# Patient Record
Sex: FEMALE | Race: WHITE | NOT HISPANIC OR LATINO | Employment: OTHER | URBAN - METROPOLITAN AREA
[De-identification: names, ages, dates, MRNs, and addresses within clinical notes are randomized per-mention and may not be internally consistent; named-entity substitution may affect disease eponyms.]

---

## 2018-03-22 ENCOUNTER — CONVERSION ENCOUNTER (OUTPATIENT)
Dept: MAMMOGRAPHY | Facility: HOSPITAL | Age: 68
End: 2018-03-22

## 2019-01-03 ENCOUNTER — HOSPITAL ENCOUNTER (OUTPATIENT)
Dept: OTHER | Facility: HOSPITAL | Age: 69
Discharge: HOME | End: 2019-01-03
Attending: FAMILY MEDICINE

## 2019-01-03 LAB
APPEARANCE UR: CLEAR
BILIRUB UR QL: NEGATIVE
COLOR UR: YELLOW
CONV COLLECTION SOURCE (UA): NORMAL
CONV UROBILINOGEN IN URINE BY AUTOMATED TEST STRIP: 0.2 {EHRLICHU}/DL (ref 0.1–1)
GLUCOSE UR QL: NEGATIVE MG/DL
HGB UR QL STRIP: NEGATIVE
KETONES UR QL STRIP: NEGATIVE MG/DL
LEUKOCYTE ESTERASE UR QL STRIP: NEGATIVE
NITRITE UR QL STRIP: NEGATIVE
PH UR STRIP.AUTO: 5.5 [PH] (ref 5–8)
PROT UR QL: NEGATIVE MG/DL
SP GR UR: 1.02 (ref 1–1.03)

## 2019-01-05 LAB — BACTERIA UR CULT: NORMAL

## 2019-01-17 ENCOUNTER — HOSPITAL ENCOUNTER (OUTPATIENT)
Dept: CT IMAGING | Facility: HOSPITAL | Age: 69
Discharge: HOME | End: 2019-01-17
Attending: FAMILY MEDICINE

## 2019-01-17 LAB
CREAT BLD-MCNC: 0.8 MG/DL (ref 0.6–1.4)
GFR SERPLBLD BASED ON 1.73 SQ M-ARVRAT: >60 ML/MIN/{1.73_M2}

## 2019-03-14 ENCOUNTER — OFFICE VISIT CONVERTED (OUTPATIENT)
Dept: GASTROENTEROLOGY | Facility: CLINIC | Age: 69
End: 2019-03-14
Attending: PHYSICIAN ASSISTANT

## 2019-03-28 ENCOUNTER — HOSPITAL ENCOUNTER (OUTPATIENT)
Dept: GASTROENTEROLOGY | Facility: HOSPITAL | Age: 69
Setting detail: HOSPITAL OUTPATIENT SURGERY
Discharge: HOME | End: 2019-03-28
Attending: INTERNAL MEDICINE

## 2019-06-06 ENCOUNTER — OFFICE VISIT CONVERTED (OUTPATIENT)
Dept: GASTROENTEROLOGY | Facility: CLINIC | Age: 69
End: 2019-06-06
Attending: PHYSICIAN ASSISTANT

## 2019-06-06 ENCOUNTER — CONVERSION ENCOUNTER (OUTPATIENT)
Dept: GASTROENTEROLOGY | Facility: CLINIC | Age: 69
End: 2019-06-06

## 2019-06-13 ENCOUNTER — HOSPITAL ENCOUNTER (OUTPATIENT)
Dept: OTHER | Facility: HOSPITAL | Age: 69
Discharge: HOME | End: 2019-06-13
Attending: FAMILY MEDICINE

## 2019-06-13 LAB
25(OH)D3 SERPL-MCNC: 21 NG/ML (ref 30–100)
ALBUMIN SERPL-MCNC: 4.3 G/DL (ref 3.5–5)
ALBUMIN/GLOB SERPL: 1.7 {RATIO} (ref 1.4–2.6)
ALP SERPL-CCNC: 62 U/L (ref 43–160)
ALT SERPL-CCNC: 13 U/L (ref 10–40)
ANION GAP SERPL CALC-SCNC: 17 MMOL/L (ref 8–19)
AST SERPL-CCNC: 15 U/L (ref 15–50)
BILIRUB SERPL-MCNC: 0.35 MG/DL (ref 0.2–1.3)
BUN SERPL-MCNC: 14 MG/DL (ref 5–25)
BUN/CREAT SERPL: 17 {RATIO} (ref 6–20)
CALCIUM SERPL-MCNC: 9.7 MG/DL (ref 8.7–10.4)
CHLORIDE SERPL-SCNC: 103 MMOL/L (ref 99–111)
CHOLEST SERPL-MCNC: 146 MG/DL (ref 107–200)
CHOLEST/HDLC SERPL: 2.4 {RATIO} (ref 3–6)
CONV CO2: 25 MMOL/L (ref 22–32)
CONV TOTAL PROTEIN: 6.9 G/DL (ref 6.3–8.2)
CREAT UR-MCNC: 0.84 MG/DL (ref 0.5–0.9)
GFR SERPLBLD BASED ON 1.73 SQ M-ARVRAT: >60 ML/MIN/{1.73_M2}
GLOBULIN UR ELPH-MCNC: 2.6 G/DL (ref 2–3.5)
GLUCOSE SERPL-MCNC: 112 MG/DL (ref 65–99)
HDLC SERPL-MCNC: 62 MG/DL (ref 40–60)
LDLC SERPL CALC-MCNC: 53 MG/DL (ref 70–100)
OSMOLALITY SERPL CALC.SUM OF ELEC: 293 MOSM/KG (ref 273–304)
POTASSIUM SERPL-SCNC: 4.3 MMOL/L (ref 3.5–5.3)
SODIUM SERPL-SCNC: 141 MMOL/L (ref 135–147)
T4 FREE SERPL-MCNC: 1.1 NG/DL (ref 0.9–1.8)
TRIGL SERPL-MCNC: 155 MG/DL (ref 40–150)
TSH SERPL-ACNC: 2.61 M[IU]/L (ref 0.27–4.2)
VLDLC SERPL-MCNC: 31 MG/DL (ref 5–37)

## 2019-08-02 ENCOUNTER — HOSPITAL ENCOUNTER (OUTPATIENT)
Dept: MAMMOGRAPHY | Facility: HOSPITAL | Age: 69
Discharge: HOME | End: 2019-08-02
Attending: FAMILY MEDICINE

## 2019-10-22 ENCOUNTER — HOSPITAL ENCOUNTER (OUTPATIENT)
Dept: OTHER | Facility: HOSPITAL | Age: 69
Discharge: HOME | End: 2019-10-22
Attending: FAMILY MEDICINE

## 2019-10-22 LAB
25(OH)D3 SERPL-MCNC: 22.9 NG/ML (ref 30–100)
ALBUMIN SERPL-MCNC: 4.7 G/DL (ref 3.5–5)
ALBUMIN/GLOB SERPL: 2 {RATIO} (ref 1.4–2.6)
ALP SERPL-CCNC: 66 U/L (ref 43–160)
ALT SERPL-CCNC: 15 U/L (ref 10–40)
ANION GAP SERPL CALC-SCNC: 18 MMOL/L (ref 8–19)
AST SERPL-CCNC: 18 U/L (ref 15–50)
BILIRUB SERPL-MCNC: 0.31 MG/DL (ref 0.2–1.3)
BUN SERPL-MCNC: 15 MG/DL (ref 5–25)
BUN/CREAT SERPL: 19 {RATIO} (ref 6–20)
CALCIUM SERPL-MCNC: 10.1 MG/DL (ref 8.7–10.4)
CHLORIDE SERPL-SCNC: 101 MMOL/L (ref 99–111)
CONV CO2: 27 MMOL/L (ref 22–32)
CONV TOTAL PROTEIN: 7.1 G/DL (ref 6.3–8.2)
CREAT UR-MCNC: 0.77 MG/DL (ref 0.5–0.9)
GFR SERPLBLD BASED ON 1.73 SQ M-ARVRAT: >60 ML/MIN/{1.73_M2}
GLOBULIN UR ELPH-MCNC: 2.4 G/DL (ref 2–3.5)
GLUCOSE SERPL-MCNC: 93 MG/DL (ref 65–99)
OSMOLALITY SERPL CALC.SUM OF ELEC: 293 MOSM/KG (ref 273–304)
POTASSIUM SERPL-SCNC: 4.7 MMOL/L (ref 3.5–5.3)
SODIUM SERPL-SCNC: 141 MMOL/L (ref 135–147)

## 2020-02-11 ENCOUNTER — HOSPITAL ENCOUNTER (OUTPATIENT)
Dept: OTHER | Facility: HOSPITAL | Age: 70
Discharge: HOME | End: 2020-02-11
Attending: FAMILY MEDICINE

## 2020-02-11 LAB
25(OH)D3 SERPL-MCNC: 18.7 NG/ML (ref 30–100)
ALBUMIN SERPL-MCNC: 4.4 G/DL (ref 3.5–5)
ALBUMIN/GLOB SERPL: 1.6 {RATIO} (ref 1.4–2.6)
ALP SERPL-CCNC: 65 U/L (ref 43–160)
ALT SERPL-CCNC: 13 U/L (ref 10–40)
ANION GAP SERPL CALC-SCNC: 17 MMOL/L (ref 8–19)
AST SERPL-CCNC: 16 U/L (ref 15–50)
BILIRUB SERPL-MCNC: 0.17 MG/DL (ref 0.2–1.3)
BUN SERPL-MCNC: 17 MG/DL (ref 5–25)
BUN/CREAT SERPL: 23 {RATIO} (ref 6–20)
CALCIUM SERPL-MCNC: 9.6 MG/DL (ref 8.7–10.4)
CHLORIDE SERPL-SCNC: 98 MMOL/L (ref 99–111)
CONV CO2: 25 MMOL/L (ref 22–32)
CONV TOTAL PROTEIN: 7.1 G/DL (ref 6.3–8.2)
CREAT UR-MCNC: 0.75 MG/DL (ref 0.5–0.9)
GFR SERPLBLD BASED ON 1.73 SQ M-ARVRAT: >60 ML/MIN/{1.73_M2}
GLOBULIN UR ELPH-MCNC: 2.7 G/DL (ref 2–3.5)
GLUCOSE SERPL-MCNC: 107 MG/DL (ref 65–99)
OSMOLALITY SERPL CALC.SUM OF ELEC: 284 MOSM/KG (ref 273–304)
POTASSIUM SERPL-SCNC: 4.1 MMOL/L (ref 3.5–5.3)
SODIUM SERPL-SCNC: 136 MMOL/L (ref 135–147)

## 2020-03-19 ENCOUNTER — HOSPITAL ENCOUNTER (OUTPATIENT)
Dept: URGENT CARE | Facility: CLINIC | Age: 70
Discharge: HOME | End: 2020-03-19
Attending: FAMILY MEDICINE

## 2020-03-21 LAB — BACTERIA SPEC AEROBE CULT: NORMAL

## 2020-05-07 ENCOUNTER — HOSPITAL ENCOUNTER (OUTPATIENT)
Dept: FAMILY MEDICINE CLINIC | Facility: CLINIC | Age: 70
Discharge: HOME | End: 2020-05-07
Attending: NURSE PRACTITIONER

## 2020-05-07 ENCOUNTER — OFFICE VISIT CONVERTED (OUTPATIENT)
Dept: FAMILY MEDICINE CLINIC | Facility: CLINIC | Age: 70
End: 2020-05-07
Attending: NURSE PRACTITIONER

## 2020-05-07 ENCOUNTER — CONVERSION ENCOUNTER (OUTPATIENT)
Dept: OTHER | Facility: HOSPITAL | Age: 70
End: 2020-05-07

## 2020-05-07 ENCOUNTER — HOSPITAL ENCOUNTER (OUTPATIENT)
Dept: OTHER | Facility: HOSPITAL | Age: 70
Discharge: HOME | End: 2020-05-07
Attending: NURSE PRACTITIONER

## 2020-05-07 LAB
25(OH)D3 SERPL-MCNC: 25.1 NG/ML (ref 30–100)
ALBUMIN SERPL-MCNC: 4.4 G/DL (ref 3.5–5)
ALBUMIN/GLOB SERPL: 1.2 {RATIO} (ref 1.4–2.6)
ALP SERPL-CCNC: 72 U/L (ref 43–160)
ALT SERPL-CCNC: 14 U/L (ref 10–40)
ANION GAP SERPL CALC-SCNC: 18 MMOL/L (ref 8–19)
AST SERPL-CCNC: 16 U/L (ref 15–50)
BASOPHILS # BLD AUTO: 0.09 10*3/UL (ref 0–0.2)
BASOPHILS NFR BLD AUTO: 0.7 % (ref 0–3)
BILIRUB SERPL-MCNC: 0.2 MG/DL (ref 0.2–1.3)
BUN SERPL-MCNC: 18 MG/DL (ref 5–25)
BUN/CREAT SERPL: 22 {RATIO} (ref 6–20)
CALCIUM SERPL-MCNC: 10.3 MG/DL (ref 8.7–10.4)
CHLORIDE SERPL-SCNC: 99 MMOL/L (ref 99–111)
CHOLEST SERPL-MCNC: 171 MG/DL (ref 107–200)
CHOLEST/HDLC SERPL: 2.6 {RATIO} (ref 3–6)
CONV ABS IMM GRAN: 0.09 10*3/UL (ref 0–0.2)
CONV CO2: 24 MMOL/L (ref 22–32)
CONV IMMATURE GRAN: 0.7 % (ref 0–1.8)
CONV TOTAL PROTEIN: 8.2 G/DL (ref 6.3–8.2)
CREAT UR-MCNC: 0.82 MG/DL (ref 0.5–0.9)
DEPRECATED RDW RBC AUTO: 46.8 FL (ref 36.4–46.3)
EOSINOPHIL # BLD AUTO: 0.11 10*3/UL (ref 0–0.7)
EOSINOPHIL # BLD AUTO: 0.9 % (ref 0–7)
ERYTHROCYTE [DISTWIDTH] IN BLOOD BY AUTOMATED COUNT: 13.5 % (ref 11.7–14.4)
GFR SERPLBLD BASED ON 1.73 SQ M-ARVRAT: >60 ML/MIN/{1.73_M2}
GLOBULIN UR ELPH-MCNC: 3.8 G/DL (ref 2–3.5)
GLUCOSE SERPL-MCNC: 86 MG/DL (ref 65–99)
HCT VFR BLD AUTO: 49.7 % (ref 37–47)
HDLC SERPL-MCNC: 66 MG/DL (ref 40–60)
HGB BLD-MCNC: 16.2 G/DL (ref 12–16)
LDLC SERPL CALC-MCNC: 52 MG/DL (ref 70–100)
LYMPHOCYTES # BLD AUTO: 3.6 10*3/UL (ref 1–5)
LYMPHOCYTES NFR BLD AUTO: 27.8 % (ref 20–45)
MCH RBC QN AUTO: 30.8 PG (ref 27–31)
MCHC RBC AUTO-ENTMCNC: 32.6 G/DL (ref 33–37)
MCV RBC AUTO: 94.5 FL (ref 81–99)
MONOCYTES # BLD AUTO: 0.82 10*3/UL (ref 0.2–1.2)
MONOCYTES NFR BLD AUTO: 6.3 % (ref 3–10)
NEUTROPHILS # BLD AUTO: 8.22 10*3/UL (ref 2–8)
NEUTROPHILS NFR BLD AUTO: 63.6 % (ref 30–85)
NRBC CBCN: 0 % (ref 0–0.7)
OSMOLALITY SERPL CALC.SUM OF ELEC: 285 MOSM/KG (ref 273–304)
PLATELET # BLD AUTO: 207 10*3/UL (ref 130–400)
PMV BLD AUTO: 12.6 FL (ref 9.4–12.3)
POTASSIUM SERPL-SCNC: 4.3 MMOL/L (ref 3.5–5.3)
RBC # BLD AUTO: 5.26 10*6/UL (ref 4.2–5.4)
SODIUM SERPL-SCNC: 137 MMOL/L (ref 135–147)
TRIGL SERPL-MCNC: 267 MG/DL (ref 40–150)
TSH SERPL-ACNC: 1.84 M[IU]/L (ref 0.27–4.2)
VLDLC SERPL-MCNC: 53 MG/DL (ref 5–37)
WBC # BLD AUTO: 12.93 10*3/UL (ref 4.8–10.8)

## 2020-05-10 LAB — SARS-COV-2 RNA SPEC QL NAA+PROBE: NOT DETECTED

## 2020-06-30 ENCOUNTER — TELEMEDICINE CONVERTED (OUTPATIENT)
Dept: FAMILY MEDICINE CLINIC | Facility: CLINIC | Age: 70
End: 2020-06-30
Attending: NURSE PRACTITIONER

## 2020-08-13 ENCOUNTER — TELEMEDICINE CONVERTED (OUTPATIENT)
Dept: FAMILY MEDICINE CLINIC | Facility: CLINIC | Age: 70
End: 2020-08-13
Attending: NURSE PRACTITIONER

## 2020-09-02 ENCOUNTER — HOSPITAL ENCOUNTER (OUTPATIENT)
Dept: MAMMOGRAPHY | Facility: HOSPITAL | Age: 70
Discharge: HOME | End: 2020-09-02
Attending: NURSE PRACTITIONER

## 2020-09-17 ENCOUNTER — HOSPITAL ENCOUNTER (OUTPATIENT)
Dept: FAMILY MEDICINE CLINIC | Facility: CLINIC | Age: 70
Discharge: HOME | End: 2020-09-17
Attending: NURSE PRACTITIONER

## 2020-09-17 ENCOUNTER — OFFICE VISIT CONVERTED (OUTPATIENT)
Dept: FAMILY MEDICINE CLINIC | Facility: CLINIC | Age: 70
End: 2020-09-17
Attending: NURSE PRACTITIONER

## 2020-09-17 LAB
ALBUMIN SERPL-MCNC: 4.2 G/DL (ref 3.5–5)
ALBUMIN/GLOB SERPL: 1.3 {RATIO} (ref 1.4–2.6)
ALP SERPL-CCNC: 61 U/L (ref 43–160)
ALT SERPL-CCNC: 16 U/L (ref 10–40)
ANION GAP SERPL CALC-SCNC: 17 MMOL/L (ref 8–19)
AST SERPL-CCNC: 20 U/L (ref 15–50)
BASOPHILS # BLD AUTO: 0.05 10*3/UL (ref 0–0.2)
BASOPHILS NFR BLD AUTO: 0.5 % (ref 0–3)
BILIRUB SERPL-MCNC: 0.26 MG/DL (ref 0.2–1.3)
BUN SERPL-MCNC: 16 MG/DL (ref 5–25)
BUN/CREAT SERPL: 18 {RATIO} (ref 6–20)
CALCIUM SERPL-MCNC: 9.6 MG/DL (ref 8.7–10.4)
CHLORIDE SERPL-SCNC: 101 MMOL/L (ref 99–111)
CHOLEST SERPL-MCNC: 161 MG/DL (ref 107–200)
CHOLEST/HDLC SERPL: 2.7 {RATIO} (ref 3–6)
CONV ABS IMM GRAN: 0.03 10*3/UL (ref 0–0.2)
CONV CO2: 26 MMOL/L (ref 22–32)
CONV IMMATURE GRAN: 0.3 % (ref 0–1.8)
CONV TOTAL PROTEIN: 7.4 G/DL (ref 6.3–8.2)
CREAT UR-MCNC: 0.87 MG/DL (ref 0.5–0.9)
DEPRECATED RDW RBC AUTO: 46 FL (ref 36.4–46.3)
EOSINOPHIL # BLD AUTO: 0.13 10*3/UL (ref 0–0.7)
EOSINOPHIL # BLD AUTO: 1.3 % (ref 0–7)
ERYTHROCYTE [DISTWIDTH] IN BLOOD BY AUTOMATED COUNT: 13.1 % (ref 11.7–14.4)
GFR SERPLBLD BASED ON 1.73 SQ M-ARVRAT: >60 ML/MIN/{1.73_M2}
GLOBULIN UR ELPH-MCNC: 3.2 G/DL (ref 2–3.5)
GLUCOSE SERPL-MCNC: 104 MG/DL (ref 65–99)
HCT VFR BLD AUTO: 47.4 % (ref 37–47)
HDLC SERPL-MCNC: 60 MG/DL (ref 40–60)
HGB BLD-MCNC: 15.3 G/DL (ref 12–16)
LDLC SERPL CALC-MCNC: 28 MG/DL (ref 70–100)
LYMPHOCYTES # BLD AUTO: 3.97 10*3/UL (ref 1–5)
LYMPHOCYTES NFR BLD AUTO: 39.2 % (ref 20–45)
MCH RBC QN AUTO: 31 PG (ref 27–31)
MCHC RBC AUTO-ENTMCNC: 32.3 G/DL (ref 33–37)
MCV RBC AUTO: 96 FL (ref 81–99)
MONOCYTES # BLD AUTO: 0.63 10*3/UL (ref 0.2–1.2)
MONOCYTES NFR BLD AUTO: 6.2 % (ref 3–10)
NEUTROPHILS # BLD AUTO: 5.31 10*3/UL (ref 2–8)
NEUTROPHILS NFR BLD AUTO: 52.5 % (ref 30–85)
NRBC CBCN: 0 % (ref 0–0.7)
OSMOLALITY SERPL CALC.SUM OF ELEC: 291 MOSM/KG (ref 273–304)
PLATELET # BLD AUTO: 202 10*3/UL (ref 130–400)
PMV BLD AUTO: 12.3 FL (ref 9.4–12.3)
POTASSIUM SERPL-SCNC: 3.9 MMOL/L (ref 3.5–5.3)
RBC # BLD AUTO: 4.94 10*6/UL (ref 4.2–5.4)
SODIUM SERPL-SCNC: 140 MMOL/L (ref 135–147)
TRIGL SERPL-MCNC: 363 MG/DL (ref 40–150)
TSH SERPL-ACNC: 1.3 M[IU]/L (ref 0.27–4.2)
VLDLC SERPL-MCNC: 73 MG/DL (ref 5–37)
WBC # BLD AUTO: 10.12 10*3/UL (ref 4.8–10.8)

## 2020-09-18 LAB — 25(OH)D3 SERPL-MCNC: 24.8 NG/ML (ref 30–100)

## 2020-10-15 ENCOUNTER — HOSPITAL ENCOUNTER (OUTPATIENT)
Dept: MAMMOGRAPHY | Facility: HOSPITAL | Age: 70
Discharge: HOME | End: 2020-10-15
Attending: NURSE PRACTITIONER

## 2020-12-07 ENCOUNTER — CONVERSION ENCOUNTER (OUTPATIENT)
Dept: FAMILY MEDICINE CLINIC | Facility: CLINIC | Age: 70
End: 2020-12-07

## 2020-12-07 ENCOUNTER — HOSPITAL ENCOUNTER (OUTPATIENT)
Dept: FAMILY MEDICINE CLINIC | Facility: CLINIC | Age: 70
Discharge: HOME | End: 2020-12-07
Attending: NURSE PRACTITIONER

## 2020-12-07 ENCOUNTER — TELEPHONE CONVERTED (OUTPATIENT)
Dept: FAMILY MEDICINE CLINIC | Facility: CLINIC | Age: 70
End: 2020-12-07
Attending: NURSE PRACTITIONER

## 2020-12-10 LAB — SARS-COV-2 RNA SPEC QL NAA+PROBE: NOT DETECTED

## 2021-02-04 ENCOUNTER — TELEMEDICINE CONVERTED (OUTPATIENT)
Dept: FAMILY MEDICINE CLINIC | Facility: CLINIC | Age: 71
End: 2021-02-04
Attending: NURSE PRACTITIONER

## 2021-02-04 ENCOUNTER — HOSPITAL ENCOUNTER (OUTPATIENT)
Dept: GENERAL RADIOLOGY | Facility: HOSPITAL | Age: 71
Discharge: HOME | End: 2021-02-04
Attending: NURSE PRACTITIONER

## 2021-04-26 ENCOUNTER — CONVERSION ENCOUNTER (OUTPATIENT)
Dept: FAMILY MEDICINE CLINIC | Facility: CLINIC | Age: 71
End: 2021-04-26

## 2021-04-26 ENCOUNTER — HOSPITAL ENCOUNTER (OUTPATIENT)
Dept: FAMILY MEDICINE CLINIC | Facility: CLINIC | Age: 71
Discharge: HOME | End: 2021-04-26
Attending: NURSE PRACTITIONER

## 2021-04-26 ENCOUNTER — OFFICE VISIT CONVERTED (OUTPATIENT)
Dept: FAMILY MEDICINE CLINIC | Facility: CLINIC | Age: 71
End: 2021-04-26
Attending: NURSE PRACTITIONER

## 2021-04-26 LAB
AMPHET UR QL CFM: NEGATIVE
BARBITURATES UR QL: POSITIVE
BENZODIAZ UR QL SCN: NEGATIVE
CONV AMP/METHAMP UR: NEGATIVE
CONV COCAINE, UR: NEGATIVE
MDMA UR QL SCN: NEGATIVE
METHADONE UR QL SCN: NEGATIVE
OPIATES UR QL SCN: NEGATIVE
OXYCODONE UR QL SCN: NEGATIVE
PCP UR QL: NEGATIVE
THC SERPLBLD CFM-MCNC: NEGATIVE NG/ML

## 2021-04-27 ENCOUNTER — OFFICE VISIT CONVERTED (OUTPATIENT)
Dept: FAMILY MEDICINE CLINIC | Facility: CLINIC | Age: 71
End: 2021-04-27
Attending: NURSE PRACTITIONER

## 2021-04-27 LAB
25(OH)D3 SERPL-MCNC: 25.6 NG/ML (ref 30–100)
ALBUMIN SERPL-MCNC: 4.2 G/DL (ref 3.5–5)
ALBUMIN/GLOB SERPL: 1.1 {RATIO} (ref 1.4–2.6)
ALP SERPL-CCNC: 59 U/L (ref 43–160)
ALT SERPL-CCNC: 13 U/L (ref 10–40)
ANION GAP SERPL CALC-SCNC: 18 MMOL/L (ref 8–19)
AST SERPL-CCNC: 20 U/L (ref 15–50)
BASOPHILS # BLD AUTO: 0.08 10*3/UL (ref 0–0.2)
BASOPHILS NFR BLD AUTO: 0.5 % (ref 0–3)
BILIRUB SERPL-MCNC: 0.3 MG/DL (ref 0.2–1.3)
BUN SERPL-MCNC: 16 MG/DL (ref 5–25)
BUN/CREAT SERPL: 20 {RATIO} (ref 6–20)
CALCIUM SERPL-MCNC: 10.1 MG/DL (ref 8.7–10.4)
CHLORIDE SERPL-SCNC: 102 MMOL/L (ref 99–111)
CHOLEST SERPL-MCNC: 147 MG/DL (ref 107–200)
CHOLEST/HDLC SERPL: 2.5 {RATIO} (ref 3–6)
CONV ABS IMM GRAN: 0.06 10*3/UL (ref 0–0.2)
CONV CO2: 23 MMOL/L (ref 22–32)
CONV IMMATURE GRAN: 0.4 % (ref 0–1.8)
CONV TOTAL PROTEIN: 8.2 G/DL (ref 6.3–8.2)
CREAT UR-MCNC: 0.8 MG/DL (ref 0.5–0.9)
DEPRECATED RDW RBC AUTO: 48.4 FL (ref 36.4–46.3)
EOSINOPHIL # BLD AUTO: 0.19 10*3/UL (ref 0–0.7)
EOSINOPHIL # BLD AUTO: 1.2 % (ref 0–7)
ERYTHROCYTE [DISTWIDTH] IN BLOOD BY AUTOMATED COUNT: 14.6 % (ref 11.7–14.4)
GFR SERPLBLD BASED ON 1.73 SQ M-ARVRAT: >60 ML/MIN/{1.73_M2}
GLOBULIN UR ELPH-MCNC: 4 G/DL (ref 2–3.5)
GLUCOSE SERPL-MCNC: 100 MG/DL (ref 65–99)
HCT VFR BLD AUTO: 45.6 % (ref 37–47)
HDLC SERPL-MCNC: 58 MG/DL (ref 40–60)
HGB BLD-MCNC: 14.6 G/DL (ref 12–16)
LDLC SERPL CALC-MCNC: 49 MG/DL (ref 70–100)
LYMPHOCYTES # BLD AUTO: 5.43 10*3/UL (ref 1–5)
LYMPHOCYTES NFR BLD AUTO: 32.9 % (ref 20–45)
MCH RBC QN AUTO: 29.1 PG (ref 27–31)
MCHC RBC AUTO-ENTMCNC: 32 G/DL (ref 33–37)
MCV RBC AUTO: 90.8 FL (ref 81–99)
MONOCYTES # BLD AUTO: 1.11 10*3/UL (ref 0.2–1.2)
MONOCYTES NFR BLD AUTO: 6.7 % (ref 3–10)
NEUTROPHILS # BLD AUTO: 9.65 10*3/UL (ref 2–8)
NEUTROPHILS NFR BLD AUTO: 58.3 % (ref 30–85)
NRBC CBCN: 0 % (ref 0–0.7)
OSMOLALITY SERPL CALC.SUM OF ELEC: 287 MOSM/KG (ref 273–304)
PLATELET # BLD AUTO: 188 10*3/UL (ref 130–400)
PMV BLD AUTO: 12.7 FL (ref 9.4–12.3)
POTASSIUM SERPL-SCNC: 4.6 MMOL/L (ref 3.5–5.3)
RBC # BLD AUTO: 5.02 10*6/UL (ref 4.2–5.4)
SODIUM SERPL-SCNC: 138 MMOL/L (ref 135–147)
TRIGL SERPL-MCNC: 202 MG/DL (ref 40–150)
TSH SERPL-ACNC: 1.9 M[IU]/L (ref 0.27–4.2)
VLDLC SERPL-MCNC: 40 MG/DL (ref 5–37)
WBC # BLD AUTO: 16.52 10*3/UL (ref 4.8–10.8)

## 2021-04-30 ENCOUNTER — TELEMEDICINE CONVERTED (OUTPATIENT)
Dept: FAMILY MEDICINE CLINIC | Facility: CLINIC | Age: 71
End: 2021-04-30
Attending: NURSE PRACTITIONER

## 2021-05-07 ENCOUNTER — HOSPITAL ENCOUNTER (OUTPATIENT)
Dept: FAMILY MEDICINE CLINIC | Facility: CLINIC | Age: 71
Discharge: HOME | End: 2021-05-07
Attending: NURSE PRACTITIONER

## 2021-05-07 ENCOUNTER — CONVERSION ENCOUNTER (OUTPATIENT)
Dept: FAMILY MEDICINE CLINIC | Facility: CLINIC | Age: 71
End: 2021-05-07

## 2021-05-07 ENCOUNTER — OFFICE VISIT CONVERTED (OUTPATIENT)
Dept: FAMILY MEDICINE CLINIC | Facility: CLINIC | Age: 71
End: 2021-05-07
Attending: NURSE PRACTITIONER

## 2021-05-09 LAB — BACTERIA UR CULT: NORMAL

## 2021-05-10 NOTE — H&P
"   History and Physical      Patient Name: Dorothea Cruz   Patient ID: [id number]   Sex: Female   YOB: 1950    Referring Provider: Shital Owens MD    Visit Date: May 7, 2020    Provider: ARA Asif   Location: [facility]   Location Address: [address] [facility] [place]  [id number]   Location Phone: [phone]          Chief Complaint     Establish care       History Of Present Illness  Dorothea Cruz is a 70 year old /White female who presents for evaluation and treatment of:      est care.  fever, cough, \"lungs hurt\" shortness of breath. Started about a month ago. Is getting worse, not better. She went to urgent care, was given steroids, abx and an inhaler. She has a \"terrible headache\", sorethroat, aches, productive cough and wheezing. Denies hemoptysis.    HTN- needs refill on lisinopril    HLP- on crestor    anxiety/depression, takes cymbalta and prozac    tremors, was seen in the past by dr thomas, on primadone    vit. d def.- she is on weekly supplements.    IBS, on bentyl as needed    back pain, she has gapapentin, does not need refills    she would like pneumo vax when she is feeling better  she is due a bone density   due hep c screen       Past Medical History  Disease Name Date Onset Notes   Arthritis --  --    Depression --  --    Diverticulitis --  --    High cholesterol --  --    Major depressive disorder 05/07/2020 --    Sinus trouble --  --    Sleep apnea --  --          Past Surgical History  Procedure Name Date Notes   Colonoscopy 2019 --    Hysterectomy --  --          Medication List  Name Date Started Instructions   Crestor 20 mg oral tablet  take 1 tablet (20 mg) by oral route once daily   dicyclomine 20 mg oral tablet 05/07/2020 take 1 tablet (20 mg) by oral route 3 times per day prn abd pain   duloxetine 20 mg oral capsule,delayed release(/EC)  take 1 capsule by oral route daily   fluoxetine 40 mg oral capsule 05/07/2020 " take 1 capsule (40 mg) by oral route once daily in the evening   gabapentin 600 mg oral tablet  take 1 tablet (600 mg) by oral route 3 times per day   lisinopril 5 mg oral tablet  take 1 tablet (5 mg) by oral route once daily   primidone 50 mg oral tablet  take 1 tablet by oral route once a day (at bedtime)   Vitamin D2 1,250 mcg (50,000 unit) oral capsule  take 1 capsule by oral route         Allergy List  Allergen Name Date Reaction Notes   Bactrim --  --  --        Allergies Reconciled  Family Medical History  Disease Name Relative/Age Notes   Family history of breast cancer Sister/   Sister         Social History  Finding Status Start/Stop Quantity Notes   Alcohol Never --/-- --  does not drink   lives with spouse --  --/-- --  --     --  --/-- --  --    Tobacco Never --/-- --  never smoker   Working --  --/-- --  --          Review of Systems  · Constitutional  o Admits  o : fever, fatigue  · HENT  o Admits  o : headaches, sore throat  · Cardiovascular  o Denies  o : lower extremity edema, chest pressure, palpitations  · Respiratory  o Admits  o : cough, wheezing, shortness of breath  · Gastrointestinal  o Denies  o : nausea, vomiting, diarrhea, constipation, abdominal pain  · Psychiatric  o Admits  o : anxiety, depression  o Denies  o : suicidal ideation, homicidal ideation      Physical Examination  · Constitutional  o Appearance  o : well developed, well-nourished, no acute distress  · Ears, Nose, Mouth and Throat  o Ears  o :   § External Ears  § : external auditory canal appearance normal, no discharge present  § Otoscopic Examination  § : tympanic membranes pearly white/gray bilaterally  o Nose  o :   § External Nose  § : no lesions noted  § Intranasal Exam  § : nasal mucosa light pink, no intranasal lesions present, nares patent  § Nasopharynx  § : no discharge present  o Oral Cavity  o :   § Oral Mucosa  § : oral mucosa light pink  o Throat  o :   § Oropharynx  § : tonsils without exudate, no  palatal petechiae  · Neck  o Inspection/Palpation  o : normal appearance, no masses or tenderness, trachea midline  o Thyroid  o : gland size normal, nontender, no nodules or masses present on palpation  · Respiratory  o Respiratory Effort  o : breathing unlabored  o Inspection of Chest  o : chest rise symmetric bilaterally  o Auscultation of Lungs  o : crackles, bilateral lung bases  · Cardiovascular  o Heart  o :   § Auscultation of Heart  § : regular rate and rhythm, no murmurs, gallops or rubs  o Peripheral Vascular System  o :   § Extremities  § : no edema  · Lymphatic  o Neck  o : no cervical lymphadenopathy, no supraclavicular lymphadenopathy  · Psychiatric  o Mood and Affect  o : mood normal, affect appropriate          Results  · In-Office Procedures  o Lab procedure  § IOP - Influenza A/B Test (32285)   § Influenza A: Negative   § Influenza B: Negative   § Internal Control Verified?: Yes       Assessment  · Cough     786.2/R05  · Essential hypertension     401.9/I10  · Fatigue     780.79/R53.83  · Headache     784.0/R51  · Hyperlipidemia     272.4/E78.5  · Major depressive disorder     296.20/F32.2  · Vitamin D deficiency     268.9/E55.9  · Complex care coordination     V65.49/Z71.89  · Screening for depression     V79.0/Z13.89  · Establishing care with new doctor, encounter for     V65.8/Z76.89  · Fever     780.60/R50.9  · Wheezing     786.07/R06.2  · Chronic pain     338.29/G89.29  · Tremors of nervous system     781.0/R25.1  · Medication monitoring encounter     V58.83/Z51.81  · IBS (irritable bowel syndrome)     564.1/K58.9      Plan  · Orders  o CARE PLAN COMPLETED (CARE) - V65.49/Z71.89 - 05/07/2020  o Annual depression screening, 15 minutes (, 83163) - V79.0/Z13.89 - 05/07/2020  o ACO-18: Negative screen for clinical depression using a standardized tool () - V79.0/Z13.89 - 05/07/2020  o Physical, Primary Care Panel (CBC, CMP, Lipid, TSH) [facility] (92727, 49829, 84879, 07737) - -  05/07/2020  o Vitamin D (25-Hydroxy) Level (55932) - - 05/07/2020  o ACO-39: Current medications updated and reviewed () - - 05/07/2020  o CXR PA/Lat [facility] (88449) - 786.2/R05, 780.60/R50.9 - 05/07/2020  · Medications  o fluoxetine 40 mg oral capsule   SIG: take 1 capsule (40 mg) by oral route once daily in the evening   DISP: (90) capsule with 1 refills  Prescribed on 05/07/2020     o dicyclomine 20 mg oral tablet   SIG: take 1 tablet (20 mg) by oral route 3 times per day prn abd pain   DISP: (90) tablets with 1 refills  Refilled on 05/07/2020     o Medications have been Reconciled  o Transition of Care or Provider Policy  · Instructions  o Advised that cheeses and other sources of dairy fats, animal fats, fast food, and the extras (candy, pastries, pies, doughnuts and cookies) all contain LDL raising nutrients. Advised to increase fruits, vegetables, whole grains, and to monitor portion sizes.   o Depression Screen completed and scanned into the EMR under the designated folder within the patient's documents.  o Today's PHQ-9 result is 0___  o The provider screening met the required time of 15 minutes.  o Take all medications as prescribed/directed.  o Rest. Increase Fluids.  o Patient was educated/instructed on their diagnosis, treatment and medications prior to discharge from the clinic today.  o Patient instructed to seek medical attention urgently for new or worsening symptoms.  o Call the office with any concerns or questions.  o Minutes spent with patient including greater than 50% in Education/Counseling/Care Coordination.  o Time spent with the patient was minutes, more than 50% face to face.  o Chronic conditions reviewed and taken into consideration for today's treatment plan.  o will r/o covid and do a chest xray, call pt with results. She needs annual wellness visit, she can reschedule that in the next few months. Check labs and call with results, refill meds.   · Disposition  o Call  or Return if symptoms worsen or persist.  o F/U appt in 1 month            Electronically Signed by: ARA Asif -Author on May 7, 2020 02:34:11 PM

## 2021-05-13 NOTE — PROGRESS NOTES
Progress Note      Patient Name: Dorothea Cruz   Patient ID: [id number]   Sex: Female   YOB: 1950    Primary Care Provider: Arelis BULLOCK   Referring Provider: Shital Owens MD    Visit Date: December 7, 2020    Provider: ARA Asif   Location: [facility]   Location Address: [address] [place] [address]  [id number]   Location Phone: [phone]          Chief Complaint  · discuss increase in med  · sinus congestion      History Of Present Illness  TELEHEALTH TELEPHONE VISIT  Dorothea Cruz is a 70 year old /White female who is presenting for evaluation via telehealth telephone visit. Verbal consent obtained before beginning visit.   Provider spent 12 minutes with patient during telehealth visit.   The following staff were present during this visit: sm   Past Medical History/Overview of Patient Symptoms  Dorothea Cruz is a 70 year old /White female who presents for evaluation and treatment of:      sinus congestion, body aches, fatigue, cough. Symptoms started about 5 days ago. She is taking nyquil otc. Willing to covid test. No shortness of breath. No wheezing.    FM- she stopped the neurontin because she accidentally washed her prescription in her pants pocket and she has done ok without it. She is on cymbalta and it's helped but she would like the dose increased.       Past Medical History  Disease Name Date Onset Notes   Arthritis --  --    Depression --  --    Diverticulitis --  --    Essential hypertension 09/17/2020 --    Hyperlipidemia 09/17/2020 --    Major depressive disorder 05/07/2020 --    Post-menopause 09/17/2020 --    Sinus trouble --  --    Sleep apnea --  --    Vitamin D deficiency 09/17/2020 --          Past Surgical History  Procedure Name Date Notes   Colonoscopy 2019 --    Hysterectomy --  --          Medication List  Name Date Started Instructions   Crestor 20 mg oral tablet  10/27/2020 take 1 tablet (20 mg) by oral route once daily for 90 days   dicyclomine 20 mg oral tablet 09/17/2020 take 1 tablet (20 mg) by oral route 3 times per day prn abd pain   duloxetine 30 mg oral capsule,delayed release(DR/EC) 10/30/2020 take 1 capsule (30 mg) by oral route once daily for 30 days   Flonase Allergy Relief 50 mcg/actuation nasal spray,suspension 09/17/2020 inhale 2 sprays (100 mcg) in each nostril by intranasal route once daily as needed   fluoxetine 40 mg oral capsule 08/05/2020 take 1 capsule (40 mg) by oral route once daily in the evening   gabapentin 600 mg oral tablet 09/17/2020 take 1 tablet (600 mg) by oral route 3 times per day for 90 days   lisinopril 5 mg oral tablet 09/17/2020 take 1 tablet (5 mg) by oral route once daily for 90 days   Premarin 0.625 mg/gram vaginal cream 09/24/2020 as directed topically   primidone 50 mg oral tablet  take 1 tablet by oral route once a day (at bedtime)   Ventolin HFA 90 mcg/actuation inhalation HFA aerosol inhaler 05/08/2020 inhale 1 puff (90 mcg) by inhalation route every 4 hours as needed   Vitamin D2 1,250 mcg (50,000 unit) oral capsule 09/17/2020 1 capsule by mouth once a week         Allergy List  Allergen Name Date Reaction Notes   Bactrim --  --  --        Allergies Reconciled  Family Medical History  Disease Name Relative/Age Notes   Family history of breast cancer Sister/   Sister         Social History  Finding Status Start/Stop Quantity Notes   Alcohol Never --/-- --  does not drink    --  --/-- --  --    Tobacco Never --/-- --  never smoker   Working --  --/-- --  --          Immunizations  NameDate Admin Mfg Trade Name Lot Number Route Inj VIS Given VIS Publication   Liaqwfipl72/17/2020 SK[employer] Fluarix, quadrivalent, preservative free [id number] IM LD 09/17/2020    Comments: Patient tolerated well   Prevnar 1309/17/2020 [facility] PREVNAR 13 [id number] IM RD 09/17/2020    Comments: Pateint tolerated well         Review of  Systems  · Constitutional  o Admits  o : body aches, fatigue  o Denies  o : fever, weight loss, weight gain  · HENT  o Admits  o : nasal congestion, PND  · Cardiovascular  o Denies  o : lower extremity edema, claudication, chest pressure, palpitations  · Respiratory  o Denies  o : shortness of breath, wheezing, cough, hemoptysis, dyspnea on exertion  · Gastrointestinal  o Denies  o : nausea, vomiting, diarrhea, constipation, abdominal pain  · Musculoskeletal  o Admits  o : joint pain  · Psychiatric  o Admits  o : anxiety, depression  o Denies  o : suicidal ideation, homicidal ideation          Assessment  · Body aches     780.96/R52  · Viral URI with cough     465.9/J06.9  · Fibromyalgia     729.1/M79.7  · Medication course changed     V58.69/Z79.899      Plan  · Orders  o ACO-39: Current medications updated and reviewed (1159F, ) - - 12/07/2020  o Physician Telephone Evaluation, 11-20 minutes (10217) - - 12/07/2020  o [facility] NCOV2 (send-out) (66834) - 780.96/R52, 465.9/J06.9 - 12/07/2020  · Medications  o doxycycline monohydrate 100 mg oral capsule   SIG: take 1 capsule (100 mg) by oral route 2 times per day   DISP: (20) Capsule with 0 refills  Prescribed on 12/07/2020     o duloxetine 60 mg oral capsule,delayed release(DR/EC)   SIG: take 1 capsule (60 mg) by oral route once daily for 30 days   DISP: (30) Capsule with 5 refills  Adjusted on 12/07/2020     o Medications have been Reconciled  o Transition of Care or Provider Policy  · Instructions  o Plan Of Care: she is going to come by for a covid test, symptomatic tx, quarantine and when to seek emergency care was discussed.   o Chronic conditions reviewed and taken into consideration for today's treatment plan.  o Patient instructed to seek medical attention urgently for new or worsening symptoms.  o Take all medications as prescribed/directed.  o Rest. Increase Fluids.  o Call the office with any concerns or questions.  · Disposition  o Call or  Return if symptoms worsen or persist.            Electronically Signed by: ARA Asif -Author on December 7, 2020 08:24:08 AM

## 2021-05-13 NOTE — PROGRESS NOTES
Progress Note      Patient Name: Dorothea Cruz   Patient ID: [id number]   Sex: Female   YOB: 1950    Primary Care Provider: Arelis BULLOCK    Visit Date: September 17, 2020    Provider: ARA Asif   Location: [facility]   Location Address: [address] [place] [place]  [id number]   Location Phone: [phone]          Chief Complaint  · Physical      History Of Present Illness  Dorothea Cruz is a 70 year old /White female who presents for evaluation and treatment of:      For annual physical, overall she is doing well.  She does complain of vaginal itch, she is postmenopausal.  She is taking several over-the-counter treatments with little improvement with the miconazole.    nasal congestion, she has had history of septal deviation and subsequent surgery.  She wonders if she needs to get back into see ENT.  She uses a Isabelle pot, she does not use any nasal spray or allergy medicine.    IBS, controlled with dicyclomine    Fibromyalgia she is on low-dose Cymbalta 20 mg.  She says she hurts every day.  She also takes gabapentin 600 mg 3 times a day.    Anxiety and depression, she is on fluoxetine 40    Hypertension lisinopril 5mg, stable    Vitamin D deficiency she is taking a weekly supplement.    familial tremors, currently on primidone from neurology.    She reports she had colonoscopy 3 years ago.    She reports she had hepatitis C screen which was negative with her previous provider.    She is due for flu shot and Prevnar 13.    She reports no falls, she is not interested in paperwork for living will.    She is due for Pap smear, she is current on her mammogram.       Past Medical History  Disease Name Date Onset Notes   Arthritis --  --    Depression --  --    Diverticulitis --  --    Essential hypertension 09/17/2020 --    High cholesterol --  --    Hyperlipidemia 09/17/2020 --    Major depressive disorder 05/07/2020  --    Post-menopause 09/17/2020 --    Sinus trouble --  --    Sleep apnea --  --    Vitamin D deficiency 09/17/2020 --          Past Surgical History  Procedure Name Date Notes   Colonoscopy 2019 --    Hysterectomy --  --          Medication List  Name Date Started Instructions   Crestor 20 mg oral tablet 09/17/2020 take 1 tablet (20 mg) by oral route once daily for 90 days   dicyclomine 20 mg oral tablet 09/17/2020 take 1 tablet (20 mg) by oral route 3 times per day prn abd pain   duloxetine 30 mg oral capsule,delayed release(DR/EC) 09/17/2020 take 1 capsule (30 mg) by oral route once daily for 30 days   fluoxetine 40 mg oral capsule 08/05/2020 take 1 capsule (40 mg) by oral route once daily in the evening   gabapentin 600 mg oral tablet 09/17/2020 take 1 tablet (600 mg) by oral route 3 times per day for 90 days   lisinopril 5 mg oral tablet 09/17/2020 take 1 tablet (5 mg) by oral route once daily for 90 days   primidone 50 mg oral tablet  take 1 tablet by oral route once a day (at bedtime)   Ventolin HFA 90 mcg/actuation inhalation HFA aerosol inhaler 05/08/2020 inhale 1 puff (90 mcg) by inhalation route every 4 hours as needed   Vitamin D2 1,250 mcg (50,000 unit) oral capsule 09/17/2020 1 capsule by mouth once a week         Allergy List  Allergen Name Date Reaction Notes   Bactrim --  --  --        Allergies Reconciled  Family Medical History  Disease Name Relative/Age Notes   Family history of breast cancer Sister/   Sister         Social History  Finding Status Start/Stop Quantity Notes   Alcohol Never --/-- --  does not drink   lives with spouse --  --/-- --  --     --  --/-- --  --    Tobacco Never --/-- --  never smoker   Working --  --/-- --  --          Immunizations  NameDate Admin Mfg Trade Name Lot Number Route Inj VIS Given VIS Publication   Ryyqzmapv69/17/2020 SKB Fluarix, quadrivalent, preservative free [id number] IM LD 09/17/2020    Comments: Patient tolerated well   Prevnar 1309/17/2020 [facility]  "PREVNAR 13 [id number] [facility] 09/17/2020    Comments: Pateint tolerated well         Review of Systems  · Constitutional  o Denies  o : fever, fatigue, weight loss, weight gain  · HENT  o Denies  o : headaches, vertigo, lightheadedness  · Breasts  o Denies  o : lumps, tenderness, swelling  · Cardiovascular  o Denies  o : lower extremity edema, claudication, chest pressure, palpitations  · Respiratory  o Denies  o : shortness of breath, wheezing, cough, hemoptysis, dyspnea on exertion  · Gastrointestinal  o Denies  o : nausea, vomiting, diarrhea, constipation, abdominal pain  · Genitourinary  o Denies  o : urgency, frequency, dysuria  · Musculoskeletal  o Admits  o : joint pain, muscle pain  · Psychiatric  o Admits  o : anxiety, depression  o Denies  o : suicidal ideation, homicidal ideation      Vitals  Date Time BP Position Site L\R Cuff Size HR RR TEMP (F) WT  HT  BMI kg/m2 BSA m2 O2 Sat        09/17/2020 01:23 /62 Sitting    84 - R 18 97.8 182lbs 9oz 5'  3\" 32.34 1.92 95 %          Physical Examination  · Constitutional  o Appearance  o : well developed, well-nourished, no acute distress  · Ears, Nose, Mouth and Throat  o Ears  o :   § External Ears  § : external auditory canal appearance normal, no discharge present  § Otoscopic Examination  § : tympanic membranes pearly white/gray bilaterally  o Nose  o :   § External Nose  § : no lesions noted  § Intranasal Exam  § : nasal mucosa light pink, no intranasal lesions present, nares patent  § Nasopharynx  § : no discharge present  o Oral Cavity  o :   § Oral Mucosa  § : oral mucosa light pink  o Throat  o :   § Oropharynx  § : tonsils without exudate, no palatal petechiae  · Neck  o Inspection/Palpation  o : normal appearance, no masses or tenderness, trachea midline  o Thyroid  o : gland size normal, nontender, no nodules or masses present on palpation  · Respiratory  o Respiratory Effort  o : breathing unlabored  o Inspection of Chest  o : chest rise symmetric " bilaterally  o Auscultation of Lungs  o : clear to auscultation bilaterally throughout inspiration and expiration  · Cardiovascular  o Heart  o :   § Auscultation of Heart  § : regular rate and rhythm, no murmurs, gallops or rubs  o Peripheral Vascular System  o :   § Extremities  § : no edema  · Lymphatic  o Neck  o : no cervical lymphadenopathy, no supraclavicular lymphadenopathy  · Psychiatric  o Mood and Affect  o : mood normal, affect appropriate          Results  · In-Office Procedures  o Lab procedure  § IOP - Urinalysis without Microscopy (Clinitek) [facility] (68773)   § Color Ur: Yellow   § Clarity Ur: Clear   § Glucose Ur Ql Strip: Negative   § Bilirub Ur Ql Strip: Negative   § Ketones Ur Ql Strip: Negative   § Sp Gr Ur Qn: 1.025   § Hgb Ur Ql Strip: Trace-Intact   § pH Ur-LsCnc: 6.0   § Prot Ur Ql Strip: Negative   § Urobilinogen Ur Strip-mCnc: 0.2 E.U./dL   § Nitrite Ur Ql Strip: Negative   § WBC Est Ur Ql Strip: Trace       Assessment  · Annual physical exam     V70.0/Z00.00  · Allergic rhinitis due to allergen     477.9/J30.9  · Anxiety disorder     300.00/F41.9  · Essential hypertension     401.9/I10  · Hyperlipidemia     272.4/E78.5  · Major depressive disorder     296.20/F32.2  · Post-menopause     V49.81/Z78.0  · Vitamin D deficiency     268.9/E55.9  · Need for influenza vaccination     V04.81/Z23  · Need for pneumococcal vaccination     V03.82/Z23  · Encounter for screening for cardiovascular disorders     V81.2/Z13.6  · Positive urine drug screen     796.0/R82.5  · Advance directive declined by patient     V49.89/Z78.9  · Tremors of nervous system     781.0/R25.1  · Vaginitis     616.10/N76.0  · Nasal congestion     478.19/R09.81      Plan  · Orders  o DEXA Bone Density, 1 or more sites, axial skeleton [facility] (88026) - V49.81/Z78.0 - 09/17/2020  o Fluzone High Dose Flu Vaccine (92289) - V04.81/Z23 - 09/17/2020   Vaccine - Influenza; Dose: 0.50; Site: Left Deltoid; Route: Intramuscular; Date: 09/17/2020  14:44:00; Exp: 06/30/2021; Lot: [id number]; Mfg: [employer]; TradeName: Fluarix, quadrivalent, preservative free; Administered By: Tisha Castro; Comment: Patient tolerated well  o Administration of Influenza Vaccine - Medicare () - V04.81/Z23 - 09/17/2020  o Prevnar 13 (50187) - V03.82/Z23 - 09/17/2020   Vaccine - Prevnar 13; Dose: 0.50; Site: Right Deltoid; Route: Intramuscular; Date: 09/17/2020 14:47:00; Exp: 05/01/2022; Lot: [id number]; Mfg: [facility]; TradeName: PREVNAR 13; Administered By: Tisha Castro; Comment: Pateint tolerated well  o Administration of Pneumococcal Vaccine - Medicare () - V03.82/Z23 - 09/17/2020  o Physical, Primary Care Panel (CBC, CMP, Lipid, TSH) [facility] (79501, 39422, 36443, 25667) - - 09/17/2020  o Vitamin D (25-Hydroxy) Level (23335) - - 09/17/2020  o ACO-39: Current medications updated and reviewed () - - 09/17/2020  o ACO-15: Pneumococcal Vaccine Administered or Previously Received (4040F) - - 09/17/2020  o ACO-14: Influenza immunization administered or previously received () - - 09/17/2020  o ACO-19: Colorectal cancer screening results documented and reviewed (3017F) - - 09/17/2020   2019 Dr Garcia  o ACO-13: Fall Risk Screening with no falls in past year or only one fall without injury in the past year (1101F) - - 09/17/2020  o ACO - Pt declines to or was not able to provide an Advance Care Plan or name a Surrogate Decision Maker (1124F) - - 09/17/2020  o Urine Drug Screen Confirmation (Send Out) (CONDS) - - 09/17/2020  o Vaginal Screen (Candida, Gardnerella & Trichomonas) (79474) - - 09/17/2020  o JAMARCUS Report (KASPR) - - 09/17/2020  o Estab. Pt. 15 Min Low/Moderate (64103) - - 09/17/2020  · Medications  o Flonase Allergy Relief 50 mcg/actuation nasal spray,suspension   SIG: inhale 2 sprays (100 mcg) in each nostril by intranasal route once daily as needed   DISP: (1) 9.9 ml aer w/adap with 2 refills  Prescribed on 09/17/2020     o gabapentin  600 mg oral tablet   SIG: take 1 tablet (600 mg) by oral route 3 times per day for 90 days   DISP: (270) tablet with 0 refills  Prescribed on 2020     o lisinopril 5 mg oral tablet   SIG: take 1 tablet (5 mg) by oral route once daily for 90 days   DISP: (90) tablet with 1 refills  Prescribed on 2020     o duloxetine 30 mg oral capsule,delayed release(DR/EC)   SIG: take 1 capsule (30 mg) by oral route once daily for 30 days   DISP: (30) capsules with 0 refills  Adjusted on 2020     o Crestor 20 mg oral tablet   SIG: take 1 tablet (20 mg) by oral route once daily for 90 days   DISP: (90) tablet with 1 refills  Refilled on 2020     o dicyclomine 20 mg oral tablet   SIG: take 1 tablet (20 mg) by oral route 3 times per day prn abd pain   DISP: (90) tablets with 1 refills  Refilled on 2020     o Vitamin D2 1,250 mcg (50,000 unit) oral capsule   SI capsule by mouth once a week   DISP: (12) capsules with 1 refills  Refilled on 2020     o Augmentin 875-125 mg oral tablet   SIG: take 1 tablet by oral route every 12 hours for 10 days   DISP: (20) tablets with 0 refills  Discontinued on 2020     · Instructions  o Reviewed health maintenance flowsheet and updated information. Orders were placed and/or patient's response was documented.  o Patient advised to monitor blood pressure (B/P) at home and journal readings. Patient informed that a B/P reading at home of more than 130/80 is considered hypertension. For readings greater wmlq020/90 or higher patient is advised to follow up in the office with readings for management. Patient advised to limit sodium intake.  o Advised that cheeses and other sources of dairy fats, animal fats, fast food, and the extras (candy, pastries, pies, doughnuts and cookies) all contain LDL raising nutrients. Advised to increase fruits, vegetables, whole grains, and to monitor portion sizes.   o Stop taking calcium supplements for at least 48 hours prior to  your exam. Failure to stop supplements could alter results, and the radiologists will require you to reschedule your test.  o Take all medications as prescribed/directed.  o Patient was educated/instructed on their diagnosis, treatment and medications prior to discharge from the clinic today.  o Minutes spent with patient including greater than 50% in Education/Counseling/Care Coordination.  o Time spent with the patient was minutes, more than 50% face to face.  o Chronic conditions reviewed and taken into consideration for today's treatment plan.  o I refileld meds, she denies any use of benzos, will send out for confirmation. If pos, she knows I will not be able to rx her controlled meds for her any longer  o she will use an otc allergy med and flonase to see if it helps with her congestion.   o f/u for pelvic exam  o if vag swab is normal, she likely has atrophic vaginitis. She has breast cancer hx in her sister, we discussed risks with HRT, she is going to let me know .  o we are going to try to wean off the prozac and up the cymbalta. She is going to wean every other day on the prozac and call me in 2-3 weeks to tell me if she is ready to go up to the 60mg of cymbalta  · Disposition  o Call or Return if symptoms worsen or persist.  o F/u appt in 6 months            Electronically Signed by: ARA Asif -Author on September 17, 2020 03:48:31 PM

## 2021-05-13 NOTE — PROGRESS NOTES
Progress Note      Patient Name: Dorothea Cruz   Patient ID: [id number]   Sex: Female   YOB: 1950    Primary Care Provider: Arelis BULLOCK   Referring Provider: Shital Owens MD    Visit Date: June 30, 2020    Provider: ARA Asif   Location: [facility]   Location Address: [address] [place] [address]  [id number]   Location Phone: [phone]          Chief Complaint     Possible shingles on lower abd. X 3-4 days       History Of Present Illness  Dorothea Cruz is a 70 year old /White female who presents for evaluation and treatment of:   Video Conferencing Visit  Dorothea Cruz is a 70 year old /White female who is presenting for evaluation via video conferencing via [employer] . Verbal consent obtained before beginning visit. Patient alone on call   The following staff were present during this visit: AT/CMA      blistered area right low abdomen, burns itches. using cortisone cream on it.  Denies fever. No other symtpoms       Past Medical History  Disease Name Date Onset Notes   Arthritis --  --    Depression --  --    Diverticulitis --  --    High cholesterol --  --    Major depressive disorder 05/07/2020 --    Sinus trouble --  --    Sleep apnea --  --          Past Surgical History  Procedure Name Date Notes   Colonoscopy 2019 --    Hysterectomy --  --          Medication List  Name Date Started Instructions   Crestor 20 mg oral tablet  take 1 tablet (20 mg) by oral route once daily   dicyclomine 20 mg oral tablet 05/07/2020 take 1 tablet (20 mg) by oral route 3 times per day prn abd pain   duloxetine 20 mg oral capsule,delayed release(DR/EC) 06/29/2020 take 1 capsule by oral route daily for 30 days   fluoxetine 40 mg oral capsule 05/07/2020 take 1 capsule (40 mg) by oral route once daily in the evening   gabapentin 600 mg oral tablet  take 1 tablet (600 mg) by oral route 3 times per day   lisinopril 5 mg oral  "tablet  take 1 tablet (5 mg) by oral route once daily   primidone 50 mg oral tablet  take 1 tablet by oral route once a day (at bedtime)   Ventolin HFA 90 mcg/actuation inhalation HFA aerosol inhaler 05/08/2020 inhale 1 puff (90 mcg) by inhalation route every 4 hours as needed   Vitamin D2 1,250 mcg (50,000 unit) oral capsule 06/03/2020 1 capsule by mouth once a week         Allergy List  Allergen Name Date Reaction Notes   Bactrim --  --  --          Family Medical History  Disease Name Relative/Age Notes   Family history of breast cancer Sister/   Sister         Social History  Finding Status Start/Stop Quantity Notes   Alcohol Never --/-- --  does not drink   lives with spouse --  --/-- --  --     --  --/-- --  --    Tobacco Never --/-- --  never smoker   Working --  --/-- --  --          Review of Systems  · Constitutional  o Denies  o : fever, fatigue  · Cardiovascular  o Denies  o : lower extremity edema, chest pressure, palpitations  · Respiratory  o Denies  o : cough wheezing  · Gastrointestinal  o Denies  o : nausea, vomiting, diarrhea, constipation, abdominal pain  · Integument  o Admits  o : rash, itching, pigmentation changes      Physical Examination  · Constitutional  o Appearance  o : well developed, well-nourished, no acute distress  · Respiratory  o Respiratory Effort  o : breathing unlabored  · Psychiatric  o Mood and Affect  o : mood normal, affect appropriate     on the right low abdomen, there is an area that is red, I could not see vesicles or blisters with the phone camera on the video conference, but she says there are blisters. The area is about 2x3\" and red.           Assessment  · Rash     782.1/R21      Plan  · Orders  o ACO-39: Current medications updated and reviewed () - - 06/30/2020  o ACO-14: Influenza immunization administered or previously received () - - 06/30/2020  · Medications  o famciclovir 500 mg oral tablet   SIG: take 1 tablet (500 mg) by oral route every 8 " hours for 7 days   DISP: (21) tablets with 0 refills  Prescribed on 06/30/2020     o Medications have been Reconciled  o Transition of Care or Provider Policy  · Instructions  o Take all medications as prescribed/directed.  o Rest. Increase Fluids.  o Patient was educated/instructed on their diagnosis, treatment and medications prior to discharge from the clinic today.  o Patient instructed to seek medical attention urgently for new or worsening symptoms.  o Call the office with any concerns or questions.  o will treat for shingles, she will let me know if it's not improving. Avoid skin to skin contact with others.  · Disposition  o Call or Return if symptoms worsen or persist.            Electronically Signed by: ARA Asif -Author on June 30, 2020 03:56:56 PM

## 2021-05-13 NOTE — PROGRESS NOTES
Progress Note      Patient Name: Dorothea Cruz   Patient ID: [id number]   Sex: Female   YOB: 1950    Primary Care Provider: Arelis BULLOCK   Referring Provider: Shital Owens MD    Visit Date: August 13, 2020    Provider: ARA Asif   Location: [facility]   Location Address: [address] [place] [address]  [id number]   Location Phone: [phone]          Chief Complaint     Sinus and headache  Sinus drainage  states been going on several day       History Of Present Illness  TELEHEALTH TELEPHONE VISIT  The following staff were present during this visit: CMartin LPN   Past Medical History/Overview of Patient Symptoms  Dorothea Cruz is a 70 year old /White female who presents for evaluation and treatment of:      head congestion, sinus pressure, otc sudaphed. She hasn't had any allergy meds, no cough, no fever. Denies loss of taste and smell. Green sinus drainage. Symptoms have been going on for 5 days, she doesn't not want to try a nasal spray.   Video Conferencing Visit  Dorothea Cruz is a 70 year old /White female who is presenting for evaluation via video conferencing via [employer]. Verbal consent obtained before beginning visit.   The following staff were present during this visit: Kim whitmore LPN       Review of Systems  · Constitutional  o Denies  o : fever, fatigue, weight loss, weight gain  · HENT  o Admits  o : headaches, nasal congestion, nasal discharge, postnasal drip  o Denies  o : lightheadedness  · Cardiovascular  o Denies  o : lower extremity edema, claudication, chest pressure, palpitations  · Respiratory  o Denies  o : shortness of breath, wheezing, cough, hemoptysis, dyspnea on exertion  · Gastrointestinal  o Denies  o : nausea, vomiting, diarrhea, constipation, abdominal pain      Physical Examination  · Constitutional  o Appearance  o : well developed, well-nourished, no acute  distress  · Respiratory  o Respiratory Effort  o : breathing unlabored  · Psychiatric  o Mood and Affect  o : mood normal, affect appropriate          Assessment  · Sinus infection     473.9/J32.9    Problems Reconciled  Plan  · Orders  o ACO-39: Current medications updated and reviewed () - - 08/13/2020  · Medications  o Augmentin 875-125 mg oral tablet   SIG: take 1 tablet by oral route every 12 hours for 10 days   DISP: (20) tablets with 0 refills  Prescribed on 08/13/2020     o Medications have been Reconciled  o Transition of Care or Provider Policy  · Instructions  o Plan Of Care:   o Rest. Increase Fluids.  o Call the office with any concerns or questions.  o Discussed Covid-19 precautions including, but not limited to, social distancing, avoid touching your face, and hand washing.             Electronically Signed by: ARA Asif -Author on August 13, 2020 03:07:33 PM

## 2021-05-14 ENCOUNTER — TELEPHONE CONVERTED (OUTPATIENT)
Dept: FAMILY MEDICINE CLINIC | Facility: CLINIC | Age: 71
End: 2021-05-14
Attending: NURSE PRACTITIONER

## 2021-05-14 VITALS
RESPIRATION RATE: 18 BRPM | HEART RATE: 84 BPM | DIASTOLIC BLOOD PRESSURE: 62 MMHG | TEMPERATURE: 97.8 F | WEIGHT: 182.56 LBS | HEIGHT: 63 IN | BODY MASS INDEX: 32.35 KG/M2 | OXYGEN SATURATION: 95 % | SYSTOLIC BLOOD PRESSURE: 115 MMHG

## 2021-05-14 VITALS
DIASTOLIC BLOOD PRESSURE: 70 MMHG | HEART RATE: 70 BPM | OXYGEN SATURATION: 92 % | TEMPERATURE: 97.3 F | SYSTOLIC BLOOD PRESSURE: 130 MMHG | RESPIRATION RATE: 18 BRPM

## 2021-05-14 VITALS
OXYGEN SATURATION: 94 % | WEIGHT: 190.25 LBS | SYSTOLIC BLOOD PRESSURE: 134 MMHG | HEIGHT: 63 IN | HEART RATE: 84 BPM | TEMPERATURE: 98 F | RESPIRATION RATE: 18 BRPM | BODY MASS INDEX: 33.71 KG/M2 | DIASTOLIC BLOOD PRESSURE: 65 MMHG

## 2021-05-14 VITALS
HEART RATE: 91 BPM | OXYGEN SATURATION: 95 % | DIASTOLIC BLOOD PRESSURE: 79 MMHG | HEIGHT: 63 IN | TEMPERATURE: 97.3 F | SYSTOLIC BLOOD PRESSURE: 140 MMHG | RESPIRATION RATE: 20 BRPM | WEIGHT: 190.31 LBS | BODY MASS INDEX: 33.72 KG/M2

## 2021-05-14 NOTE — PROGRESS NOTES
"   Progress Note      Patient Name: Dorothea Cruz   Patient ID: [id number]   Sex: Female   YOB: 1950    Primary Care Provider: Arelis BULLOCK   Referring Provider: Shital Owens MD    Visit Date: April 27, 2021    Provider: ARA Asif   Location: [facility]   Location Address: [address] [place] [address]  [id number]   Location Phone: [phone]          Chief Complaint  · stomach ache   · fever            History Of Present Illness  Dorothea Cruz is a 71 year old /White female who presents for evaluation and treatment of:      abdominal pain, started last night, lower left primarily but also the right.  Normal BMs, \"fever\" but was less than 100. Denies urinary symptoms. She has hx of diverticulitis and says this feels the same.  Normal BMs and passing gas.     I reviewed labs from her office visit yesterday and wbc elevated at 16.     hx of appendectmy       Past Medical History  Disease Name Date Onset Notes   Arthritis --  --    Depression --  --    Diverticulitis --  --    Essential hypertension 09/17/2020 --    Hyperlipidemia 09/17/2020 --    Major depressive disorder 05/07/2020 --    Post-menopause 09/17/2020 --    Sinus trouble --  --    Sleep apnea --  --    Vitamin D deficiency 09/17/2020 --          Past Surgical History  Procedure Name Date Notes   Colonoscopy 2019 --    Hysterectomy --  --          Medication List  Name Date Started Instructions   Crestor 20 mg oral tablet 04/26/2021 take 1 tablet (20 mg) by oral route once daily for 30 days   dicyclomine 20 mg oral tablet 09/17/2020 take 1 tablet (20 mg) by oral route 3 times per day prn abd pain   duloxetine 60 mg oral capsule,delayed release(DR/EC) 04/26/2021 take 1 capsule (60 mg) by oral route once daily for 30 days   gabapentin 300 mg oral capsule 04/26/2021 take 1 capsule (300 mg) by oral route 3 times per day for 90 days   lisinopril 5 mg oral " tablet 04/26/2021 take 1 tablet (5 mg) by oral route once daily for 30 days   Premarin 0.625 mg/gram vaginal cream 09/24/2020 as directed topically   primidone 50 mg oral tablet  take 1 tablet by oral route once a day (at bedtime)   Ventolin HFA 90 mcg/actuation inhalation HFA aerosol inhaler 02/04/2021 inhale 2 puffs (180 mcg) by inhalation route every 4-6 hours as needed   Vitamin D2 1,250 mcg (50,000 unit) oral capsule 04/26/2021 1 capsule by mouth once a week         Allergy List  Allergen Name Date Reaction Notes   Bactrim --  --  --          Family Medical History  Disease Name Relative/Age Notes   Family history of breast cancer Sister/   Sister         Social History  Finding Status Start/Stop Quantity Notes   Alcohol Never --/-- --  does not drink    --  --/-- --  --    Tobacco Never --/-- --  never smoker   Working --  --/-- --  --          Immunizations  NameDate Admin Mfg Trade Name Lot Number Route Inj VIS Given VIS Publication   COVID Eemgvbb2404/22/2021 MOD Moderna COVID-19 Vaccine  NE NE 04/26/2021    Comments:    COVID Efiogcp4603/25/2021 MOD Moderna COVID-19 Vaccine  NE NE 04/26/2021    Comments:    Pfezxhqdi50/17/2020 SKB Fluarix, quadrivalent, preservative free [id number] IM LD 09/17/2020    Comments: Patient tolerated well   Prevnar 1309/17/2020 WAL PREVNAR 13 [id number] IM RD 09/17/2020    Comments: Pateint tolerated well         Review of Systems  · Constitutional  o Admits  o : low grade temp  · Cardiovascular  o Denies  o : lower extremity edema, claudication, chest pressure, palpitations  · Respiratory  o Denies  o : shortness of breath, wheezing, cough, hemoptysis, dyspnea on exertion  · Gastrointestinal  o Admits  o : abdominal pain  o Denies  o : nausea, vomiting, diarrhea, constipation  · Genitourinary  o Denies  o : urgency, frequency, dysuria      Vitals  Date Time BP Position Site L\R Cuff Size HR RR TEMP (F) WT  HT  BMI kg/m2 BSA m2 O2 Sat FR L/min FiO2 HC       04/27/2021 03:54 PM  "140/79 Sitting    91 - R 20 97.3 190lbs 5oz 5'  3\" 33.71 1.96 95 %                    Physical Examination  · Constitutional  o Appearance  o : well developed, well-nourished, no acute distress  · Respiratory  o Respiratory Effort  o : breathing unlabored  o Inspection of Chest  o : chest rise symmetric bilaterally  o Auscultation of Lungs  o : clear to auscultation bilaterally throughout inspiration and expiration  · Cardiovascular  o Heart  o :   § Auscultation of Heart  § : regular rate and rhythm, no murmurs, gallops or rubs  o Peripheral Vascular System  o :   § Extremities  § : no edema  · Lymphatic  o Neck  o : no cervical lymphadenopathy, no supraclavicular lymphadenopathy  · Psychiatric  o Mood and Affect  o : mood normal, affect appropriate     abdomen soft, positive bowel sounds, tender in both lower quads, no rigid abdomen.               Assessment  · Diverticulitis     562.11/K57.92  · Leukocytosis     288.60/D72.829      Plan  · Orders  o ACO-39: Current medications updated and reviewed (, 1159F) - - 04/27/2021  · Medications  o Cipro 500 mg oral tablet   SIG: take 1 tablet (500 mg) by oral route 2 times per day for 10 days   DISP: (20) Tablet with 0 refills  Prescribed on 04/27/2021     o Flagyl 500 mg oral tablet   SIG: take 1 tablet (500 mg) by oral route 3 times per day for 10 days   DISP: (30) Tablet with 0 refills  Prescribed on 04/27/2021     · Instructions  o Patient was educated/instructed on their diagnosis, treatment and medications prior to discharge from the clinic today.  o Patient instructed to seek medical attention urgently for new or worsening symptoms.  o Call the office with any concerns or questions.  o Chronic conditions reviewed and taken into consideration for today's treatment plan.  o clear liquids next 3 days, start abx tonight, to er with worsening of symptoms, f/u wiht me in 2-3 days.   · Disposition  o Call or Return if symptoms worsen or " persist.            Electronically Signed by: ARA Asif -Author on April 27, 2021 04:42:01 PM

## 2021-05-14 NOTE — PROGRESS NOTES
Progress Note      Patient Name: Dorothea Cruz   Patient ID: [id number]   Sex: Female   YOB: 1950    Primary Care Provider: Arelis BULLOCK   Referring Provider: Shital Owens MD    Visit Date: April 26, 2021    Provider: ARA Asif   Location: [facility]   Location Address: [address] [place] [address]  [id number]   Location Phone: [phone]          Chief Complaint  · JAW PAIN  · med refills      History Of Present Illness  Dorothea Cruz is a 71 year old /White female who presents for evaluation and treatment of:      medication refills. Doing well, fibro is bothering her, she would like to go back to the neurontin. she also had a painful left upper gum that's inflamed.     HLP- doing well on crestor    fibro- on cymbalta    HTN- lisinopril    IBS- Bentyl, bms regular, does not need refills    takes primadone presribed by neurology       Past Medical History  Disease Name Date Onset Notes   Arthritis --  --    Depression --  --    Diverticulitis --  --    Essential hypertension 09/17/2020 --    Hyperlipidemia 09/17/2020 --    Major depressive disorder 05/07/2020 --    Post-menopause 09/17/2020 --    Sinus trouble --  --    Sleep apnea --  --    Vitamin D deficiency 09/17/2020 --          Past Surgical History  Procedure Name Date Notes   Colonoscopy 2019 --    Hysterectomy --  --          Medication List  Name Date Started Instructions   Cipro 500 mg oral tablet 04/27/2021 take 1 tablet (500 mg) by oral route 2 times per day for 10 days   Crestor 20 mg oral tablet 04/26/2021 take 1 tablet (20 mg) by oral route once daily for 30 days   dicyclomine 20 mg oral tablet 09/17/2020 take 1 tablet (20 mg) by oral route 3 times per day prn abd pain   duloxetine 60 mg oral capsule,delayed release(DR/EC) 04/26/2021 take 1 capsule (60 mg) by oral route once daily for 30 days   Flagyl 500 mg oral tablet 04/27/2021 take 1 tablet  (500 mg) by oral route 3 times per day for 10 days   gabapentin 300 mg oral capsule 04/26/2021 take 1 capsule (300 mg) by oral route 3 times per day for 90 days   lisinopril 5 mg oral tablet 04/26/2021 take 1 tablet (5 mg) by oral route once daily for 30 days   Premarin 0.625 mg/gram vaginal cream 09/24/2020 as directed topically   primidone 50 mg oral tablet  take 1 tablet by oral route once a day (at bedtime)   Ventolin HFA 90 mcg/actuation inhalation HFA aerosol inhaler 02/04/2021 inhale 2 puffs (180 mcg) by inhalation route every 4-6 hours as needed   Vitamin D2 1,250 mcg (50,000 unit) oral capsule 04/26/2021 1 capsule by mouth once a week         Allergy List  Allergen Name Date Reaction Notes   Bactrim --  --  --        Allergies Reconciled  Family Medical History  Disease Name Relative/Age Notes   Family history of breast cancer Sister/   Sister         Social History  Finding Status Start/Stop Quantity Notes   Alcohol Never --/-- --  does not drink    --  --/-- --  --    Tobacco Never --/-- --  never smoker   Working --  --/-- --  --          Immunizations  NameDate Admin Mfg Trade Name Lot Number Route Inj VIS Given VIS Publication   COVID Kbivcsk70a04/22/2021 MOD Moderna COVID-19 Vaccine  NE NE 04/26/2021    Comments:    COVID Gquxqhu2203/25/2021 MOD Moderna COVID-19 Vaccine  NE NE 04/26/2021    Comments:    Uxloniwjb27/17/2020 SKB Fluarix, quadrivalent, preservative free [id number] IM LD 09/17/2020    Comments: Patient tolerated well   Prevnar 1309/17/2020 WAL PREVNAR 13 [id number] IM RD 09/17/2020    Comments: Pateint tolerated well         Review of Systems  · Constitutional  o Denies  o : fever, fatigue, weight loss, weight gain  · HENT  o Admits  o : gingival pain  o Denies  o : gingival bleeding, dental problems  · Cardiovascular  o Denies  o : lower extremity edema, claudication, chest pressure, palpitations  · Respiratory  o Denies  o : shortness of breath, wheezing, cough, hemoptysis, dyspnea on  "exertion  · Gastrointestinal  o Denies  o : nausea, vomiting, diarrhea, constipation, abdominal pain  · Musculoskeletal  o Admits  o : joint pain  · Psychiatric  o Denies  o : anxiety, depression, suicidal ideation, homicidal ideation      Vitals  Date Time BP Position Site L\R Cuff Size HR RR TEMP (F) WT  HT  BMI kg/m2 BSA m2 O2 Sat FR L/min FiO2 HC       04/26/2021 10:24 /65 Sitting    84 - R 18 98 190lbs 4oz 5'  3\" 33.7 1.96 94 %  21%          Physical Examination  · Constitutional  o Appearance  o : well developed, well-nourished, no acute distress  · Ears, Nose, Mouth and Throat  o Ears  o :   § External Ears  § : external auditory canal appearance normal, no discharge present  § Otoscopic Examination  § : tympanic membranes pearly white/gray bilaterally  o Nose  o :   § External Nose  § : no lesions noted  § Intranasal Exam  § : nasal mucosa light pink, no intranasal lesions present, nares patent  § Nasopharynx  § : no discharge present  o Oral Cavity  o :   § Oral Mucosa  § : oral mucosa light pink  § Gums  § : gingivitis present   o Throat  o :   § Oropharynx  § : tonsils without exudate, no palatal petechiae  · Neck  o Inspection/Palpation  o : normal appearance, no masses or tenderness, trachea midline  o Thyroid  o : gland size normal, nontender, no nodules or masses present on palpation  · Respiratory  o Respiratory Effort  o : breathing unlabored  o Inspection of Chest  o : chest rise symmetric bilaterally  o Auscultation of Lungs  o : clear to auscultation bilaterally throughout inspiration and expiration  · Cardiovascular  o Heart  o :   § Auscultation of Heart  § : regular rate and rhythm, no murmurs, gallops or rubs  o Peripheral Vascular System  o :   § Extremities  § : no edema  · Lymphatic  o Neck  o : no cervical lymphadenopathy, no supraclavicular lymphadenopathy  · Psychiatric  o Mood and Affect  o : mood normal, affect appropriate          Results  · In-Office Procedures  o Lab " procedure  § IOP - Urine Drug Screen In-House [facility] (81292)   § Amphetamines Ur Ql: Negative   § Barbiturates Ur Ql: Positive   § Buprenorphine+Nor Ur Ql Scn: Negative   § Benzodiaz Ur Ql: Negative   § Cocaine Ur Ql: Negative   § Methadone Ur Ql: Negative   § Methamphet Ur Ql: Negative   § MDMA Ur Ql Scn: Negative   § Opiates Ur Ql: Negative   § Oxycodone Ur Ql: Negative   § PCP Ur Ql: Negative   § THC Ur Ql: Negative   § Temp in Range?: Within/Acceptable   § Control Seen?: Yes       Assessment  · Annual physical exam     V70.0/Z00.00  · Allergic rhinitis due to allergen     477.9/J30.9  · Essential hypertension     401.9/I10  · Hyperlipidemia     272.4/E78.5  · Vitamin D deficiency     268.9/E55.9  · Fibromyalgia     729.1/M79.7  · Chronic pain     338.29/G89.29  · Medication monitoring encounter     V58.83/Z51.81  · IBS (irritable bowel syndrome)     564.1/K58.9  · Gingivitis     523.10/K05.10      Plan  · Orders  o Physical, Primary Care Panel (CBC, CMP, Lipid, TSH) [facility] (76481, 87590, 60972, 69256) - - 04/26/2021  o Vitamin D (25-Hydroxy) Level (53223) - - 04/26/2021  o ACO-20: Screening Mammography documented and reviewed [facility] () - - 04/26/2021 sept 2020  o ACO-19: Colorectal cancer screening results documented and reviewed (3017F) - - 04/26/2021 2019  o ACO-39: Current medications updated and reviewed (1159F, ) - - 04/26/2021  o Mammogram breast screening 3D digital bilateral (87533, , 63382) - - 04/26/2021   schedule after september.   · Medications  o gabapentin 300 mg oral capsule   SIG: take 1 capsule (300 mg) by oral route 3 times per day for 90 days   DISP: (270) Capsule with 0 refills  Adjusted on 04/26/2021     o Crestor 20 mg oral tablet   SIG: take 1 tablet (20 mg) by oral route once daily for 30 days   DISP: (30) Tablet with 5 refills  Refilled on 04/26/2021     o duloxetine 60 mg oral capsule,delayed release(DR/EC)   SIG: take 1 capsule (60 mg) by oral route once daily for 30 days    DISP: (30) Capsule with 5 refills  Refilled on 2021     o lisinopril 5 mg oral tablet   SIG: take 1 tablet (5 mg) by oral route once daily for 30 days   DISP: (30) Tablet with 5 refills  Refilled on 2021     o Vitamin D2 1,250 mcg (50,000 unit) oral capsule   SI capsule by mouth once a week   DISP: (12) Capsule with 1 refills  Refilled on 2021     o Augmentin 875-125 mg oral tablet   SIG: take 1 tablet by oral route every 12 hours for 10 days   DISP: (20) Tablet with 0 refills  Discontinued on 2021     o Medications have been Reconciled  o Transition of Care or Provider Policy  · Instructions  o Reviewed health maintenance flowsheet and updated information. Orders were placed and/or patient's response was documented.  o Patient advised to monitor blood pressure (B/P) at home and journal readings. Patient informed that a B/P reading at home of more than 130/80 is considered hypertension. For readings greater zboy480/90 or higher patient is advised to follow up in the office with readings for management. Patient advised to limit sodium intake.  o Advised that cheeses and other sources of dairy fats, animal fats, fast food, and the extras (candy, pastries, pies, doughnuts and cookies) all contain LDL raising nutrients. Advised to increase fruits, vegetables, whole grains, and to monitor portion sizes.   o Obtained a written consent for JAMARCUS query. Discussed the risk and benefits of the use of controlled substances with the patient, including the risk of tolerance and drug dependence. The patient has been counseled on the need to have an exit strategy, including potentially discontinuing the use of controlled substances. JAMARCUS has or will be reviewed as soon as it becomes avaliable.  o Patient was educated/instructed on their diagnosis, treatment and medications prior to discharge from the clinic today.  o Patient instructed to seek medical attention urgently for new or worsening  symptoms.  o Call the office with any concerns or questions.  o Chronic conditions reviewed and taken into consideration for today's treatment plan.  o Discussed Covid-19 precautions including, but not limited to, social distancing, avoid touching your face, and hand washing.   o Refill meds, reinitiate gabapentin, UDS positive due to primidone, follow-up with her dentist for the gingivitis, I have reviewed all meds at this time no meds need to be adjusted for chronic conditions.  · Disposition  o Call or Return if symptoms worsen or persist.  o F/u appt in 3 months            Electronically Signed by: ARA Asif -Author on April 29, 2021 05:05:28 PM

## 2021-05-14 NOTE — PROGRESS NOTES
Progress Note      Patient Name: Dorothea Cruz   Patient ID: [id number]   Sex: Female   YOB: 1950    Primary Care Provider: Arelis BULLOCK   Referring Provider: Shital Owens MD    Visit Date: February 4, 2021    Provider: ARA Asif   Location: [facility]   Location Address: [address] [place] [address]  [id number]   Location Phone: [phone]          Chief Complaint  · chest congestion  · cough  · HA      History Of Present Illness  TELEHEALTH TELEPHONE VISIT  Dorothea Cruz is a 71 year old /White female who is presenting for evaluation via telehealth telephone visit. Verbal consent obtained before beginning visit.   Provider spent 9 minutes with patient during telehealth visit.   The following staff were present during this visit: gm   Past Medical History/Overview of Patient Symptoms  Dorothea Cruz is a 71 year old /White female who presents for evaluation and treatment of:      cough and wheezing, cough is productive, started 2 weeks ago, she has had headache. Denies fever or shortness of breath. She has been using an inhaler. She had a similar illness in [facility], tested neg for covid, she had resolution of symptoms after doxy.    HTN- she does use an ACE inhibitor for BP       Past Medical History  Disease Name Date Onset Notes   Arthritis --  --    Depression --  --    Diverticulitis --  --    Essential hypertension 09/17/2020 --    Hyperlipidemia 09/17/2020 --    Major depressive disorder 05/07/2020 --    Post-menopause 09/17/2020 --    Sinus trouble --  --    Sleep apnea --  --    Vitamin D deficiency 09/17/2020 --          Past Surgical History  Procedure Name Date Notes   Colonoscopy 2019 --    Hysterectomy --  --          Medication List  Name Date Started Instructions   Crestor 20 mg oral tablet 10/27/2020 take 1 tablet (20 mg) by oral route once daily for 90 days   dicyclomine 20 mg  oral tablet 09/17/2020 take 1 tablet (20 mg) by oral route 3 times per day prn abd pain   duloxetine 60 mg oral capsule,delayed release(DR/EC) 12/07/2020 take 1 capsule (60 mg) by oral route once daily for 30 days   Flonase Allergy Relief 50 mcg/actuation nasal spray,suspension 09/17/2020 inhale 2 sprays (100 mcg) in each nostril by intranasal route once daily as needed   gabapentin 600 mg oral tablet 09/17/2020 take 1 tablet (600 mg) by oral route 3 times per day for 90 days   lisinopril 5 mg oral tablet 09/17/2020 take 1 tablet (5 mg) by oral route once daily for 90 days   Premarin 0.625 mg/gram vaginal cream 09/24/2020 as directed topically   primidone 50 mg oral tablet  take 1 tablet by oral route once a day (at bedtime)   Vitamin D2 1,250 mcg (50,000 unit) oral capsule 09/17/2020 1 capsule by mouth once a week         Allergy List  Allergen Name Date Reaction Notes   Bactrim --  --  --        Allergies Reconciled  Family Medical History  Disease Name Relative/Age Notes   Family history of breast cancer Sister/   Sister         Social History  Finding Status Start/Stop Quantity Notes   Alcohol Never --/-- --  does not drink    --  --/-- --  --    Tobacco Never --/-- --  never smoker   Working --  --/-- --  --          Immunizations  NameDate Admin Mfg Trade Name Lot Number Route Inj VIS Given VIS Publication   Mgmklhgub02/17/2020 SKB Fluarix, quadrivalent, preservative free [id number] IM LD 09/17/2020    Comments: Patient tolerated well   Prevnar 1309/17/2020 WAL PREVNAR 13 [id number] IM RD 09/17/2020    Comments: Pateint tolerated well         Review of Systems  · Constitutional  o Denies  o : fever, fatigue, weight loss, weight gain  · HENT  o Admits  o : headaches  · Cardiovascular  o Denies  o : lower extremity edema, claudication, chest pressure, palpitations  · Respiratory  o Admits  o : cough, wheezing  o Denies  o : shortness of breath, hemoptysis, dyspnea on exertion  · Gastrointestinal  o Denies  o :  nausea, vomiting, diarrhea, constipation, abdominal pain          Assessment  · Cough     786.2/R05  · Wheezing     786.07/R06.2      Plan  · Orders  o ACO-39: Current medications updated and reviewed (1159F, ) - - 02/04/2021  o CXR PA/Lat [facility] (95324) - 786.2/R05, 786.07/R06.2 - 02/04/2021  · Medications  o Augmentin 875-125 mg oral tablet   SIG: take 1 tablet by oral route every 12 hours for 10 days   DISP: (20) Tablet with 0 refills  Prescribed on 02/04/2021     o Medrol (Miles) 4 mg oral tablets,dose pack   SIG: take by oral route as directed per package instructions   DISP: (1) Packet with 0 refills  Prescribed on 02/04/2021     o Ventolin HFA 90 mcg/actuation inhalation HFA aerosol inhaler   SIG: inhale 2 puffs (180 mcg) by inhalation route every 4-6 hours as needed   DISP: (1) Can with 0 refills  Prescribed on 02/04/2021     o Medications have been Reconciled  o Transition of Care or Provider Policy  · Instructions  o Plan Of Care: will r/o pneumo, start abx and steroids, refill inhaler, if symptoms aren't improving she will call and we will need to hold the lisinopril to amke sure she doesn't have an ace intolerance.   o Chronic conditions reviewed and taken into consideration for today's treatment plan.  o Patient instructed to seek medical attention urgently for new or worsening symptoms.  o Rest. Increase Fluids.  o Call the office with any concerns or questions.  · Disposition  o Call or Return if symptoms worsen or persist.  o F/U appt in 1 month            Electronically Signed by: ARA Asif -Author on February 4, 2021 11:31:33 AM

## 2021-05-14 NOTE — PROGRESS NOTES
Progress Note      Patient Name: Dorothea Cruz   Patient ID: [id number]   Sex: Female   YOB: 1950    Primary Care Provider: Arelis BULLOCK   Referring Provider: Shital Owens MD    Visit Date: April 30, 2021    Provider: ARA Asif   Location: [facility]   Location Address: [address] [place] [address]  [id number]   Location Phone: [phone]          Chief Complaint  · diverticulitis      History Of Present Illness  Dorothea Cruz is a 71 year old /White female who presents for evaluation and treatment of:   TELEHEALTH TELEPHONE VISIT  Dorothea Cruz is a 71 year old /White female who is presenting for evaluation via telehealth telephone visit. Verbal consent obtained before beginning visit.   Provider spent 14 minutes with patient during telehealth visit.   The following staff were present during this visit: gm   Past Medical History/Overview of Patient Symptoms     f/u on diverticulitis. I started her on cipro and flagyl 3 days ago for leukocytosis and lower abdominal pain. She did not  the meds that evening and wound up going to the ER that night because of the pain. They did a CT whcih confirmed diverticulitis, wbc was up to 18. She started the antibiotics finally yesterday. She has had improvment which she says is about 50%. She has been doing clear liquids. BMs are normal.     CT incidentally showed gallstones and an adrenal mass. She says the Er did not discuss this with her.       Past Medical History  Disease Name Date Onset Notes   Arthritis --  --    Depression --  --    Diverticulitis --  --    Essential hypertension 09/17/2020 --    Hyperlipidemia 09/17/2020 --    Major depressive disorder 05/07/2020 --    Post-menopause 09/17/2020 --    Sinus trouble --  --    Sleep apnea --  --    Vitamin D deficiency 09/17/2020 --          Past Surgical History  Procedure Name Date Notes    Colonoscopy 2019 --    Hysterectomy --  --          Medication List  Name Date Started Instructions   Cipro 500 mg oral tablet 04/27/2021 take 1 tablet (500 mg) by oral route 2 times per day for 10 days   Crestor 20 mg oral tablet 04/26/2021 take 1 tablet (20 mg) by oral route once daily for 30 days   dicyclomine 20 mg oral tablet 09/17/2020 take 1 tablet (20 mg) by oral route 3 times per day prn abd pain   duloxetine 60 mg oral capsule,delayed release(DR/EC) 04/26/2021 take 1 capsule (60 mg) by oral route once daily for 30 days   Flagyl 500 mg oral tablet 04/27/2021 take 1 tablet (500 mg) by oral route 3 times per day for 10 days   gabapentin 300 mg oral capsule 04/26/2021 take 1 capsule (300 mg) by oral route 3 times per day for 90 days   lisinopril 5 mg oral tablet 04/26/2021 take 1 tablet (5 mg) by oral route once daily for 30 days   Premarin 0.625 mg/gram vaginal cream 09/24/2020 as directed topically   primidone 50 mg oral tablet  take 1 tablet by oral route once a day (at bedtime)   Ventolin HFA 90 mcg/actuation inhalation HFA aerosol inhaler 02/04/2021 inhale 2 puffs (180 mcg) by inhalation route every 4-6 hours as needed   Vitamin D2 1,250 mcg (50,000 unit) oral capsule 04/26/2021 1 capsule by mouth once a week         Allergy List  Allergen Name Date Reaction Notes   Bactrim --  --  --        Allergies Reconciled  Family Medical History  Disease Name Relative/Age Notes   Family history of breast cancer Sister/   Sister         Social History  Finding Status Start/Stop Quantity Notes   Alcohol Never --/-- --  does not drink    --  --/-- --  --    Tobacco Never --/-- --  never smoker   Working --  --/-- --  --          Immunizations  NameDate Admin Mfg Trade Name Lot Number Route Inj VIS Given VIS Publication   COVID Vkgrcdl89a04/22/2021 MOD Moderna COVID-19 Vaccine  NE NE 04/26/2021    Comments:    COVID Hpwmokt10a03/25/2021 MOD Moderna COVID-19 Vaccine  NE NE 04/26/2021    Comments:     Vzrtydcbv02/17/2020 SKB Fluarix, quadrivalent, preservative free [id number] IM LD 09/17/2020    Comments: Patient tolerated well   Prevnar 1309/17/2020 [facility] PREVNAR 13 [id number] IM RD 09/17/2020    Comments: Pateint tolerated well         Review of Systems  · Gastrointestinal  o Admits  o : abdominal pain  o Denies  o : nausea, vomiting, diarrhea, constipation              Assessment  · Diverticulitis     562.11/K57.92  · Cholelithiases     574.20/K80.20  · Adrenal mass     255.8/E27.8  · Abnormal CT of the abdomen     793.6/R93.5      Plan  · Orders  o ACO-39: Current medications updated and reviewed (, 1159F) - - 04/30/2021  o MRI abdomen with and without contrast (98861) - 255.8/E27.8, 793.6/R93.5 - 04/30/2021  · Medications  o Medications have been Reconciled  o Transition of Care or Provider Policy  · Instructions  o Rest. Increase Fluids.  o Patient was educated/instructed on their diagnosis, treatment and medications prior to discharge from the clinic today.  o Patient instructed to seek medical attention urgently for new or worsening symptoms.  o Call the office with any concerns or questions.  o Chronic conditions reviewed and taken into consideration for today's treatment plan.  o clear liquids next 3 days, start abx tonight, to er with worsening of symptoms, f/u wiht me in 2-3 days.   o Plan Of Care:   o continue meds, f/u in a week, at that visit discuss scheduling colonoscopy in 6 weeks after diverticullar flare.   · Disposition  o Call or Return if symptoms worsen or persist.            Electronically Signed by: ARA Asif -Author on April 30, 2021 09:58:10 AM

## 2021-05-15 VITALS
SYSTOLIC BLOOD PRESSURE: 132 MMHG | HEIGHT: 62 IN | DIASTOLIC BLOOD PRESSURE: 72 MMHG | WEIGHT: 181 LBS | RESPIRATION RATE: 16 BRPM | BODY MASS INDEX: 33.31 KG/M2 | OXYGEN SATURATION: 94 % | HEART RATE: 75 BPM

## 2021-05-15 VITALS
HEART RATE: 81 BPM | BODY MASS INDEX: 32.48 KG/M2 | SYSTOLIC BLOOD PRESSURE: 137 MMHG | HEIGHT: 62 IN | RESPIRATION RATE: 20 BRPM | DIASTOLIC BLOOD PRESSURE: 67 MMHG | WEIGHT: 176.5 LBS | OXYGEN SATURATION: 96 % | TEMPERATURE: 99.7 F

## 2021-05-15 VITALS
SYSTOLIC BLOOD PRESSURE: 137 MMHG | WEIGHT: 176.12 LBS | HEIGHT: 62 IN | OXYGEN SATURATION: 99 % | DIASTOLIC BLOOD PRESSURE: 60 MMHG | HEART RATE: 74 BPM | BODY MASS INDEX: 32.41 KG/M2

## 2021-05-15 VITALS — TEMPERATURE: 98.9 F

## 2021-05-21 ENCOUNTER — CONVERSION ENCOUNTER (OUTPATIENT)
Dept: SURGERY | Facility: CLINIC | Age: 71
End: 2021-05-21

## 2021-05-21 ENCOUNTER — OFFICE VISIT CONVERTED (OUTPATIENT)
Dept: SURGERY | Facility: CLINIC | Age: 71
End: 2021-05-21
Attending: SURGERY

## 2021-05-22 ENCOUNTER — TRANSCRIBE ORDERS (OUTPATIENT)
Dept: FAMILY MEDICINE CLINIC | Facility: CLINIC | Age: 71
End: 2021-05-22

## 2021-05-22 DIAGNOSIS — Z12.31 VISIT FOR SCREENING MAMMOGRAM: Primary | ICD-10-CM

## 2021-06-05 NOTE — H&P
History and Physical      Patient Name: Dorothea Cruz   Patient ID: [id number]   Sex: Female   YOB: 1950    Primary Care Provider: Arelis BULLOCK    Visit Date: May 21, 2021    Provider: Louie Medina MD   Location: [facility]   Location Address: [address] [place] [address]  [id number]   Location Phone: [phone]          Chief Complaint  · Gallstones      History Of Present Illness  Dorothea Cruz is a 71 year old /White female who presents to the office today as a consult from one of the local ER clinicians      Patient was referred for gallstones.  Patient went to the emergency room on 4/28/2021 for lower abdominal pain.  CT head and pelvis was done and showed acute diverticulitis of the distal descending colon.  The CT scan also showed small stones in the gallbladder.  Patient's lower abdominal pain has resolved.  Gallstones were further evaluated with an ultrasound and it showed gallstones without evidence of acute cholecystitis and the common bile duct was nondilated at 4 to 5 mm in diameter.  Patient last had a colonoscopy in 2019.  He does not have any upper abdominal pain or right upper quadrant abdominal pain at all and she cannot recall having any significant pain at these areas in the past.  She does not have any nausea or bloating after eating.  Diagnosis of gallstones was discussed with the patient.  I reviewed copies of the CT scan and abdominal ultrasound radiology reports.       Past Medical History  Disease Name Date Onset Notes   Arthritis --  --    Cholelithiases 04/30/2021 --    Depression --  --    Diverticulitis --  --    Essential hypertension 09/17/2020 --    Hyperlipidemia 09/17/2020 --    Major depressive disorder 05/07/2020 --    Post-menopause 09/17/2020 --    Sinus trouble --  --    Sleep apnea --  --    Vitamin D deficiency 09/17/2020 --          Past Surgical History  Procedure Name Date Notes    Colonoscopy 2019 --    Hysterectomy --  --          Medication List  Name Date Started Instructions   Crestor 20 mg oral tablet 04/26/2021 take 1 tablet (20 mg) by oral route once daily for 30 days   dicyclomine 20 mg oral tablet 09/17/2020 take 1 tablet (20 mg) by oral route 3 times per day prn abd pain   duloxetine 60 mg oral capsule,delayed release(DR/EC) 04/26/2021 take 1 capsule (60 mg) by oral route once daily for 30 days   gabapentin 300 mg oral capsule 04/26/2021 take 1 capsule (300 mg) by oral route 3 times per day for 90 days   lisinopril 5 mg oral tablet 04/26/2021 take 1 tablet (5 mg) by oral route once daily for 30 days   Premarin 0.625 mg/gram vaginal cream 09/24/2020 as directed topically   primidone 50 mg oral tablet  take 1 tablet by oral route once a day (at bedtime)   Ventolin HFA 90 mcg/actuation inhalation HFA aerosol inhaler 02/04/2021 inhale 2 puffs (180 mcg) by inhalation route every 4-6 hours as needed   Vitamin D2 1,250 mcg (50,000 unit) oral capsule 04/26/2021 1 capsule by mouth once a week         Allergy List  Allergen Name Date Reaction Notes   Bactrim --  --  --        Allergies Reconciled  Family Medical History  Disease Name Relative/Age Notes   Family history of breast cancer Sister/   Sister         Social History  Finding Status Start/Stop Quantity Notes   Alcohol Never --/-- --  does not drink    --  --/-- --  --    Tobacco Never --/-- --  never smoker   Working --  --/-- --  --          Immunizations  NameDate Admin Mfg Trade Name Lot Number Route Inj VIS Given VIS Publication   COVID Mnetnnt0304/22/2021 MOD Moderna COVID-19 Vaccine  NE NE 04/26/2021    Comments:    COVID Vaorgxg1603/25/2021 MOD Moderna COVID-19 Vaccine  NE NE 04/26/2021    Comments:    Pysmegmor47/17/2020 SKB Fluarix, quadrivalent, preservative free [id number] IM LD 09/17/2020    Comments: Patient tolerated well   Prevnar 1309/17/2020 [facility] PREVNAR 13 [id number] IM RD 09/17/2020    Comments: Pateint tolerated well  "        Review of Systems  · Constitutional  o Denies  o : fatigue, night sweats  · Eyes  o Denies  o : double vision, blurred vision  · HENT  o Denies  o : vertigo, recent head injury  · Breasts  o Denies  o : abnormal changes in breast size, additional breast symptoms except as noted in the HPI  · Cardiovascular  o Denies  o : chest pain, irregular heart beats  · Respiratory  o Denies  o : shortness of breath, productive cough  · Gastrointestinal  o Denies  o : nausea, vomiting  · Genitourinary  o Denies  o : dysuria, urinary retention  · Integument  o Denies  o : hair growth change, new skin lesions  · Neurologic  o Denies  o : altered mental status, seizures  · Musculoskeletal  o Denies  o : joint swelling, limitation of motion  · Endocrine  o Denies  o : cold intolerance, heat intolerance  · Heme-Lymph  o Denies  o : petechiae, lymph node enlargement or tenderness  · Allergic-Immunologic  o Denies  o : frequent illnesses      Vitals  Date Time BP Position Site L\R Cuff Size HR RR TEMP (F) WT  HT  BMI kg/m2 BSA m2 O2 Sat FR L/min FiO2        05/21/2021 03:27 PM       16  191lbs 0oz 5'  2\" 34.93 1.95             Physical Examination  · Constitutional  o Appearance  o : here alone, alert and in no acute distress, reliable historian  · Head and Face  o Head  o :   § Inspection  § : no visable deformities or lesions  · Eyes  o Conjunctivae  o : clear, wearing glasses  o Sclerae  o : clear  · Neck  o Inspection/Palpation  o : normal appearance, no masses, trachea midline  · Respiratory  o Respiratory Effort  o : breathing unlabored, respiratory effort appears normal  o Inspection of Chest  o : normal appearance, no retractions  · Cardiovascular  o Heart  o : regular rate and rhythm  · Gastrointestinal  o Abdominal Examination  o :   § Abdomen  § : soft, nontender, nondistended  · Skin and Subcutaneous Tissue  o General Inspection  o : no visible concerning rashes or lesions present  · Neurologic  o Cranial " Nerves  o : no obvious motor deficits  o Sensation  o : no obvious sensory deficits  o Gait and Station  o :   § Gait Screening  § : normal gait, able to stand without diffculty  o Cerebellar Function  o : no obvious abnormalities  · Psychiatric  o Judgement and Insight  o : judgment and insight intact  o Mood and Affect  o : mood normal, affect appropriate          Assessment  · Cholelithiasis     574.20/K80.20  asymptomatic, incidental radiographic finding      Plan  · Medications  o Medications have been Reconciled  o Transition of Care or Provider Policy  · Instructions  o Electronically Identified Patient Education Materials Provided Electronically     Diagnosis of gallstones was discussed with the patient.  In general,  there is no indication for prophylactic removal of gallbladder for asymptomatic gallstones.  The signs and symptoms that would be concerning for symptomatic gallstones were discussed with the patient.  Patient will schedule appointment to see me again should any of the signs or symptoms we discussed start occurring.             Electronically Signed by: Louie Medina MD -Author on May 21, 2021 03:50:20 PM

## 2021-06-06 NOTE — PROGRESS NOTES
Progress Note      Patient Name: Dorothea Cruz   Patient ID: [id number]   Sex: Female   YOB: 1950    Primary Care Provider: Arelis BULLOCK   Referring Provider: Shital Owens MD    Visit Date: May 14, 2021    Provider: ARA Asif   Location: [facility]   Location Address: [address] [place] [address]  [id number]   Location Phone: [phone]          Chief Complaint  · hip conplaints      History Of Present Illness  TELEHEALTH TELEPHONE VISIT  Dorothea Cruz is a 71 year old /White female who is presenting for evaluation via telehealth telephone visit. Verbal consent obtained before beginning visit.   Provider spent 9 minutes with patient during telehealth visit.   The following staff were present during this visit: gm   Past Medical History/Overview of Patient Symptoms  Dorothea Cruz is a 71 year old /White female who presents for evaluation and treatment of:      right sided hip pain with weight bearing, she hasn't tried taking any anti inflammatories, she does have arthritis, she has not tried the Neurontin. She has hx of fibro but says this pain feels different. No injury, redness or swelling.     Gallstones, she is supposed to be calling surgical specialists today to schedule her apt with doctor lau. It's not bothering her today.       Past Medical History  Disease Name Date Onset Notes   Arthritis --  --    Cholelithiases 04/30/2021 --    Depression --  --    Diverticulitis --  --    Essential hypertension 09/17/2020 --    Hyperlipidemia 09/17/2020 --    Major depressive disorder 05/07/2020 --    Post-menopause 09/17/2020 --    Sinus trouble --  --    Sleep apnea --  --    Vitamin D deficiency 09/17/2020 --          Past Surgical History  Procedure Name Date Notes   Colonoscopy 2019 --    Hysterectomy --  --          Medication List  Name Date Started Instructions   Crestor 20 mg oral tablet  04/26/2021 take 1 tablet (20 mg) by oral route once daily for 30 days   dicyclomine 20 mg oral tablet 09/17/2020 take 1 tablet (20 mg) by oral route 3 times per day prn abd pain   duloxetine 60 mg oral capsule,delayed release(DR/EC) 04/26/2021 take 1 capsule (60 mg) by oral route once daily for 30 days   Flagyl 500 mg oral tablet 04/27/2021 take 1 tablet (500 mg) by oral route 3 times per day for 10 days   gabapentin 300 mg oral capsule 04/26/2021 take 1 capsule (300 mg) by oral route 3 times per day for 90 days   lisinopril 5 mg oral tablet 04/26/2021 take 1 tablet (5 mg) by oral route once daily for 30 days   Premarin 0.625 mg/gram vaginal cream 09/24/2020 as directed topically   primidone 50 mg oral tablet  take 1 tablet by oral route once a day (at bedtime)   Ventolin HFA 90 mcg/actuation inhalation HFA aerosol inhaler 02/04/2021 inhale 2 puffs (180 mcg) by inhalation route every 4-6 hours as needed   Vitamin D2 1,250 mcg (50,000 unit) oral capsule 04/26/2021 1 capsule by mouth once a week         Allergy List  Allergen Name Date Reaction Notes   Bactrim --  --  --          Family Medical History  Disease Name Relative/Age Notes   Family history of breast cancer Sister/   Sister         Social History  Finding Status Start/Stop Quantity Notes   Alcohol Never --/-- --  does not drink    --  --/-- --  --    Tobacco Never --/-- --  never smoker   Working --  --/-- --  --          Immunizations  NameDate Admin Mfg Trade Name Lot Number Route Inj VIS Given VIS Publication   COVID Xqenqrh4004/22/2021 MOD Moderna COVID-19 Vaccine  NE NE 04/26/2021    Comments:    COVID Fmyojkf1803/25/2021 MOD Moderna COVID-19 Vaccine  NE NE 04/26/2021    Comments:    Hoezxxfxa81/17/2020 SKB Fluarix, quadrivalent, preservative free [id number] IM LD 09/17/2020    Comments: Patient tolerated well   Prevnar 1309/17/2020 [facility] PREVNAR 13 [id number] IM RD 09/17/2020    Comments: Pateint tolerated well         Review of  Systems  · Constitutional  o Denies  o : fever, fatigue, weight loss, weight gain  · Cardiovascular  o Denies  o : lower extremity edema, claudication, chest pressure, palpitations  · Respiratory  o Denies  o : shortness of breath, wheezing, cough, hemoptysis, dyspnea on exertion  · Gastrointestinal  o Denies  o : nausea, vomiting, diarrhea, constipation, abdominal pain  · Musculoskeletal  o Admits  o : hip pain          Assessment  · Right hip pain     719.45/M25.551  · Fibromyalgia     729.1/M79.7  · Cholelithiases     574.20/K80.20      Plan  · Orders  o Physican Telephone evaluation, 5-10 min (81608) - - 05/14/2021  o ACO-39: Current medications updated and reviewed (1159F, ) - - 05/14/2021  o Xray hip right 2 or more views (includes AP Pelvis) [facility]. (66680) - 719.45/M25.551 - 05/14/2021  · Instructions  o Plan Of Care: will go ahead and order the xray, but she is going to try the ibuprofen and/or Neurontin to see if it helps with pain and let me know on Monday if she wants the order to be faxed to the hospital.   o Chronic conditions reviewed and taken into consideration for today's treatment plan.  o Patient instructed to seek medical attention urgently for new or worsening symptoms.  o Take all medications as prescribed/directed.  o Call the office with any concerns or questions.  o try ice/heat            Electronically Signed by: ARA Asif -Author on May 14, 2021 09:32:19 AM

## 2021-06-06 NOTE — PROGRESS NOTES
Progress Note      Patient Name: Dorothea Cruz   Patient ID: [id number]   Sex: Female   YOB: 1950    Primary Care Provider: Arelis BULLOCK   Referring Provider: Shital Owens MD    Visit Date: May 7, 2021    Provider: ARA Asif   Location: [facility]   Location Address: [address] [place], [place] [address]  [place], [place]  [id number]   Location Phone: [phone]          Chief Complaint  · diverticulitis follow up  · [facility] ER      History Of Present Illness  Dorothea Cruz is a 71 year old /White female who presents for evaluation and treatment of:   Past Medical History/Overview of Patient Symptoms     pt went back to the er for abdominal pain last night that was doubling her over. She is still on abx for diverticulitis, cipro and flagyl, and wbc is still elevated. Dr Medina was consulted for possible gallbladder removal and she was started on hydrocodone and Keflex. She has not yet picked up the Keflex.  I reviewed her Er discharge summary.    She spoke with dr medina last night who put her on a liquid diet through the weekend, the starting Monday will do low fat, non greasy regular diet with 90 oz water daily and 2 fiber supplements. She will see him next week.    SHe is in no pain this morning. Describes her pain as diffuse across entire abdomen, denies urinary symptoms but had leuks in urine at the ER.    BP elevated, she has nt had her meds this morning       Past Medical History  Disease Name Date Onset Notes   Arthritis --  --    Cholelithiases 04/30/2021 --    Depression --  --    Diverticulitis --  --    Essential hypertension 09/17/2020 --    Hyperlipidemia 09/17/2020 --    Major depressive disorder 05/07/2020 --    Post-menopause 09/17/2020 --    Sinus trouble --  --    Sleep apnea --  --    Vitamin D deficiency 09/17/2020 --          Past Surgical History  Procedure Name Date Notes   Colonoscopy 2019 --    Hysterectomy --  --           Medication List  Name Date Started Instructions   Cipro 500 mg oral tablet 04/27/2021 take 1 tablet (500 mg) by oral route 2 times per day for 10 days   Crestor 20 mg oral tablet 04/26/2021 take 1 tablet (20 mg) by oral route once daily for 30 days   dicyclomine 20 mg oral tablet 09/17/2020 take 1 tablet (20 mg) by oral route 3 times per day prn abd pain   duloxetine 60 mg oral capsule,delayed release(DR/EC) 04/26/2021 take 1 capsule (60 mg) by oral route once daily for 30 days   Flagyl 500 mg oral tablet 04/27/2021 take 1 tablet (500 mg) by oral route 3 times per day for 10 days   gabapentin 300 mg oral capsule 04/26/2021 take 1 capsule (300 mg) by oral route 3 times per day for 90 days   lisinopril 5 mg oral tablet 04/26/2021 take 1 tablet (5 mg) by oral route once daily for 30 days   Premarin 0.625 mg/gram vaginal cream 09/24/2020 as directed topically   primidone 50 mg oral tablet  take 1 tablet by oral route once a day (at bedtime)   Ventolin HFA 90 mcg/actuation inhalation HFA aerosol inhaler 02/04/2021 inhale 2 puffs (180 mcg) by inhalation route every 4-6 hours as needed   Vitamin D2 1,250 mcg (50,000 unit) oral capsule 04/26/2021 1 capsule by mouth once a week         Allergy List  Allergen Name Date Reaction Notes   Bactrim --  --  --        Allergies Reconciled  Family Medical History  Disease Name Relative/Age Notes   Family history of breast cancer Sister/   Sister         Social History  Finding Status Start/Stop Quantity Notes   Alcohol Never --/-- --  does not drink    --  --/-- --  --    Tobacco Never --/-- --  never smoker   Working --  --/-- --  --          Immunizations  NameDate Admin Mfg Trade Name Lot Number Route Inj VIS Given VIS Publication   COVID [id number]/22/2021 MOD Moderna COVID-19 Vaccine  NE NE 04/26/2021    Comments:    COVID Bxdtnjv2603/25/2021 MOD Moderna COVID-19 Vaccine  NE NE 04/26/2021    Comments:    Zqdehiipq48/17/2020 SKB Fluarix, quadrivalent, preservative  "free [id number] IM LD 09/17/2020    Comments: Patient tolerated well   Prevnar 1309/17/2020 [facility] PREVNAR 13 [id number] IM RD 09/17/2020    Comments: Pateint tolerated well         Review of Systems  · Constitutional  o Denies  o : fever  · Cardiovascular  o Admits  o : transfusions  o Denies  o : palpations, chest pain  · Gastrointestinal  o Admits  o : abdominal pain  o Denies  o : difficulty swallowing, reflux  · Genitourinary  o Denies  o : incontinence, urgency, dysuria      Vitals  Date Time BP Position Site L\R Cuff Size HR RR TEMP (F) WT  HT  BMI kg/m2 BSA m2 O2 Sat FR L/min FiO2 HC       05/07/2021 08:35 /61 Sitting    89 - R 18 97.1 192lbs 2oz 5'  3\" 34.03 1.97 94 %  21%          Physical Examination  · Constitutional  o Appearance  o : well developed, well-nourished, no acute distress  · Respiratory  o Respiratory Effort  o : breathing unlabored  o Auscultation of Lungs  o : clear to auscultation bilaterally throughout inspiration and expiration  · Cardiovascular  o Heart  o : heart rate and rhythm regular  o Peripheral Vascular System  o : no edema     abdomen soft non distended non tender positive bowel sounds               Assessment  · Abdominal pain     789.00/R10.9  · Diverticulitis     562.11/K57.92  · Leukocytosis     288.60/D72.829  · Cholelithiases     574.20/K80.20      Plan  · Orders  o ACO-39: Current medications updated and reviewed (, 1159F) - - 05/07/2021  o General Surgery Consult ([facility]) - 789.00/R10.9, 288.60/D72.829 - 05/07/2021   pt has been in contact with dr lau personally and is seeing him next week  o Urine Culture [facility] ([id number]) - 789.00/R10.9, 288.60/D72.829 - 05/07/2021  · Medications  o Medications have been Reconciled  o Transition of Care or Provider Policy  · Instructions  o Instructed to seek medical attention urgently for new or worsening symptoms.  o Rest. Increase Fluids.  o Patient was educated/instructed on their diagnosis, treatment and medications prior to " discharge from the clinic today.  o Patient instructed to seek medical attention urgently for new or worsening symptoms.  o Call the office with any concerns or questions.  o Chronic conditions reviewed and taken into consideration for today's treatment plan.  o Plan Of Care: follow instructions from Dr lau  o she is going to start on the Keflex today, to er with worsening  · Disposition  o Call or Return if symptoms worsen or persist.            Electronically Signed by: ARA Asif -Author on May 7, 2021 09:07:22 AM

## 2021-07-07 PROBLEM — E55.9 VITAMIN D DEFICIENCY: Status: ACTIVE | Noted: 2020-09-17

## 2021-07-07 PROBLEM — Z78.0 POST-MENOPAUSE: Status: ACTIVE | Noted: 2020-09-17

## 2021-07-07 PROBLEM — F32.9 MAJOR DEPRESSIVE DISORDER: Status: ACTIVE | Noted: 2020-05-07

## 2021-07-08 ENCOUNTER — OFFICE VISIT (OUTPATIENT)
Dept: FAMILY MEDICINE CLINIC | Facility: CLINIC | Age: 71
End: 2021-07-08

## 2021-07-08 VITALS
RESPIRATION RATE: 20 BRPM | WEIGHT: 191.7 LBS | OXYGEN SATURATION: 96 % | HEART RATE: 95 BPM | TEMPERATURE: 96.4 F | HEIGHT: 62 IN | DIASTOLIC BLOOD PRESSURE: 98 MMHG | BODY MASS INDEX: 35.28 KG/M2 | SYSTOLIC BLOOD PRESSURE: 138 MMHG

## 2021-07-08 DIAGNOSIS — M19.90 ARTHRITIS: ICD-10-CM

## 2021-07-08 DIAGNOSIS — M79.7 FIBROMYALGIA: ICD-10-CM

## 2021-07-08 DIAGNOSIS — R05.9 COUGH: ICD-10-CM

## 2021-07-08 DIAGNOSIS — I10 ESSENTIAL HYPERTENSION: ICD-10-CM

## 2021-07-08 DIAGNOSIS — E78.2 MIXED HYPERLIPIDEMIA: ICD-10-CM

## 2021-07-08 DIAGNOSIS — M77.8 LEFT ELBOW TENDINITIS: Primary | ICD-10-CM

## 2021-07-08 PROCEDURE — 99214 OFFICE O/P EST MOD 30 MIN: CPT | Performed by: NURSE PRACTITIONER

## 2021-07-08 RX ORDER — METHYLPREDNISOLONE 4 MG/1
TABLET ORAL
Qty: 1 EACH | Refills: 0 | Status: SHIPPED | OUTPATIENT
Start: 2021-07-08 | End: 2021-08-09

## 2021-07-08 RX ORDER — HYDROCHLOROTHIAZIDE 25 MG/1
25 TABLET ORAL DAILY
Qty: 30 TABLET | Refills: 1 | Status: SHIPPED | OUTPATIENT
Start: 2021-07-08 | End: 2021-08-20

## 2021-07-08 RX ORDER — LISINOPRIL 5 MG/1
5 TABLET ORAL DAILY
COMMUNITY
Start: 2021-06-22 | End: 2021-07-23 | Stop reason: SINTOL

## 2021-07-08 RX ORDER — GABAPENTIN 300 MG/1
300 CAPSULE ORAL 3 TIMES DAILY PRN
COMMUNITY
Start: 2021-06-21 | End: 2021-12-16 | Stop reason: SDUPTHER

## 2021-07-08 NOTE — PROGRESS NOTES
Chief Complaint  Pain (arm-moving wood day before), Hypertension, and Fluid Retention (went to urgent care)    Subjective          Dorothea Cruz presents to [facility]  History of Present Illness    She move fire wood with her  Wednesday last week and the next day woke up with a sore left elbow that radiated up toward the shoulder, toward the fingers. Rates pain an 8.    Peripheral edema in hands, denies increased salt intake. This has been going on for about the same amount of time.    BP elevated today, she thinks because she is hurting. She has been checking her BP at home and it's been in the 150s systolic.     She went to urgent care for the elbow, was given voltaren but she hasn't been to the pharmacy yet. BP was elevated at that visit as well.     Past Medical History:   • Anxiety   • Arthritis   • Cholelithiases   • Depression   • Disease of thyroid gland   • Diverticulitis   • Fibromyalgia   • Hyperlipidemia   • Hypertension   • Major depressive disorder   • Post-menopause   • Sinus trouble   • Sleep apnea   • Vitamin D deficiency       Allergies  Sulfamethoxazole-trimethoprim    Past Surgical History:   • COLONOSCOPY   • HYSTERECTOMY   • TONSILLECTOMY   • WRIST ARTHROPLASTY       Social History     Tobacco Use   • Smoking status: Never Smoker   • Smokeless tobacco: Never Used   Substance Use Topics   • Alcohol use: Never   • Drug use: Never       Family History   Problem Relation Age of Onset   • No Known Problems Mother    • No Known Problems Father    • Breast cancer Sister         Health Maintenance Due   Topic Date Due   • TDAP/TD VACCINES (1 - Tdap) Never done   • ZOSTER VACCINE (1 of 2) Never done   • HEPATITIS C SCREENING  Never done   • ANNUAL WELLNESS VISIT  Never done   • LIPID PANEL  07/07/2021          Current Outpatient Medications:   •  diclofenac (VOLTAREN) 50 MG EC tablet, Take 1 tablet by mouth 2 (Two) Times a Day As Needed (pain)., Disp: 10  tablet, Rfl: 0  •  DULoxetine (CYMBALTA) 60 MG capsule, Take 60 mg by mouth Daily., Disp: , Rfl:   •  ergocalciferol (ERGOCALCIFEROL) 1.25 MG (97044 UT) capsule, Vitamin D2 1,250 mcg (50,000 unit) oral capsule take 1 capsule by oral route   Suspended, Disp: , Rfl:   •  gabapentin (NEURONTIN) 300 MG capsule, Take 300 mg by mouth 3 (Three) Times a Day., Disp: , Rfl:   •  lisinopril (PRINIVIL,ZESTRIL) 5 MG tablet, Take 5 mg by mouth Daily., Disp: , Rfl:   •  primidone (MYSOLINE) 50 MG tablet, primidone 50 mg oral tablet take 1 tablet by oral route once a day (at bedtime)   Active, Disp: , Rfl:   •  rosuvastatin (CRESTOR) 20 MG tablet, Take 20 mg by mouth Daily., Disp: , Rfl:   •  FLUoxetine (PROzac) 20 MG capsule, Prozac 20 mg oral capsule take 1 capsule (20 mg) by oral route once daily in the evening   Suspended, Disp: , Rfl:   •  hydroCHLOROthiazide (HYDRODIURIL) 25 MG tablet, Take 1 tablet by mouth Daily for 30 days., Disp: 30 tablet, Rfl: 1  •  lisinopril (PRINIVIL,ZESTRIL) 20 MG tablet, Take 20 mg by mouth Daily., Disp: , Rfl:   •  methylPREDNISolone (MEDROL) 4 MG dose pack, Take as directed on package instructions., Disp: 1 each, Rfl: 0    There are no discontinued medications.    Immunization History   Administered Date(s) Administered   • COVID-19 (MODERNA) 03/25/2021, 04/22/2021   • Influenza, Unspecified 09/17/2020   • Pneumococcal Conjugate 13-Valent (PCV13) 09/17/2020       Review of Systems   Constitutional: Negative.    HENT: Negative.    Respiratory: Negative.    Cardiovascular: Negative.         Hands swelling   Gastrointestinal: Negative.    Musculoskeletal: Positive for arthralgias.        Elbow pain, back pain, shoulder pain        Objective       Vitals:    07/08/21 0810   BP: 138/98   Pulse:    Resp:    Temp:    SpO2:      Body mass index is 35.06 kg/m².         Physical Exam  Constitutional:       Appearance: Normal appearance.   Cardiovascular:      Rate and Rhythm: Normal rate and regular  rhythm.   Pulmonary:      Effort: Pulmonary effort is normal.      Breath sounds: Normal breath sounds.   Musculoskeletal:         General: Tenderness present. No swelling or deformity.      Comments: Left elbow and posterior upper arm sore to touch   Skin:     General: Skin is warm and dry.      Findings: No bruising.   Neurological:      General: No focal deficit present.      Mental Status: She is alert and oriented to person, place, and time.   Psychiatric:         Mood and Affect: Mood normal.             Result Review :     The following data was reviewed by: ARA Asif on 07/08/2021:    Common labs    Common Labsle 4/26/21 4/28/21 4/28/21 5/6/21 5/6/21     0544 0544 1550 1550   Glucose 100 (A) 119 (A)  139 (A)    BUN 16 15  17    Creatinine 0.80 0.75  1.00 (A)    Sodium 138 138  141    Potassium 4.6 4.1  4.3    Chloride 102 103  106    Calcium 10.1 9.7  9.9    Albumin 4.2 4.1  4.0    Total Bilirubin 0.30 0.40  0.16 (A)    Alkaline Phosphatase 59 58  58    AST (SGOT) 20 16  22    ALT (SGPT) 13 11  20    WBC 16.52 (A)  18.74 (A)  17.03 (A)   Hemoglobin 14.6  14.2  14.2   Hematocrit 45.6  43.7  44.5   Platelets 188  202  258   Total Cholesterol 147       Triglycerides 202 (A)       HDL Cholesterol 58       LDL Cholesterol  49 (A)       (A) Abnormal value       Comments are available for some flowsheets but are not being displayed.             Data reviewed: Consultant notes Urgent care from July 6              Assessment and Plan      Diagnoses and all orders for this visit:    1. Left elbow tendinitis (Primary)  Comments:  medrol pack, add a brace, ice 20 min tid.   Orders:  -     methylPREDNISolone (MEDROL) 4 MG dose pack; Take as directed on package instructions.  Dispense: 1 each; Refill: 0    2. Essential hypertension  Comments:  add hctz 25 and check bp daily, bring diary to next visit  Orders:  -     hydroCHLOROthiazide (HYDRODIURIL) 25 MG tablet; Take 1 tablet by mouth Daily for 30 days.   Dispense: 30 tablet; Refill: 1    3. Mixed hyperlipidemia  Assessment & Plan:  Lipid abnormalities are stable.  Pharmacotherapy as ordered.  Lipids will be reassessed in 3 months.      4. Fibromyalgia  Comments:  neurontin as directed    5. Arthritis  Comments:  she will  the voltaren    6. Cough  Comments:  she is going to hold the ace and f/u wiht me in a month          Follow Up     Return in about 4 weeks (around 8/5/2021).     Call with worsening, follow up in a month.     Patient was given instructions and counseling regarding her condition or for health maintenance advice. Please see specific information pulled into the AVS if appropriate.     Dorothea Cruz  reports that she has never smoked. She has never used smokeless tobacco.     Arelis Butt, ARA

## 2021-07-08 NOTE — ASSESSMENT & PLAN NOTE
Lipid abnormalities are stable.  Pharmacotherapy as ordered.  Lipids will be reassessed in 3 months.

## 2021-07-15 VITALS
RESPIRATION RATE: 18 BRPM | HEIGHT: 63 IN | OXYGEN SATURATION: 94 % | TEMPERATURE: 97.1 F | HEART RATE: 89 BPM | DIASTOLIC BLOOD PRESSURE: 61 MMHG | WEIGHT: 192.12 LBS | BODY MASS INDEX: 34.04 KG/M2 | SYSTOLIC BLOOD PRESSURE: 160 MMHG

## 2021-07-15 VITALS — HEIGHT: 62 IN | WEIGHT: 191 LBS | RESPIRATION RATE: 16 BRPM | BODY MASS INDEX: 35.15 KG/M2

## 2021-07-15 VITALS — SYSTOLIC BLOOD PRESSURE: 170 MMHG | DIASTOLIC BLOOD PRESSURE: 90 MMHG

## 2021-07-23 ENCOUNTER — OFFICE VISIT (OUTPATIENT)
Dept: FAMILY MEDICINE CLINIC | Facility: CLINIC | Age: 71
End: 2021-07-23

## 2021-07-23 VITALS
HEART RATE: 96 BPM | TEMPERATURE: 96.1 F | SYSTOLIC BLOOD PRESSURE: 148 MMHG | WEIGHT: 190.1 LBS | RESPIRATION RATE: 18 BRPM | BODY MASS INDEX: 34.98 KG/M2 | DIASTOLIC BLOOD PRESSURE: 68 MMHG | OXYGEN SATURATION: 97 % | HEIGHT: 62 IN

## 2021-07-23 DIAGNOSIS — I10 ESSENTIAL HYPERTENSION: Primary | ICD-10-CM

## 2021-07-23 DIAGNOSIS — R00.2 INTERMITTENT PALPITATIONS: ICD-10-CM

## 2021-07-23 DIAGNOSIS — R05.8 ACE-INHIBITOR COUGH: ICD-10-CM

## 2021-07-23 DIAGNOSIS — M77.02 EPICONDYLITIS ELBOW, MEDIAL, LEFT: ICD-10-CM

## 2021-07-23 DIAGNOSIS — T46.4X5A ACE-INHIBITOR COUGH: ICD-10-CM

## 2021-07-23 LAB
ALBUMIN SERPL-MCNC: 4.4 G/DL (ref 3.5–5.2)
ALBUMIN/GLOB SERPL: 1.5 G/DL
ALP SERPL-CCNC: 54 U/L (ref 39–117)
ALT SERPL W P-5'-P-CCNC: 16 U/L (ref 1–33)
ANION GAP SERPL CALCULATED.3IONS-SCNC: 8.6 MMOL/L (ref 5–15)
AST SERPL-CCNC: 23 U/L (ref 1–32)
BASOPHILS # BLD AUTO: 0.09 10*3/MM3 (ref 0–0.2)
BASOPHILS NFR BLD AUTO: 0.6 % (ref 0–1.5)
BILIRUB SERPL-MCNC: 0.2 MG/DL (ref 0–1.2)
BUN SERPL-MCNC: 17 MG/DL (ref 8–23)
BUN/CREAT SERPL: 20.5 (ref 7–25)
CALCIUM SPEC-SCNC: 10 MG/DL (ref 8.6–10.5)
CHLORIDE SERPL-SCNC: 97 MMOL/L (ref 98–107)
CHOLEST SERPL-MCNC: 166 MG/DL (ref 0–200)
CO2 SERPL-SCNC: 31.4 MMOL/L (ref 22–29)
CREAT SERPL-MCNC: 0.83 MG/DL (ref 0.57–1)
DEPRECATED RDW RBC AUTO: 49.1 FL (ref 37–54)
EOSINOPHIL # BLD AUTO: 0.25 10*3/MM3 (ref 0–0.4)
EOSINOPHIL NFR BLD AUTO: 1.7 % (ref 0.3–6.2)
ERYTHROCYTE [DISTWIDTH] IN BLOOD BY AUTOMATED COUNT: 15.2 % (ref 12.3–15.4)
GFR SERPL CREATININE-BSD FRML MDRD: 68 ML/MIN/1.73
GLOBULIN UR ELPH-MCNC: 3 GM/DL
GLUCOSE SERPL-MCNC: 108 MG/DL (ref 65–99)
HCT VFR BLD AUTO: 47.1 % (ref 34–46.6)
HDLC SERPL-MCNC: 63 MG/DL (ref 40–60)
HGB BLD-MCNC: 15.9 G/DL (ref 12–15.9)
IMM GRANULOCYTES # BLD AUTO: 0.04 10*3/MM3 (ref 0–0.05)
IMM GRANULOCYTES NFR BLD AUTO: 0.3 % (ref 0–0.5)
LDLC SERPL CALC-MCNC: 65 MG/DL (ref 0–100)
LDLC/HDLC SERPL: 0.88 {RATIO}
LYMPHOCYTES # BLD AUTO: 6.15 10*3/MM3 (ref 0.7–3.1)
LYMPHOCYTES NFR BLD AUTO: 41.4 % (ref 19.6–45.3)
MCH RBC QN AUTO: 30.2 PG (ref 26.6–33)
MCHC RBC AUTO-ENTMCNC: 33.8 G/DL (ref 31.5–35.7)
MCV RBC AUTO: 89.5 FL (ref 79–97)
MONOCYTES # BLD AUTO: 0.83 10*3/MM3 (ref 0.1–0.9)
MONOCYTES NFR BLD AUTO: 5.6 % (ref 5–12)
NEUTROPHILS NFR BLD AUTO: 50.4 % (ref 42.7–76)
NEUTROPHILS NFR BLD AUTO: 7.49 10*3/MM3 (ref 1.7–7)
NRBC BLD AUTO-RTO: 0 /100 WBC (ref 0–0.2)
PLATELET # BLD AUTO: 205 10*3/MM3 (ref 140–450)
PMV BLD AUTO: 12.9 FL (ref 6–12)
POTASSIUM SERPL-SCNC: 3.9 MMOL/L (ref 3.5–5.2)
PROT SERPL-MCNC: 7.4 G/DL (ref 6–8.5)
RBC # BLD AUTO: 5.26 10*6/MM3 (ref 3.77–5.28)
SODIUM SERPL-SCNC: 137 MMOL/L (ref 136–145)
TRIGL SERPL-MCNC: 237 MG/DL (ref 0–150)
TSH SERPL DL<=0.05 MIU/L-ACNC: 1.29 UIU/ML (ref 0.27–4.2)
VLDLC SERPL-MCNC: 38 MG/DL (ref 5–40)
WBC # BLD AUTO: 14.85 10*3/MM3 (ref 3.4–10.8)

## 2021-07-23 PROCEDURE — 84443 ASSAY THYROID STIM HORMONE: CPT | Performed by: NURSE PRACTITIONER

## 2021-07-23 PROCEDURE — 80053 COMPREHEN METABOLIC PANEL: CPT | Performed by: NURSE PRACTITIONER

## 2021-07-23 PROCEDURE — 99214 OFFICE O/P EST MOD 30 MIN: CPT | Performed by: NURSE PRACTITIONER

## 2021-07-23 PROCEDURE — 80061 LIPID PANEL: CPT | Performed by: NURSE PRACTITIONER

## 2021-07-23 PROCEDURE — 85025 COMPLETE CBC W/AUTO DIFF WBC: CPT | Performed by: NURSE PRACTITIONER

## 2021-07-23 PROCEDURE — 93005 ELECTROCARDIOGRAM TRACING: CPT | Performed by: NURSE PRACTITIONER

## 2021-07-23 NOTE — PROGRESS NOTES
Chief Complaint  Hypertension and Hyperlipidemia    Subjective           History of Present Illness  Dorothea Cruz presents to [facility]    Follow-up on cough, it resolved when she stopped the lisinopril.    Follow-up on tendinitis, it is improving    Complains of racing heart, palpitations.  Happens nearly every day.    Hypertension, on HCTZ 25 in place of the Zestril.  BP not to target.    Past Medical History:   • Anxiety   • Arthritis   • Cholelithiases   • Depression   • Disease of thyroid gland   • Diverticulitis   • Fibromyalgia   • Hyperlipidemia   • Hypertension   • Major depressive disorder   • Post-menopause   • Sinus trouble   • Sleep apnea   • Vitamin D deficiency       Allergies  Sulfamethoxazole-trimethoprim    Past Surgical History:   • COLONOSCOPY   • HYSTERECTOMY   • TONSILLECTOMY   • WRIST ARTHROPLASTY       Social History     Tobacco Use   • Smoking status: Never Smoker   • Smokeless tobacco: Never Used   Substance Use Topics   • Alcohol use: Never   • Drug use: Never       Family History   Problem Relation Age of Onset   • No Known Problems Mother    • No Known Problems Father    • Breast cancer Sister         Health Maintenance Due   Topic Date Due   • TDAP/TD VACCINES (1 - Tdap) Never done   • ZOSTER VACCINE (1 of 2) Never done   • HEPATITIS C SCREENING  Never done   • ANNUAL WELLNESS VISIT  Never done   • LIPID PANEL  07/07/2021          Current Outpatient Medications:   •  diclofenac (VOLTAREN) 50 MG EC tablet, Take 1 tablet by mouth 2 (Two) Times a Day As Needed (pain)., Disp: 10 tablet, Rfl: 0  •  DULoxetine (CYMBALTA) 60 MG capsule, Take 60 mg by mouth Daily., Disp: , Rfl:   •  ergocalciferol (ERGOCALCIFEROL) 1.25 MG (49591 UT) capsule, Vitamin D2 1,250 mcg (50,000 unit) oral capsule take 1 capsule by oral route   Suspended, Disp: , Rfl:   •  FLUoxetine (PROzac) 20 MG capsule, Prozac 20 mg oral capsule take 1 capsule (20 mg) by oral route once daily  in the evening   Suspended, Disp: , Rfl:   •  gabapentin (NEURONTIN) 300 MG capsule, Take 300 mg by mouth 3 (Three) Times a Day., Disp: , Rfl:   •  hydroCHLOROthiazide (HYDRODIURIL) 25 MG tablet, Take 1 tablet by mouth Daily for 30 days., Disp: 30 tablet, Rfl: 1  •  methylPREDNISolone (MEDROL) 4 MG dose pack, Take as directed on package instructions., Disp: 1 each, Rfl: 0  •  primidone (MYSOLINE) 50 MG tablet, primidone 50 mg oral tablet take 1 tablet by oral route once a day (at bedtime)   Active, Disp: , Rfl:   •  rosuvastatin (CRESTOR) 20 MG tablet, Take 20 mg by mouth Daily., Disp: , Rfl:   •  TURMERIC PO, Take  by mouth., Disp: , Rfl:   •  metoprolol tartrate (LOPRESSOR) 25 MG tablet, Take 1 tablet by mouth 2 (Two) Times a Day., Disp: 60 tablet, Rfl: 1    Immunization History   Administered Date(s) Administered   • COVID-19 (MODERNA) 03/25/2021, 04/22/2021   • FLUAD TRI 65YR+ 12/27/2019   • Fluzone High Dose =>65 Years ([employer] ONLY) 11/24/2018   • Influenza, Unspecified 09/17/2020   • Pneumococcal Conjugate 13-Valent (PCV13) 09/17/2020       Objective     Vitals:    07/23/21 0901   BP: 148/68   Pulse: 96   Resp: 18   Temp: 96.1 °F (35.6 °C)   SpO2: 97%     Body mass index is 34.77 kg/m².     Physical Exam  Vitals reviewed.   Constitutional:       Appearance: Normal appearance. She is well-developed.   Cardiovascular:      Rate and Rhythm: Normal rate and regular rhythm.      Heart sounds: Normal heart sounds. No murmur heard.     Pulmonary:      Effort: Pulmonary effort is normal.      Breath sounds: Normal breath sounds.   Neurological:      Mental Status: She is alert and oriented to person, place, and time.      Cranial Nerves: No cranial nerve deficit.      Motor: No weakness.   Psychiatric:         Mood and Affect: Mood and affect normal.             Result Review :    The following data was reviewed by: ARA Asif on 07/23/2021:    Common labs    Common Labsle 4/26/21 4/28/21 4/28/21  5/6/21 5/6/21     0544 0544 1550 1550   Glucose 100 (A) 119 (A)  139 (A)    BUN 16 15  17    Creatinine 0.80 0.75  1.00 (A)    Sodium 138 138  141    Potassium 4.6 4.1  4.3    Chloride 102 103  106    Calcium 10.1 9.7  9.9    Albumin 4.2 4.1  4.0    Total Bilirubin 0.30 0.40  0.16 (A)    Alkaline Phosphatase 59 58  58    AST (SGOT) 20 16  22    ALT (SGPT) 13 11  20    WBC 16.52 (A)  18.74 (A)  17.03 (A)   Hemoglobin 14.6  14.2  14.2   Hematocrit 45.6  43.7  44.5   Platelets 188  202  258   Total Cholesterol 147       Triglycerides 202 (A)       HDL Cholesterol 58       LDL Cholesterol  49 (A)       (A) Abnormal value       Comments are available for some flowsheets but are not being displayed.                           Assessment and Plan     Diagnoses and all orders for this visit:    1. Essential hypertension (Primary)  Assessment & Plan:  Hypertension is improving with treatment.  Continue current treatment regimen.  Blood pressure will be reassessed in 4 weeks.    Orders:  -     Comprehensive Metabolic Panel  -     Lipid Panel  -     metoprolol tartrate (LOPRESSOR) 25 MG tablet; Take 1 tablet by mouth 2 (Two) Times a Day.  Dispense: 60 tablet; Refill: 1  -     ECG 12 Lead    2. Intermittent palpitations  -     CBC & Differential  -     TSH  -     Holter monitor - 24 hour; Future  -     ECG 12 Lead    3. Epicondylitis elbow, medial, left  Comments:  continue the elbow brace if needed, condition is improving    4. ACE-inhibitor cough  Comments:  d/c lisinopril and add to allergy list          Follow Up     Return in about 4 weeks (around 8/20/2021) for Recheck BP.    Patient was given instructions and counseling regarding her condition or for health maintenance advice. Please see specific information pulled into the AVS if appropriate.     Dorothea Tashiarositademetrice  reports that she has never smoked. She has never used smokeless tobacco.     Discussed when to seek emergency treatment for palpitations, chest pain  shortness of breath etc.    We will stop the lisinopril and change to metoprolol which should help with palpitations as well.  Schedule for Holter monitor    We will make adjustments pending lab results           ARA Asif

## 2021-07-23 NOTE — ASSESSMENT & PLAN NOTE
Hypertension is improving with treatment.  Continue current treatment regimen.  Blood pressure will be reassessed in 4 weeks.

## 2021-08-03 ENCOUNTER — TELEPHONE (OUTPATIENT)
Dept: FAMILY MEDICINE CLINIC | Facility: CLINIC | Age: 71
End: 2021-08-03

## 2021-08-03 DIAGNOSIS — D72.829 LEUKOCYTOSIS, UNSPECIFIED TYPE: Primary | ICD-10-CM

## 2021-08-03 DIAGNOSIS — K80.20 GALLSTONES: ICD-10-CM

## 2021-08-03 DIAGNOSIS — K57.90 DIVERTICULOSIS: ICD-10-CM

## 2021-08-09 ENCOUNTER — OFFICE VISIT (OUTPATIENT)
Dept: FAMILY MEDICINE CLINIC | Facility: CLINIC | Age: 71
End: 2021-08-09

## 2021-08-09 VITALS
TEMPERATURE: 96.4 F | RESPIRATION RATE: 16 BRPM | DIASTOLIC BLOOD PRESSURE: 64 MMHG | WEIGHT: 189.3 LBS | SYSTOLIC BLOOD PRESSURE: 129 MMHG | HEART RATE: 87 BPM | HEIGHT: 62 IN | BODY MASS INDEX: 34.83 KG/M2 | OXYGEN SATURATION: 96 %

## 2021-08-09 DIAGNOSIS — R00.2 PALPITATIONS: ICD-10-CM

## 2021-08-09 DIAGNOSIS — G47.33 OBSTRUCTIVE SLEEP APNEA SYNDROME: ICD-10-CM

## 2021-08-09 DIAGNOSIS — R10.32 LEFT LOWER QUADRANT ABDOMINAL PAIN: ICD-10-CM

## 2021-08-09 DIAGNOSIS — D72.829 LEUKOCYTOSIS, UNSPECIFIED TYPE: ICD-10-CM

## 2021-08-09 DIAGNOSIS — I10 ESSENTIAL HYPERTENSION: ICD-10-CM

## 2021-08-09 DIAGNOSIS — K57.90 DIVERTICULOSIS: ICD-10-CM

## 2021-08-09 LAB
BILIRUB BLD-MCNC: NEGATIVE MG/DL
CLARITY, POC: CLEAR
COLOR UR: YELLOW
GLUCOSE UR STRIP-MCNC: NEGATIVE MG/DL
KETONES UR QL: NEGATIVE
LEUKOCYTE EST, POC: NEGATIVE
NITRITE UR-MCNC: NEGATIVE MG/ML
PH UR: 6 [PH] (ref 5–8)
PROT UR STRIP-MCNC: NEGATIVE MG/DL
RBC # UR STRIP: NEGATIVE /UL
SP GR UR: 1.02 (ref 1–1.03)
UROBILINOGEN UR QL: NORMAL

## 2021-08-09 PROCEDURE — 99214 OFFICE O/P EST MOD 30 MIN: CPT | Performed by: NURSE PRACTITIONER

## 2021-08-09 PROCEDURE — 81003 URINALYSIS AUTO W/O SCOPE: CPT | Performed by: NURSE PRACTITIONER

## 2021-08-09 RX ORDER — CIPROFLOXACIN 500 MG/1
500 TABLET, FILM COATED ORAL 2 TIMES DAILY
Qty: 20 TABLET | Refills: 0 | Status: SHIPPED | OUTPATIENT
Start: 2021-08-09 | End: 2021-08-20

## 2021-08-09 RX ORDER — METRONIDAZOLE 500 MG/1
500 TABLET ORAL 3 TIMES DAILY
Qty: 30 TABLET | Refills: 0 | Status: SHIPPED | OUTPATIENT
Start: 2021-08-09 | End: 2021-08-20

## 2021-08-09 NOTE — PROGRESS NOTES
Chief Complaint  Hypertension    Subjective       History of Present Illness  Dorothea Cruz presents to [facility]    Follow-up on hypertension, ACE related cough.  When she stopped the lisinopril the cough totally resolved.  BPs have been well controlled.  She is doing well on the metoprolol.    History of diverticulosis and abdominal pain that started over the weekend.  She said yesterday it nearly doubled her over.  She did not go to urgent care or ER.    Leukocytosis with history of gallstones plus diverticulosis.  She is waiting for an appointment to be scheduled with Dr. Garcia's office.  She has seen him in the past.  She denies any urinary type symptoms.  Abdominal pain she would rate a 4 to a 5 on the pain scale today.    HESHAM, she would like to be managed with her 's doctor, Dr. Mariano.    Past Medical History:   • Anxiety   • Arthritis   • Cholelithiases   • Depression   • Disease of thyroid gland   • Diverticulitis   • Fibromyalgia   • Hyperlipidemia   • Hypertension   • Major depressive disorder   • Post-menopause   • Sinus trouble   • Sleep apnea   • Vitamin D deficiency       Allergies  Ace inhibitors and Sulfamethoxazole-trimethoprim    Past Surgical History:   • COLONOSCOPY   • HYSTERECTOMY   • TONSILLECTOMY   • WRIST ARTHROPLASTY       Social History     Tobacco Use   • Smoking status: Never Smoker   • Smokeless tobacco: Never Used   Substance Use Topics   • Alcohol use: Never   • Drug use: Never       Family History   Problem Relation Age of Onset   • No Known Problems Mother    • No Known Problems Father    • Breast cancer Sister         Health Maintenance Due   Topic Date Due   • TDAP/TD VACCINES (1 - Tdap) Never done   • ZOSTER VACCINE (1 of 2) Never done   • HEPATITIS C SCREENING  Never done   • ANNUAL WELLNESS VISIT  Never done          Current Outpatient Medications:   •  DULoxetine (CYMBALTA) 60 MG capsule, Take 60 mg by mouth Daily., Disp: ,  Rfl:   •  ergocalciferol (ERGOCALCIFEROL) 1.25 MG (24403 UT) capsule, Vitamin D2 1,250 mcg (50,000 unit) oral capsule take 1 capsule by oral route   Suspended, Disp: , Rfl:   •  gabapentin (NEURONTIN) 300 MG capsule, Take 300 mg by mouth 3 (Three) Times a Day., Disp: , Rfl:   •  metoprolol tartrate (LOPRESSOR) 25 MG tablet, Take 1 tablet by mouth 2 (Two) Times a Day., Disp: 180 tablet, Rfl: 1  •  primidone (MYSOLINE) 50 MG tablet, primidone 50 mg oral tablet take 1 tablet by oral route once a day (at bedtime)   Active, Disp: , Rfl:   •  rosuvastatin (CRESTOR) 20 MG tablet, Take 20 mg by mouth Daily., Disp: , Rfl:   •  ciprofloxacin (Cipro) 500 MG tablet, Take 1 tablet by mouth 2 (Two) Times a Day., Disp: 20 tablet, Rfl: 0  •  diclofenac (VOLTAREN) 50 MG EC tablet, Take 1 tablet by mouth 2 (Two) Times a Day As Needed (pain)., Disp: 10 tablet, Rfl: 0  •  FLUoxetine (PROzac) 20 MG capsule, Prozac 20 mg oral capsule take 1 capsule (20 mg) by oral route once daily in the evening   Suspended, Disp: , Rfl:   •  hydroCHLOROthiazide (HYDRODIURIL) 25 MG tablet, Take 1 tablet by mouth Daily for 30 days., Disp: 30 tablet, Rfl: 1  •  metroNIDAZOLE (Flagyl) 500 MG tablet, Take 1 tablet by mouth 3 (Three) Times a Day for 10 days., Disp: 30 tablet, Rfl: 0  •  TURMERIC PO, Take  by mouth., Disp: , Rfl:     Immunization History   Administered Date(s) Administered   • COVID-19 (MODERNA) 03/25/2021, 04/22/2021   • FLUAD TRI 65YR+ 12/27/2019   • Fluzone High Dose =>65 Years ([employer] ONLY) 11/24/2018   • Influenza, Unspecified 09/17/2020   • Pneumococcal Conjugate 13-Valent (PCV13) 09/17/2020       Objective     Vitals:    08/09/21 0854   BP: 129/64   Pulse: 87   Resp: 16   Temp: 96.4 °F (35.8 °C)   SpO2: 96%     Body mass index is 34.62 kg/m².     Physical Exam  Vitals reviewed.   Constitutional:       Appearance: Normal appearance. She is well-developed.   HENT:      Mouth/Throat:      Pharynx: No oropharyngeal exudate.    Cardiovascular:      Rate and Rhythm: Normal rate and regular rhythm.      Heart sounds: Normal heart sounds. No murmur heard.     Pulmonary:      Effort: Pulmonary effort is normal.      Breath sounds: Normal breath sounds.   Neurological:      Mental Status: She is alert and oriented to person, place, and time.      Cranial Nerves: No cranial nerve deficit.      Motor: No weakness.   Psychiatric:         Mood and Affect: Mood and affect normal.     Abdomen soft tender primarily in the left lower quadrant but also tender on the right lower quadrant,  no rigid abdomen.        Result Review :    The following data was reviewed by: ARA Asif on 08/09/2021:    Common labs    Common Labsle 4/28/21 4/28/21 5/6/21 5/6/21 7/23/21 7/23/21 7/23/21    0544 0544 1550 1550 0928 0928 0928   Glucose     108 (A)     Glucose 119 (A)  139 (A)       BUN 15  17  17     Creatinine 0.75  1.00 (A)  0.83     eGFR Non African Am     68     Sodium 138  141  137     Potassium 4.1  4.3  3.9     Chloride 103  106  97 (A)     Calcium 9.7  9.9  10.0     Albumin 4.1  4.0  4.40     Total Bilirubin 0.40  0.16 (A)  0.2     Alkaline Phosphatase 58  58  54     AST (SGOT) 16  22  23     ALT (SGPT) 11  20  16     WBC  18.74 (A)  17.03 (A)   14.85 (A)   Hemoglobin  14.2  14.2   15.9   Hematocrit  43.7  44.5   47.1 (A)   Platelets  202  258   205   Total Cholesterol      166    Triglycerides      237 (A)    HDL Cholesterol      63 (A)    LDL Cholesterol       65    (A) Abnormal value       Comments are available for some flowsheets but are not being displayed.                           Assessment and Plan     Diagnoses and all orders for this visit:    1. Left lower quadrant abdominal pain  -     POCT urinalysis dipstick, automated    2. Diverticulosis  -     ciprofloxacin (Cipro) 500 MG tablet; Take 1 tablet by mouth 2 (Two) Times a Day.  Dispense: 20 tablet; Refill: 0    3. Leukocytosis, unspecified type  -     metroNIDAZOLE (Flagyl)  500 MG tablet; Take 1 tablet by mouth 3 (Three) Times a Day for 10 days.  Dispense: 30 tablet; Refill: 0  -     ciprofloxacin (Cipro) 500 MG tablet; Take 1 tablet by mouth 2 (Two) Times a Day.  Dispense: 20 tablet; Refill: 0  -     POCT urinalysis dipstick, automated    4. Essential hypertension  -     metoprolol tartrate (LOPRESSOR) 25 MG tablet; Take 1 tablet by mouth 2 (Two) Times a Day.  Dispense: 180 tablet; Refill: 1    5. Palpitations  -     Holter monitor - 24 hour; Future    6. Obstructive sleep apnea syndrome  -     Holter monitor - 24 hour; Future  -     Ambulatory Referral to Sleep Medicine      Discussed when to seek emergency care for the abdominal pain. She has a consult in to see gastro for the frequent diverticulitis, leukocytosis, gallstones.     I spent 36 minutes caring for Dorothea on this date of service. This time includes time spent by me in the following activities:preparing for the visit, reviewing tests, performing a medically appropriate examination and/or evaluation , counseling and educating the patient/family/caregiver, ordering medications, tests, or procedures, documenting information in the medical record and care coordination    Follow Up     Return in about 3 months (around 11/9/2021).    Patient was given instructions and counseling regarding her condition or for health maintenance advice. Please see specific information pulled into the AVS if appropriate.          Arelis Butt, ARA

## 2021-08-16 ENCOUNTER — TELEPHONE (OUTPATIENT)
Dept: FAMILY MEDICINE CLINIC | Facility: CLINIC | Age: 71
End: 2021-08-16

## 2021-08-16 NOTE — TELEPHONE ENCOUNTER
Pt calling-states that she is continuing to have low abdomen pain and that the med does not seem to be working. No GI symptoms just low abd discomfort. She would like to know what she can take for the pain. She also states that she has not heard anything from [facility] office-she states she has seen him in past so she is going to call and see if they will make her an appt and I will also check into why the referral has not been worked. Any further instructions for low abd pain?

## 2021-08-17 ENCOUNTER — HOSPITAL ENCOUNTER (EMERGENCY)
Facility: HOSPITAL | Age: 71
Discharge: HOME | End: 2021-08-17
Attending: EMERGENCY MEDICINE | Admitting: EMERGENCY MEDICINE

## 2021-08-17 ENCOUNTER — APPOINTMENT (OUTPATIENT)
Dept: CT IMAGING | Facility: HOSPITAL | Age: 71
End: 2021-08-17

## 2021-08-17 VITALS
BODY MASS INDEX: 35.34 KG/M2 | TEMPERATURE: 98.9 F | HEIGHT: 62 IN | HEART RATE: 68 BPM | SYSTOLIC BLOOD PRESSURE: 123 MMHG | RESPIRATION RATE: 18 BRPM | DIASTOLIC BLOOD PRESSURE: 64 MMHG | OXYGEN SATURATION: 95 % | WEIGHT: 192.02 LBS

## 2021-08-17 DIAGNOSIS — K80.20 CALCULUS OF GALLBLADDER WITHOUT CHOLECYSTITIS WITHOUT OBSTRUCTION: ICD-10-CM

## 2021-08-17 DIAGNOSIS — K57.92 DIVERTICULITIS: Primary | ICD-10-CM

## 2021-08-17 LAB
ALBUMIN SERPL-MCNC: 4.1 G/DL (ref 3.5–5.2)
ALBUMIN/GLOB SERPL: 1.3 G/DL
ALP SERPL-CCNC: 53 U/L (ref 39–117)
ALT SERPL W P-5'-P-CCNC: 14 U/L (ref 1–33)
ANION GAP SERPL CALCULATED.3IONS-SCNC: 9.6 MMOL/L (ref 5–15)
AST SERPL-CCNC: 16 U/L (ref 1–32)
BASOPHILS # BLD AUTO: 0.08 10*3/MM3 (ref 0–0.2)
BASOPHILS NFR BLD AUTO: 0.5 % (ref 0–1.5)
BILIRUB SERPL-MCNC: 0.3 MG/DL (ref 0–1.2)
BILIRUB UR QL STRIP: NEGATIVE
BUN SERPL-MCNC: 12 MG/DL (ref 8–23)
BUN/CREAT SERPL: 14.5 (ref 7–25)
CALCIUM SPEC-SCNC: 9.7 MG/DL (ref 8.6–10.5)
CHLORIDE SERPL-SCNC: 101 MMOL/L (ref 98–107)
CLARITY UR: CLEAR
CO2 SERPL-SCNC: 26.4 MMOL/L (ref 22–29)
COLOR UR: YELLOW
CREAT SERPL-MCNC: 0.83 MG/DL (ref 0.57–1)
DEPRECATED RDW RBC AUTO: 50.2 FL (ref 37–54)
EOSINOPHIL # BLD AUTO: 0.19 10*3/MM3 (ref 0–0.4)
EOSINOPHIL NFR BLD AUTO: 1.1 % (ref 0.3–6.2)
ERYTHROCYTE [DISTWIDTH] IN BLOOD BY AUTOMATED COUNT: 15.2 % (ref 12.3–15.4)
GFR SERPL CREATININE-BSD FRML MDRD: 68 ML/MIN/1.73
GLOBULIN UR ELPH-MCNC: 3.1 GM/DL
GLUCOSE SERPL-MCNC: 117 MG/DL (ref 65–99)
GLUCOSE UR STRIP-MCNC: NEGATIVE MG/DL
HCT VFR BLD AUTO: 44.3 % (ref 34–46.6)
HGB BLD-MCNC: 14.8 G/DL (ref 12–15.9)
HGB UR QL STRIP.AUTO: NEGATIVE
HOLD SPECIMEN: NORMAL
HOLD SPECIMEN: NORMAL
IMM GRANULOCYTES # BLD AUTO: 0.07 10*3/MM3 (ref 0–0.05)
IMM GRANULOCYTES NFR BLD AUTO: 0.4 % (ref 0–0.5)
KETONES UR QL STRIP: NEGATIVE
LEUKOCYTE ESTERASE UR QL STRIP.AUTO: NEGATIVE
LIPASE SERPL-CCNC: 40 U/L (ref 13–60)
LYMPHOCYTES # BLD AUTO: 6.54 10*3/MM3 (ref 0.7–3.1)
LYMPHOCYTES NFR BLD AUTO: 36.8 % (ref 19.6–45.3)
MCH RBC QN AUTO: 30 PG (ref 26.6–33)
MCHC RBC AUTO-ENTMCNC: 33.4 G/DL (ref 31.5–35.7)
MCV RBC AUTO: 89.9 FL (ref 79–97)
MONOCYTES # BLD AUTO: 1.01 10*3/MM3 (ref 0.1–0.9)
MONOCYTES NFR BLD AUTO: 5.7 % (ref 5–12)
NEUTROPHILS NFR BLD AUTO: 55.5 % (ref 42.7–76)
NEUTROPHILS NFR BLD AUTO: 9.88 10*3/MM3 (ref 1.7–7)
NITRITE UR QL STRIP: NEGATIVE
NRBC BLD AUTO-RTO: 0 /100 WBC (ref 0–0.2)
PH UR STRIP.AUTO: 6.5 [PH] (ref 5–8)
PLATELET # BLD AUTO: 211 10*3/MM3 (ref 140–450)
PMV BLD AUTO: 11.7 FL (ref 6–12)
POTASSIUM SERPL-SCNC: 4.3 MMOL/L (ref 3.5–5.2)
PROT SERPL-MCNC: 7.2 G/DL (ref 6–8.5)
PROT UR QL STRIP: NEGATIVE
RBC # BLD AUTO: 4.93 10*6/MM3 (ref 3.77–5.28)
SODIUM SERPL-SCNC: 137 MMOL/L (ref 136–145)
SP GR UR STRIP: 1.01 (ref 1–1.03)
UROBILINOGEN UR QL STRIP: NORMAL
WBC # BLD AUTO: 17.77 10*3/MM3 (ref 3.4–10.8)
WHOLE BLOOD HOLD SPECIMEN: NORMAL

## 2021-08-17 PROCEDURE — 0 IOPAMIDOL PER 1 ML: Performed by: EMERGENCY MEDICINE

## 2021-08-17 PROCEDURE — 36415 COLL VENOUS BLD VENIPUNCTURE: CPT

## 2021-08-17 PROCEDURE — 96374 THER/PROPH/DIAG INJ IV PUSH: CPT

## 2021-08-17 PROCEDURE — 85025 COMPLETE CBC W/AUTO DIFF WBC: CPT

## 2021-08-17 PROCEDURE — 96375 TX/PRO/DX INJ NEW DRUG ADDON: CPT

## 2021-08-17 PROCEDURE — 81003 URINALYSIS AUTO W/O SCOPE: CPT

## 2021-08-17 PROCEDURE — 25010000002 MORPHINE PER 10 MG: Performed by: EMERGENCY MEDICINE

## 2021-08-17 PROCEDURE — 99283 EMERGENCY DEPT VISIT LOW MDM: CPT

## 2021-08-17 PROCEDURE — 74177 CT ABD & PELVIS W/CONTRAST: CPT | Performed by: RADIOLOGY

## 2021-08-17 PROCEDURE — 25010000002 ONDANSETRON PER 1 MG: Performed by: EMERGENCY MEDICINE

## 2021-08-17 PROCEDURE — 83690 ASSAY OF LIPASE: CPT

## 2021-08-17 PROCEDURE — 74177 CT ABD & PELVIS W/CONTRAST: CPT

## 2021-08-17 PROCEDURE — 80053 COMPREHEN METABOLIC PANEL: CPT

## 2021-08-17 RX ORDER — ACETAMINOPHEN 325 MG/1
650 TABLET ORAL ONCE
Status: COMPLETED | OUTPATIENT
Start: 2021-08-17 | End: 2021-08-17

## 2021-08-17 RX ORDER — ONDANSETRON 2 MG/ML
4 INJECTION INTRAMUSCULAR; INTRAVENOUS ONCE
Status: COMPLETED | OUTPATIENT
Start: 2021-08-17 | End: 2021-08-17

## 2021-08-17 RX ORDER — HYDROCODONE BITARTRATE AND ACETAMINOPHEN 5; 325 MG/1; MG/1
1 TABLET ORAL EVERY 4 HOURS PRN
Qty: 18 TABLET | Refills: 0 | Status: SHIPPED | OUTPATIENT
Start: 2021-08-17 | End: 2021-08-20

## 2021-08-17 RX ORDER — SODIUM CHLORIDE 0.9 % (FLUSH) 0.9 %
10 SYRINGE (ML) INJECTION AS NEEDED
Status: DISCONTINUED | OUTPATIENT
Start: 2021-08-17 | End: 2021-08-17 | Stop reason: HOSPADM

## 2021-08-17 RX ORDER — ONDANSETRON 8 MG/1
8 TABLET, ORALLY DISINTEGRATING ORAL EVERY 8 HOURS PRN
Qty: 30 TABLET | Refills: 0 | Status: SHIPPED | OUTPATIENT
Start: 2021-08-17 | End: 2021-08-20

## 2021-08-17 RX ORDER — AMOXICILLIN AND CLAVULANATE POTASSIUM 875; 125 MG/1; MG/1
1 TABLET, FILM COATED ORAL 2 TIMES DAILY
Qty: 20 TABLET | Refills: 0 | Status: SHIPPED | OUTPATIENT
Start: 2021-08-17 | End: 2021-08-25

## 2021-08-17 RX ADMIN — ONDANSETRON 4 MG: 2 INJECTION INTRAMUSCULAR; INTRAVENOUS at 18:26

## 2021-08-17 RX ADMIN — SODIUM CHLORIDE 1000 ML: 9 INJECTION, SOLUTION INTRAVENOUS at 18:24

## 2021-08-17 RX ADMIN — IOPAMIDOL 100 ML: 755 INJECTION, SOLUTION INTRAVENOUS at 18:40

## 2021-08-17 RX ADMIN — ACETAMINOPHEN 650 MG: 325 TABLET ORAL at 20:55

## 2021-08-17 RX ADMIN — MORPHINE SULFATE 4 MG: 4 INJECTION, SOLUTION INTRAMUSCULAR; INTRAVENOUS at 18:27

## 2021-08-17 NOTE — DISCHARGE INSTRUCTIONS
Clear liquids for the next 12 hours.  Advance your diet very slowly.  Follow-up with your primary care physician in 48 hours for serial reexamination the abdomen.  Please follow-up with general surgery in regards to the chronic gallstones.  Return to the emergency room for intractable pain, intractable vomiting, fever, worsening symptoms or any new symptoms

## 2021-08-17 NOTE — ED PROVIDER NOTES
Time: 5:36 PM EDT  Arrived by: private car  Chief Complaint:   Chief Complaint   Patient presents with   • Abdominal Pain   • Flank Pain   • Back Pain     History provided by: patient  History is limited by: N/A     History of Present Illness:  Patient is a 71 y.o. year old female that presents to the emergency department with abdominal pain. Pt saw PCP on Monday and was told she had diverticulitis. Pt was put on antibiotics and had loose stools after starting them. Pt stools have changed in color to black and green.     Pt still has her gallbladder.   Pt doesn't have her appendix.     Abdominal Pain  Pain location:  Generalized  Pain radiates to:  Does not radiate  Pain severity:  Moderate  Onset quality:  Gradual  Duration:  1 week  Timing:  Constant  Progression:  Worsening  Chronicity:  New  Relieved by:  Nothing  Worsened by:  Eating  Associated symptoms: fever    Associated symptoms: no chest pain, no chills, no cough, no diarrhea, no dysuria, no melena, no shortness of breath and no vomiting    Fever:     Duration:  1 week    Timing:  Unable to specify    Temp source:  Subjective    Progression:  Unable to specify  Flank Pain  Associated symptoms: fever    Associated symptoms: no chest pain, no chills, no cough, no diarrhea, no dysuria, no melena, no shortness of breath and no vomiting    Back Pain  Associated symptoms: abdominal pain and fever    Associated symptoms: no chest pain and no dysuria        Similar Symptoms Previously: yes  Recently seen: yes      Patient Care Team  Primary Care Provider: Arelis Daniel APRN    Past Medical History:     Allergies   Allergen Reactions   • Ace Inhibitors Cough   • Sulfamethoxazole-Trimethoprim Hives     Past Medical History:   Diagnosis Date   • Anxiety    • Arthritis    • Cholelithiases 4/30@1   • Depression    • Disease of thyroid gland    • Diverticulitis    • Fibromyalgia    • Hyperlipidemia    • Hypertension    • Major depressive disorder 05/07/2020    • Post-menopause 09/17/2020   • Sinus trouble    • Sleep apnea    • Vitamin D deficiency 09/17/2020     Past Surgical History:   Procedure Laterality Date   • COLONOSCOPY  2019   • HYSTERECTOMY     • TONSILLECTOMY     • WRIST ARTHROPLASTY Bilateral      Family History   Problem Relation Age of Onset   • No Known Problems Mother    • No Known Problems Father    • Breast cancer Sister        Home Medications:  Prior to Admission medications    Medication Sig Start Date End Date Taking? Authorizing Provider   ciprofloxacin (Cipro) 500 MG tablet Take 1 tablet by mouth 2 (Two) Times a Day. 8/9/21   Arelis Daniel APRN   DULoxetine (CYMBALTA) 60 MG capsule Take 60 mg by mouth Daily.    Emergency, Nurse SILVANO Arteaga   ergocalciferol (ERGOCALCIFEROL) 1.25 MG (31284 UT) capsule Vitamin D2 1,250 mcg (50,000 unit) oral capsule take 1 capsule by oral route   Suspended    Emergency, Nurse Epic, RN   gabapentin (NEURONTIN) 300 MG capsule Take 300 mg by mouth 3 (Three) Times a Day. 6/21/21   Provider, MD Larry   hydroCHLOROthiazide (HYDRODIURIL) 25 MG tablet Take 1 tablet by mouth Daily for 30 days. 7/8/21 8/7/21  Arelis Daniel APRN   metoprolol tartrate (LOPRESSOR) 25 MG tablet Take 1 tablet by mouth 2 (Two) Times a Day. 8/9/21   Arelis Daniel APRN   metroNIDAZOLE (Flagyl) 500 MG tablet Take 1 tablet by mouth 3 (Three) Times a Day for 10 days. 8/9/21 8/19/21  Arelis Daniel APRN   primidone (MYSOLINE) 50 MG tablet primidone 50 mg oral tablet take 1 tablet by oral route once a day (at bedtime)   Active    Emergency, Nurse SILVANO Arteaga   rosuvastatin (CRESTOR) 20 MG tablet Take 20 mg by mouth Daily.    Emergency, Nurse Epic, RN   TURMERIC PO Take  by mouth.    Provider, Historical, MD        Social History:   Social History     Tobacco Use   • Smoking status: Never Smoker   • Smokeless tobacco: Never Used   Substance Use Topics   • Alcohol use: Never   • Drug use: Never         Review of  "Systems:  Review of Systems   Constitutional: Positive for fever. Negative for chills.   HENT: Negative for nosebleeds.    Eyes: Negative for redness.   Respiratory: Negative for cough and shortness of breath.    Cardiovascular: Negative for chest pain.   Gastrointestinal: Positive for abdominal pain. Negative for diarrhea, melena and vomiting.   Genitourinary: Positive for flank pain. Negative for dysuria and frequency.   Musculoskeletal: Positive for back pain. Negative for neck pain.   Skin: Negative for rash.   Neurological: Negative for seizures and syncope.        Physical Exam:  /72 (BP Location: Right arm, Patient Position: Lying)   Pulse 73   Temp 98.9 °F (37.2 °C) (Oral)   Resp 18   Ht 157.5 cm (62\")   Wt 87.1 kg (192 lb 0.3 oz)   SpO2 97%   Breastfeeding No   BMI 35.12 kg/m²     Physical Exam  Vitals and nursing note reviewed.   Constitutional:       General: She is awake. She is not in acute distress.     Appearance: Normal appearance. She is not ill-appearing.   HENT:      Head: Normocephalic and atraumatic.      Nose: Nose normal.      Mouth/Throat:      Pharynx: Oropharynx is clear.   Eyes:      General: Lids are normal. No scleral icterus.     Conjunctiva/sclera: Conjunctivae normal.      Pupils: Pupils are equal, round, and reactive to light.   Cardiovascular:      Rate and Rhythm: Normal rate and regular rhythm.      Heart sounds: Normal heart sounds. No murmur heard.     Pulmonary:      Effort: Pulmonary effort is normal. No respiratory distress.      Breath sounds: Normal breath sounds. Decreased air movement (slightly diminished) present. No decreased breath sounds, wheezing, rhonchi or rales.   Chest:      Chest wall: No tenderness.   Abdominal:      Palpations: Abdomen is soft.      Tenderness: There is abdominal tenderness (diffuse). There is no right CVA tenderness, left CVA tenderness, guarding or rebound.      Comments: No rigidity. Worse in RUQ and lower abdomen.  "   Musculoskeletal:         General: No tenderness. Normal range of motion.      Cervical back: Normal range of motion and neck supple. No tenderness.      Right lower leg: No edema.      Left lower leg: No edema.   Skin:     General: Skin is warm and dry.   Neurological:      General: No focal deficit present.      Mental Status: She is alert.      Sensory: No sensory deficit.      Motor: No weakness.   Psychiatric:         Mood and Affect: Mood normal.         Behavior: Behavior normal.                Medications in the Emergency Department:  Medications   sodium chloride 0.9 % flush 10 mL (has no administration in time range)   sodium chloride 0.9 % bolus 1,000 mL (1,000 mL Intravenous New Bag 8/17/21 1824)   ondansetron (ZOFRAN) injection 4 mg (4 mg Intravenous Given 8/17/21 1826)   morphine injection 4 mg (4 mg Intravenous Given 8/17/21 1827)   iopamidol (ISOVUE-370) 76 % injection 100 mL (100 mL Intravenous Given 8/17/21 1840)        Labs  Lab Results (last 24 hours)     Procedure Component Value Units Date/Time    CBC & Differential [[id number]]  (Abnormal) Collected: 08/17/21 1110    Specimen: Blood Updated: 08/17/21 1303    Narrative:      The following orders were created for panel order CBC & Differential.  Procedure                               Abnormality         Status                     ---------                               -----------         ------                     CBC Auto Differential[[id number]]        Abnormal            Final result                 Please view results for these tests on the individual orders.    Comprehensive Metabolic Panel [[id number]]  (Abnormal) Collected: 08/17/21 1110    Specimen: Blood Updated: 08/17/21 1156     Glucose 117 mg/dL      BUN 12 mg/dL      Creatinine 0.83 mg/dL      Sodium 137 mmol/L      Potassium 4.3 mmol/L      Chloride 101 mmol/L      CO2 26.4 mmol/L      Calcium 9.7 mg/dL      Total Protein 7.2 g/dL      Albumin 4.10 g/dL      ALT (SGPT) 14 U/L       AST (SGOT) 16 U/L      Alkaline Phosphatase 53 U/L      Total Bilirubin 0.3 mg/dL      eGFR Non African Amer 68 mL/min/1.73      Globulin 3.1 gm/dL      A/G Ratio 1.3 g/dL      BUN/Creatinine Ratio 14.5     Anion Gap 9.6 mmol/L     Narrative:      GFR Normal >60  Chronic Kidney Disease <60  Kidney Failure <15      Lipase [[id number]]  (Normal) Collected: 08/17/21 1110    Specimen: Blood Updated: 08/17/21 1156     Lipase 40 U/L     Urinalysis With Microscopic If Indicated (No Culture) - Urine, Clean Catch [[id number]]  (Normal) Collected: 08/17/21 1110    Specimen: Urine, Clean Catch Updated: 08/17/21 1150     Color, UA Yellow     Appearance, UA Clear     pH, UA 6.5     Specific Gravity, UA 1.014     Glucose, UA Negative     Ketones, UA Negative     Bilirubin, UA Negative     Blood, UA Negative     Protein, UA Negative     Leuk Esterase, UA Negative     Nitrite, UA Negative     Urobilinogen, UA 0.2 E.U./dL    Narrative:      Urine microscopic not indicated.    CBC Auto Differential [[id number]]  (Abnormal) Collected: 08/17/21 1110    Specimen: Blood Updated: 08/17/21 1303     WBC 17.77 10*3/mm3      RBC 4.93 10*6/mm3      Hemoglobin 14.8 g/dL      Hematocrit 44.3 %      MCV 89.9 fL      MCH 30.0 pg      MCHC 33.4 g/dL      RDW 15.2 %      RDW-SD 50.2 fl      MPV 11.7 fL      Platelets 211 10*3/mm3      Neutrophil % 55.5 %      Lymphocyte % 36.8 %      Monocyte % 5.7 %      Eosinophil % 1.1 %      Basophil % 0.5 %      Immature Grans % 0.4 %      Neutrophils, Absolute 9.88 10*3/mm3      Lymphocytes, Absolute 6.54 10*3/mm3      Monocytes, Absolute 1.01 10*3/mm3      Eosinophils, Absolute 0.19 10*3/mm3      Basophils, Absolute 0.08 10*3/mm3      Immature Grans, Absolute 0.07 10*3/mm3      nRBC 0.0 /100 WBC            Imaging:  CT Abdomen Pelvis With Contrast    Result Date: 8/17/2021  PROCEDURE: CT ABDOMEN PELVIS W CONTRAST  COMPARISON: [facility], CT, ABDOMEN/PELVIS WITH CONTRAST, 1/17/2019, 12:51.  MAGALY  [facility], CT, ABDOMEN/PELVIS WITH CONTRAST, 4/28/2021, 8:08.  INDICATIONS: Left-side abdominal pain, Hx diverticulitis  TECHNIQUE: After obtaining the patient's consent, CT images were created with non-ionic intravenous contrast material.   PROTOCOL:   Standard imaging protocol performed    RADIATION:   DLP: 716.8 mGy*cm   Automated exposure control was utilized to minimize radiation dose. CONTRAST: 100 cc Isovue 370 I.V. LABS:   eGFR: >60 ml/min/1.73m2  FINDINGS:  The lung bases are clear bilaterally.  Multiple gallstones are noted in the gallbladder without CT evidence of cholecystitis.  The liver, spleen, pancreas and left adrenal gland appear unremarkable.  There is a right adrenal nodule measuring 2.4 cm x 1.8 cm, similar to the previous exam.  The lesion was present on the 1/17/2019 exam at which time it measured 1.5 cm.  Both kidneys appear normal.  Several sub cm retroperitoneal lymph nodes appear similar to the previous exam.  No free fluid is seen in the abdomen or pelvis.  The urinary bladder appears normal.  A hysterectomy has been performed.  The adnexal regions appear unremarkable.  Colonic diverticulosis is noted.  Inflammatory changes are noted surrounding the distal descending colon consistent with acute diverticulitis.  No abscess or free air is demonstrated.  No focal osseous lesion is seen.    CONCLUSION:  1. Acute diverticulitis involving the distal descending colon 2. Indeterminate right adrenal nodule measuring 2.4 cm.  A metastasis is considered unlikely given the chronicity.  A slow growing primary adrenal mass such as pheochromocytoma or carcinoma would be in the differential.  MRI could be considered to further evaluate 3. Cholelithiasis 4. Previous hysterectomy     Dillon Aiken M.D.       Electronically Signed and Approved By: Dillon Aiken M.D. on 8/17/2021 at 19:18               Procedures:  Procedures    Progress                            Medical Decision  Making:  MDM  Number of Diagnoses or Management Options  Calculus of gallbladder without cholecystitis without obstruction  Diverticulitis  Diagnosis management comments:     At the time of discharge, the patient appears well, no distress and nontoxic.  The patient is resting comfortably.  The patient states that her pain is controlled.  The patient's white blood count is elevated at 17,000.  The patient's other labs are unremarkable.  Patient has a CAT scan that demonstrates diverticulitis on the left side of the colon.  There is no sign of perforation or abscess.  The CAT scan also demonstrates cholelithiasis but no evidence of cholecystitis.  The patient is aware of her gallstones.  On reexamination, the patient really has diffuse tenderness again worse in the lower abdomen and in the right upper quadrant.  There is no peritoneal findings such as rebound guarding or rigidity.  The patient has a negative Flores sign.  I will switch the patient's antibiotics to Augmentin.  We will place the patient on hydrocodone and Zofran.  The patient will follow up with her primary care physician in 40 hours for serial reexamination, the patient will follow up with general surgery in regards to her chronic cholelithiasis.  The patient will return for intractable pain, vomiting, fever or new symptoms.  The patient states that she does feel comfortable to go home.       Amount and/or Complexity of Data Reviewed  Clinical lab tests: reviewed  Tests in the radiology section of CPT®: reviewed                 Final diagnoses:   Diverticulitis   Calculus of gallbladder without cholecystitis without obstruction        Disposition:  ED Disposition     ED Disposition Condition Comment    Discharge Stable           This medical record created using voice recognition software and may contain unintended errors.    Documentation assistance provided by Monserrat Steel acting as scribe for Shamir Banda DO. Information recorded by the  scribe was done at my direction and has been verified and validated by me.          Monserrat Steel  08/17/21 1759       Monserrat Steel  08/17/21 1800       Shamir Banda DO  08/18/21 [id number]

## 2021-08-20 ENCOUNTER — OFFICE VISIT (OUTPATIENT)
Dept: SURGERY | Facility: CLINIC | Age: 71
End: 2021-08-20

## 2021-08-20 ENCOUNTER — PREP FOR SURGERY (OUTPATIENT)
Dept: OTHER | Facility: HOSPITAL | Age: 71
End: 2021-08-20

## 2021-08-20 VITALS — HEIGHT: 62 IN | WEIGHT: 191.8 LBS | RESPIRATION RATE: 16 BRPM | BODY MASS INDEX: 35.3 KG/M2

## 2021-08-20 DIAGNOSIS — K80.20 SYMPTOMATIC CHOLELITHIASIS: Primary | ICD-10-CM

## 2021-08-20 PROCEDURE — 99213 OFFICE O/P EST LOW 20 MIN: CPT | Performed by: SURGERY

## 2021-08-20 NOTE — PROGRESS NOTES
Chief Complaint:  Abdominal Pain         History of Present Illness  Dorothea Cruz is a 71 y.o. female self-referral for gallstones.  The patient previously saw me on 5/21/2021 for gallstones but at that time there was no indication the gallstones were symptomatic and we decided to proceed with watchful observation.  A copy and paste of the HPI section and plan section and Plan section from that office note is available below.  Patient is now having pain at her right upper quadrant along with bloating and the pain occurs after eating.  Patient wants to go ahead now and get her gallbladder removed.  Surgical history includes hysterectomy.    Copy and Paste of HPI Section from Office Note from 5/21/21  Patient was referred for gallstones.  Patient went to the emergency room on 4/28/2021 for lower abdominal pain.  CT head and pelvis was done and showed acute diverticulitis of the distal descending colon.  The CT scan also showed small stones in the gallbladder.  Patient's lower abdominal pain has resolved.  Gallstones were further evaluated with an ultrasound and it showed gallstones without evidence of acute cholecystitis and the common bile duct was nondilated at 4 to 5 mm in diameter.  Patient last had a colonoscopy in 2019.  He does not have any upper abdominal pain or right upper quadrant abdominal pain at all and she cannot recall having any significant pain at these areas in the past.  She does not have any nausea or bloating after eating.  Diagnosis of gallstones was discussed with the patient.  I reviewed copies of the CT scan and abdominal ultrasound radiology reports.    Copy and Paste of Plan Section from Office Note from 5/21/21  Diagnosis of gallstones was discussed with the patient.  In general,  there is no indication for prophylactic removal of gallbladder for asymptomatic gallstones.  The signs and symptoms that would be concerning for symptomatic gallstones were discussed with the patient.   Patient will schedule appointment to see me again should any of the signs or symptoms we discussed start occurring.  Allergies: Ace inhibitors and Sulfamethoxazole-trimethoprim      Current Outpatient Medications:   •  amoxicillin-clavulanate (AUGMENTIN) 875-125 MG per tablet, Take 1 tablet by mouth 2 (Two) Times a Day for 10 days., Disp: 20 tablet, Rfl: 0  •  DULoxetine (CYMBALTA) 60 MG capsule, Take 60 mg by mouth Daily., Disp: , Rfl:   •  ergocalciferol (ERGOCALCIFEROL) 1.25 MG (20637 UT) capsule, Vitamin D2 1,250 mcg (50,000 unit) oral capsule take 1 capsule by oral route   Suspended, Disp: , Rfl:   •  gabapentin (NEURONTIN) 300 MG capsule, Take 300 mg by mouth 3 (Three) Times a Day., Disp: , Rfl:   •  HYDROcodone-acetaminophen (NORCO) 5-325 MG per tablet, Take 1 tablet by mouth Every 4 (Four) Hours As Needed for Moderate Pain  for up to 3 days., Disp: 18 tablet, Rfl: 0  •  metoprolol tartrate (LOPRESSOR) 25 MG tablet, Take 1 tablet by mouth 2 (Two) Times a Day., Disp: 180 tablet, Rfl: 1  •  primidone (MYSOLINE) 50 MG tablet, primidone 50 mg oral tablet take 1 tablet by oral route once a day (at bedtime)   Active, Disp: , Rfl:   •  rosuvastatin (CRESTOR) 20 MG tablet, Take 20 mg by mouth Daily., Disp: , Rfl:   •  TURMERIC PO, Take  by mouth., Disp: , Rfl:     Past Medical History:   • Anxiety   • Arthritis   • Cholelithiases   • Depression   • Disease of thyroid gland   • Diverticulitis   • Fibromyalgia   • Hyperlipidemia   • Hypertension   • Major depressive disorder   • Post-menopause   • Sinus trouble   • Sleep apnea   • Vitamin D deficiency        Past Surgical History:   • COLONOSCOPY   • HYSTERECTOMY   • TONSILLECTOMY   • WRIST ARTHROPLASTY       Family History:   Family History   Problem Relation Age of Onset   • No Known Problems Mother    • No Known Problems Father    • Breast cancer Sister         Social History:  Social History     Tobacco Use   • Smoking status: Never Smoker   • Smokeless tobacco:  "Never Used   Substance Use Topics   • Alcohol use: Never       Objective     Vital Signs:  Resp 16   Ht 157.5 cm (62\")   Wt 87 kg (191 lb 12.8 oz)   BMI 35.08 kg/m²   • Constitutional: here alone, alert, no acute distress, reliable historian  • HENT:  NCAT, no visible deformities or lesions  • Eyes:  sclerae clear, conjunctivae clear, EOMI  • Neck:  normal appearance, no masses, trachea midline  • Respiratory:  breathing not labored, respiratory effort appears normal  • Cardiovascular:  heart regular rate  • Abdomen:  soft, nontender, nondistended, surgical scar extending from the suprapubic area to just below the umbilicus  • Skin and subcutaneous tissue:  no visible concerning rashes or lesions, no jaundice  • Musculoskeletal: moving all extremities symmetrically and purposefully  • Neurologic:  no obvious motor or sensory deficits, normal gait, able to stand without difficulty, cerebellar function without any obvious abnormalities, alert & oriented x 3, speech clear  • Psychiatric:  judgment and insight intact, mood normal, affect appropriate, cooperative    Assessment:  Symptomatic cholelithiasis    Plan:  Laparoscopic cholecystectomy with intraoperative cholangiogram    Discussion: Indications, options, risk, benefits, and expected outcomes of planned surgery were discussed with the patient and she agrees to proceed.    Louie Medina MD  08/20/2021    Electronically signed by Louie Medina MD, 08/20/21, 3:01 PM EDT.        "

## 2021-08-23 ENCOUNTER — HOSPITAL ENCOUNTER (OUTPATIENT)
Dept: CARDIOLOGY | Facility: HOSPITAL | Age: 71
Discharge: HOME | End: 2021-08-23
Admitting: NURSE PRACTITIONER

## 2021-08-23 ENCOUNTER — OFFICE VISIT (OUTPATIENT)
Dept: FAMILY MEDICINE CLINIC | Facility: CLINIC | Age: 71
End: 2021-08-23

## 2021-08-23 VITALS
SYSTOLIC BLOOD PRESSURE: 130 MMHG | HEIGHT: 62 IN | DIASTOLIC BLOOD PRESSURE: 60 MMHG | RESPIRATION RATE: 18 BRPM | BODY MASS INDEX: 35.11 KG/M2 | HEART RATE: 70 BPM | WEIGHT: 190.8 LBS | TEMPERATURE: 97.3 F | OXYGEN SATURATION: 96 %

## 2021-08-23 DIAGNOSIS — K57.92 DIVERTICULITIS: Primary | ICD-10-CM

## 2021-08-23 DIAGNOSIS — R00.2 PALPITATIONS: ICD-10-CM

## 2021-08-23 DIAGNOSIS — G47.33 OBSTRUCTIVE SLEEP APNEA SYNDROME: ICD-10-CM

## 2021-08-23 DIAGNOSIS — D72.829 LEUKOCYTOSIS, UNSPECIFIED TYPE: ICD-10-CM

## 2021-08-23 LAB
ALBUMIN SERPL-MCNC: 4.7 G/DL (ref 3.5–5.2)
ALBUMIN/GLOB SERPL: 1.8 G/DL
ALP SERPL-CCNC: 52 U/L (ref 39–117)
ALT SERPL W P-5'-P-CCNC: 20 U/L (ref 1–33)
ANION GAP SERPL CALCULATED.3IONS-SCNC: 13.6 MMOL/L (ref 5–15)
AST SERPL-CCNC: 23 U/L (ref 1–32)
BILIRUB SERPL-MCNC: 0.2 MG/DL (ref 0–1.2)
BUN SERPL-MCNC: 15 MG/DL (ref 8–23)
BUN/CREAT SERPL: 18.3 (ref 7–25)
CALCIUM SPEC-SCNC: 10.2 MG/DL (ref 8.6–10.5)
CHLORIDE SERPL-SCNC: 99 MMOL/L (ref 98–107)
CO2 SERPL-SCNC: 26.4 MMOL/L (ref 22–29)
CREAT SERPL-MCNC: 0.82 MG/DL (ref 0.57–1)
DEPRECATED RDW RBC AUTO: 48.4 FL (ref 37–54)
ERYTHROCYTE [DISTWIDTH] IN BLOOD BY AUTOMATED COUNT: 14.8 % (ref 12.3–15.4)
GFR SERPL CREATININE-BSD FRML MDRD: 69 ML/MIN/1.73
GLOBULIN UR ELPH-MCNC: 2.6 GM/DL
GLUCOSE SERPL-MCNC: 138 MG/DL (ref 65–99)
HCT VFR BLD AUTO: 47.5 % (ref 34–46.6)
HGB BLD-MCNC: 15.4 G/DL (ref 12–15.9)
MCH RBC QN AUTO: 29.4 PG (ref 26.6–33)
MCHC RBC AUTO-ENTMCNC: 32.4 G/DL (ref 31.5–35.7)
MCV RBC AUTO: 90.6 FL (ref 79–97)
PLATELET # BLD AUTO: 235 10*3/MM3 (ref 140–450)
PMV BLD AUTO: 12.2 FL (ref 6–12)
POTASSIUM SERPL-SCNC: 3.7 MMOL/L (ref 3.5–5.2)
PROT SERPL-MCNC: 7.3 G/DL (ref 6–8.5)
RBC # BLD AUTO: 5.24 10*6/MM3 (ref 3.77–5.28)
SODIUM SERPL-SCNC: 139 MMOL/L (ref 136–145)
WBC # BLD AUTO: 13.57 10*3/MM3 (ref 3.4–10.8)

## 2021-08-23 PROCEDURE — 85007 BL SMEAR W/DIFF WBC COUNT: CPT | Performed by: NURSE PRACTITIONER

## 2021-08-23 PROCEDURE — 99213 OFFICE O/P EST LOW 20 MIN: CPT | Performed by: NURSE PRACTITIONER

## 2021-08-23 PROCEDURE — 85025 COMPLETE CBC W/AUTO DIFF WBC: CPT | Performed by: NURSE PRACTITIONER

## 2021-08-23 PROCEDURE — 80053 COMPREHEN METABOLIC PANEL: CPT | Performed by: NURSE PRACTITIONER

## 2021-08-23 NOTE — PROGRESS NOTES
Chief Complaint  Diverticulitis (ER follow up) and Abdominal Pain    Subjective       History of Present Illness  Dorothea Cruz presents to [facility]  Follow-up on diverticulitis, she went to the ER last Tuesday because pain was increasing.  She was already on Flagyl and Cipro.  She had CT proven diverticulitis.  Elevated white count, she was switched to Augmentin.  Her symptoms have improved since starting that.  Today she is pain-free.    Past Medical History:   • Anxiety   • Arthritis   • Cholelithiases   • Depression   • Disease of thyroid gland   • Diverticulitis   • Fibromyalgia   • Hyperlipidemia   • Hypertension   • Major depressive disorder   • Post-menopause   • Sinus trouble   • Sleep apnea   • Vitamin D deficiency       Allergies  Ace inhibitors and Sulfamethoxazole-trimethoprim    Past Surgical History:   • COLONOSCOPY   • HYSTERECTOMY   • TONSILLECTOMY   • WRIST ARTHROPLASTY       Social History     Tobacco Use   • Smoking status: Never Smoker   • Smokeless tobacco: Never Used   Substance Use Topics   • Alcohol use: Never   • Drug use: Never       Family History   Problem Relation Age of Onset   • No Known Problems Mother    • No Known Problems Father    • Breast cancer Sister         Health Maintenance Due   Topic Date Due   • TDAP/TD VACCINES (1 - Tdap) Never done   • ZOSTER VACCINE (1 of 2) Never done   • HEPATITIS C SCREENING  Never done   • ANNUAL WELLNESS VISIT  Never done          Current Outpatient Medications:   •  amoxicillin-clavulanate (AUGMENTIN) 875-125 MG per tablet, Take 1 tablet by mouth 2 (Two) Times a Day for 10 days., Disp: 20 tablet, Rfl: 0  •  DULoxetine (CYMBALTA) 60 MG capsule, Take 60 mg by mouth Daily., Disp: , Rfl:   •  ergocalciferol (ERGOCALCIFEROL) 1.25 MG (26949 UT) capsule, Vitamin D2 1,250 mcg (50,000 unit) oral capsule take 1 capsule by oral route   Suspended, Disp: , Rfl:   •  gabapentin (NEURONTIN) 300 MG capsule, Take 300 mg  by mouth 3 (Three) Times a Day., Disp: , Rfl:   •  metoprolol tartrate (LOPRESSOR) 25 MG tablet, Take 1 tablet by mouth 2 (Two) Times a Day., Disp: 180 tablet, Rfl: 1  •  primidone (MYSOLINE) 50 MG tablet, primidone 50 mg oral tablet take 1 tablet by oral route once a day (at bedtime)   Active, Disp: , Rfl:   •  rosuvastatin (CRESTOR) 20 MG tablet, Take 20 mg by mouth Daily., Disp: , Rfl:   •  TURMERIC PO, Take  by mouth., Disp: , Rfl:     Immunization History   Administered Date(s) Administered   • COVID-19 (MODERNA) 03/25/2021, 04/22/2021   • FLUAD TRI 65YR+ 12/27/2019   • Fluzone High Dose =>65 Years ([employer] ONLY) 11/24/2018   • Influenza, Unspecified 09/17/2020   • Pneumococcal Conjugate 13-Valent (PCV13) 09/17/2020       Objective     Vitals:    08/23/21 1605   BP: 130/60   Pulse: 70   Resp: 18   Temp: 97.3 °F (36.3 °C)   SpO2: 96%     Body mass index is 34.9 kg/m².     Physical Exam    Well-developed well-nourished in no distress    Heart rate and rhythm are regular without murmur    Breath sounds are clear respirations unlabored    Abdomen soft nondistended nontender with positive bowel sounds    Affect pleasant    Oriented x3    Result Review :    The following data was reviewed by: ARA Asif on 08/23/2021:    Common labs    Common Labsle 5/6/21 5/6/21 7/23/21 7/23/21 7/23/21 8/17/21 8/17/21    1550 1550 0928 0928 0928 1110 1110   Glucose   108 (A)   117 (A)    Glucose 139 (A)         BUN 17  17   12    Creatinine 1.00 (A)  0.83   0.83    eGFR Non African Am   68   68    Sodium 141  137   137    Potassium 4.3  3.9   4.3    Chloride 106  97 (A)   101    Calcium 9.9  10.0   9.7    Albumin 4.0  4.40   4.10    Total Bilirubin 0.16 (A)  0.2   0.3    Alkaline Phosphatase 58  54   53    AST (SGOT) 22  23   16    ALT (SGPT) 20  16   14    WBC  17.03 (A)   14.85 (A)  17.77 (A)   Hemoglobin  14.2   15.9  14.8   Hematocrit  44.5   47.1 (A)  44.3   Platelets  258   205  211   Total Cholesterol    166       Triglycerides    237 (A)      HDL Cholesterol    63 (A)      LDL Cholesterol     65      (A) Abnormal value              Data reviewed: Recent hospitalization notes ER visit 8/17/21              Assessment and Plan     Diagnoses and all orders for this visit:    1. Diverticulitis (Primary)  Comments:  Follow-up with Dr. Garcia in 6 weeks, she has an appointment.  Orders:  -     CBC w AUTO Differential  -     Comprehensive metabolic panel    2. Leukocytosis, unspecified type  Comments:  Will call with CBC results.  Orders:  -     CBC w AUTO Differential            Follow Up     Return if symptoms worsen or fail to improve.  Discussed when to seek emergency care.    Patient was given instructions and counseling regarding her condition or for health maintenance advice. Please see specific information pulled into the AVS if appropriate.       Arelis Butt, APRN

## 2021-08-24 ENCOUNTER — TELEPHONE (OUTPATIENT)
Dept: FAMILY MEDICINE CLINIC | Facility: CLINIC | Age: 71
End: 2021-08-24

## 2021-08-24 LAB
BASOPHILS # BLD MANUAL: 0.15 10*3/MM3 (ref 0–0.2)
BASOPHILS NFR BLD AUTO: 1.1 % (ref 0–1.5)
LYMPHOCYTES # BLD MANUAL: 4.78 10*3/MM3 (ref 0.7–3.1)
LYMPHOCYTES NFR BLD MANUAL: 35.2 % (ref 19.6–45.3)
LYMPHOCYTES NFR BLD MANUAL: 5.5 % (ref 5–12)
MONOCYTES # BLD AUTO: 0.75 10*3/MM3 (ref 0.1–0.9)
NEUTROPHILS # BLD AUTO: 7.9 10*3/MM3 (ref 1.7–7)
NEUTROPHILS NFR BLD MANUAL: 58.2 % (ref 42.7–76)
PLAT MORPH BLD: NORMAL
RBC MORPH BLD: NORMAL
SMUDGE CELLS BLD QL SMEAR: ABNORMAL

## 2021-08-24 NOTE — TELEPHONE ENCOUNTER
Caller: Dorothea Cruz    Relationship: Self    Best call back number: [phone]    What is the best time to reach you: ANYTIME    Who are you requesting to speak with (clinical staff, provider,  specific staff member):         What was the call regarding: PATIENT STATES THAT HER WHITE BLOOD COUNT IS HIGH AND SHE DOESN'T SEE DR PURVIS UNTIL THE END OF SEPTEMBER. PATIENT WOULD LIKE A CALL BACK    Do you require a callback: YES

## 2021-08-26 ENCOUNTER — ANESTHESIA EVENT (OUTPATIENT)
Dept: PERIOP | Facility: HOSPITAL | Age: 71
End: 2021-08-26

## 2021-08-30 LAB
MAXIMAL PREDICTED HEART RATE: 150 BPM
STRESS TARGET HR: 128 BPM

## 2021-09-02 ENCOUNTER — APPOINTMENT (OUTPATIENT)
Dept: GENERAL RADIOLOGY | Facility: HOSPITAL | Age: 71
End: 2021-09-02

## 2021-09-02 ENCOUNTER — HOSPITAL ENCOUNTER (OUTPATIENT)
Facility: HOSPITAL | Age: 71
Discharge: HOME | End: 2021-09-02
Attending: SURGERY | Admitting: SURGERY

## 2021-09-02 ENCOUNTER — ANESTHESIA (OUTPATIENT)
Dept: PERIOP | Facility: HOSPITAL | Age: 71
End: 2021-09-02

## 2021-09-02 VITALS
RESPIRATION RATE: 16 BRPM | HEART RATE: 65 BPM | TEMPERATURE: 97.7 F | SYSTOLIC BLOOD PRESSURE: 145 MMHG | DIASTOLIC BLOOD PRESSURE: 71 MMHG | OXYGEN SATURATION: 94 %

## 2021-09-02 DIAGNOSIS — K80.20 SYMPTOMATIC CHOLELITHIASIS: ICD-10-CM

## 2021-09-02 PROCEDURE — 74300 X-RAY BILE DUCTS/PANCREAS: CPT

## 2021-09-02 PROCEDURE — 25010000002 ONDANSETRON PER 1 MG: Performed by: NURSE ANESTHETIST, CERTIFIED REGISTERED

## 2021-09-02 PROCEDURE — A9270 NON-COVERED ITEM OR SERVICE: HCPCS | Performed by: NURSE ANESTHETIST, CERTIFIED REGISTERED

## 2021-09-02 PROCEDURE — 25010000002 DEXAMETHASONE PER 1 MG: Performed by: NURSE ANESTHETIST, CERTIFIED REGISTERED

## 2021-09-02 PROCEDURE — C1889 IMPLANT/INSERT DEVICE, NOC: HCPCS | Performed by: SURGERY

## 2021-09-02 PROCEDURE — 25010000002 HYDROMORPHONE 1 MG/ML SOLUTION: Performed by: NURSE ANESTHETIST, CERTIFIED REGISTERED

## 2021-09-02 PROCEDURE — 25010000003 MEPERIDINE PER 100 MG: Performed by: NURSE ANESTHETIST, CERTIFIED REGISTERED

## 2021-09-02 PROCEDURE — 25010000002 FENTANYL CITRATE (PF) 50 MCG/ML SOLUTION: Performed by: NURSE ANESTHETIST, CERTIFIED REGISTERED

## 2021-09-02 PROCEDURE — 63710000001 ACETAMINOPHEN 500 MG TABLET: Performed by: ANESTHESIOLOGY

## 2021-09-02 PROCEDURE — 25010000002 MIDAZOLAM PER 1MG: Performed by: ANESTHESIOLOGY

## 2021-09-02 PROCEDURE — A9270 NON-COVERED ITEM OR SERVICE: HCPCS | Performed by: ANESTHESIOLOGY

## 2021-09-02 PROCEDURE — 25010000002 PROPOFOL 10 MG/ML EMULSION: Performed by: NURSE ANESTHETIST, CERTIFIED REGISTERED

## 2021-09-02 PROCEDURE — 88304 TISSUE EXAM BY PATHOLOGIST: CPT | Performed by: SURGERY

## 2021-09-02 PROCEDURE — 63710000001 OXYCODONE 5 MG TABLET: Performed by: NURSE ANESTHETIST, CERTIFIED REGISTERED

## 2021-09-02 PROCEDURE — 47563 LAPARO CHOLECYSTECTOMY/GRAPH: CPT | Performed by: SURGERY

## 2021-09-02 DEVICE — LIGACLIP 10-M/L, 10MM ENDOSCOPIC ROTATING MULTIPLE CLIP APPLIERS
Type: IMPLANTABLE DEVICE | Site: ABDOMEN | Status: FUNCTIONAL
Brand: LIGACLIP

## 2021-09-02 RX ORDER — LIDOCAINE HYDROCHLORIDE 20 MG/ML
INJECTION, SOLUTION INFILTRATION; PERINEURAL AS NEEDED
Status: DISCONTINUED | OUTPATIENT
Start: 2021-09-02 | End: 2021-09-02 | Stop reason: SURG

## 2021-09-02 RX ORDER — FENTANYL CITRATE 50 UG/ML
INJECTION, SOLUTION INTRAMUSCULAR; INTRAVENOUS AS NEEDED
Status: DISCONTINUED | OUTPATIENT
Start: 2021-09-02 | End: 2021-09-02 | Stop reason: SURG

## 2021-09-02 RX ORDER — ONDANSETRON 2 MG/ML
INJECTION INTRAMUSCULAR; INTRAVENOUS AS NEEDED
Status: DISCONTINUED | OUTPATIENT
Start: 2021-09-02 | End: 2021-09-02 | Stop reason: SURG

## 2021-09-02 RX ORDER — PROMETHAZINE HYDROCHLORIDE 12.5 MG/1
TABLET ORAL
Qty: 12 TABLET | Refills: 0 | Status: SHIPPED | OUTPATIENT
Start: 2021-09-02 | End: 2021-09-30

## 2021-09-02 RX ORDER — OXYCODONE HYDROCHLORIDE 5 MG/1
5 TABLET ORAL
Status: COMPLETED | OUTPATIENT
Start: 2021-09-02 | End: 2021-09-02

## 2021-09-02 RX ORDER — GLYCOPYRROLATE 0.2 MG/ML
0.2 INJECTION INTRAMUSCULAR; INTRAVENOUS
Status: COMPLETED | OUTPATIENT
Start: 2021-09-02 | End: 2021-09-02

## 2021-09-02 RX ORDER — DEXAMETHASONE SODIUM PHOSPHATE 4 MG/ML
INJECTION, SOLUTION INTRA-ARTICULAR; INTRALESIONAL; INTRAMUSCULAR; INTRAVENOUS; SOFT TISSUE AS NEEDED
Status: DISCONTINUED | OUTPATIENT
Start: 2021-09-02 | End: 2021-09-02 | Stop reason: SURG

## 2021-09-02 RX ORDER — HYDROCODONE BITARTRATE AND ACETAMINOPHEN 5; 325 MG/1; MG/1
1-2 TABLET ORAL EVERY 4 HOURS PRN
Qty: 15 TABLET | Refills: 0 | Status: SHIPPED | OUTPATIENT
Start: 2021-09-02 | End: 2021-09-30

## 2021-09-02 RX ORDER — MEPERIDINE HYDROCHLORIDE 25 MG/ML
12.5 INJECTION INTRAMUSCULAR; INTRAVENOUS; SUBCUTANEOUS
Status: DISCONTINUED | OUTPATIENT
Start: 2021-09-02 | End: 2021-09-02 | Stop reason: HOSPADM

## 2021-09-02 RX ORDER — PROMETHAZINE HYDROCHLORIDE 12.5 MG/1
25 TABLET ORAL ONCE AS NEEDED
Status: DISCONTINUED | OUTPATIENT
Start: 2021-09-02 | End: 2021-09-02 | Stop reason: HOSPADM

## 2021-09-02 RX ORDER — MIDAZOLAM HYDROCHLORIDE 2 MG/2ML
2 INJECTION, SOLUTION INTRAMUSCULAR; INTRAVENOUS ONCE
Status: COMPLETED | OUTPATIENT
Start: 2021-09-02 | End: 2021-09-02

## 2021-09-02 RX ORDER — SODIUM CHLORIDE, SODIUM LACTATE, POTASSIUM CHLORIDE, CALCIUM CHLORIDE 600; 310; 30; 20 MG/100ML; MG/100ML; MG/100ML; MG/100ML
9 INJECTION, SOLUTION INTRAVENOUS CONTINUOUS PRN
Status: DISCONTINUED | OUTPATIENT
Start: 2021-09-02 | End: 2021-09-02 | Stop reason: HOSPADM

## 2021-09-02 RX ORDER — ACETAMINOPHEN 500 MG
1000 TABLET ORAL ONCE
Status: COMPLETED | OUTPATIENT
Start: 2021-09-02 | End: 2021-09-02

## 2021-09-02 RX ORDER — BUPIVACAINE HYDROCHLORIDE 2.5 MG/ML
INJECTION, SOLUTION EPIDURAL; INFILTRATION; INTRACAUDAL AS NEEDED
Status: DISCONTINUED | OUTPATIENT
Start: 2021-09-02 | End: 2021-09-02 | Stop reason: HOSPADM

## 2021-09-02 RX ORDER — PROPOFOL 10 MG/ML
VIAL (ML) INTRAVENOUS AS NEEDED
Status: DISCONTINUED | OUTPATIENT
Start: 2021-09-02 | End: 2021-09-02 | Stop reason: SURG

## 2021-09-02 RX ORDER — HYDROCHLOROTHIAZIDE 25 MG/1
25 TABLET ORAL DAILY
COMMUNITY
End: 2021-09-07 | Stop reason: SDUPTHER

## 2021-09-02 RX ORDER — KETOROLAC TROMETHAMINE 30 MG/ML
INJECTION, SOLUTION INTRAMUSCULAR; INTRAVENOUS AS NEEDED
Status: DISCONTINUED | OUTPATIENT
Start: 2021-09-02 | End: 2021-09-02

## 2021-09-02 RX ORDER — MAGNESIUM HYDROXIDE 1200 MG/15ML
LIQUID ORAL AS NEEDED
Status: DISCONTINUED | OUTPATIENT
Start: 2021-09-02 | End: 2021-09-02 | Stop reason: HOSPADM

## 2021-09-02 RX ORDER — ROCURONIUM BROMIDE 10 MG/ML
INJECTION, SOLUTION INTRAVENOUS AS NEEDED
Status: DISCONTINUED | OUTPATIENT
Start: 2021-09-02 | End: 2021-09-02 | Stop reason: SURG

## 2021-09-02 RX ORDER — ONDANSETRON 2 MG/ML
4 INJECTION INTRAMUSCULAR; INTRAVENOUS ONCE AS NEEDED
Status: DISCONTINUED | OUTPATIENT
Start: 2021-09-02 | End: 2021-09-02 | Stop reason: HOSPADM

## 2021-09-02 RX ORDER — PROMETHAZINE HYDROCHLORIDE 25 MG/1
25 SUPPOSITORY RECTAL ONCE AS NEEDED
Status: DISCONTINUED | OUTPATIENT
Start: 2021-09-02 | End: 2021-09-02 | Stop reason: HOSPADM

## 2021-09-02 RX ADMIN — HYDROMORPHONE HYDROCHLORIDE 0.5 MG: 1 INJECTION, SOLUTION INTRAMUSCULAR; INTRAVENOUS; SUBCUTANEOUS at 14:50

## 2021-09-02 RX ADMIN — FENTANYL CITRATE 100 MCG: 50 INJECTION INTRAMUSCULAR; INTRAVENOUS at 13:22

## 2021-09-02 RX ADMIN — SODIUM CHLORIDE, POTASSIUM CHLORIDE, SODIUM LACTATE AND CALCIUM CHLORIDE 9 ML/HR: 600; 310; 30; 20 INJECTION, SOLUTION INTRAVENOUS at 10:56

## 2021-09-02 RX ADMIN — MIDAZOLAM HYDROCHLORIDE 2 MG: 1 INJECTION, SOLUTION INTRAMUSCULAR; INTRAVENOUS at 12:49

## 2021-09-02 RX ADMIN — GLYCOPYRROLATE 0.2 MG: 0.2 INJECTION INTRAMUSCULAR; INTRAVENOUS at 12:49

## 2021-09-02 RX ADMIN — PROPOFOL 160 MG: 10 INJECTION, EMULSION INTRAVENOUS at 13:22

## 2021-09-02 RX ADMIN — ACETAMINOPHEN 1000 MG: 500 TABLET ORAL at 10:56

## 2021-09-02 RX ADMIN — SUGAMMADEX 200 MG: 100 INJECTION, SOLUTION INTRAVENOUS at 14:20

## 2021-09-02 RX ADMIN — OXYCODONE HYDROCHLORIDE 5 MG: 5 TABLET ORAL at 16:33

## 2021-09-02 RX ADMIN — OXYCODONE HYDROCHLORIDE 5 MG: 5 TABLET ORAL at 15:17

## 2021-09-02 RX ADMIN — HYDROMORPHONE HYDROCHLORIDE 0.5 MG: 1 INJECTION, SOLUTION INTRAMUSCULAR; INTRAVENOUS; SUBCUTANEOUS at 15:09

## 2021-09-02 RX ADMIN — MEPERIDINE HYDROCHLORIDE 12.5 MG: 25 INJECTION INTRAMUSCULAR; INTRAVENOUS; SUBCUTANEOUS at 14:50

## 2021-09-02 RX ADMIN — ONDANSETRON 4 MG: 2 INJECTION INTRAMUSCULAR; INTRAVENOUS at 14:54

## 2021-09-02 RX ADMIN — ROCURONIUM BROMIDE 50 MG: 10 INJECTION INTRAVENOUS at 13:22

## 2021-09-02 RX ADMIN — ONDANSETRON 4 MG: 2 INJECTION INTRAMUSCULAR; INTRAVENOUS at 13:22

## 2021-09-02 RX ADMIN — LIDOCAINE HYDROCHLORIDE 50 MG: 20 INJECTION, SOLUTION INFILTRATION; PERINEURAL at 13:22

## 2021-09-02 RX ADMIN — DEXAMETHASONE SODIUM PHOSPHATE 4 MG: 4 INJECTION INTRA-ARTICULAR; INTRALESIONAL; INTRAMUSCULAR; INTRAVENOUS; SOFT TISSUE at 13:22

## 2021-09-02 NOTE — DISCHARGE INSTRUCTIONS
DISCHARGE INSTRUCTIONS LAPAROSCOPIC CHOLECYSTECTOMY   (GALL BLADDER)      ? For your surgery you had:  ? General anesthesia (you may have a sore throat for the first 24 hours)  ? IV sedation  ? Local anesthesia  ? Monitored anesthesia care  ? You received a medicated patch for nausea prevention today (behind your ear). It is recommended that you remove it 24-48 hours post-operatively. It must be removed within 72 hours.  ? You received an anesthesia medication today that can cause hormonal forms of birth control to be ineffective. You should use a different form of birth control (to prevent pregnancy) for 7 days.   ? You may experience dizziness, drowsiness, or light-headedness for several hours following surgery.  ? Do not stay alone tonight.  ? Limit your activity for 24 hours.  ? Resume your diet slowly.  Follow whatever special dietary instructions you may have been given by your doctor.  ? You should not drive, operate machinery, drink alcohol, or sign legally binding documents for 24 hours or while you are taking pain medication.  Last dose of pain medication was given at:   .    NOTIFY YOUR DOCTOR IF YOU EXPERIENCE ANY OF THE FOLLOWING:  ? Temperature greater than 101 degrees Fahrenheit  ? Shaking Chills  ? Redness or excessive drainage from incision  ? Nausea, vomiting and/or pain that is not controlled by prescribed medications  ? Increase in bleeding or bleeding that is excessive  ? Unable to urinate in 6 hours after surgery  ? If unable to reach your doctor, please go to the closest Emergency Room ? You may remove Band-Aid/dressing   .  ? You may shower or bathe  .  ? Apply an ice pack 24-48 hours.  ? You may experience gas discomfort 24-48 hours after discharge, especially in chest and shoulders.  Changing position frequently may alleviate this discomfort.  ? If you have excessive pain, swelling, redness, drainage or other problems, notify your physician.  ? If unable to urinate in 6 to 8 hours after  surgery or urinating frequently in small amounts, notify your doctor or go to the nearest Emergency Room.  ? Medications per physician instructions as indicated on Discharge Medication Information Sheet.      SPECIAL INSTRUCTIONS:                                      Dr. Medina's Instructions     DIET  Gradually increase your dietary intake.  Although you will likely feel very hungry after surgery, do not eat too much for the first 12 to 24 hours.  Begin with a bland diet, such as chicken noodle soup, crackers, gatorade or tea, and gradually work your way up to a normal diet.     ACTIVITY  When you first get home from the hospital, it is important that you get up and move around your house.  For the next three weeks, you should avoid any strenuous physical activity and you should not lift anything heavier than 25 pounds.  Walking up stairs and walking short distances for exercise are acceptable activities.  After three weeks, you have no activity restrictions and may gradually increase your activities using common sense.       WOUND CARE & SHOWERING/BATHING  Remove the bandages two days after your surgery but leave the strips of tape (steri-strips) underneath the bandages in place.  Let the steri-strips fall off by themselves or gently pull them off if they haven't fallen off by themselves in 10 days.  You have sutures in your incisions but they are placed below the level of the skin and they will slowly dissolve (they do not need to be removed).  The skin around your incisions will likely have some bruising.  This is normal.  You can shower beginning two days after the surgery but try not to get the steri-strips very wet for the first week after surgery.  Wait two weeks after your surgery before taking any tub baths.     PAIN CONTROL  You will receive a narcotic pain medicine and an anti-nausea medicine prescription before you are discharged home.  Be sure to take the narcotic pain medication with some food so as  not to upset your stomach.  Do not drive while you are taking the prescription pain medication.  Also, take 3 tablets of Motrin 200 mg (total of 600mg) every 8 hours for the next 3 days & then discontinue.  Advil and ibuprofen work the same as Motrin so you can use the same dose of either one of them instead of Motrin.  Some patients find that using an ice pack (a package of frozen corn or peas works well) for the first two days after surgery helps reduce pain.  If you decide to use an ice pack, apply it for 20 minutes and then remove it for 20 minutes.  Do this 4 or 5 times each day for only the first two or three days after surgery.  You will likely have some shoulder pain (especially the right shoulder).  This is caused by the air used to inflate your abdomen during surgery and will go away on its own in 24 to 48 hours.     BOWEL MOVEMENTS  It is not unusual for narcotic pain medications to cause constipation.  Also, the medications and anesthesia you received for your surgery can have a constipating effect.  To help avoid constipation, drink at least four eight-ounce glasses of water each day and use over-the-counter laxatives/stool softeners (dulcolax, colace, milk of magnesia, senokot, etc.).  I recommend drinking the aforementioned amount of water daily and taking 30 ml of milk of magnesia two times each day while you are using the prescribed narcotic pain medication.  If your bowel movements become too loose or too frequent, then simply stop following these recommendations unless you start to feel constipated.     FOLLOW-UP VISIT & QUESTIONS/CONCERNS  Call Dr. Medina's office at [phone] and schedule a follow-up appointment for about 3 weeks after your surgery date.  Should you have any questions or concerns, have a temperature over 101 degrees, worsening abdominal pain, persistent nausea or vomiting, or any other problems you think need medical attention, please call Dr. Medina's office or go to the  emergency room.

## 2021-09-02 NOTE — ANESTHESIA PREPROCEDURE EVALUATION
Anesthesia Evaluation     Patient summary reviewed and Nursing notes reviewed   no history of anesthetic complications:  NPO Solid Status: > 8 hours  NPO Liquid Status: > 2 hours           Airway   Mallampati: II  TM distance: >3 FB  Neck ROM: full  No difficulty expected and Small opening  Dental - normal exam     Pulmonary - normal exam   (+) sleep apnea,   Cardiovascular - normal exam  Exercise tolerance: good (4-7 METS)    Patient on routine beta blocker and Beta blocker given within 24 hours of surgery    (+) hypertension, hyperlipidemia,       Neuro/Psych- negative ROS  GI/Hepatic/Renal/Endo - negative ROS     Musculoskeletal     Abdominal  - normal exam    Bowel sounds: normal.   Substance History - negative use     OB/GYN negative ob/gyn ROS         Other   arthritis,                      Anesthesia Plan    ASA 2     general     intravenous induction     Anesthetic plan, all risks, benefits, and alternatives have been provided, discussed and informed consent has been obtained with: patient.

## 2021-09-02 NOTE — ANESTHESIA POSTPROCEDURE EVALUATION
Patient: Dorothea Cruz    Procedure Summary     Date: 09/02/21 Room / Location: [facility] OR 04 / [facility] MAIN OR    Anesthesia Start: 1318 Anesthesia Stop: 1440    Procedure: CHOLECYSTECTOMY LAPAROSCOPIC INTRAOPERATIVE CHOLANGIOGRAM (N/A Abdomen) Diagnosis:       Symptomatic cholelithiasis      (Symptomatic cholelithiasis [K80.20])    Surgeons: Louie Medina MD Provider: Bruno Mercado MD    Anesthesia Type: general ASA Status: 2          Anesthesia Type: general    Vitals  Vitals Value Taken Time   /72 09/02/21 1448   Temp 36.4 °C (97.5 °F) 09/02/21 1438   Pulse 56 09/02/21 1451   Resp 18 09/02/21 1442   SpO2 99 % 09/02/21 1451   Vitals shown include unvalidated device data.        Post Anesthesia Care and Evaluation    Patient location during evaluation: bedside  Patient participation: complete - patient participated  Level of consciousness: awake  Pain score: 2  Pain management: adequate  Airway patency: patent  Anesthetic complications: No anesthetic complications  PONV Status: none  Cardiovascular status: acceptable and stable  Respiratory status: acceptable and nasal airway  Hydration status: acceptable    Comments: An Anesthesiologist personally participated in the most demanding procedures (including induction and emergence if applicable) in the anesthesia plan, monitored the course of anesthesia administration at frequent intervals and remained physically present and available for immediate diagnosis and treatment of emergencies.

## 2021-09-02 NOTE — OP NOTE
CHOLECYSTECTOMY LAPAROSCOPIC  Procedure Report    Patient Name:  Dorothea Cruz  YOB: 1950    Date of Surgery:  9/2/2021     Pre-op Diagnosis:   Symptomatic cholelithiasis [K80.20]    Pre-Op Diagnosis Codes:     * Symptomatic cholelithiasis [K80.20]       Post-op Diagnosis:   Post-Op Diagnosis Codes:     * Symptomatic cholelithiasis [K80.20]    Procedure/CPT® Codes:  51293    Procedure(s):  CHOLECYSTECTOMY LAPAROSCOPIC INTRAOPERATIVE CHOLANGIOGRAM    Staff:  Surgeon(s):  Louie Medina MD    Assistant: Kyle Kim RN; Richard Rust CSA    Anesthesia: General    Estimated Blood Loss: minimal    Complications:  None    Drains:  None    Packing:  None    Implants:    Implant Name Type Inv. Item Serial No.  Lot No. LRB No. Used Action   CLIPAPPLR M/ ENDO LIGACLIP ROT 10MM MD/LG - [id number] Implant CLIPAPPLR M/ ENDO LIGACLIP ROT 10MM MD/LG  [employer]  DIV [employer] J [employer] J [id number] N/A 1 Implanted       Specimen:          Specimens     ID Source Type Tests Collected By Collected At Frozen?    A Gallbladder Tissue · TISSUE PATHOLOGY EXAM   Louie Medina MD 9/2/21 1402 No    Description: GALLBLADDER AND CONTENTS           Indications: Patient is a 71-year-old female with symptomatic gallstones.     Findings: Intraoperative cholangiogram showed flow of contrast into the duodenum and the intrahepatic ducts.  Liver normal appearing.    Description of Procedure: Description of Procedure:  Patient was taken to the operating placed supine on the operating table.  Timeout was performed.  General anesthesia was administered.  Abdomen was prepped and draped in the usual fashion.  Using my standard technique with a Veress needle to establish pneumoperitoneum and my standard four cannula sizes and locations on the abdominal wall, pneumoperitoneum was established and four cannulas were placed.  A laparoscope was inserted.  There was no evidence of injury to any intra-abdominal  structures from using the Veress needle from placing the cannulas.  Patient was positioned head up and rotated toward the left side.  Attention was focused in the right upper quadrant.  Liver was normal appearing.  A grasper was placed on the fundus of the gallbladder and used to elevate the gallbladder superiorly.  Another grasper was placed on the infundibulum of the gallbladder and used to apply lateral and inferior retraction.  Tissue in the triangle of Calot was dissected until I achieved a critical view of safety.  A Larose clamp and Larose cholangiogram catheter were used to perform a cholangiogram.  See above finding section.  The Larose cholangiogram equipment was removed.  The cystic duct was clipped 3 times proximally and once distally.  The cystic artery was clipped proximally and distally as well.  The artery and duct were then divided between the proximal and distal clips..  Hook electrocautery was used to cauterize the cholecystohepatic attachments until the gallbladder was freed from the liver.  The gallbladder was placed in an Endo Catch bag and removed from the abdomen and sent to pathology.  The gallbladder fossa was examined.  There was no bleeding.  There was no leakage of bile.  The patient was positioned flat.  The midline epigastric cannula fascial opening was closed with an 0 Vicryl suture using a suture passer.  Pneumoperitoneum was released and all of the laparoscopic equipment was removed.  The skin incisions were closed with buried absorbable suture followed by appropriate dressings.  No complications.  Patient did well during the procedure and was transported to the recovery area in stable condition.  Sponge, needle, and instrument counts were correct.    Assistant: Richard Rust CSA was responsible for performing the following activities: closing, placing dressing and operating laparoscope during the surgery, and his skilled assistance was necessary for the success of this  case.      Louie Medina MD     Date: 9/2/2021  Time: 14:31 EDT

## 2021-09-07 LAB
CYTO UR: NORMAL
LAB AP CASE REPORT: NORMAL
LAB AP CLINICAL INFORMATION: NORMAL
PATH REPORT.FINAL DX SPEC: NORMAL
PATH REPORT.GROSS SPEC: NORMAL

## 2021-09-07 RX ORDER — HYDROCHLOROTHIAZIDE 25 MG/1
25 TABLET ORAL DAILY
Qty: 30 TABLET | Refills: 0 | Status: SHIPPED | OUTPATIENT
Start: 2021-09-07 | End: 2021-10-11 | Stop reason: SDUPTHER

## 2021-09-07 RX ORDER — PRIMIDONE 50 MG/1
50 TABLET ORAL NIGHTLY
Qty: 30 TABLET | Refills: 0 | Status: SHIPPED | OUTPATIENT
Start: 2021-09-07 | End: 2021-10-19 | Stop reason: SDUPTHER

## 2021-09-07 NOTE — TELEPHONE ENCOUNTER
Caller: Dorothea Cruz    Relationship: Self    Best call back number: [phone]    Medication needed:   Requested Prescriptions     Pending Prescriptions Disp Refills   • primidone (MYSOLINE) 50 MG tablet     • hydroCHLOROthiazide (HYDRODIURIL) 25 MG tablet       Sig: Take 1 tablet by mouth Daily.       When do you need the refill by: ASAP    Does the patient have less than a 3 day supply:  [x] Yes  [] No    What is the patient's preferred pharmacy: [facility] [place]UDMILA [place]STANEDA [facility] [place] - [address] [phone] [facility] [phone]

## 2021-09-10 ENCOUNTER — HOSPITAL ENCOUNTER (OUTPATIENT)
Dept: MAMMOGRAPHY | Facility: HOSPITAL | Age: 71
Discharge: HOME | End: 2021-09-10
Admitting: NURSE PRACTITIONER

## 2021-09-10 DIAGNOSIS — Z12.31 VISIT FOR SCREENING MAMMOGRAM: ICD-10-CM

## 2021-09-10 DIAGNOSIS — R92.8 ABNORMAL MAMMOGRAM: Primary | ICD-10-CM

## 2021-09-10 PROCEDURE — 77067 SCR MAMMO BI INCL CAD: CPT

## 2021-09-10 PROCEDURE — 77063 BREAST TOMOSYNTHESIS BI: CPT

## 2021-09-24 ENCOUNTER — OFFICE VISIT (OUTPATIENT)
Dept: SURGERY | Facility: CLINIC | Age: 71
End: 2021-09-24

## 2021-09-24 VITALS — WEIGHT: 190.4 LBS | RESPIRATION RATE: 16 BRPM | BODY MASS INDEX: 35.04 KG/M2 | HEIGHT: 62 IN

## 2021-09-24 DIAGNOSIS — Z09 FOLLOW UP: Primary | ICD-10-CM

## 2021-09-24 PROCEDURE — 99024 POSTOP FOLLOW-UP VISIT: CPT | Performed by: SURGERY

## 2021-09-24 NOTE — PROGRESS NOTES
Patient is here for follow-up after gallbladder removal.  She is doing fine.  No complaints.  Abdominal exam is benign and the incisions are healing well.  No new issues to address.  Patient seems pleased with her progress and can see me PRN.

## 2021-09-28 ENCOUNTER — TELEPHONE (OUTPATIENT)
Dept: FAMILY MEDICINE CLINIC | Facility: CLINIC | Age: 71
End: 2021-09-28

## 2021-09-28 DIAGNOSIS — I10 ESSENTIAL HYPERTENSION: ICD-10-CM

## 2021-09-28 NOTE — TELEPHONE ENCOUNTER
Caller: Dorothea Cruz    Relationship: Self      Medication requested (name and dosage):   metoprolol tartrate (LOPRESSOR) 25 MG tablet  Pharmacy where request should be sent:   [facility] [place] [facility] [place], [place]  [address] - [phone]          Additional details provided by patient: PATIENT WOULD LIKE A 90 DAY SUPPLY IF POSSIBLE    Best call back number: [phone]   Does the patient have less than a 3 day supply:  [x] Yes  [] No    Jackelyn Cordero Rep   09/28/21 09:42 EDT

## 2021-09-29 NOTE — PROGRESS NOTES
Chief Complaint        Diverticulosis    History of Present Illness      Doroteha Cruz is a 71 y.o. female who presents to [facility] as a new patient referred by ARA Asif.  Patient reports having lower abdominal pain, fever, diarrhea that sent her to the emergency department  Patient had lower abd -pain sending her to ER 8/17/2021 she had a CT scan which revealed diverticulitis and gallstones.  Patient had been treated prior to the ER visit by her primary care with Flagyl and Cipro but continued to have symptoms.  She was treated in the ER with Augmentin which resolved her diverticulitis she was set up to have her gallbladder removed 9/2/2021.  Patient reports she has had loose stools since her surgery 3-4 times a day.  She is taking Metamucil and hydrating well drinking about 70 ounces of water a day.  She is eating a well-balanced diet with fiber.  Since her surgery patient denies fever, nausea, vomiting, weight loss, night sweats, melena, hematochezia, hematemesis.    Colonoscopy: Review of the patient's most recent colonoscopy performed by Dr. Garcia on 03/28/2019.  Revealed diverticula and internal hemorrhoids.  She was educated to take Metamucil at that time.    Patient was recently in the emergency department at [facility].  Patient had a CT abdomen/pelvis with contrast on 08/17/2021 acute diverticulitis of the distal descending colon.  No obstruction perforation or abscess.  Right adrenal nodule measuring 2.4 cm metastasis is considered unlikely given the chronicity.  MRI could be considered for further evaluation.  Cholelithiasis.  Results       Result Review :   The following data was reviewed by: Breann Quiñonez NP on 09/30/2021     CMP    CMP 7/23/21 8/17/21 8/23/21   Glucose 108 (A) 117 (A) 138 (A)   BUN 17 12 15   Creatinine 0.83 0.83 0.82   eGFR Non African Am 68 68 69   Sodium 137 137 139   Potassium 3.9 4.3 3.7   Chloride 97 (A)  101 99   Calcium 10.0 9.7 10.2   Albumin 4.40 4.10 4.70   Total Bilirubin 0.2 0.3 0.2   Alkaline Phosphatase 54 53 52   AST (SGOT) 23 16 23   ALT (SGPT) 16 14 20   (A) Abnormal value            CBC    CBC 7/23/21 8/17/21 8/23/21   WBC 14.85 (A) 17.77 (A) 13.57 (A)   RBC 5.26 4.93 5.24   Hemoglobin 15.9 14.8 15.4   Hematocrit 47.1 (A) 44.3 47.5 (A)   MCV 89.5 89.9 90.6   MCH 30.2 30.0 29.4   MCHC 33.8 33.4 32.4   RDW 15.2 15.2 14.8   Platelets 205 211 235   (A) Abnormal value                   Past Medical History       Past Medical History:   Diagnosis Date   • Anxiety    • Arthritis    • Cholelithiases 4/30@1   • Depression    • Disease of thyroid gland     currently not taking any meds   • Diverticulitis    • Fibromyalgia    • Hyperlipidemia    • Hypertension    • Major depressive disorder 05/07/2020   • Palpitations     no c/o cp or soa   • Post-menopause 09/17/2020   • Sinus trouble    • Sleep apnea    • Vitamin D deficiency 09/17/2020       Past Surgical History:   Procedure Laterality Date   • BILATERAL BREAST REDUCTION     • CHOLECYSTECTOMY N/A 9/2/2021    Procedure: CHOLECYSTECTOMY LAPAROSCOPIC INTRAOPERATIVE CHOLANGIOGRAM;  Surgeon: Louie Medina MD;  Location: [facility] OR;  Service: General;  Laterality: N/A;   • COLONOSCOPY  2019   • HYSTERECTOMY     • TONSILLECTOMY     • WRIST ARTHROPLASTY Bilateral          Current Outpatient Medications:   •  DULoxetine (CYMBALTA) 60 MG capsule, Take 60 mg by mouth Daily., Disp: , Rfl:   •  ergocalciferol (ERGOCALCIFEROL) 1.25 MG (68816 UT) capsule, Take 50,000 Units by mouth 1 (One) Time Per Week., Disp: , Rfl:   •  gabapentin (NEURONTIN) 300 MG capsule, Take 300 mg by mouth 3 (Three) Times a Day As Needed., Disp: , Rfl:   •  hydroCHLOROthiazide (HYDRODIURIL) 25 MG tablet, Take 1 tablet by mouth Daily., Disp: 30 tablet, Rfl: 0  •  metoprolol tartrate (LOPRESSOR) 25 MG tablet, Take 1 tablet by mouth 2 (Two) Times a Day., Disp: 180 tablet, Rfl: 1  •  primidone  "(MYSOLINE) 50 MG tablet, Take 1 tablet by mouth Every Night., Disp: 30 tablet, Rfl: 0  •  rosuvastatin (CRESTOR) 20 MG tablet, Take 20 mg by mouth Every Night., Disp: , Rfl:   •  TURMERIC PO, Take  by mouth., Disp: , Rfl:   •  colestipol (Colestid) 1 g tablet, Take 1 tablet by mouth Daily., Disp: 30 tablet, Rfl: 3     Allergies   Allergen Reactions   • Ace Inhibitors Cough   • Sulfamethoxazole-Trimethoprim Hives       Family History   Problem Relation Age of Onset   • No Known Problems Mother    • No Known Problems Father    • Breast cancer Sister         Social History     Social History Narrative   • Not on file       Objective       Objective     Vital Signs:   /64 (BP Location: Right arm, Patient Position: Sitting, Cuff Size: Adult)   Pulse 81   Ht 157.5 cm (62\")   Wt 87.5 kg (193 lb)   SpO2 99%   BMI 35.30 kg/m²     Body mass index is 35.3 kg/m².    Physical Exam  Constitutional:       General: She is not in acute distress.     Appearance: Normal appearance.   HENT:      Head: Normocephalic.   Eyes:      Conjunctiva/sclera: Conjunctivae normal.      Pupils: Pupils are equal, round, and reactive to light.      Visual Fields: Right eye visual fields normal and left eye visual fields normal.   Neck:      Trachea: Trachea normal.   Cardiovascular:      Rate and Rhythm: Normal rate and regular rhythm.      Heart sounds: Normal heart sounds.   Pulmonary:      Effort: Pulmonary effort is normal.      Breath sounds: Normal breath sounds and air entry.      Comments: Inspection of chest: normal appearance  Abdominal:      General: Abdomen is flat. Bowel sounds are normal.      Palpations: Abdomen is soft. There is no mass.      Tenderness: There is no guarding.   Musculoskeletal:      Right lower leg: No edema.      Left lower leg: No edema.   Skin:     Findings: No lesion.      Comments: Turgor is normal   Neurological:      Mental Status: She is alert and oriented to person, place, and time.   Psychiatric:  "        Mood and Affect: Mood and affect normal.              Assessment & Plan          Assessment and Plan    Diagnoses and all orders for this visit:    1. History of diverticulitis (Primary)    2. History of cholecystectomy    3. Altered bowel habits    4. Diarrhea, unspecified type    Other orders  -     colestipol (Colestid) 1 g tablet; Take 1 tablet by mouth Daily.  Dispense: 30 tablet; Refill: 3      71-year-old female presenting the office today with a history of diverticulitis, cholecystectomy, altered bowel habits and diarrhea post cholecystectomy.  I have prescribed colestipol 1 g daily to see if this helps with her bowel movements.  I have educated the patient on titrating the medication to meet her needs.  We will follow up in 3 months.  We discussed that if her bowel habits do not improve I would recommend repeat colonoscopy with her history of diverticulitis.  Patient agreeable to this plan.  Will call with any questions or concerns.          Follow Up       Follow Up   Return in about 3 months (around 12/30/2021).  Patient was given instructions and counseling regarding her condition or for health maintenance advice. Please see specific information pulled into the AVS if appropriate.

## 2021-09-30 ENCOUNTER — OFFICE VISIT (OUTPATIENT)
Dept: GASTROENTEROLOGY | Facility: CLINIC | Age: 71
End: 2021-09-30

## 2021-09-30 VITALS
SYSTOLIC BLOOD PRESSURE: 140 MMHG | WEIGHT: 193 LBS | DIASTOLIC BLOOD PRESSURE: 64 MMHG | OXYGEN SATURATION: 99 % | BODY MASS INDEX: 35.51 KG/M2 | HEIGHT: 62 IN | HEART RATE: 81 BPM

## 2021-09-30 DIAGNOSIS — Z90.49 HISTORY OF CHOLECYSTECTOMY: ICD-10-CM

## 2021-09-30 DIAGNOSIS — R19.7 DIARRHEA, UNSPECIFIED TYPE: ICD-10-CM

## 2021-09-30 DIAGNOSIS — Z87.19 HISTORY OF DIVERTICULITIS: Primary | ICD-10-CM

## 2021-09-30 DIAGNOSIS — R19.4 ALTERED BOWEL HABITS: ICD-10-CM

## 2021-09-30 PROCEDURE — 99214 OFFICE O/P EST MOD 30 MIN: CPT | Performed by: NURSE PRACTITIONER

## 2021-09-30 RX ORDER — MONTELUKAST SODIUM 4 MG/1
1 TABLET, CHEWABLE ORAL DAILY
Qty: 30 TABLET | Refills: 3 | Status: SHIPPED | OUTPATIENT
Start: 2021-09-30 | End: 2021-11-15 | Stop reason: SDUPTHER

## 2021-10-01 ENCOUNTER — HOSPITAL ENCOUNTER (OUTPATIENT)
Dept: MAMMOGRAPHY | Facility: HOSPITAL | Age: 71
Discharge: HOME | End: 2021-10-01

## 2021-10-01 ENCOUNTER — HOSPITAL ENCOUNTER (OUTPATIENT)
Dept: ULTRASOUND IMAGING | Facility: HOSPITAL | Age: 71
Discharge: HOME | End: 2021-10-01

## 2021-10-01 ENCOUNTER — TRANSCRIBE ORDERS (OUTPATIENT)
Dept: ADMINISTRATIVE | Facility: HOSPITAL | Age: 71
End: 2021-10-01

## 2021-10-01 DIAGNOSIS — R92.8 ABNORMAL MAMMOGRAM: ICD-10-CM

## 2021-10-01 DIAGNOSIS — N63.10 BREAST MASS, RIGHT: Primary | ICD-10-CM

## 2021-10-01 PROCEDURE — 76642 ULTRASOUND BREAST LIMITED: CPT

## 2021-10-01 PROCEDURE — G0279 TOMOSYNTHESIS, MAMMO: HCPCS

## 2021-10-01 PROCEDURE — 77065 DX MAMMO INCL CAD UNI: CPT

## 2021-10-07 ENCOUNTER — OFFICE VISIT (OUTPATIENT)
Dept: SLEEP MEDICINE | Facility: HOSPITAL | Age: 71
End: 2021-10-07

## 2021-10-07 VITALS
DIASTOLIC BLOOD PRESSURE: 68 MMHG | BODY MASS INDEX: 34.8 KG/M2 | HEIGHT: 62 IN | HEART RATE: 68 BPM | OXYGEN SATURATION: 95 % | TEMPERATURE: 94.6 F | WEIGHT: 189.1 LBS | SYSTOLIC BLOOD PRESSURE: 133 MMHG

## 2021-10-07 DIAGNOSIS — G47.33 OSA (OBSTRUCTIVE SLEEP APNEA): Primary | ICD-10-CM

## 2021-10-07 PROCEDURE — G0463 HOSPITAL OUTPT CLINIC VISIT: HCPCS

## 2021-10-07 NOTE — PROGRESS NOTES
[facility]    Dorothea Cruz  1950  71 y.o.  female      PCP:Arelis Daniel APRN    Type of service: Initial New Patient Office Visit  Date of service: 10/07/2021  Patient is here today for obstructive sleep apnea.  She reports that she needs an order for CPAP supplies and has concerns about machine recall.    History of present illness;  Dorothea Cruz is a new patient for me and was seen today for sleep related problems of snoring, nonrestorative sleep and witnessed apneas.  She has already been diagnosed with obstructive sleep apnea since 2012.  She has followed with Dr De La O before.  She is not sure how severe her sleep apnea is or the results of her previous sleep studies.  She reports that she has used a CPAP machine since she was diagnosed with improvement in her symptoms of snoring and arousals associated with shortness of breath.  Currently using a nasal mask but reports that the mouth breather as a result of which, she has opened her mouth while on CPAP.  She reports of some dry mouth.  She would like to try some other kind of mask.  When asked why she has not requested this from her [employer] company, she said that that was all they have.    She had a previous history of tonsillectomy and some type of nasal surgery.    Patient gives the following sleep history.  Sleep schedule:  Bedtime: 10:30 PM  Wake time: 9 AM  Normally takes about 15-20 minutes to fall asleep  Average hours of sleep 8  Number of naps per day 1  She reports that she feels good when she wakes up.  Symptoms  In addition to snoring, nonrestorative sleep and witnessed apneas patient gives the following associated symptoms.  Have you ever awakened gasping for breath, coughing, choking: Yes, prior to CPAP  Change in weight,  No   Morning headaches  No   Awaken with a sore throat or dry mouth  Yes   Leg jerking at night:  No   Crawly feeling/urge sensation to move in the legs: No   Teeth grinding:No  "  Have you ever awakened at night with a sour taste or burning sensation in your chest:  No   Do you have muscle weakness with laughing or anger or sleep paralysis:  No   Have you ever felt paralyzed while going to sleep or waking up:  No   Sleepwalking, nightmares, No   Nocturia (urination at night): 1 times per night  Memory Problem:No     Past medical history: (Relevant to sleep medicine)  1. Arthritis  2. Depression      • Medications are reviewed by me and documented in the encounter  • Allergies reviewed and documented in encounter    Social history:  Do you drive a commercial vehicle:  No   Shift work:  No   Tobacco use:  No   Alcohol use: 0 per week  Caffeinated drinks: 1 cup of coffee    FAMILY HISTORY (Your mother, father, brothers and sisters)  1. Positive for sleep apnea    REVIEW OF SYSTEMS.  Full review of systems available on the intake form which is scanned in the media tab.  The relevant positive are noted below  1. [place] Sleepiness Scale :Total score: 9   2. Snoring        Physical exam:  Vitals:    10/07/21 1112   BP: 133/68   BP Location: Right arm   Patient Position: Sitting   Cuff Size: Adult   Pulse: 68   Temp: 94.6 °F (34.8 °C)   TempSrc: Temporal   SpO2: 95%   Weight: 85.8 kg (189 lb 1.6 oz)   Height: 157.5 cm (62\")    Body mass index is 34.59 kg/m². Neck Circumference:  (14\")  Physical Exam  Vitals reviewed.   Constitutional:       Appearance: Normal appearance.   HENT:      Head: Normocephalic and atraumatic.      Nose: Nose normal.      Mouth/Throat:      Comments: Mallampati class III.  Prominent tonsillar pillars left more than right  Neck:      Vascular: No carotid bruit.   Cardiovascular:      Rate and Rhythm: Normal rate and regular rhythm.      Heart sounds: Normal heart sounds.   Pulmonary:      Effort: Pulmonary effort is normal.      Breath sounds: Normal breath sounds.   Musculoskeletal:      Cervical back: Neck supple. No tenderness.   Neurological:      Mental Status: She is " alert and oriented to person, place, and time.   Psychiatric:         Mood and Affect: Mood normal.         Behavior: Behavior normal.         Thought Content: Thought content normal.        Compliance information was reviewed from 7/9/2021 to 10/6/2021 showing 86.7% days with device usage.  Average usage for days used 7 hours 18 minutes.  Percent of days with usage equal to greater than 4 hours 84.4%.  Average AHI 0.4.  She is on 13 cm water pressure  Labs reviewed.  TSH Results:  TSH    TSH 4/26/21 7/23/21   TSH 1.900 1.290                 Assessment and plan:  Obstructive sleep apnea showing benefits and compliance with CPAP use    Plans:  1.  Mask fitting  2.  If she qualifies for a new CPAP machine, I suggested that she obtain another one  3.  I explained the recall notice with her  4.  She is going to continue using CPAP at current pressure settings  5.  She is going to return for face-to-face compliance visit after she obtains a new CPAP machine.  Otherwise, she is going to return for follow-up visit in 1 year  6.  CPAP supplies were reordered  7.  I also explained to her that we would need to see copies of her previous sleep studies.    • Return in about 1 year (around 10/7/2022) for 31 to 90 days after PAP setup..  Patient's questions were answered.      Gayle Mariano MD  10/7/2021

## 2021-10-08 ENCOUNTER — HOSPITAL ENCOUNTER (OUTPATIENT)
Dept: MAMMOGRAPHY | Facility: HOSPITAL | Age: 71
Discharge: HOME | End: 2021-10-08

## 2021-10-08 ENCOUNTER — DOCUMENTATION (OUTPATIENT)
Dept: MAMMOGRAPHY | Facility: HOSPITAL | Age: 71
End: 2021-10-08

## 2021-10-08 DIAGNOSIS — N63.10 BREAST MASS, RIGHT: ICD-10-CM

## 2021-10-08 PROCEDURE — 88305 TISSUE EXAM BY PATHOLOGIST: CPT | Performed by: NURSE PRACTITIONER

## 2021-10-08 PROCEDURE — 88342 IMHCHEM/IMCYTCHM 1ST ANTB: CPT | Performed by: NURSE PRACTITIONER

## 2021-10-08 PROCEDURE — 25010000003 LIDOCAINE 1 % SOLUTION: Performed by: NURSE PRACTITIONER

## 2021-10-08 PROCEDURE — 88360 TUMOR IMMUNOHISTOCHEM/MANUAL: CPT | Performed by: NURSE PRACTITIONER

## 2021-10-08 PROCEDURE — 88341 IMHCHEM/IMCYTCHM EA ADD ANTB: CPT | Performed by: NURSE PRACTITIONER

## 2021-10-08 RX ORDER — LIDOCAINE HYDROCHLORIDE AND EPINEPHRINE 10; 10 MG/ML; UG/ML
10 INJECTION, SOLUTION INFILTRATION; PERINEURAL ONCE
Status: COMPLETED | OUTPATIENT
Start: 2021-10-08 | End: 2021-10-08

## 2021-10-08 RX ORDER — LIDOCAINE HYDROCHLORIDE 10 MG/ML
10 INJECTION, SOLUTION INFILTRATION; PERINEURAL ONCE
Status: COMPLETED | OUTPATIENT
Start: 2021-10-08 | End: 2021-10-08

## 2021-10-08 RX ADMIN — LIDOCAINE HYDROCHLORIDE,EPINEPHRINE BITARTRATE 10 ML: 10; .01 INJECTION, SOLUTION INFILTRATION; PERINEURAL at 10:55

## 2021-10-08 RX ADMIN — LIDOCAINE HYDROCHLORIDE 10 ML: 10 INJECTION, SOLUTION INFILTRATION; PERINEURAL at 10:55

## 2021-10-08 NOTE — PROGRESS NOTES
S/W PATIENT AFTER HER BIOPSY AND DISCUSSED DISCHARGE INSTRUCTIONS WITH [employer] FOR PAIN CONTROL AND PT V/U. PT REPORTED THAT SHE DOES GET HER MAMMOGRAMS YEARLY AND THIS WAS CAUGHT ON HER SCREENING. PT DENIED ANY OTHER COMPLICATIONS WITH HER BREASTS. PT REPORTED THAT SHE WAS 25 WITH HER FIRST CHILD AND 12 WITH HER FIRST PERIOD AND SHE HAD A COMPLETE BENIGN HYSTERECTOMY AT THE AGE OF 36. SHE TOOK HORMONE REPLACEMENT THERAPY FOR 15 YEARS. SHE REPORTED THAT HER DOCTOR TOOK HER OFF THE HORMONE REPLACEMENTS ABOUT 10 YEARS AGO. PT REPORTED THAT SHE HAS A SISTER THAT HAD BREAST CANCER AND NO OTHER FAMILY HX OF CANCERS. I GAVE PT A CARD WITH MY CONTACT INFORMATION AND ENCOURAGED HER TO CALL WITH ANY QUESTIONS OR CONCERNS AND PT V/U

## 2021-10-11 RX ORDER — HYDROCHLOROTHIAZIDE 25 MG/1
25 TABLET ORAL DAILY
Qty: 30 TABLET | Refills: 0 | Status: SHIPPED | OUTPATIENT
Start: 2021-10-11 | End: 2021-10-27 | Stop reason: SDUPTHER

## 2021-10-11 NOTE — TELEPHONE ENCOUNTER
Caller: Dorothea Cruz    Relationship: Self      Medication requested (name and dosage): hydroCHLOROthiazide (HYDRODIURIL) 25 MG tablet    Pharmacy where request should be sent: [facility] [place] [facility] [place], [place] - [address] - [phone]  - [phone]   [phone]    Additional details provided by patient: PATIENT IS REQUESTING A 90 DAY SUPPLY.     Best call back number: [phone]    Does the patient have less than a 3 day supply:  [x] Yes  [] No    Jackelyn Pierre Rep   10/11/21 12:48 EDT

## 2021-10-12 ENCOUNTER — TELEPHONE (OUTPATIENT)
Dept: SLEEP MEDICINE | Facility: HOSPITAL | Age: 71
End: 2021-10-12

## 2021-10-12 ENCOUNTER — TELEPHONE (OUTPATIENT)
Dept: FAMILY MEDICINE CLINIC | Facility: CLINIC | Age: 71
End: 2021-10-12

## 2021-10-12 NOTE — TELEPHONE ENCOUNTER
Called pt - she uses [facility] - I scheduled her CC visit for 02/01/22 @ 9:30 am.

## 2021-10-12 NOTE — TELEPHONE ENCOUNTER
Called pt - britney au she had her original sleep study done at [facility] in [place], [place].

## 2021-10-14 ENCOUNTER — OFFICE VISIT (OUTPATIENT)
Dept: FAMILY MEDICINE CLINIC | Facility: CLINIC | Age: 71
End: 2021-10-14

## 2021-10-14 DIAGNOSIS — C50.919 INFILTRATING DUCTAL CARCINOMA OF FEMALE BREAST, UNSPECIFIED LATERALITY (HCC): Primary | ICD-10-CM

## 2021-10-14 DIAGNOSIS — D05.10 DUCTAL CARCINOMA IN SITU (DCIS) OF BREAST, UNSPECIFIED LATERALITY: Primary | ICD-10-CM

## 2021-10-14 PROCEDURE — 99213 OFFICE O/P EST LOW 20 MIN: CPT | Performed by: NURSE PRACTITIONER

## 2021-10-14 NOTE — PROGRESS NOTES
Chief Complaint  Biopsy (Discuss breast biopsy results )    Subjective      Patient understanding that this is a telehealth visit.  I cannot perform a full physical exam, therefore something might be missed, or patient may need to come into the office for further evaluation.  Patient is understanding and consents to this type of visit.    Call done through [employer].    History of Present Illness  Dorothea Cruz presents to [facility]    Pt's biopsy revealed invasive ductal carcinoma of breast. Her sister had breast cancer as well. She needs referral. She cannot palpate a mass. She is anxious to start with oncology. Her  is with her on the call.     Past Medical History:   • Anxiety   • Arthritis   • Cholelithiases   • Depression   • Disease of thyroid gland    currently not taking any meds   • Diverticulitis   • Fibromyalgia   • Hyperlipidemia   • Hypertension   • Major depressive disorder   • Palpitations    no c/o cp or soa   • Post-menopause   • Sinus trouble   • Sleep apnea   • Vitamin D deficiency       Allergies  Ace inhibitors and Sulfamethoxazole-trimethoprim    Past Surgical History:   • BILATERAL BREAST REDUCTION   • CHOLECYSTECTOMY    Procedure: CHOLECYSTECTOMY LAPAROSCOPIC INTRAOPERATIVE CHOLANGIOGRAM;  Surgeon: Louie Medina MD;  Location: [facility] OR;  Service: General;  Laterality: N/A;   • COLONOSCOPY   • HYSTERECTOMY   • TONSILLECTOMY   • WRIST ARTHROPLASTY       Social History     Tobacco Use   • Smoking status: Never Smoker   • Smokeless tobacco: Never Used   Vaping Use   • Vaping Use: Never used   Substance Use Topics   • Alcohol use: Never   • Drug use: Never       Family History   Problem Relation Age of Onset   • No Known Problems Mother    • No Known Problems Father    • Breast cancer Sister         Health Maintenance Due   Topic Date Due   • TDAP/TD VACCINES (1 - Tdap) Never done   • ZOSTER VACCINE (1 of 2) Never done   • HEPATITIS C  SCREENING  Never done   • ANNUAL WELLNESS VISIT  Never done   • INFLUENZA VACCINE  08/01/2021   • Pneumococcal Vaccine 65+ (2 of 2 - PPSV23) 09/17/2021          Current Outpatient Medications:   •  colestipol (Colestid) 1 g tablet, Take 1 tablet by mouth Daily., Disp: 30 tablet, Rfl: 3  •  DULoxetine (CYMBALTA) 60 MG capsule, Take 60 mg by mouth Daily., Disp: , Rfl:   •  ergocalciferol (ERGOCALCIFEROL) 1.25 MG (05125 UT) capsule, Take 50,000 Units by mouth 1 (One) Time Per Week., Disp: , Rfl:   •  gabapentin (NEURONTIN) 300 MG capsule, Take 300 mg by mouth 3 (Three) Times a Day As Needed., Disp: , Rfl:   •  hydroCHLOROthiazide (HYDRODIURIL) 25 MG tablet, Take 1 tablet by mouth Daily., Disp: 30 tablet, Rfl: 0  •  metoprolol tartrate (LOPRESSOR) 25 MG tablet, Take 1 tablet by mouth 2 (Two) Times a Day., Disp: 180 tablet, Rfl: 1  •  primidone (MYSOLINE) 50 MG tablet, Take 1 tablet by mouth Every Night., Disp: 30 tablet, Rfl: 0  •  rosuvastatin (CRESTOR) 20 MG tablet, Take 20 mg by mouth Every Night., Disp: , Rfl:   •  TURMERIC PO, Take  by mouth., Disp: , Rfl:     Immunization History   Administered Date(s) Administered   • COVID-19 (MODERNA) 03/25/2021, 04/22/2021   • FLUAD TRI 65YR+ 12/27/2019   • Fluzone High Dose =>65 Years ([employer] ONLY) 11/24/2018   • Influenza, Unspecified 09/17/2020   • Pneumococcal Conjugate 13-Valent (PCV13) 09/17/2020       Objective     There were no vitals filed for this visit.  There is no height or weight on file to calculate BMI.     Review of Systems    Physical Exam    Gen: well-nourished, no acute distress  HENT: atraumatic, normocephalic  Eyes: extraocular movements intact, no scleral icterus  Lung: breathing comfortably, no cough  Skin: no visible rash, no lesions  Neuro: grossly oriented to person, place, and time. no facial droop   Psych: normal mood and affect      Result Review :    The following data was reviewed by: ARA Asif on 10/14/2021:    Depression  Screening Not Performed    Common labs    Common Labsle 7/23/21 7/23/21 7/23/21 8/17/21 8/17/21 8/23/21 8/23/21    0928 0928 0928 1110 1110 1643 1643   Glucose 108 (A)   117 (A)  138 (A)    BUN 17   12  15    Creatinine 0.83   0.83  0.82    eGFR Non  Am 68   68  69    Sodium 137   137  139    Potassium 3.9   4.3  3.7    Chloride 97 (A)   101  99    Calcium 10.0   9.7  10.2    Albumin 4.40   4.10  4.70    Total Bilirubin 0.2   0.3  0.2    Alkaline Phosphatase 54   53  52    AST (SGOT) 23   16  23    ALT (SGPT) 16   14  20    WBC   14.85 (A)  17.77 (A)  13.57 (A)   Hemoglobin   15.9  14.8  15.4   Hematocrit   47.1 (A)  44.3  47.5 (A)   Platelets   205  211  235   Total Cholesterol  166        Triglycerides  237 (A)        HDL Cholesterol  63 (A)        LDL Cholesterol   65        (A) Abnormal value              Data reviewed: Radiologic studies breast biopsy results, last mammo 9/21 and 9/2020              Assessment and Plan     Diagnoses and all orders for this visit:    1. Infiltrating ductal carcinoma of female breast, unspecified laterality (HCC) (Primary)  Comments:  i put in orders for oncology consult this morning with Dr Gudino. She is going to try to call to get an appt sooner or be on a cancellation list.     she is going to need a PET scan and biopsy. Call me with concerns/problems.     I spent 25 minutes caring for Dorothea on this date of service. This time includes time spent by me in the following activities:preparing for the visit, reviewing tests, obtaining and/or reviewing a separately obtained history, counseling and educating the patient/family/caregiver, referring and communicating with other health care professionals  and documenting information in the medical record    Follow Up     Return for Next scheduled follow up, Video visit.    Patient was given instructions and counseling regarding her condition or for health maintenance advice. Please see specific information pulled into the  AVS if appropriate.     Dorothea Cruz  reports that she has never smoked. She has never used smokeless tobacco.           ARA Asif

## 2021-10-14 NOTE — PROGRESS NOTES
Chief Complaint  Biopsy (Discuss breast biopsy results )    Subjective      Patient understanding that this is a telehealth visit.  I cannot perform a full physical exam, therefore something might be missed, or patient may need to come into the office for further evaluation.  Patient is understanding and consents to this type of visit.    Call done through [employer].    History of Present Illness  Dorothea Cruz presents to [facility]    Pt's biopsy revealed invasive ductal carcinoma of breast. Her sister had breast cancer as well. She needs referral. She cannot palpate a mass. She is anxious to start with oncology. Her  is with her on the call.     Past Medical History:   • Anxiety   • Arthritis   • Cholelithiases   • Depression   • Disease of thyroid gland    currently not taking any meds   • Diverticulitis   • Fibromyalgia   • Hyperlipidemia   • Hypertension   • Major depressive disorder   • Palpitations    no c/o cp or soa   • Post-menopause   • Sinus trouble   • Sleep apnea   • Vitamin D deficiency       Allergies  Ace inhibitors and Sulfamethoxazole-trimethoprim    Past Surgical History:   • BILATERAL BREAST REDUCTION   • CHOLECYSTECTOMY    Procedure: CHOLECYSTECTOMY LAPAROSCOPIC INTRAOPERATIVE CHOLANGIOGRAM;  Surgeon: Louie Medina MD;  Location: [facility] OR;  Service: General;  Laterality: N/A;   • COLONOSCOPY   • HYSTERECTOMY   • TONSILLECTOMY   • WRIST ARTHROPLASTY       Social History     Tobacco Use   • Smoking status: Never Smoker   • Smokeless tobacco: Never Used   Vaping Use   • Vaping Use: Never used   Substance Use Topics   • Alcohol use: Never   • Drug use: Never       Family History   Problem Relation Age of Onset   • No Known Problems Mother    • No Known Problems Father    • Breast cancer Sister         Health Maintenance Due   Topic Date Due   • TDAP/TD VACCINES (1 - Tdap) Never done   • ZOSTER VACCINE (1 of 2) Never done   • HEPATITIS C  SCREENING  Never done   • ANNUAL WELLNESS VISIT  Never done   • INFLUENZA VACCINE  08/01/2021   • Pneumococcal Vaccine 65+ (2 of 2 - PPSV23) 09/17/2021          Current Outpatient Medications:   •  colestipol (Colestid) 1 g tablet, Take 1 tablet by mouth Daily., Disp: 30 tablet, Rfl: 3  •  DULoxetine (CYMBALTA) 60 MG capsule, Take 60 mg by mouth Daily., Disp: , Rfl:   •  ergocalciferol (ERGOCALCIFEROL) 1.25 MG (59195 UT) capsule, Take 50,000 Units by mouth 1 (One) Time Per Week., Disp: , Rfl:   •  gabapentin (NEURONTIN) 300 MG capsule, Take 300 mg by mouth 3 (Three) Times a Day As Needed., Disp: , Rfl:   •  hydroCHLOROthiazide (HYDRODIURIL) 25 MG tablet, Take 1 tablet by mouth Daily., Disp: 30 tablet, Rfl: 0  •  metoprolol tartrate (LOPRESSOR) 25 MG tablet, Take 1 tablet by mouth 2 (Two) Times a Day., Disp: 180 tablet, Rfl: 1  •  primidone (MYSOLINE) 50 MG tablet, Take 1 tablet by mouth Every Night., Disp: 30 tablet, Rfl: 0  •  rosuvastatin (CRESTOR) 20 MG tablet, Take 20 mg by mouth Every Night., Disp: , Rfl:   •  TURMERIC PO, Take  by mouth., Disp: , Rfl:     Immunization History   Administered Date(s) Administered   • COVID-19 (MODERNA) 03/25/2021, 04/22/2021   • FLUAD TRI 65YR+ 12/27/2019   • Fluzone High Dose =>65 Years (Vaxcare ONLY) 11/24/2018   • Influenza, Unspecified 09/17/2020   • Pneumococcal Conjugate 13-Valent (PCV13) 09/17/2020       Objective     There were no vitals filed for this visit.  There is no height or weight on file to calculate BMI.     Review of Systems    Physical Exam    Gen: well-nourished, no acute distress  HENT: atraumatic, normocephalic  Eyes: extraocular movements intact, no scleral icterus  Lung: breathing comfortably, no cough  Skin: no visible rash, no lesions  Neuro: grossly oriented to person, place, and time. no facial droop   Psych: normal mood and affect      Result Review :    The following data was reviewed by: ARA Asif on 10/14/2021:    Depression  Screening Not Performed    Common labs    Common Labsle 7/23/21 7/23/21 7/23/21 8/17/21 8/17/21 8/23/21 8/23/21    0928 0928 0928 1110 1110 1643 1643   Glucose 108 (A)   117 (A)  138 (A)    BUN 17   12  15    Creatinine 0.83   0.83  0.82    eGFR Non  Am 68   68  69    Sodium 137   137  139    Potassium 3.9   4.3  3.7    Chloride 97 (A)   101  99    Calcium 10.0   9.7  10.2    Albumin 4.40   4.10  4.70    Total Bilirubin 0.2   0.3  0.2    Alkaline Phosphatase 54   53  52    AST (SGOT) 23   16  23    ALT (SGPT) 16   14  20    WBC   14.85 (A)  17.77 (A)  13.57 (A)   Hemoglobin   15.9  14.8  15.4   Hematocrit   47.1 (A)  44.3  47.5 (A)   Platelets   205  211  235   Total Cholesterol  166        Triglycerides  237 (A)        HDL Cholesterol  63 (A)        LDL Cholesterol   65        (A) Abnormal value              Data reviewed: Radiologic studies breast biopsy results, last mammo 9/21 and 9/2020              Assessment and Plan     Diagnoses and all orders for this visit:    1. Infiltrating ductal carcinoma of female breast, unspecified laterality (HCC) (Primary)  Comments:  i put in orders for oncology consult this morning with Dr Gudino. She is going to try to call to get an appt sooner or be on a cancellation list.     she is going to need a PET scan and biopsy. Call me with concerns/problems.         Follow Up     Return for Next scheduled follow up, Video visit.    Patient was given instructions and counseling regarding her condition or for health maintenance advice. Please see specific information pulled into the AVS if appropriate.     Dorothea Nancy  reports that she has never smoked. She has never used smokeless tobacco.           Arelis Butt, APRN

## 2021-10-15 PROBLEM — K57.92 DIVERTICULITIS: Status: ACTIVE | Noted: 2021-10-15

## 2021-10-15 PROBLEM — J34.9 SINUS TROUBLE: Status: ACTIVE | Noted: 2021-10-15

## 2021-10-18 ENCOUNTER — DOCUMENTATION (OUTPATIENT)
Dept: MAMMOGRAPHY | Facility: HOSPITAL | Age: 71
End: 2021-10-18

## 2021-10-18 ENCOUNTER — TELEPHONE (OUTPATIENT)
Dept: FAMILY MEDICINE CLINIC | Facility: CLINIC | Age: 71
End: 2021-10-18

## 2021-10-18 DIAGNOSIS — C50.919 INFILTRATING DUCTAL CARCINOMA OF BREAST, UNSPECIFIED LATERALITY (HCC): ICD-10-CM

## 2021-10-18 DIAGNOSIS — C50.011 MALIGNANT NEOPLASM OF NIPPLE OF RIGHT BREAST IN FEMALE, UNSPECIFIED ESTROGEN RECEPTOR STATUS (HCC): Primary | ICD-10-CM

## 2021-10-18 NOTE — PROGRESS NOTES
S/W PATIENT AND GAVE HER THE APPTS WITH DR. HILL AND DR. BERG FOR THIS Thursday. GAVE DATE, TIME AND ADDRESS OF APPT AND PT V/U

## 2021-10-18 NOTE — TELEPHONE ENCOUNTER
Caller: Dorothea Cruz    Relationship: Self    Best call back number: [phone]     What is the medical concern/diagnosis:     What specialty or service is being requested: SURGEON     What is the provider, practice or medical service name: DR HILL     What is the office location: [address] [facility] [facility]     What is the office phone number: [phone]    Any additional details: [facility] NURSE NAVIGATOR CALLED ON BEHALF OF PATIENT STATING THAT SHE DOES NOT HAVE A REFERRAL FOR SURGEON FOR PATIENT.

## 2021-10-19 RX ORDER — ROSUVASTATIN CALCIUM 20 MG/1
20 TABLET, COATED ORAL NIGHTLY
Qty: 90 TABLET | Refills: 1 | Status: SHIPPED | OUTPATIENT
Start: 2021-10-19 | End: 2022-04-14 | Stop reason: SDUPTHER

## 2021-10-19 RX ORDER — PRIMIDONE 50 MG/1
50 TABLET ORAL NIGHTLY
Qty: 90 TABLET | Refills: 1 | Status: SHIPPED | OUTPATIENT
Start: 2021-10-19 | End: 2022-03-08

## 2021-10-19 NOTE — TELEPHONE ENCOUNTER
Caller: Dorothea Cruz    Relationship: Self      Medication requested (name and dosage):     Requested Prescriptions:   Requested Prescriptions     Pending Prescriptions Disp Refills   • primidone (MYSOLINE) 50 MG tablet 30 tablet 0     Sig: Take 1 tablet by mouth Every Night.   • rosuvastatin (CRESTOR) 20 MG tablet       Sig: Take 1 tablet by mouth Every Night.        Pharmacy where request should be sent: [facility] [place] [facility] -  [place], [place] - [address] - [phone] [facility] [phone]   [phone]    Additional details provided by patient: PATIENT CALLED TO GET REFILLS OF THESE MEDICATIONS. SHE HAS NONE LEFT. SHE WOULD LIKE 90 DAY SUPPLY FOR EACH MEDICATION.    Best call back number: [phone]     Does the patient have less than a 3 day supply:  [x] Yes  [] No    Jackelyn Granados Rep   10/19/21 10:09 EDT

## 2021-10-20 PROBLEM — C50.919 BREAST CANCER: Status: ACTIVE | Noted: 2021-10-20

## 2021-10-20 NOTE — PROGRESS NOTES
Chief Complaint: Breast Cancer    Subjective           Dorothea Cruz is a 71 y.o. female presents to [facility] to be seen for right breast mass at 0200 about 7 cm FTN.  This was biopsied and returned as:      Clinical Information    Right breast mass   Comment:         Final Diagnosis   Right breast,  2 o'clock position, 7 cm from nipple, ultrasound-guided core biopsy:  - Invasive ductal carcinoma with metaplastic features  - Histologic grade ([place] Histologic Score):    - Glandular (Acinar)/Tubular Differentiation:  Score 3  - Nuclear Pleomorphism:  Score 3  - Mitotic Rate:  Score 3  - Overall Grade:  Grade 3               - Size of largest focus of invasion:  4 mm               - Breast biomarker testing:                            - Estrogen Receptor (ER):  Negative (<1%)                            - Progesterone Receptor (PgR):  Negative (<1%)                            - HER2 (by immunohistochemistry):  Equivocal (Score 2+), see comment  - Ki-67:  75%       PROCEDURE:  MAMMO DIAGNOSTIC DIGITAL TOMOSYNTHESIS RIGHT W CAD, 10/01/2021, 9:31  US BREAST RIGHT COMPLETE, 10/01/2021, 9:46     FINDINGS:          Persistent 5 mm ill-defined nodule is seen in the upper inner mid right breast at 2 o'clock, about   7 cm from the nipple.  Targeted ultrasound reveals a hypoechoic nodule corresponding in size,   position, and configuration.     IMPRESSION:  Diagnostic right mammogram and targeted right breast ultrasound demonstrating suspicious nodule at   2 o'clock.     I discussed findings and recommendations with the patient.  The patient was set up to be scheduled   for an ultrasound-guided core needle biopsy.     RECOMMENDATION(S):               ULTRASOUND-GUIDED CORE BIOPSY: RIGHT BREAST       BIRADS:               DIAGNOSTIC CATEGORY 4--SUSPICIOUS          Objective     Past Medical History:   Diagnosis Date   • Anxiety    • Arthritis    • Cholelithiases 4/30@1   •  Depression    • Disease of thyroid gland     currently not taking any meds   • Diverticulitis    • Fibromyalgia    • Hyperlipidemia    • Hypertension    • Major depressive disorder 05/07/2020   • Palpitations     no c/o cp or soa   • Post-menopause 09/17/2020   • Sinus trouble    • Sleep apnea    • Vitamin D deficiency 09/17/2020       Past Surgical History:   Procedure Laterality Date   • BILATERAL BREAST REDUCTION     • CHOLECYSTECTOMY N/A 9/2/2021    Procedure: CHOLECYSTECTOMY LAPAROSCOPIC INTRAOPERATIVE CHOLANGIOGRAM;  Surgeon: Louie Medina MD;  Location: [facility] MAIN OR;  Service: General;  Laterality: N/A;   • COLONOSCOPY  2019   • HYSTERECTOMY     • TONSILLECTOMY     • WRIST ARTHROPLASTY Bilateral          Current Outpatient Medications:   •  colestipol (Colestid) 1 g tablet, Take 1 tablet by mouth Daily., Disp: 30 tablet, Rfl: 3  •  DULoxetine (CYMBALTA) 60 MG capsule, Take 60 mg by mouth Daily., Disp: , Rfl:   •  ergocalciferol (ERGOCALCIFEROL) 1.25 MG (35106 UT) capsule, Take 50,000 Units by mouth 1 (One) Time Per Week., Disp: , Rfl:   •  gabapentin (NEURONTIN) 300 MG capsule, Take 300 mg by mouth 3 (Three) Times a Day As Needed., Disp: , Rfl:   •  hydroCHLOROthiazide (HYDRODIURIL) 25 MG tablet, Take 1 tablet by mouth Daily., Disp: 30 tablet, Rfl: 0  •  metoprolol tartrate (LOPRESSOR) 25 MG tablet, Take 1 tablet by mouth 2 (Two) Times a Day., Disp: 180 tablet, Rfl: 1  •  primidone (MYSOLINE) 50 MG tablet, Take 1 tablet by mouth Every Night., Disp: 90 tablet, Rfl: 1  •  rosuvastatin (CRESTOR) 20 MG tablet, Take 1 tablet by mouth Every Night., Disp: 90 tablet, Rfl: 1  •  TURMERIC PO, Take  by mouth., Disp: , Rfl:     Allergies   Allergen Reactions   • Ace Inhibitors Cough   • Sulfamethoxazole-Trimethoprim Hives        Family History   Problem Relation Age of Onset   • No Known Problems Mother    • No Known Problems Father    • Breast cancer Sister        Social History     Socioeconomic History   •  "Marital status:    Tobacco Use   • Smoking status: Never Smoker   • Smokeless tobacco: Never Used   Vaping Use   • Vaping Use: Never used   Substance and Sexual Activity   • Alcohol use: Never   • Drug use: Never   • Sexual activity: Defer       Vital Signs:   Resp 16   Ht 157.5 cm (62\")   Wt 84.2 kg (185 lb 10 oz)   BMI 33.95 kg/m²    Review of Systems   Constitutional: Negative for fatigue and fever.   HENT: Negative for ear pain and sore throat.    Eyes: Negative for blurred vision.   Respiratory: Negative for cough and shortness of breath.    Cardiovascular: Negative for chest pain and leg swelling.   Gastrointestinal: Negative for abdominal pain, constipation, diarrhea, nausea and vomiting.   Musculoskeletal: Negative for arthralgias and myalgias.   Skin: Negative for rash and skin lesions.   Neurological: Negative for weakness and headache.   Hematological: Negative for adenopathy. Does not bruise/bleed easily.   Psychiatric/Behavioral: Negative for sleep disturbance and depressed mood.       Physical Exam  Vitals and nursing note reviewed.   Constitutional:       Appearance: Normal appearance.   HENT:      Head: Normocephalic and atraumatic.   Eyes:      Extraocular Movements: Extraocular movements intact.      Pupils: Pupils are equal, round, and reactive to light.   Cardiovascular:      Pulses: Normal pulses.   Pulmonary:      Effort: Pulmonary effort is normal. No accessory muscle usage or respiratory distress.   Chest:   Breasts:      Right: Normal. No inverted nipple, nipple discharge, skin change, axillary adenopathy or supraclavicular adenopathy.      Left: Normal. No inverted nipple, nipple discharge, skin change, axillary adenopathy or supraclavicular adenopathy.        Comments: Well healed right breast biopsy  Abdominal:      General: Abdomen is flat.      Palpations: Abdomen is soft.      Tenderness: There is no abdominal tenderness. There is no guarding.   Musculoskeletal:         " General: No swelling, tenderness or deformity.      Cervical back: Neck supple.   Lymphadenopathy:      Upper Body:      Right upper body: No supraclavicular or axillary adenopathy.      Left upper body: No supraclavicular or axillary adenopathy.   Skin:     General: Skin is warm and dry.   Neurological:      General: No focal deficit present.      Mental Status: She is alert and oriented to person, place, and time.   Psychiatric:         Mood and Affect: Mood normal.         Thought Content: Thought content normal.          Result Review :        Assessment and Plan    Diagnoses and all orders for this visit:    1. Malignant neoplasm of upper-inner quadrant of right breast in female, estrogen receptor negative (HCC) (Primary)  -     Case Request; Standing  -     Mammo Breast Placement Device Initial Without Biopsy Right; Future  -     Case Request  -     NM sentinel node injection only; Future    Right breast needle localized lumpectomy with sentinel node identification    Follow Up   Return for Next scheduled follow up after surgery.  Patient was given instructions and counseling regarding her condition or for health maintenance advice. Please see specific information pulled into the AVS if appropriate.         This document has been electronically signed by Mirtha Esteves MD  October 21, 2021 14:15 EDT

## 2021-10-21 ENCOUNTER — DOCUMENTATION (OUTPATIENT)
Dept: ONCOLOGY | Facility: HOSPITAL | Age: 71
End: 2021-10-21

## 2021-10-21 ENCOUNTER — CONSULT (OUTPATIENT)
Dept: ONCOLOGY | Facility: HOSPITAL | Age: 71
End: 2021-10-21

## 2021-10-21 ENCOUNTER — OFFICE VISIT (OUTPATIENT)
Dept: OTHER | Facility: HOSPITAL | Age: 71
End: 2021-10-21

## 2021-10-21 VITALS
TEMPERATURE: 97.2 F | HEART RATE: 65 BPM | WEIGHT: 185.63 LBS | SYSTOLIC BLOOD PRESSURE: 129 MMHG | RESPIRATION RATE: 18 BRPM | BODY MASS INDEX: 33.95 KG/M2 | OXYGEN SATURATION: 97 % | DIASTOLIC BLOOD PRESSURE: 61 MMHG

## 2021-10-21 VITALS — RESPIRATION RATE: 16 BRPM | WEIGHT: 185.63 LBS | HEIGHT: 62 IN | BODY MASS INDEX: 34.16 KG/M2

## 2021-10-21 DIAGNOSIS — C50.211 MALIGNANT NEOPLASM OF UPPER-INNER QUADRANT OF RIGHT BREAST IN FEMALE, ESTROGEN RECEPTOR NEGATIVE (HCC): Primary | ICD-10-CM

## 2021-10-21 DIAGNOSIS — Z17.1 MALIGNANT NEOPLASM OF UPPER-INNER QUADRANT OF RIGHT BREAST IN FEMALE, ESTROGEN RECEPTOR NEGATIVE (HCC): Primary | ICD-10-CM

## 2021-10-21 DIAGNOSIS — D05.10 DUCTAL CARCINOMA IN SITU (DCIS) OF BREAST, UNSPECIFIED LATERALITY: ICD-10-CM

## 2021-10-21 LAB
CYTO UR: NORMAL
LAB AP CASE REPORT: NORMAL
LAB AP CLINICAL INFORMATION: NORMAL
LAB AP DIAGNOSIS COMMENT: NORMAL
LAB AP SPECIAL STAINS: NORMAL
PATH REPORT.ADDENDUM SPEC: NORMAL
PATH REPORT.FINAL DX SPEC: NORMAL
PATH REPORT.GROSS SPEC: NORMAL

## 2021-10-21 PROCEDURE — G0463 HOSPITAL OUTPT CLINIC VISIT: HCPCS | Performed by: INTERNAL MEDICINE

## 2021-10-21 PROCEDURE — 99205 OFFICE O/P NEW HI 60 MIN: CPT | Performed by: INTERNAL MEDICINE

## 2021-10-21 PROCEDURE — 99214 OFFICE O/P EST MOD 30 MIN: CPT | Performed by: SURGERY

## 2021-10-21 RX ORDER — CLINDAMYCIN PHOSPHATE 900 MG/50ML
900 INJECTION INTRAVENOUS ONCE
Status: CANCELLED | OUTPATIENT
Start: 2021-10-21 | End: 2021-10-21

## 2021-10-21 NOTE — PROGRESS NOTES
Distress Screening Follow-Up    Diagnosis: Breast cancer    Date of Distress Screening: 10/21/21  Location of Distress Screening: Med onc  Distress Level: 8  Problems Indicated: Nervousness, worry, feeling swollen    Comments: OSW met with pt and spouse in multi-d breast clinic to f/u in regards to identified distress. PHQ-9 completed today - score 0. Introduced myself and discussed OSW role/psychosocial services available. Provided emotional support throughout, utilizing empathy and validating and normalizing identified emotions. Discussed opportunity for mental health services (counseling,psych) and/or support services ([id number] support, support groups). Pt did not express interest at this time. Pt is prescribed Cymbalta. Pt reports she has great support, including her spouse and adult children. Pt has a daughter that resides next door. Pt and spouse reside in [place]. Pt is retired and has [place] Medicare insurance plan. Pt's spouse continues to work as . Pt to undergo lumpectomy and radiation therapy. Informed pt I am also the OSW in rad onc as well and physically present on Mondays. Pt inquiring about rad onc tx. Informed pt this will be daily tx, M-F and the exact number of txs will be determined by rad onc. Pt inquiring about transportation resources in the case if she doesn't feel well and inquired about TACK. Discussed TACK transportation and the expense for this service. However, if transportation becomes a barrier, OSW will suggest investigating pt's insurance benefits to determine if she has a transportation benefit. No other needs identified at this time. Encouraged OSW support remains available. Pt and spouse expressed gratitude.    Services/Referrals Provided: Emotional support

## 2021-10-25 ENCOUNTER — OFFICE VISIT (OUTPATIENT)
Dept: FAMILY MEDICINE CLINIC | Facility: CLINIC | Age: 71
End: 2021-10-25

## 2021-10-25 VITALS
HEART RATE: 69 BPM | TEMPERATURE: 97.4 F | DIASTOLIC BLOOD PRESSURE: 64 MMHG | WEIGHT: 186.3 LBS | RESPIRATION RATE: 20 BRPM | BODY MASS INDEX: 34.28 KG/M2 | SYSTOLIC BLOOD PRESSURE: 136 MMHG | HEIGHT: 62 IN | OXYGEN SATURATION: 97 %

## 2021-10-25 DIAGNOSIS — Z23 NEED FOR INFLUENZA VACCINATION: ICD-10-CM

## 2021-10-25 DIAGNOSIS — R30.0 DYSURIA: Primary | ICD-10-CM

## 2021-10-25 LAB
BILIRUB BLD-MCNC: NEGATIVE MG/DL
CLARITY, POC: CLEAR
COLOR UR: YELLOW
EXPIRATION DATE: ABNORMAL
GLUCOSE UR STRIP-MCNC: NEGATIVE MG/DL
KETONES UR QL: NEGATIVE
LEUKOCYTE EST, POC: ABNORMAL
Lab: ABNORMAL
NITRITE UR-MCNC: NEGATIVE MG/ML
PH UR: 6.5 [PH] (ref 5–8)
PROT UR STRIP-MCNC: NEGATIVE MG/DL
RBC # UR STRIP: ABNORMAL /UL
SP GR UR: 1.02 (ref 1–1.03)
UROBILINOGEN UR QL: NORMAL

## 2021-10-25 PROCEDURE — G0008 ADMIN INFLUENZA VIRUS VAC: HCPCS | Performed by: NURSE PRACTITIONER

## 2021-10-25 PROCEDURE — 81003 URINALYSIS AUTO W/O SCOPE: CPT | Performed by: NURSE PRACTITIONER

## 2021-10-25 PROCEDURE — 87086 URINE CULTURE/COLONY COUNT: CPT | Performed by: NURSE PRACTITIONER

## 2021-10-25 PROCEDURE — 90662 IIV NO PRSV INCREASED AG IM: CPT | Performed by: NURSE PRACTITIONER

## 2021-10-25 PROCEDURE — 99213 OFFICE O/P EST LOW 20 MIN: CPT | Performed by: NURSE PRACTITIONER

## 2021-10-25 RX ORDER — NITROFURANTOIN 25; 75 MG/1; MG/1
100 CAPSULE ORAL 2 TIMES DAILY
Qty: 14 CAPSULE | Refills: 0 | Status: SHIPPED | OUTPATIENT
Start: 2021-10-25 | End: 2021-11-29

## 2021-10-25 NOTE — PROGRESS NOTES
Chief Complaint  Urinary Tract Infection (dysuria, frequency)    Subjective      History of Present Illness  Dorothea Cruz presents to [facility] dysuria and increased urinary frequency for the last 3 days.  Denies fever or low back pain but she has had some achiness into her legs.    Breast cancer, she has surgery scheduled for November 12.  She has a good outlook.  She feels positive about her diagnosis.    Requests flu shot      Past Medical History:   • Anxiety   • Arthritis   • Cholelithiases   • Depression   • Disease of thyroid gland    currently not taking any meds   • Diverticulitis   • Fibromyalgia   • Hyperlipidemia   • Hypertension   • Major depressive disorder   • Palpitations    no c/o cp or soa   • Post-menopause   • Sinus trouble   • Sleep apnea   • Vitamin D deficiency       Allergies  Ace inhibitors and Sulfamethoxazole-trimethoprim    Past Surgical History:   • BILATERAL BREAST REDUCTION   • CHOLECYSTECTOMY    Procedure: CHOLECYSTECTOMY LAPAROSCOPIC INTRAOPERATIVE CHOLANGIOGRAM;  Surgeon: Louie Medina MD;  Location: [facility] MAIN OR;  Service: General;  Laterality: N/A;   • COLONOSCOPY   • HYSTERECTOMY   • TONSILLECTOMY   • WRIST ARTHROPLASTY       Social History     Tobacco Use   • Smoking status: Never Smoker   • Smokeless tobacco: Never Used   Vaping Use   • Vaping Use: Never used   Substance Use Topics   • Alcohol use: Never   • Drug use: Never       Family History   Problem Relation Age of Onset   • No Known Problems Mother    • No Known Problems Father    • Breast cancer Sister         Health Maintenance Due   Topic Date Due   • TDAP/TD VACCINES (1 - Tdap) Never done   • ZOSTER VACCINE (1 of 2) Never done   • HEPATITIS C SCREENING  Never done   • ANNUAL WELLNESS VISIT  Never done   • INFLUENZA VACCINE  08/01/2021   • Pneumococcal Vaccine 65+ (2 of 2 - PPSV23) 09/17/2021          Current Outpatient Medications:   •  colestipol (Colestid) 1 g tablet,  Take 1 tablet by mouth Daily., Disp: 30 tablet, Rfl: 3  •  DULoxetine (CYMBALTA) 60 MG capsule, Take 60 mg by mouth Daily., Disp: , Rfl:   •  ergocalciferol (ERGOCALCIFEROL) 1.25 MG (41109 UT) capsule, Take 50,000 Units by mouth 1 (One) Time Per Week., Disp: , Rfl:   •  gabapentin (NEURONTIN) 300 MG capsule, Take 300 mg by mouth 3 (Three) Times a Day As Needed., Disp: , Rfl:   •  hydroCHLOROthiazide (HYDRODIURIL) 25 MG tablet, Take 1 tablet by mouth Daily., Disp: 30 tablet, Rfl: 0  •  metoprolol tartrate (LOPRESSOR) 25 MG tablet, Take 1 tablet by mouth 2 (Two) Times a Day., Disp: 180 tablet, Rfl: 1  •  nitrofurantoin, macrocrystal-monohydrate, (Macrobid) 100 MG capsule, Take 1 capsule by mouth 2 (Two) Times a Day., Disp: 14 capsule, Rfl: 0  •  primidone (MYSOLINE) 50 MG tablet, Take 1 tablet by mouth Every Night., Disp: 90 tablet, Rfl: 1  •  rosuvastatin (CRESTOR) 20 MG tablet, Take 1 tablet by mouth Every Night., Disp: 90 tablet, Rfl: 1  •  TURMERIC PO, Take  by mouth., Disp: , Rfl:     Immunization History   Administered Date(s) Administered   • COVID-19 (MODERNA) 03/25/2021, 04/22/2021   • FLUAD TRI 65YR+ 12/27/2019   • Fluzone High Dose =>65 Years (Vaxcare ONLY) 11/24/2018   • Influenza, Unspecified 09/17/2020   • Pneumococcal Conjugate 13-Valent (PCV13) 09/17/2020       Objective     Vitals:    10/25/21 1432   BP: 136/64   Pulse: 69   Resp: 20   Temp: 97.4 °F (36.3 °C)   SpO2: 97%     Body mass index is 34.07 kg/m².     Review of Systems    Physical Exam  Vitals reviewed.   Constitutional:       Appearance: Normal appearance. She is well-developed.   Cardiovascular:      Rate and Rhythm: Normal rate and regular rhythm.      Heart sounds: Normal heart sounds. No murmur heard.      Pulmonary:      Effort: Pulmonary effort is normal.      Breath sounds: Normal breath sounds.   Neurological:      Mental Status: She is alert and oriented to person, place, and time.      Cranial Nerves: No cranial nerve deficit.       Motor: No weakness.   Psychiatric:         Mood and Affect: Mood and affect normal.         Result Review :    The following data was reviewed by: ARA Asif on 10/25/2021:       Depression: Not at risk   • PHQ-2 Score: 0       Common labs    Common Labsle 7/23/21 7/23/21 7/23/21 8/17/21 8/17/21 8/23/21 8/23/21    0928 0928 0928 1110 1110 1643 1643   Glucose 108 (A)   117 (A)  138 (A)    BUN 17   12  15    Creatinine 0.83   0.83  0.82    eGFR Non  Am 68   68  69    Sodium 137   137  139    Potassium 3.9   4.3  3.7    Chloride 97 (A)   101  99    Calcium 10.0   9.7  10.2    Albumin 4.40   4.10  4.70    Total Bilirubin 0.2   0.3  0.2    Alkaline Phosphatase 54   53  52    AST (SGOT) 23   16  23    ALT (SGPT) 16   14  20    WBC   14.85 (A)  17.77 (A)  13.57 (A)   Hemoglobin   15.9  14.8  15.4   Hematocrit   47.1 (A)  44.3  47.5 (A)   Platelets   205  211  235   Total Cholesterol  166        Triglycerides  237 (A)        HDL Cholesterol  63 (A)        LDL Cholesterol   65        (A) Abnormal value                            Assessment and Plan     Diagnoses and all orders for this visit:    1. Dysuria (Primary)  -     Urine Culture - Urine, Urine, Clean Catch  -     POCT urinalysis dipstick, automated  -     nitrofurantoin, macrocrystal-monohydrate, (Macrobid) 100 MG capsule; Take 1 capsule by mouth 2 (Two) Times a Day.  Dispense: 14 capsule; Refill: 0    2. Need for influenza vaccination  -     Fluzone High-Dose 65+yrs (9649-1106)            Follow Up     Return if symptoms worsen or fail to improve.    Patient was given instructions and counseling regarding her condition or for health maintenance advice. Please see specific information pulled into the AVS if appropriate.              ARA Asif

## 2021-10-26 ENCOUNTER — TELEPHONE (OUTPATIENT)
Dept: SURGERY | Facility: CLINIC | Age: 71
End: 2021-10-26

## 2021-10-26 LAB
BACTERIA SPEC AEROBE CULT: ABNORMAL
BACTERIA SPEC AEROBE CULT: ABNORMAL

## 2021-10-26 NOTE — TELEPHONE ENCOUNTER
RIGHT BREAST LUMPECTOMY WITH SENTINEL NODE BIOPSY AND NEEDLE LOCALIZATION scheduled 11/12.  Patient asked to be called to explain what is being done with the wire in regards to lymph nodes.

## 2021-10-28 RX ORDER — HYDROCHLOROTHIAZIDE 25 MG/1
25 TABLET ORAL DAILY
Qty: 30 TABLET | Refills: 0 | Status: SHIPPED | OUTPATIENT
Start: 2021-10-28 | End: 2021-11-03 | Stop reason: SDUPTHER

## 2021-10-28 RX ORDER — FLUCONAZOLE 100 MG/1
100 TABLET ORAL DAILY
Qty: 3 TABLET | Refills: 0 | Status: SHIPPED | OUTPATIENT
Start: 2021-10-28 | End: 2021-10-31

## 2021-11-03 ENCOUNTER — TELEPHONE (OUTPATIENT)
Dept: FAMILY MEDICINE CLINIC | Facility: CLINIC | Age: 71
End: 2021-11-03

## 2021-11-03 RX ORDER — HYDROCHLOROTHIAZIDE 25 MG/1
25 TABLET ORAL DAILY
Qty: 90 TABLET | Refills: 0 | Status: SHIPPED | OUTPATIENT
Start: 2021-11-03 | End: 2022-03-08 | Stop reason: SDUPTHER

## 2021-11-03 NOTE — TELEPHONE ENCOUNTER
Caller: Dorothea Cruz    Relationship: Self      Medication requested (name and dosage):     Requested Prescriptions:   Requested Prescriptions     Pending Prescriptions Disp Refills   • hydroCHLOROthiazide (HYDRODIURIL) 25 MG tablet 30 tablet 0     Sig: Take 1 tablet by mouth Daily.    PATIENT WANTS A 90 DAY QTY PLEASE    Pharmacy where request should be sent:[facility] [place] [facility] - [place], [place] - [address] - [phone] [facility] [phone]   [phone] Additional details provided by patient: PATIENT WOULD LIKE A 90 DAY QTY PLEASE    Best call back number: [phone]        Jackelyn Cuello Rep   11/03/21 15:13 EDT

## 2021-11-05 ENCOUNTER — ANESTHESIA EVENT (OUTPATIENT)
Dept: PERIOP | Facility: HOSPITAL | Age: 71
End: 2021-11-05

## 2021-11-12 ENCOUNTER — HOSPITAL ENCOUNTER (OUTPATIENT)
Facility: HOSPITAL | Age: 71
Setting detail: HOSPITAL OUTPATIENT SURGERY
Discharge: HOME | End: 2021-11-12
Attending: SURGERY | Admitting: SURGERY

## 2021-11-12 ENCOUNTER — ANESTHESIA (OUTPATIENT)
Dept: PERIOP | Facility: HOSPITAL | Age: 71
End: 2021-11-12

## 2021-11-12 ENCOUNTER — APPOINTMENT (OUTPATIENT)
Dept: MAMMOGRAPHY | Facility: HOSPITAL | Age: 71
End: 2021-11-12

## 2021-11-12 ENCOUNTER — HOSPITAL ENCOUNTER (OUTPATIENT)
Dept: MAMMOGRAPHY | Facility: HOSPITAL | Age: 71
Discharge: HOME | End: 2021-11-12

## 2021-11-12 ENCOUNTER — HOSPITAL ENCOUNTER (OUTPATIENT)
Dept: NUCLEAR MEDICINE | Facility: HOSPITAL | Age: 71
Discharge: HOME | End: 2021-11-12

## 2021-11-12 VITALS
TEMPERATURE: 97.2 F | SYSTOLIC BLOOD PRESSURE: 157 MMHG | RESPIRATION RATE: 16 BRPM | HEART RATE: 66 BPM | OXYGEN SATURATION: 94 % | DIASTOLIC BLOOD PRESSURE: 73 MMHG | WEIGHT: 188.71 LBS | HEIGHT: 62 IN | BODY MASS INDEX: 34.73 KG/M2

## 2021-11-12 DIAGNOSIS — C50.211 MALIGNANT NEOPLASM OF UPPER-INNER QUADRANT OF RIGHT BREAST IN FEMALE, ESTROGEN RECEPTOR NEGATIVE (HCC): ICD-10-CM

## 2021-11-12 DIAGNOSIS — Z17.1 MALIGNANT NEOPLASM OF UPPER-INNER QUADRANT OF RIGHT BREAST IN FEMALE, ESTROGEN RECEPTOR NEGATIVE (HCC): ICD-10-CM

## 2021-11-12 DIAGNOSIS — C50.211 MALIGNANT NEOPLASM OF UPPER-INNER QUADRANT OF RIGHT BREAST IN FEMALE, ESTROGEN RECEPTOR NEGATIVE (HCC): Primary | ICD-10-CM

## 2021-11-12 DIAGNOSIS — Z17.1 MALIGNANT NEOPLASM OF UPPER-INNER QUADRANT OF RIGHT BREAST IN FEMALE, ESTROGEN RECEPTOR NEGATIVE (HCC): Primary | ICD-10-CM

## 2021-11-12 PROCEDURE — 19301 PARTIAL MASTECTOMY: CPT | Performed by: SURGERY

## 2021-11-12 PROCEDURE — 25010000002 KETOROLAC TROMETHAMINE PER 15 MG: Performed by: NURSE ANESTHETIST, CERTIFIED REGISTERED

## 2021-11-12 PROCEDURE — 25010000002 DEXAMETHASONE PER 1 MG: Performed by: NURSE ANESTHETIST, CERTIFIED REGISTERED

## 2021-11-12 PROCEDURE — 0 TECHETIUM TC99M TILMANOCEPT: Performed by: SURGERY

## 2021-11-12 PROCEDURE — 76098 X-RAY EXAM SURGICAL SPECIMEN: CPT

## 2021-11-12 PROCEDURE — 25010000002 PROPOFOL 10 MG/ML EMULSION: Performed by: NURSE ANESTHETIST, CERTIFIED REGISTERED

## 2021-11-12 PROCEDURE — 38792 RA TRACER ID OF SENTINL NODE: CPT

## 2021-11-12 PROCEDURE — 38900 IO MAP OF SENT LYMPH NODE: CPT | Performed by: SURGERY

## 2021-11-12 PROCEDURE — 25010000002 GENTAMICIN PER 80 MG: Performed by: SURGERY

## 2021-11-12 PROCEDURE — 25010000002 HYDROMORPHONE 1 MG/ML SOLUTION: Performed by: NURSE ANESTHETIST, CERTIFIED REGISTERED

## 2021-11-12 PROCEDURE — 0 LIDOCAINE 1 % SOLUTION: Performed by: SURGERY

## 2021-11-12 PROCEDURE — 38525 BIOPSY/REMOVAL LYMPH NODES: CPT | Performed by: SURGERY

## 2021-11-12 PROCEDURE — 25010000002 FENTANYL CITRATE (PF) 50 MCG/ML SOLUTION: Performed by: NURSE ANESTHETIST, CERTIFIED REGISTERED

## 2021-11-12 PROCEDURE — 19281 PERQ DEVICE BREAST 1ST IMAG: CPT | Performed by: SPECIALIST/TECHNOLOGIST, OTHER

## 2021-11-12 PROCEDURE — 19301 PARTIAL MASTECTOMY: CPT | Performed by: SPECIALIST/TECHNOLOGIST, OTHER

## 2021-11-12 PROCEDURE — 19281 PERQ DEVICE BREAST 1ST IMAG: CPT | Performed by: SURGERY

## 2021-11-12 PROCEDURE — C1819 TISSUE LOCALIZATION-EXCISION: HCPCS

## 2021-11-12 PROCEDURE — 38900 IO MAP OF SENT LYMPH NODE: CPT | Performed by: SPECIALIST/TECHNOLOGIST, OTHER

## 2021-11-12 PROCEDURE — A9520 TC99 TILMANOCEPT DIAG 0.5MCI: HCPCS | Performed by: SURGERY

## 2021-11-12 PROCEDURE — 88307 TISSUE EXAM BY PATHOLOGIST: CPT | Performed by: SURGERY

## 2021-11-12 PROCEDURE — 25010000002 MIDAZOLAM PER 1MG: Performed by: ANESTHESIOLOGY

## 2021-11-12 PROCEDURE — 25010000002 ONDANSETRON PER 1 MG: Performed by: NURSE ANESTHETIST, CERTIFIED REGISTERED

## 2021-11-12 DEVICE — LIGACLIP MCA MULTIPLE CLIP APPLIERS, 20 MEDIUM CLIPS
Type: IMPLANTABLE DEVICE | Site: BREAST | Status: FUNCTIONAL
Brand: LIGACLIP

## 2021-11-12 RX ORDER — MIDAZOLAM HYDROCHLORIDE 2 MG/2ML
2 INJECTION, SOLUTION INTRAMUSCULAR; INTRAVENOUS ONCE
Status: COMPLETED | OUTPATIENT
Start: 2021-11-12 | End: 2021-11-12

## 2021-11-12 RX ORDER — ONDANSETRON 2 MG/ML
4 INJECTION INTRAMUSCULAR; INTRAVENOUS ONCE AS NEEDED
Status: DISCONTINUED | OUTPATIENT
Start: 2021-11-12 | End: 2021-11-12 | Stop reason: HOSPADM

## 2021-11-12 RX ORDER — PHENYLEPHRINE HCL IN 0.9% NACL 1 MG/10 ML
SYRINGE (ML) INTRAVENOUS AS NEEDED
Status: DISCONTINUED | OUTPATIENT
Start: 2021-11-12 | End: 2021-11-12 | Stop reason: SURG

## 2021-11-12 RX ORDER — GENTAMICIN SULFATE 40 MG/ML
INJECTION, SOLUTION INTRAMUSCULAR; INTRAVENOUS AS NEEDED
Status: DISCONTINUED | OUTPATIENT
Start: 2021-11-12 | End: 2021-11-12 | Stop reason: HOSPADM

## 2021-11-12 RX ORDER — PROMETHAZINE HYDROCHLORIDE 25 MG/1
25 SUPPOSITORY RECTAL ONCE AS NEEDED
Status: DISCONTINUED | OUTPATIENT
Start: 2021-11-12 | End: 2021-11-12 | Stop reason: HOSPADM

## 2021-11-12 RX ORDER — LIDOCAINE HYDROCHLORIDE 10 MG/ML
10 INJECTION, SOLUTION INFILTRATION; PERINEURAL ONCE
Status: COMPLETED | OUTPATIENT
Start: 2021-11-12 | End: 2021-11-12

## 2021-11-12 RX ORDER — SODIUM CHLORIDE, SODIUM LACTATE, POTASSIUM CHLORIDE, CALCIUM CHLORIDE 600; 310; 30; 20 MG/100ML; MG/100ML; MG/100ML; MG/100ML
9 INJECTION, SOLUTION INTRAVENOUS CONTINUOUS PRN
Status: DISCONTINUED | OUTPATIENT
Start: 2021-11-12 | End: 2021-11-12 | Stop reason: HOSPADM

## 2021-11-12 RX ORDER — BUPIVACAINE HYDROCHLORIDE 2.5 MG/ML
INJECTION, SOLUTION EPIDURAL; INFILTRATION; INTRACAUDAL AS NEEDED
Status: DISCONTINUED | OUTPATIENT
Start: 2021-11-12 | End: 2021-11-12 | Stop reason: HOSPADM

## 2021-11-12 RX ORDER — PROMETHAZINE HYDROCHLORIDE 12.5 MG/1
25 TABLET ORAL ONCE AS NEEDED
Status: DISCONTINUED | OUTPATIENT
Start: 2021-11-12 | End: 2021-11-12 | Stop reason: HOSPADM

## 2021-11-12 RX ORDER — GLYCOPYRROLATE 0.2 MG/ML
INJECTION INTRAMUSCULAR; INTRAVENOUS AS NEEDED
Status: DISCONTINUED | OUTPATIENT
Start: 2021-11-12 | End: 2021-11-12 | Stop reason: SURG

## 2021-11-12 RX ORDER — ACETAMINOPHEN 500 MG
1000 TABLET ORAL ONCE
Status: COMPLETED | OUTPATIENT
Start: 2021-11-12 | End: 2021-11-12

## 2021-11-12 RX ORDER — LIDOCAINE HYDROCHLORIDE 20 MG/ML
INJECTION, SOLUTION INFILTRATION; PERINEURAL AS NEEDED
Status: DISCONTINUED | OUTPATIENT
Start: 2021-11-12 | End: 2021-11-12 | Stop reason: SURG

## 2021-11-12 RX ORDER — MEPERIDINE HYDROCHLORIDE 25 MG/ML
12.5 INJECTION INTRAMUSCULAR; INTRAVENOUS; SUBCUTANEOUS
Status: DISCONTINUED | OUTPATIENT
Start: 2021-11-12 | End: 2021-11-12 | Stop reason: HOSPADM

## 2021-11-12 RX ORDER — CLINDAMYCIN PHOSPHATE 900 MG/50ML
900 INJECTION INTRAVENOUS ONCE
Status: COMPLETED | OUTPATIENT
Start: 2021-11-12 | End: 2021-11-12

## 2021-11-12 RX ORDER — KETOROLAC TROMETHAMINE 30 MG/ML
INJECTION, SOLUTION INTRAMUSCULAR; INTRAVENOUS AS NEEDED
Status: DISCONTINUED | OUTPATIENT
Start: 2021-11-12 | End: 2021-11-12 | Stop reason: SURG

## 2021-11-12 RX ORDER — FENTANYL CITRATE 50 UG/ML
INJECTION, SOLUTION INTRAMUSCULAR; INTRAVENOUS AS NEEDED
Status: DISCONTINUED | OUTPATIENT
Start: 2021-11-12 | End: 2021-11-12 | Stop reason: SURG

## 2021-11-12 RX ORDER — HYDROCODONE BITARTRATE AND ACETAMINOPHEN 5; 325 MG/1; MG/1
1-2 TABLET ORAL EVERY 4 HOURS PRN
Qty: 20 TABLET | Refills: 0 | Status: SHIPPED | OUTPATIENT
Start: 2021-11-12 | End: 2021-11-29

## 2021-11-12 RX ORDER — OXYCODONE HYDROCHLORIDE 5 MG/1
5 TABLET ORAL
Status: DISCONTINUED | OUTPATIENT
Start: 2021-11-12 | End: 2021-11-12 | Stop reason: HOSPADM

## 2021-11-12 RX ORDER — DEXAMETHASONE SODIUM PHOSPHATE 4 MG/ML
INJECTION, SOLUTION INTRA-ARTICULAR; INTRALESIONAL; INTRAMUSCULAR; INTRAVENOUS; SOFT TISSUE AS NEEDED
Status: DISCONTINUED | OUTPATIENT
Start: 2021-11-12 | End: 2021-11-12 | Stop reason: SURG

## 2021-11-12 RX ORDER — ONDANSETRON 4 MG/1
4 TABLET, FILM COATED ORAL ONCE AS NEEDED
Status: DISCONTINUED | OUTPATIENT
Start: 2021-11-12 | End: 2021-11-12 | Stop reason: HOSPADM

## 2021-11-12 RX ORDER — GLYCOPYRROLATE 0.2 MG/ML
0.2 INJECTION INTRAMUSCULAR; INTRAVENOUS
Status: COMPLETED | OUTPATIENT
Start: 2021-11-12 | End: 2021-11-12

## 2021-11-12 RX ORDER — PROPOFOL 10 MG/ML
VIAL (ML) INTRAVENOUS AS NEEDED
Status: DISCONTINUED | OUTPATIENT
Start: 2021-11-12 | End: 2021-11-12 | Stop reason: SURG

## 2021-11-12 RX ADMIN — ONDANSETRON 4 MG: 2 INJECTION INTRAMUSCULAR; INTRAVENOUS at 11:18

## 2021-11-12 RX ADMIN — DEXAMETHASONE SODIUM PHOSPHATE 8 MG: 4 INJECTION INTRA-ARTICULAR; INTRALESIONAL; INTRAMUSCULAR; INTRAVENOUS; SOFT TISSUE at 11:18

## 2021-11-12 RX ADMIN — ACETAMINOPHEN 1000 MG: 500 TABLET ORAL at 09:35

## 2021-11-12 RX ADMIN — LIDOCAINE HYDROCHLORIDE 10 ML: 10 INJECTION, SOLUTION INFILTRATION; PERINEURAL at 08:48

## 2021-11-12 RX ADMIN — PROPOFOL 150 MG: 10 INJECTION, EMULSION INTRAVENOUS at 11:06

## 2021-11-12 RX ADMIN — TILMANOCEPT 1 DOSE: KIT at 09:12

## 2021-11-12 RX ADMIN — MIDAZOLAM HYDROCHLORIDE 2 MG: 1 INJECTION, SOLUTION INTRAMUSCULAR; INTRAVENOUS at 10:42

## 2021-11-12 RX ADMIN — FENTANYL CITRATE 100 MCG: 50 INJECTION, SOLUTION INTRAMUSCULAR; INTRAVENOUS at 11:05

## 2021-11-12 RX ADMIN — LIDOCAINE HYDROCHLORIDE 100 MG: 20 INJECTION, SOLUTION INFILTRATION; PERINEURAL at 11:05

## 2021-11-12 RX ADMIN — KETOROLAC TROMETHAMINE 30 MG: 30 INJECTION, SOLUTION INTRAMUSCULAR; INTRAVENOUS at 11:18

## 2021-11-12 RX ADMIN — GLYCOPYRROLATE 0.2 MG: 0.2 INJECTION INTRAMUSCULAR; INTRAVENOUS at 11:12

## 2021-11-12 RX ADMIN — OXYCODONE HYDROCHLORIDE 5 MG: 5 TABLET ORAL at 12:35

## 2021-11-12 RX ADMIN — SODIUM CHLORIDE, POTASSIUM CHLORIDE, SODIUM LACTATE AND CALCIUM CHLORIDE 9 ML/HR: 600; 310; 30; 20 INJECTION, SOLUTION INTRAVENOUS at 09:35

## 2021-11-12 RX ADMIN — GLYCOPYRROLATE 0.2 MG: 0.2 INJECTION INTRAMUSCULAR; INTRAVENOUS at 10:42

## 2021-11-12 RX ADMIN — HYDROMORPHONE HYDROCHLORIDE 0.5 MG: 1 INJECTION, SOLUTION INTRAMUSCULAR; INTRAVENOUS; SUBCUTANEOUS at 12:19

## 2021-11-12 RX ADMIN — Medication 100 MCG: at 11:14

## 2021-11-12 RX ADMIN — CLINDAMYCIN PHOSPHATE 900 MG: 900 INJECTION, SOLUTION INTRAVENOUS at 11:08

## 2021-11-12 NOTE — ANESTHESIA PREPROCEDURE EVALUATION
Anesthesia Evaluation     Patient summary reviewed and Nursing notes reviewed                Airway   Mallampati: I  TM distance: >3 FB  Neck ROM: full  No difficulty expected  Dental      Pulmonary - normal exam    breath sounds clear to auscultation  (+) sleep apnea,   Cardiovascular - normal exam    Rhythm: regular    (+) hypertension,       Neuro/Psych- negative ROS  GI/Hepatic/Renal/Endo    (+) obesity,       Musculoskeletal (-) negative ROS    Abdominal    Substance History - negative use     OB/GYN negative ob/gyn ROS         Other                        Anesthesia Plan    ASA 3     general     intravenous induction     Anesthetic plan, all risks, benefits, and alternatives have been provided, discussed and informed consent has been obtained with: patient.

## 2021-11-12 NOTE — OP NOTE
BREAST BIOPSY WITH NEEDLE LOCALIZATION AND SENTINEL NODE BIOPSY  Procedure Report    Patient Name:  Dorothea Cruz  YOB: 1950    Date of Surgery:  11/12/2021     Indications:  Breast cancer    Pre-op Diagnosis:   Malignant neoplasm of upper-inner quadrant of right breast in female, estrogen receptor negative (HCC) [C50.211, Z17.1]       Post-Op Diagnosis Codes:     * Malignant neoplasm of upper-inner quadrant of right breast in female, estrogen receptor negative (HCC) [C50.211, Z17.1]    Procedure/CPT® Codes:      Procedure(s):  RIGHT BREAST NEEDLE LOCALIZED  LUMPECTOMY WITH SENTINEL NODE BIOPSY    Staff:  Surgeon(s):  Mirtha Esteves MD    Assistant: Mayela Lugo CSA    Anesthesia: General    Estimated Blood Loss: minimal    Implants:    Implant Name Type Inv. Item Serial No.  Lot No. LRB No. Used Action   CLIPAPPLR M/ ENDO LIGACLIP 20CLP 11IN MD - [id number] Implant CLIPAPPLR M/ ENDO LIGACLIP 20CLP 11IN MD  [employer]  DIV [employer] J [employer] J [id number] Right 2 Implanted       Specimen:          Specimens     ID Source Type Tests Collected By Collected At Frozen?    A [place] Lymph Node Tissue · TISSUE PATHOLOGY EXAM   Mirtha Esteves MD 11/12/21 0957 No    Description: RIGHT AXILLARY SENTINEL LYMPH NODE #1  - COUNT  5    Comment: Fresh for permanent - collected at 1128    B [place] Lymph Node Tissue · TISSUE PATHOLOGY EXAM   Mirtha Esteves MD 11/12/21 0958 No    Description: RIGHT AXILLARY SENTINEL LYMPH NODE #2 - COUNT 300    Comment: For permanent - collected at 1130    C Breast, Right Tissue · TISSUE PATHOLOGY EXAM   Mirtha Esteves MD 11/12/21 1133     Description: right breast needle localized lumpectomy short stitch superior long stitch lateral with clipped extra margins    Comment: Please call [phone]    D Breast, Right Tissue · TISSUE PATHOLOGY EXAM   Mirtha Esteves MD 11/12/21 1142     Description: new superior anterior lateral margin with  clippes on new    Comment: Please josh [phone]              Findings: none    Complications: none    Description of Procedure:   The risks and benefits and treatment options were discussed with her. After informed consent was obtained, she was taken to the OR and placed supine on the table. Earlier this AM she had a wire placed and she had also been injected for a sentinel node. We began with axillary portion of the procedure. A small incision was made in the axilla and this probe was used to identify the hottest nodes. After the clavipectoral fascia had been incised, one node had a count of 5 and the second node had a count of 5 adn the last had a count of 300. The remainder of the bed had a count of less than 10% of the highest number. The wound was copiously irrigated with electrocautery and clips were used to achieve hemostasis. The deeper layers were closed with 2-0 Vicryl and the skin was closed with runnign sutures.    We then moved to the breast portion of the procedure. An incision was made around the wire and then skin flaps were raised anteriorly and the tissue was excised circumferentially around the area. The specimen was removed, marked for orientation, and handed off as a specimen. The wound bed was irrigated.  An extra superior anterolateral margin was taken and clips were placed on new margins.  The area this covered on the specimen was marked with clips as well. Hemostasis was achieved with electrocautery and clips. The deeper layers were closed with 2-0 Vicryl, the skin was closed with a 4.0 subcuticular stitch. The specimen had been sent to radiology for evaluation and they did call back and confirm that we had the the wire, the clip, the abnormal appearing tissue and a good margin of normal tissue around it  Assistant: Mayela Lugo CSA  was responsible for performing the following activities: Retraction and Closing and their skilled assistance was necessary for the success of this case.    Mirtha  SEGUNDO Esteves MD     Date: 11/12/2021  Time: 11:54 EST

## 2021-11-12 NOTE — DISCHARGE INSTRUCTIONS
DISCHARGE INSTRUCTIONS  [] Breast Biopsy  [] Lumpectomy  [] Lymph Node Dissection           ? For your surgery you had:  ? General anesthesia (you may have a sore throat for the first 24 hours)  ? IV sedation  ? Local anesthesia  ? Monitored anesthesia care  ? You have received a medicated patch for nausea prevention today (behind your ear). It is recommended that you remove it 24-48 hours post-operatively. It must be removed within 72 hours.   ? You have received an anesthesia medication today that can cause hormonal forms of birth control to be ineffective. You should use a different form of birth control (to prevent pregnancy) for 7 days.   ? You may experience dizziness, drowsiness, or light-headedness for several hours following surgery/procedure.  ? Do not stay alone today or tonight.  ? Limit your activity for 24 hours.  ? You should not drive, operate machinery, drink alcohol, or sign legally binding documents for 24 hours or while you are taking pain medication.  ? Resume your diet slowly.  Follow whatever special dietary instructions you may have been given by your doctor.  Last dose of pain medication was given at:   .  NOTIFY YOUR DOCTOR IF YOU EXPERIENCE ANY OF THE FOLLOWING:  ? Temperature greater than 101 degrees Fahrenheit  ? Shaking Chills  ? Redness or excessive drainage from incision  ? Nausea, vomiting and/or pain that is not controlled by prescribed medications  ? Increase in bleeding or bleeding that is excessive  ? Unable to urinate in 6 hours after surgery  ? If unable to reach your doctor, please go to the closest Emergency Room  ? You may begin dressing changes:     [] in 24 hours   [] in 48 hours   [] Other:    ? You may remove your dressing on  .  You may shower or bathe:  []tomorrow  [] other    ? Ice pack for 24-48 hours.  ? Wear a bra for support.  ? Do not do any heavy lifting.  ? After your surgery you may notice some bruising.  This is normal.  ? Medications per physician  instructions as indicated on Discharge Medication Information Sheet.  You should see   for follow-up care   on   .  Phone number:     ?   SPECIAL INSTRUCTIONS:                       Please follow purple sheet instructions

## 2021-11-12 NOTE — INTERVAL H&P NOTE
H&P updated. The patient was examined and the following changes are noted:  Risks and benefits discussed with patient and allergies reviewed.

## 2021-11-12 NOTE — ANESTHESIA POSTPROCEDURE EVALUATION
Patient: Dorothea Cruz    Procedure Summary     Date: 11/12/21 Room / Location: [facility]  /  [facility]    Anesthesia Start: 1058 Anesthesia Stop: 1205    Procedure: RIGHT BREAST NEEDLE LOCALIZED  LUMPECTOMY WITH SENTINEL NODE BIOPSY (Right Breast) Diagnosis:       Malignant neoplasm of upper-inner quadrant of right breast in female, estrogen receptor negative (HCC)      (Malignant neoplasm of upper-inner quadrant of right breast in female, estrogen receptor negative (HCC) [C50.211, Z17.1])    Surgeons: Mirtha Esteves MD Provider: Miguel Angel Scott MD    Anesthesia Type: general ASA Status: 3          Anesthesia Type: general    Vitals  Vitals Value Taken Time   /76 11/12/21 1239   Temp 36.2 °C (97.2 °F) 11/12/21 1209   Pulse 67 11/12/21 1247   Resp 16 11/12/21 1209   SpO2 95 % 11/12/21 1247   Vitals shown include unvalidated device data.        Post Anesthesia Care and Evaluation    Patient location during evaluation: bedside  Patient participation: complete - patient participated  Level of consciousness: awake  Pain management: adequate  Airway patency: patent  Anesthetic complications: No anesthetic complications  PONV Status: none  Cardiovascular status: acceptable  Respiratory status: acceptable  Hydration status: acceptable    Comments: An Anesthesiologist personally participated in the most demanding procedures (including induction and emergence if applicable) in the anesthesia plan, monitored the course of anesthesia administration at frequent intervals and remained physically present and available for immediate diagnosis and treatment of emergencies.

## 2021-11-15 ENCOUNTER — DOCUMENTATION (OUTPATIENT)
Dept: MAMMOGRAPHY | Facility: HOSPITAL | Age: 71
End: 2021-11-15

## 2021-11-15 RX ORDER — DULOXETIN HYDROCHLORIDE 60 MG/1
60 CAPSULE, DELAYED RELEASE ORAL DAILY
Qty: 90 CAPSULE | Refills: 0 | Status: SHIPPED | OUTPATIENT
Start: 2021-11-15 | End: 2022-02-11 | Stop reason: SDUPTHER

## 2021-11-15 RX ORDER — MONTELUKAST SODIUM 4 MG/1
1 TABLET, CHEWABLE ORAL DAILY
Qty: 90 TABLET | Refills: 0 | Status: SHIPPED | OUTPATIENT
Start: 2021-11-15 | End: 2021-12-17

## 2021-11-15 NOTE — TELEPHONE ENCOUNTER
Caller: Dorothea Cruz    Relationship: Self    Best call back number: [phone]    Requested Prescriptions:   Requested Prescriptions     Pending Prescriptions Disp Refills   • DULoxetine (CYMBALTA) 60 MG capsule       Sig: Take 1 capsule by mouth Daily.   • colestipol (Colestid) 1 g tablet 30 tablet 3     Sig: Take 1 tablet by mouth Daily.        [facility] request [facility] be sent[facility] [place] [place]  [address] [phone] [facility] [phone]      Additional details provided by patient: PATIENT STATES THAT SHE IS RUNNING LOW AND THAT SHE CALLED THEM IN LAST WEEK. REQUESTING A 90 DAY SUPPLY.     Does the patient have less than a 3 day supply:  [x] Yes  [] No    Jackelyn Reagan Rep   11/15/21 09:11 EST

## 2021-11-15 NOTE — PROGRESS NOTES
PT CALLED AND WANTED TO KNOW RESULTS FROM THE SURGERY AND I EXPLAINED TO HER THAT THE PATHOLOGY HAS NOT BEEN FINALIZED AND SHE DOES HAVE AN APPT WITH DR. HILL ON 11/18/21 AND HOPEFULLY THE REPORT WILL BE RELEASED BY THEN AND SHE V/U

## 2021-11-16 LAB
CYTO UR: NORMAL
LAB AP CASE REPORT: NORMAL
LAB AP CLINICAL INFORMATION: NORMAL
LAB AP SYNOPTIC CHECKLIST: NORMAL
PATH REPORT.FINAL DX SPEC: NORMAL
PATH REPORT.GROSS SPEC: NORMAL

## 2021-11-17 NOTE — PROGRESS NOTES
Chief Complaint  Post-op    Subjective          History of Present Illness  The patient is here to follow up on needle localized lumpectomy with sentinel node.  They are doing well and have no complaints.  Pathology is shown below:    Clinical Information    Comment:    Malignant neoplasm of upper-inner quadrant of right breast in female, estrogen receptor negative (HCC)      Final Diagnosis   1.  Right axillary sentinel lymph node #1, count 5, excision:               -2 lymph nodes, negative for metastatic carcinoma     2.  Right axillary sentinel lymph node #2, count 300, excision:               -1 lymph node, negative for metastatic carcinoma     3.  Right breast, excision:               -Invasive carcinoma of no special type (ductal), grade 3               -Margins negative               -See synoptic checklist below for additional information     4.  Right breast, excision of new superior anterior lateral margin:               -Negative for tumor               -Margins negative      Electronically signed by Mirella Russell MD on 11/16/2021 at 0930   Synoptic Checklist     INVASIVE CARCINOMA OF THE BREAST: Resection  8th Edition - Protocol posted: 6/30/2021  INVASIVE CARCINOMA OF THE BREAST: COMPLETE EXCISION - All Specimens  SPECIMEN   Procedure  Excision (less than total mastectomy)    Specimen Laterality  Right    TUMOR   Histologic Type  Invasive carcinoma of no special type (ductal)    Histologic Grade (Cal Histologic Score)     Glandular (Acinar) / Tubular Differentiation  Score 3    Nuclear Pleomorphism  Score 3    Mitotic Rate  Score 3    Overall Grade  Grade 3 (scores of 8 or 9)    Tumor Size  Greatest dimension of largest invasive focus (Millimeters): 5 mm   Tumor Focality  Single focus of invasive carcinoma    Ductal Carcinoma In Situ (DCIS)  Not identified    Tumor Extent     Treatment Effect in the Breast  No known presurgical therapy    MARGINS   Margin Status for Invasive Carcinoma   "All margins negative for invasive carcinoma    Distance from Invasive Carcinoma to Closest Margin  Greater than: 20 mm   REGIONAL LYMPH NODES   Regional Lymph Node Status  All regional lymph nodes negative for tumor    Total Number of Lymph Nodes Examined (sentinel and non-sentinel)  3    Number of Golden Nodes Examined  3    PATHOLOGIC STAGE CLASSIFICATION (pTNM, AJCC 8th Edition)   Reporting of pT, pN, and (when applicable) pM categories is based on information available to the pathologist at the time the report is issued. As per the AJCC (Chapter 1, 8th Ed.) it is the managing physician’s responsibility to establish the final pathologic stage based upon all pertinent information, including but potentially not limited to this pathology report.   pT Category  pT1a    Regional Lymph Nodes Modifier  (sn): Golden node(s) evaluated.    pN Category  pN0    Breast Biomarker Testing Performed on Previous Biopsy     Estrogen Receptor (ER) Status  Negative    Breast Biomarker Testing Performed on Previous Biopsy     Progesterone Receptor (PgR) Status  Negative    Breast Biomarker Testing Performed on Previous Biopsy     HER2 (by in situ hybridization)  Negative (not amplified)    Testing Performed on Case Number  [id number]           Objective   Vital Signs:   Resp 16   Ht 157.5 cm (62.01\")   Wt 85.6 kg (188 lb 11.4 oz)   BMI 34.51 kg/m²     Physical Exam  Vitals and nursing note reviewed.   Constitutional:       General: She is not in acute distress.     Appearance: Normal appearance. She is well-developed.   HENT:      Head: Normocephalic and atraumatic.   Eyes:      Extraocular Movements: Extraocular movements intact.      Pupils: Pupils are equal, round, and reactive to light.   Cardiovascular:      Pulses: Normal pulses.   Pulmonary:      Effort: Pulmonary effort is normal. No retractions.      Breath sounds: Normal air entry. No wheezing.   Abdominal:      General: There is no distension.      Palpations: " Abdomen is soft.      Tenderness: There is no abdominal tenderness.      Hernia: No hernia is present.   Musculoskeletal:         General: No swelling or deformity.      Cervical back: Neck supple.   Skin:     General: Skin is warm and dry.      Findings: No erythema.      Comments: Surgical Incision Healing Well   Neurological:      General: No focal deficit present.      Mental Status: She is alert and oriented to person, place, and time.      Motor: Motor function is intact.   Psychiatric:         Mood and Affect: Mood normal.         Thought Content: Thought content normal.            Result Review :                 Assessment and Plan    Diagnoses and all orders for this visit:    1. Malignant neoplasm of upper-inner quadrant of right breast in female, estrogen receptor negative (HCC) (Primary)  -     Ambulatory Referral to Radiation Oncology [facility]    Refer to radiation oncology  Keep oncology apt    Follow Up   Return if symptoms worsen or fail to improve.  Patient was given instructions and counseling regarding her condition or for health maintenance advice. Please see specific information pulled into the AVS if appropriate.

## 2021-11-18 ENCOUNTER — OFFICE VISIT (OUTPATIENT)
Dept: SURGERY | Facility: CLINIC | Age: 71
End: 2021-11-18

## 2021-11-18 VITALS — WEIGHT: 188.71 LBS | RESPIRATION RATE: 16 BRPM | HEIGHT: 62 IN | BODY MASS INDEX: 34.73 KG/M2

## 2021-11-18 DIAGNOSIS — C50.211 MALIGNANT NEOPLASM OF UPPER-INNER QUADRANT OF RIGHT BREAST IN FEMALE, ESTROGEN RECEPTOR NEGATIVE (HCC): Primary | ICD-10-CM

## 2021-11-18 DIAGNOSIS — Z17.1 MALIGNANT NEOPLASM OF UPPER-INNER QUADRANT OF RIGHT BREAST IN FEMALE, ESTROGEN RECEPTOR NEGATIVE (HCC): Primary | ICD-10-CM

## 2021-11-18 PROCEDURE — 99024 POSTOP FOLLOW-UP VISIT: CPT | Performed by: SURGERY

## 2021-11-18 RX ORDER — FLUCONAZOLE 100 MG/1
TABLET ORAL
COMMUNITY
Start: 2021-11-17 | End: 2021-11-29

## 2021-11-29 ENCOUNTER — CONSULT (OUTPATIENT)
Dept: RADIATION ONCOLOGY | Facility: HOSPITAL | Age: 71
End: 2021-11-29

## 2021-11-29 ENCOUNTER — OFFICE VISIT (OUTPATIENT)
Dept: FAMILY MEDICINE CLINIC | Facility: CLINIC | Age: 71
End: 2021-11-29

## 2021-11-29 VITALS
RESPIRATION RATE: 16 BRPM | DIASTOLIC BLOOD PRESSURE: 72 MMHG | TEMPERATURE: 97.2 F | SYSTOLIC BLOOD PRESSURE: 125 MMHG | WEIGHT: 190.7 LBS | OXYGEN SATURATION: 94 % | BODY MASS INDEX: 34.87 KG/M2 | HEART RATE: 64 BPM

## 2021-11-29 VITALS
TEMPERATURE: 97 F | HEART RATE: 71 BPM | WEIGHT: 191.1 LBS | HEIGHT: 62 IN | OXYGEN SATURATION: 95 % | DIASTOLIC BLOOD PRESSURE: 71 MMHG | RESPIRATION RATE: 24 BRPM | SYSTOLIC BLOOD PRESSURE: 128 MMHG | BODY MASS INDEX: 35.17 KG/M2

## 2021-11-29 DIAGNOSIS — N30.90 CYSTITIS: Primary | ICD-10-CM

## 2021-11-29 DIAGNOSIS — E55.9 VITAMIN D DEFICIENCY: ICD-10-CM

## 2021-11-29 DIAGNOSIS — Z17.1 MALIGNANT NEOPLASM OF UPPER-INNER QUADRANT OF RIGHT BREAST IN FEMALE, ESTROGEN RECEPTOR NEGATIVE (HCC): Primary | ICD-10-CM

## 2021-11-29 DIAGNOSIS — C50.211 MALIGNANT NEOPLASM OF UPPER-INNER QUADRANT OF RIGHT BREAST IN FEMALE, ESTROGEN RECEPTOR NEGATIVE (HCC): Primary | ICD-10-CM

## 2021-11-29 DIAGNOSIS — R30.0 DYSURIA: ICD-10-CM

## 2021-11-29 LAB
BILIRUB BLD-MCNC: NEGATIVE MG/DL
CLARITY, POC: ABNORMAL
COLOR UR: YELLOW
EXPIRATION DATE: ABNORMAL
GLUCOSE UR STRIP-MCNC: NEGATIVE MG/DL
KETONES UR QL: NEGATIVE
LEUKOCYTE EST, POC: ABNORMAL
Lab: ABNORMAL
NITRITE UR-MCNC: POSITIVE MG/ML
PH UR: 6 [PH] (ref 5–8)
PROT UR STRIP-MCNC: ABNORMAL MG/DL
RBC # UR STRIP: ABNORMAL /UL
SP GR UR: 1.02 (ref 1–1.03)
UROBILINOGEN UR QL: NORMAL

## 2021-11-29 PROCEDURE — 87086 URINE CULTURE/COLONY COUNT: CPT | Performed by: NURSE PRACTITIONER

## 2021-11-29 PROCEDURE — 99213 OFFICE O/P EST LOW 20 MIN: CPT | Performed by: NURSE PRACTITIONER

## 2021-11-29 PROCEDURE — 99204 OFFICE O/P NEW MOD 45 MIN: CPT | Performed by: RADIOLOGY

## 2021-11-29 PROCEDURE — 87186 SC STD MICRODIL/AGAR DIL: CPT

## 2021-11-29 PROCEDURE — 81003 URINALYSIS AUTO W/O SCOPE: CPT | Performed by: NURSE PRACTITIONER

## 2021-11-29 PROCEDURE — G0463 HOSPITAL OUTPT CLINIC VISIT: HCPCS | Performed by: RADIOLOGY

## 2021-11-29 RX ORDER — NITROFURANTOIN 25; 75 MG/1; MG/1
100 CAPSULE ORAL 2 TIMES DAILY
Qty: 14 CAPSULE | Refills: 0 | Status: SHIPPED | OUTPATIENT
Start: 2021-11-29 | End: 2021-12-16

## 2021-11-29 RX ORDER — ERGOCALCIFEROL 1.25 MG/1
50000 CAPSULE ORAL WEEKLY
Qty: 13 CAPSULE | Refills: 1 | Status: SHIPPED | OUTPATIENT
Start: 2021-11-29 | End: 2021-12-16 | Stop reason: SDUPTHER

## 2021-11-29 NOTE — PROGRESS NOTES
New Patient Office Visit      Encounter Date: 11/29/2021   Patient Name: Dorothea Cruz  YOB: 1950   Medical Record Number: [id number]   Primary Diagnosis: Malignant neoplasm of upper-inner quadrant of right breast in female, estrogen receptor negative (HCC) [C50.211, Z17.1]       Chief Complaint:    Chief Complaint   Patient presents with   • Consult   • Breast Cancer       History of Present Illness: Dorothea Cruz is a 71-year-old female with early stage triple negative breast cancer who is seen in consultation regarding radiotherapy as a component of adjuvant management.  Ms. Cruz underwent screening mammography on 9/10/2021 revealing a 4 mm asymmetry in the right upper inner breast.  Diagnostic mammography performed on 10/1/2021 revealed a persistent 5 mm ill-defined nodule in the upper inner mid right breast at 2 o'clock position, 7 cm from the nipple.  Pathology from biopsy performed on 10/8/2021 revealed invasive ductal carcinoma, grade 3, ER negative/TN negative, HER-2/bryanna negative.  Ms. Cruz underwent right breast lumpectomy and sentinel lymph node biopsy on 11/12/2021 with pathology revealing invasive ductal carcinoma, grade 3, ER positive/TN positive, HER-2/bryanna negative with tumor measuring 5 mm in greatest dimension.  All margins were negative for invasive carcinoma with the closest margin being 20 mm.  A total of three sentinel lymph nodes were removed and all were negative for carcinoma.  Ms. Cruz reports feeling well overall with no complaints.        Subjective          Review of Systems: Review of Systems   Constitutional: Positive for fatigue. Negative for appetite change and unexpected weight change.   HENT: Negative for sore throat, tinnitus and trouble swallowing.    Eyes: Positive for visual disturbance (wears glasses).   Respiratory: Negative for cough and shortness of breath.    Cardiovascular: Negative for chest pain and palpitations.    Gastrointestinal: Negative for constipation, diarrhea and nausea.   Genitourinary: Negative for dysuria, frequency and urgency.   Musculoskeletal: Positive for arthralgias. Negative for back pain.   Skin: Negative for color change.        Skin integrity impaired r/t surgery   Neurological: Negative for dizziness and headaches.   Psychiatric/Behavioral: Negative for agitation, sleep disturbance and suicidal ideas.       Past Medical History:   Past Medical History:   Diagnosis Date   • Anxiety    • Arthritis    • Breast cancer (HCC)    • Cancer (HCC)     BREAST CANCER   • Cholelithiases 4/30@1   • Depression    • Disease of thyroid gland     currently not taking any meds   • Diverticulitis    • Fibromyalgia    • Hyperlipidemia    • Hypertension    • Major depressive disorder 05/07/2020   • Palpitations     ASYMPTOMATIC- DENIES CP/SOB, SEE PCP- NO CARDIOLOGIST    • Post-menopause 09/17/2020   • Sinus trouble    • Sleep apnea    • Vitamin D deficiency 09/17/2020       Past Surgical History:   Past Surgical History:   Procedure Laterality Date   • APPENDECTOMY     • BILATERAL BREAST REDUCTION     • BREAST BIOPSY Right 11/12/2021    Procedure: RIGHT BREAST NEEDLE LOCALIZED  LUMPECTOMY WITH SENTINEL NODE BIOPSY;  Surgeon: Mirtha Esteves MD;  Location: [facility];  Service: General;  Laterality: Right;   • BREAST LUMPECTOMY     • CHOLECYSTECTOMY N/A 9/2/2021    Procedure: CHOLECYSTECTOMY LAPAROSCOPIC INTRAOPERATIVE CHOLANGIOGRAM;  Surgeon: Louie Medina MD;  Location: [facility] OR;  Service: General;  Laterality: N/A;   • COLONOSCOPY  2019   • HYSTERECTOMY     • TONSILLECTOMY     • WRIST ARTHROPLASTY Bilateral        Family History:   Family History   Problem Relation Age of Onset   • No Known Problems Mother    • No Known Problems Father    • Breast cancer Sister        Social History:   Social History     Socioeconomic History   • Marital status:    Tobacco Use   • Smoking status: Never Smoker   •  Smokeless tobacco: Never Used   Vaping Use   • Vaping Use: Never used   Substance and Sexual Activity   • Alcohol use: Never   • Drug use: Never   • Sexual activity: Defer       Medications:     Current Outpatient Medications:   •  colestipol (Colestid) 1 g tablet, Take 1 tablet by mouth Daily., Disp: 90 tablet, Rfl: 0  •  DULoxetine (CYMBALTA) 60 MG capsule, Take 1 capsule by mouth Daily., Disp: 90 capsule, Rfl: 0  •  ergocalciferol (ERGOCALCIFEROL) 1.25 MG (45466 UT) capsule, Take 50,000 Units by mouth 1 (One) Time Per Week., Disp: , Rfl:   •  gabapentin (NEURONTIN) 300 MG capsule, Take 300 mg by mouth 3 (Three) Times a Day As Needed., Disp: , Rfl:   •  hydroCHLOROthiazide (HYDRODIURIL) 25 MG tablet, Take 1 tablet by mouth Daily., Disp: 90 tablet, Rfl: 0  •  metoprolol tartrate (LOPRESSOR) 25 MG tablet, Take 1 tablet by mouth 2 (Two) Times a Day., Disp: 180 tablet, Rfl: 1  •  primidone (MYSOLINE) 50 MG tablet, Take 1 tablet by mouth Every Night., Disp: 90 tablet, Rfl: 1  •  rosuvastatin (CRESTOR) 20 MG tablet, Take 1 tablet by mouth Every Night., Disp: 90 tablet, Rfl: 1  •  TURMERIC PO, Take 1 tablet by mouth Daily., Disp: , Rfl:   •  HYDROcodone-acetaminophen (NORCO) 5-325 MG per tablet, Take 1-2 tablets by mouth Every 4 (Four) Hours As Needed (Pain)., Disp: 20 tablet, Rfl: 0    Allergies:   Allergies   Allergen Reactions   • Ace Inhibitors Cough   • Sulfamethoxazole-Trimethoprim Hives       Pain: (on a scale of 0-10)   Pain Score    11/29/21 1106   PainSc: 0-No pain       Advanced Care Plan: N   Quality of Life: 100 - Full Activity    Objective     Physical Exam:   Vital Signs:   Vitals:    11/29/21 1106   BP: 125/72   Pulse: 64   Resp: 16   Temp: 97.2 °F (36.2 °C)   TempSrc: Temporal   SpO2: 94%   Weight: 86.5 kg (190 lb 11.2 oz)   PainSc: 0-No pain     Body mass index is 34.87 kg/m².     Physical Exam  Constitutional:       General: She is not in acute distress.     Appearance: Normal appearance. She is not  ill-appearing.   HENT:      Mouth/Throat:      Mouth: Mucous membranes are moist.      Pharynx: Oropharynx is clear.   Pulmonary:      Effort: Pulmonary effort is normal. No respiratory distress.   Chest:      Comments: Well-healed right axillary and right breast incisions  Abdominal:      General: There is no distension.      Palpations: Abdomen is soft.   Skin:     General: Skin is warm and dry.   Neurological:      General: No focal deficit present.      Mental Status: She is alert and oriented to person, place, and time.      Cranial Nerves: No cranial nerve deficit.      Gait: Gait normal.   Psychiatric:         Mood and Affect: Mood normal.         Behavior: Behavior normal.           Pathology: I personally reviewed the pathology reports from the procedures performed on 10/8/2021 and 11/12/2021. The pertinent findings are as above in the HPI.    Labs:   WBC   Date Value Ref Range Status   08/23/2021 13.57 (H) 3.40 - 10.80 10*3/mm3 Final     Hemoglobin   Date Value Ref Range Status   08/23/2021 15.4 12.0 - 15.9 g/dL Final     Hematocrit   Date Value Ref Range Status   08/23/2021 47.5 (H) 34.0 - 46.6 % Final     Platelets   Date Value Ref Range Status   08/23/2021 235 140 - 450 10*3/mm3 Final       Assessment / Plan          Assessment/Plan:   Dorothea Cruz is a 71-year-old female with pT1a pN0(sn) cM0 triple negative invasive ductal carcinoma of the right breast status post lumpectomy and sentinel lymph node biopsy on 11/12/2021.  She is doing well overall postoperatively.    I discussed the clinical, radiographic and pathologic findings to date with Ms. Cruz.  I explained the rationale for external beam radiotherapy in the adjuvant setting for breast conserving therapy.  Understanding the risks, benefits and alternatives to external beam radiotherapy, Ms. Cruz is agreeable to my recommendation for adjuvant radiotherapy and will return for follow-up after the new year, after reevaluation by   Joanie regarding the role for adjuvant chemotherapy.      Miguel Angel Arroyo MD  Radiation Oncology  [facility]    This document has been signed by Miguel Angel Arroyo MD on November 29, 2021 13:39 EST

## 2021-11-29 NOTE — PROGRESS NOTES
Chief Complaint  Difficulty Urinating (painful when urinating)    Subjective      History of Present Illness  Dorothea Cruz presents to [facility]     Dysuria and increased urinary frequency over the last 3 days, she has been taking Azo with little improvement in symptoms.    Breast cancer, she will start radiation after Christmas.  Her lymph node biopsy was negative.         Allergies  Ace inhibitors and Sulfamethoxazole-trimethoprim    Objective     Vitals:    11/29/21 1541   BP: 128/71   Pulse: 71   Resp: 24   Temp: 97 °F (36.1 °C)   SpO2: 95%     Body mass index is 34.95 kg/m².     Review of Systems    Physical Exam  Vitals reviewed.   Constitutional:       Appearance: Normal appearance. She is well-developed.   HENT:      Mouth/Throat:      Pharynx: No oropharyngeal exudate.   Cardiovascular:      Rate and Rhythm: Normal rate and regular rhythm.      Heart sounds: Normal heart sounds. No murmur heard.      Pulmonary:      Effort: Pulmonary effort is normal.      Breath sounds: Normal breath sounds.   Abdominal:      Tenderness: There is no right CVA tenderness or left CVA tenderness.      Comments: No suprapubic tenderness   Neurological:      Mental Status: She is alert and oriented to person, place, and time.      Cranial Nerves: No cranial nerve deficit.      Motor: No weakness.   Psychiatric:         Mood and Affect: Mood and affect normal.         Result Review :    The following data was reviewed by: ARA Asif on 11/29/2021:       Depression: Not at risk   • PHQ-2 Score: 0       Common labs    Common Labsle 7/23/21 7/23/21 7/23/21 8/17/21 8/17/21 8/23/21 8/23/21    0928 0928 0928 1110 1110 1643 1643   Glucose 108 (A)   117 (A)  138 (A)    BUN 17   12  15    Creatinine 0.83   0.83  0.82    eGFR Non  Am 68   68  69    Sodium 137   137  139    Potassium 3.9   4.3  3.7    Chloride 97 (A)   101  99    Calcium 10.0   9.7  10.2    Albumin 4.40    4.10  4.70    Total Bilirubin 0.2   0.3  0.2    Alkaline Phosphatase 54   53  52    AST (SGOT) 23   16  23    ALT (SGPT) 16   14  20    WBC   14.85 (A)  17.77 (A)  13.57 (A)   Hemoglobin   15.9  14.8  15.4   Hematocrit   47.1 (A)  44.3  47.5 (A)   Platelets   205  211  235   Total Cholesterol  166        Triglycerides  237 (A)        HDL Cholesterol  63 (A)        LDL Cholesterol   65        (A) Abnormal value                            Assessment and Plan     Diagnoses and all orders for this visit:    1. Cystitis (Primary)  -     POCT urinalysis dipstick, automated    2. Dysuria  -     Urine Culture - Urine, Urine, Clean Catch  -     nitrofurantoin, macrocrystal-monohydrate, (Macrobid) 100 MG capsule; Take 1 capsule by mouth 2 (Two) Times a Day.  Dispense: 14 capsule; Refill: 0    3. Vitamin D deficiency  -     ergocalciferol (ERGOCALCIFEROL) 1.25 MG (66609 UT) capsule; Take 1 capsule by mouth 1 (One) Time Per Week.  Dispense: 13 capsule; Refill: 1          Follow Up     Return if symptoms worsen or fail to improve, for Recheck.    Patient was given instructions and counseling regarding her condition or for health maintenance advice. Please see specific information pulled into the AVS if appropriate.     ARA Asif

## 2021-12-02 ENCOUNTER — APPOINTMENT (OUTPATIENT)
Dept: RADIATION ONCOLOGY | Facility: HOSPITAL | Age: 71
End: 2021-12-02

## 2021-12-03 ENCOUNTER — OFFICE VISIT (OUTPATIENT)
Dept: ONCOLOGY | Facility: HOSPITAL | Age: 71
End: 2021-12-03

## 2021-12-03 VITALS
OXYGEN SATURATION: 92 % | BODY MASS INDEX: 34.96 KG/M2 | SYSTOLIC BLOOD PRESSURE: 133 MMHG | RESPIRATION RATE: 18 BRPM | DIASTOLIC BLOOD PRESSURE: 60 MMHG | WEIGHT: 191.14 LBS | TEMPERATURE: 97.6 F | HEART RATE: 70 BPM

## 2021-12-03 DIAGNOSIS — C50.211 MALIGNANT NEOPLASM OF UPPER-INNER QUADRANT OF RIGHT BREAST IN FEMALE, ESTROGEN RECEPTOR NEGATIVE: ICD-10-CM

## 2021-12-03 DIAGNOSIS — Z17.1 MALIGNANT NEOPLASM OF UPPER-INNER QUADRANT OF RIGHT BREAST IN FEMALE, ESTROGEN RECEPTOR NEGATIVE: ICD-10-CM

## 2021-12-03 LAB
BACTERIA UR CULT: ABNORMAL
BACTERIA UR CULT: ABNORMAL
OTHER ANTIBIOTIC SUSC ISLT: ABNORMAL

## 2021-12-03 PROCEDURE — G0463 HOSPITAL OUTPT CLINIC VISIT: HCPCS | Performed by: INTERNAL MEDICINE

## 2021-12-03 PROCEDURE — 99214 OFFICE O/P EST MOD 30 MIN: CPT | Performed by: INTERNAL MEDICINE

## 2021-12-06 DIAGNOSIS — E55.9 VITAMIN D DEFICIENCY: ICD-10-CM

## 2021-12-06 NOTE — TELEPHONE ENCOUNTER
Caller: Dorothea Cruz    Relationship: Self    Best call back number: [phone]     Requested Prescriptions:   Requested Prescriptions     Pending Prescriptions Disp Refills   • gabapentin (NEURONTIN) 300 MG capsule       Sig: Take 1 capsule by mouth 3 (Three) Times a Day As Needed.   • ergocalciferol (ERGOCALCIFEROL) 1.25 MG (05738 UT) capsule 13 capsule 1     Sig: Take 1 capsule by mouth 1 (One) Time Per Week.        Pharmacy where request should be sent: [facility] - [place] - [address] [phone] [facility] [phone] FX     Additional details provided by patient: PLEASE SEND TO [facility]    Does the patient have less than a 3 day supply:  [] Yes  [x] No    Jackelyn Vaughn Rep   12/06/21 11:44 EST

## 2021-12-08 RX ORDER — ERGOCALCIFEROL 1.25 MG/1
50000 CAPSULE ORAL WEEKLY
Qty: 13 CAPSULE | Refills: 1 | OUTPATIENT
Start: 2021-12-08

## 2021-12-08 RX ORDER — GABAPENTIN 300 MG/1
300 CAPSULE ORAL 3 TIMES DAILY PRN
OUTPATIENT
Start: 2021-12-08

## 2021-12-16 ENCOUNTER — OFFICE VISIT (OUTPATIENT)
Dept: FAMILY MEDICINE CLINIC | Facility: CLINIC | Age: 71
End: 2021-12-16

## 2021-12-16 VITALS
HEART RATE: 64 BPM | BODY MASS INDEX: 35.17 KG/M2 | RESPIRATION RATE: 18 BRPM | SYSTOLIC BLOOD PRESSURE: 120 MMHG | OXYGEN SATURATION: 94 % | TEMPERATURE: 97.2 F | DIASTOLIC BLOOD PRESSURE: 56 MMHG | WEIGHT: 191.1 LBS | HEIGHT: 62 IN

## 2021-12-16 DIAGNOSIS — M79.7 FIBROMYALGIA: ICD-10-CM

## 2021-12-16 DIAGNOSIS — F32.A DEPRESSION, UNSPECIFIED DEPRESSION TYPE: ICD-10-CM

## 2021-12-16 DIAGNOSIS — E55.9 VITAMIN D DEFICIENCY: ICD-10-CM

## 2021-12-16 DIAGNOSIS — Z51.81 MEDICATION MONITORING ENCOUNTER: Primary | ICD-10-CM

## 2021-12-16 DIAGNOSIS — G62.9 NEUROPATHY: ICD-10-CM

## 2021-12-16 LAB
AMPHET+METHAMPHET UR QL: NEGATIVE
AMPHETAMINE INTERNAL CONTROL: ABNORMAL
AMPHETAMINES UR QL: NEGATIVE
BARBITURATE INTERNAL CONTROL: ABNORMAL
BARBITURATES UR QL SCN: POSITIVE
BENZODIAZ UR QL SCN: NEGATIVE
BENZODIAZEPINE INTERNAL CONTROL: ABNORMAL
BUPRENORPHINE INTERNAL CONTROL: ABNORMAL
BUPRENORPHINE SERPL-MCNC: NEGATIVE NG/ML
CANNABINOIDS SERPL QL: NEGATIVE
COCAINE INTERNAL CONTROL: ABNORMAL
COCAINE UR QL: NEGATIVE
EXPIRATION DATE: ABNORMAL
Lab: ABNORMAL
MDMA (ECSTASY) INTERNAL CONTROL: ABNORMAL
MDMA UR QL SCN: NEGATIVE
METHADONE INTERNAL CONTROL: ABNORMAL
METHADONE UR QL SCN: NEGATIVE
METHAMPHETAMINE INTERNAL CONTROL: ABNORMAL
OPIATES INTERNAL CONTROL: ABNORMAL
OPIATES UR QL: NEGATIVE
OXYCODONE INTERNAL CONTROL: ABNORMAL
OXYCODONE UR QL SCN: NEGATIVE
PCP UR QL SCN: NEGATIVE
PHENCYCLIDINE INTERNAL CONTROL: ABNORMAL
THC INTERNAL CONTROL: ABNORMAL

## 2021-12-16 PROCEDURE — 99214 OFFICE O/P EST MOD 30 MIN: CPT | Performed by: NURSE PRACTITIONER

## 2021-12-16 PROCEDURE — 80305 DRUG TEST PRSMV DIR OPT OBS: CPT | Performed by: NURSE PRACTITIONER

## 2021-12-16 RX ORDER — GABAPENTIN 300 MG/1
300 CAPSULE ORAL 3 TIMES DAILY PRN
Status: CANCELLED | OUTPATIENT
Start: 2021-12-16

## 2021-12-16 RX ORDER — ERGOCALCIFEROL 1.25 MG/1
50000 CAPSULE ORAL WEEKLY
Qty: 13 CAPSULE | Refills: 1 | Status: CANCELLED | OUTPATIENT
Start: 2021-12-16

## 2021-12-16 RX ORDER — BUPROPION HYDROCHLORIDE 150 MG/1
150 TABLET ORAL DAILY
Qty: 90 TABLET | Refills: 0 | Status: SHIPPED | OUTPATIENT
Start: 2021-12-16 | End: 2022-01-28

## 2021-12-16 RX ORDER — ERGOCALCIFEROL 1.25 MG/1
50000 CAPSULE ORAL WEEKLY
Qty: 13 CAPSULE | Refills: 1 | Status: SHIPPED | OUTPATIENT
Start: 2021-12-16 | End: 2022-04-14 | Stop reason: SDUPTHER

## 2021-12-16 RX ORDER — GABAPENTIN 300 MG/1
300 CAPSULE ORAL 3 TIMES DAILY PRN
Qty: 90 CAPSULE | Refills: 2 | Status: SHIPPED | OUTPATIENT
Start: 2021-12-16 | End: 2022-04-14 | Stop reason: SDUPTHER

## 2021-12-16 NOTE — PROGRESS NOTES
Chief Complaint  Fibromyalgia (Med Refill ) and Depression    Subjective      History of Present Illness  Dorothea Cruz presents to [facility]     Due for refills on neurontin, it has helped her legs aching.  She is getting ready to travel for [place] for the holidays  Depression is getting worse, she says it does this around clau.   Needs refills on vitamin d supplements  UTI has resolved  Breast cancer, she will start radiation sometime after clau.     Dorothea Cruz  reports that she has never smoked. She has never used smokeless tobacco.         Allergies  Ace inhibitors and Sulfamethoxazole-trimethoprim    Objective     Vitals:    12/16/21 1025   BP: 120/56   Pulse: 64   Resp: 18   Temp: 97.2 °F (36.2 °C)   SpO2: 94%     Body mass index is 34.95 kg/m².     Review of Systems    Physical Exam  Vitals reviewed.   Constitutional:       Appearance: Normal appearance. She is well-developed.   HENT:      Mouth/Throat:      Pharynx: No oropharyngeal exudate.   Cardiovascular:      Rate and Rhythm: Normal rate and regular rhythm.      Heart sounds: Normal heart sounds. No murmur heard.      Pulmonary:      Effort: Pulmonary effort is normal.      Breath sounds: Normal breath sounds.   Neurological:      Mental Status: She is alert and oriented to person, place, and time.      Cranial Nerves: No cranial nerve deficit.      Motor: No weakness.   Psychiatric:         Mood and Affect: Mood and affect normal.         Result Review :    The following data was reviewed by: ARA Asif on 12/16/2021:       Depression: Not at risk   • PHQ-2 Score: 0       Common labs    Common Labsle 7/23/21 7/23/21 7/23/21 8/17/21 8/17/21 8/23/21 8/23/21    0928 0928 0928 1110 1110 1643 1643   Glucose 108 (A)   117 (A)  138 (A)    BUN 17   12  15    Creatinine 0.83   0.83  0.82    eGFR Non  Am 68   68  69    Sodium 137   137  139    Potassium 3.9   4.3  3.7    Chloride 97  (A)   101  99    Calcium 10.0   9.7  10.2    Albumin 4.40   4.10  4.70    Total Bilirubin 0.2   0.3  0.2    Alkaline Phosphatase 54   53  52    AST (SGOT) 23   16  23    ALT (SGPT) 16   14  20    WBC   14.85 (A)  17.77 (A)  13.57 (A)   Hemoglobin   15.9  14.8  15.4   Hematocrit   47.1 (A)  44.3  47.5 (A)   Platelets   205  211  235   Total Cholesterol  166        Triglycerides  237 (A)        HDL Cholesterol  63 (A)        LDL Cholesterol   65        (A) Abnormal value                            Assessment and Plan     Diagnoses and all orders for this visit:    1. Medication monitoring encounter (Primary)  Comments:  UDS pos. due to primidone  Orders:  -     POC Urine Drug Screen Premier Bio-Cup    2. Vitamin D deficiency  Comments:  refill supplements and discussed food sources, keeping vitamin d level up may help prevent seasonal affective disorder and depression  Orders:  -     ergocalciferol (ERGOCALCIFEROL) 1.25 MG (16795 UT) capsule; Take 1 capsule by mouth 1 (One) Time Per Week.  Dispense: 13 capsule; Refill: 1    3. Fibromyalgia  -     gabapentin (NEURONTIN) 300 MG capsule; Take 1 capsule by mouth 3 (Three) Times a Day As Needed (neuropathy).  Dispense: 90 capsule; Refill: 2    4. Neuropathy  Comments:  gabapentin wiht precautions  Orders:  -     gabapentin (NEURONTIN) 300 MG capsule; Take 1 capsule by mouth 3 (Three) Times a Day As Needed (neuropathy).  Dispense: 90 capsule; Refill: 2    5. Depression, unspecified depression type  Comments:  depression worsening, she says it does this around [place]. Will start wellbutrin with precautions and call with concerns  Orders:  -     buPROPion XL (Wellbutrin XL) 150 MG 24 hr tablet; Take 1 tablet by mouth Daily.  Dispense: 90 tablet; Refill: 0            Follow Up     Return in about 6 weeks (around 1/27/2022).    Patient was given instructions and counseling regarding her condition or for health maintenance advice. Please see specific information pulled into  the AVS if appropriate.     Arelis Butt, APRN

## 2021-12-17 ENCOUNTER — OFFICE VISIT (OUTPATIENT)
Dept: GASTROENTEROLOGY | Facility: CLINIC | Age: 71
End: 2021-12-17

## 2021-12-17 ENCOUNTER — PREP FOR SURGERY (OUTPATIENT)
Dept: OTHER | Facility: HOSPITAL | Age: 71
End: 2021-12-17

## 2021-12-17 VITALS
HEART RATE: 62 BPM | DIASTOLIC BLOOD PRESSURE: 69 MMHG | HEIGHT: 62 IN | WEIGHT: 189.7 LBS | OXYGEN SATURATION: 97 % | SYSTOLIC BLOOD PRESSURE: 133 MMHG | BODY MASS INDEX: 34.91 KG/M2

## 2021-12-17 DIAGNOSIS — R19.4 ALTERED BOWEL HABITS: ICD-10-CM

## 2021-12-17 DIAGNOSIS — Z87.19 HISTORY OF DIVERTICULITIS: ICD-10-CM

## 2021-12-17 DIAGNOSIS — Z12.11 SCREENING FOR COLON CANCER: Primary | ICD-10-CM

## 2021-12-17 DIAGNOSIS — R19.7 DIARRHEA, UNSPECIFIED TYPE: ICD-10-CM

## 2021-12-17 DIAGNOSIS — Z87.19 HISTORY OF DIVERTICULITIS: Primary | ICD-10-CM

## 2021-12-17 DIAGNOSIS — Z90.49 HISTORY OF CHOLECYSTECTOMY: ICD-10-CM

## 2021-12-17 PROCEDURE — 99214 OFFICE O/P EST MOD 30 MIN: CPT | Performed by: NURSE PRACTITIONER

## 2021-12-17 RX ORDER — MONTELUKAST SODIUM 4 MG/1
2 TABLET, CHEWABLE ORAL 2 TIMES DAILY
Qty: 90 TABLET | Refills: 3 | Status: SHIPPED | OUTPATIENT
Start: 2021-12-17 | End: 2022-01-10 | Stop reason: SDUPTHER

## 2021-12-17 RX ORDER — PEG-3350, SODIUM SULFATE, SODIUM CHLORIDE, POTASSIUM CHLORIDE, SODIUM ASCORBATE AND ASCORBIC ACID 7.5-2.691G
KIT ORAL
Qty: 1 EACH | Refills: 0 | OUTPATIENT
Start: 2021-12-17 | End: 2021-12-29

## 2021-12-21 ENCOUNTER — HOSPITAL ENCOUNTER (OUTPATIENT)
Dept: OCCUPATIONAL THERAPY | Facility: HOSPITAL | Age: 71
Discharge: HOME | End: 2021-12-21
Admitting: SURGERY

## 2021-12-21 VITALS — HEIGHT: 62 IN | WEIGHT: 190 LBS | BODY MASS INDEX: 34.96 KG/M2

## 2021-12-21 DIAGNOSIS — Z91.89 AT RISK FOR LYMPHEDEMA: Primary | ICD-10-CM

## 2021-12-21 PROCEDURE — 97535 SELF CARE MNGMENT TRAINING: CPT

## 2021-12-21 PROCEDURE — 93702 BIS XTRACELL FLUID ANALYSIS: CPT

## 2021-12-21 NOTE — THERAPY EVALUATION
Outpatient Occupational Therapy Lymphedema Initial Evaluation   Daniela     Patient Name: Dorothea Cruz  : 1950  MRN: [id number]  Today's Date: 2021      Visit Date: 2021    Patient Active Problem List   Diagnosis   • Vitamin D deficiency   • Sleep apnea   • Post-menopause   • Major depressive disorder   • Hypertension   • Hyperlipidemia   • Fibromyalgia   • Disease of thyroid gland   • Depression   • Anxiety   • Arthritis   • ACE-inhibitor cough   • Diverticulitis   • Sinus trouble   • Breast cancer (HCC)   • Screening for colon cancer   • History of diverticulitis   • History of cholecystectomy   • Altered bowel habits   • Diarrhea        Past Medical History:   Diagnosis Date   • Anxiety    • Arthritis    • Breast cancer (HCC)    • Cancer (HCC)     BREAST CANCER   • Cholelithiases @1   • Depression    • Disease of thyroid gland     currently not taking any meds   • Diverticulitis    • Diverticulitis of colon    • Fibromyalgia    • Hyperlipidemia    • Hypertension    • Major depressive disorder 2020   • Palpitations     ASYMPTOMATIC- DENIES CP/SOB, SEE PCP- NO CARDIOLOGIST    • Post-menopause 2020   • Sinus trouble    • Sleep apnea    • Vitamin D deficiency 2020        Past Surgical History:   Procedure Laterality Date   • ABDOMINAL SURGERY  1985   • APPENDECTOMY     • BILATERAL BREAST REDUCTION     • BREAST BIOPSY Right 2021    Procedure: RIGHT BREAST NEEDLE LOCALIZED  LUMPECTOMY WITH SENTINEL NODE BIOPSY;  Surgeon: Mirtha Esteves MD;  Location: [facility];  Service: General;  Laterality: Right;   • BREAST LUMPECTOMY     • CHOLECYSTECTOMY N/A 2021    Procedure: CHOLECYSTECTOMY LAPAROSCOPIC INTRAOPERATIVE CHOLANGIOGRAM;  Surgeon: Louie Medina MD;  Location: [facility] OR;  Service: General;  Laterality: N/A;   • COLONOSCOPY     • HYSTERECTOMY     • TONSILLECTOMY     • WRIST ARTHROPLASTY Bilateral          Visit Dx:     ICD-10-CM  ICD-9-CM   1. At risk for lymphedema  Z91.89 V49.89        Patient History     Row Name 12/21/21 0900             History    Chief Complaint Other 1 (comment)  -MP      Brief Description of Current Complaint Mrs. Hager is a 71 year old female who had surgery on 11/12/21 for Lumpectomoy on the Right due to Trip negative Breast CA  -MP      Patient/Caregiver Goals Other (comment)  -MP      Hand Dominance right-handed  -MP      Surgery/Hospitalization Surgery with discharge on 11/12/21  -MP              Fall Risk Assessment    Any falls in the past year: No  -MP      Does patient have a fear of falling Yes (comment)  -MP              Services    Prior Rehab/Home Health Experiences No  -MP      Are you currently receiving Home Health services No  -MP      Do you plan to receive Home Health services in the near future No  -MP              Daily Activities    Primary Language English  -MP      Are you able to read Yes  -MP      Are you able to write Yes  -MP      How does patient learn best? Reading; Demonstration  -MP      Barriers to learning None  -MP      Pt Participated in POC and Goals Yes  -MP              Safety    Are you being hurt, hit, or frightened by anyone at home or in your life? No  -MP      Are you being neglected by a caregiver No  -MP      Have you had any of the following issues with Depression; Anxiety  -MP            User Key  (r) = Recorded By, (t) = Taken By, (c) = Cosigned By    Initials Name Provider Type    Elicia Camara OT Occupational Therapist                 Lymphedema     Row Name 12/21/21 1100 12/21/21 0900          Subjective Pain    Able to rate subjective pain? -- yes  -MP     Pre-Treatment Pain Level -- 0  -MP     Post-Treatment Pain Level -- 0  -MP     Subjective Pain Comment -- Patient states no pain in UEs; She does states LE pain.  -MP            Lymphedema Assessment    Lymphedema Classification -- RUE:  -MP     Cancer Comments -- Begins radiation tx after Neli  -MP   "          Physical Concerns    The amount of pain associated with my lymphedema is: -- 0  -MP     The amount of limb heaviness associated with my lymphedema is: -- 0  -MP     The amount of skin tightness associated with my lymphedema is: -- 0  -MP     The size of my swollen limb(s) seems: -- 0  -MP     Lymphedema affects the movement of my swollen limb(s): -- 0  -MP     The strength in my swollen limb(s) is: -- 0  -MP            Psychosocial Concerns    Lymphedema affects my body image (i.e., \"how I think I look\"). -- 0  -MP     Lymphedema affects my socializing with others. -- 0  -MP     Lymphedema affects my intimate relations with spouse or partner (rate 0 if not applicable -- 0  -MP     Lymphedema \"gets me down\" (i.e., depression, frustration, or anger) -- 0  -MP     I must rely on others for help due to my lymphedema. -- 0  -MP     I know what to do to manage my lymphedema -- 0  -MP            Functional Concerns    Lymphedema affects my ability to perform self-care activities (i.e. eating, dressing, hygiene) -- 0  -MP     Lymphedema affects my ability to perform routine home or work-related activities. -- 0  -MP     Lymphedema affects my performance of preferred leisure activities. -- 0  -MP     Lymphedema affects proper fit of clothing/shoes -- 0  -MP     Lymphedema affects my sleep -- 0  -MP            MMT (Manual Muscle Testing)    General MMT Comments -- WNLs  -MP            Lymphedema Edema Assessment    Edema Assessment Comment -- No edema noted  -MP            Skin Changes/Observations    Location/Assessment Upper Quadrant  -MP --     Upper Quadrant Color/Pigment right:  -MP --     Upper Quadrant Skin Details Incision sites are well healed; slightly pink in color, no drainage or other concerns.  -MP --            Lymphedema Sensation    Lymphedema Sensation Comments -- No concerns  -MP            Lymphedema Measurements    Measurement Type(s) -- Volumetric  -MP     Volumetric Areas -- Upper extremities  " "-MP     Lymphedema Measurements Comments -- Sozo bioimpedence  -MP            L-Dex Bioimpedence Screening    L-Dex Measurement Extremity -- RUE  -MP     L-Dex Patient Position -- Standing  -MP     L-Dex UE Dominate Side -- Right  -MP     L-Dex UE At Risk Side -- Right  -MP     L-Dex UE Score -- -14  -MP     L-Dex UE Baseline Score -- -14  -MP     L-Dex UE Value Change -- 0  -MP     Height -- 157.5 cm (62\")  -MP     Weight -- 86.2 kg (190 lb)  -MP     BMI (Calculated) -- 34.7  -MP            Lymphedema Life Impact Scale Totals    A.  Total Q1 - Q17 (Do not include Q18) -- 0  -MP     B.  Total number of questions answered (Q1-Q17) -- 17  -MP     C. Divide A by B -- 0  -MP     D. Multiple C by 25 -- 0  -MP           User Key  (r) = Recorded By, (t) = Taken By, (c) = Cosigned By    Initials Name Provider Type    Elicia Camara OT Occupational Therapist                      Therapy Education  Education Details: Patient provided education on risk factors for lymphedema to include greater than 6 lymph node removal, BMI greater than 30, mastectomy versus lumpectomy, radiation therapy, and Taxol use and advanced age.  Patients risk factors include Right lumpectomy.  Patient provided with the LeAP lymphedema action plan stoplight to recovery handout (Rehabilitation Oncology: July 2019 - Volume 37 - Issue 3 - p 122-127 doi: 10.1097/01.REO.[id number]) to improve patient's ability to identify lymph exacerbation and provide guidance on appropriate action to be taken to control symptoms. Patient was asked to contact this clinic if any of these occur.  She was also educated in self lymphatic drainage and she return demonstrated properly.  Given: HEP, Symptoms/condition management  Program: New  How Provided: Verbal, Demonstration, Written  Provided to: Patient  45184 - OT Self Care/Mgmt Minutes: 15         OT Goals     Row Name 12/21/21 1124          Time Calculation    OT Goal Re-Cert Due Date 01/20/22  -MP           " User Key  (r) = Recorded By, (t) = Taken By, (c) = Cosigned By    Initials Name Provider Type    Elicia Camara, OT Occupational Therapist                 OT Assessment/Plan     Row Name 12/21/21 1111          OT Assessment    Functional Limitations Other (comment)  -MP     Impairments Other (comment)  -MP     Assessment Comments Mrs. Cruz is a 71 year old female who had a lumpectomy and is at risk for lymphedema.  She presents with good scar integrity, ROM, and strength, and no pain. She was not seen prior to surgery, so baseline measurements were taken today.  She is able to perform self lymphatic drainage and is aware of precautions for activity and measures for prevention. She is to begin radiation treatment following Chritmas and is aware of need to return for next follow-up assessment 3 The patient had a -14.0 SOZO measurement which I reviewed today. The score is WNL. Bioimpedance spectroscopy helps identify the onset of lymphedema in an arm or leg before patients experience noticeable swelling. Research has shown that the early detection of lymphedema using L-Dex combined with treatment can reduce progression to chronic lymphedema by 95% in breast cancer patients. Whenever possible, patients are tested for baseline L-Dex score before cancer treatment begins and then are reassessed during regular follow-up visits using the SOZO device. Otherwise, this can be started postoperatively and continued during regular follow-up visits. If the patient's L-Dex score increases above normal levels, that is a sign that lymphedema is developing and a referral is made to physical therapy for further evaluation and early compression treatment. Lymphedema assessment with the SOZO L-Dex score is recommended to be done every 3 months for the first 3 years and then every 6 months for years 4 and 5 followed by annually afterwards.weeks after the last treatment.  -MP     OT Diagnosis At risk for lymphedema  -MP     OT Rehab  Potential Excellent  -MP     Patient/caregiver participated in establishment of treatment plan and goals Yes  -MP     Patient would benefit from skilled therapy intervention Yes  -MP            OT Plan    OT Frequency Other (comment)  -MP     Predicted Duration of Therapy Intervention (OT) PT/OT consultation for lymph volume evaluation pre-op and post-op at 3 weeks post-surgery, 3 weeks post radiation therapy, every 3 months from baseline for years 1-3 and every 6 months years 4 and 5.  -MP     Planned CPT's? OT EVAL LOW COMPLEXITY: 34891; OT SELF CARE/MGMT/TRAIN 15 MIN: 43425; OT THER PROC EA 15 MIN: 56753NB; OT MANUAL THERAPY EA 15 MIN: 82327; OT BIS XTRACELL FLUID ANALYSIS: 55987; OT ORTHOTIC MGMT/TRAIN EA 15 MIN: 40705; OT WC MGMT EA 15 MIN: 21945  -MP     Planned Therapy Interventions (Optional Details) home exercise program; joint mobilization; manual therapy techniques; orthotic fitting/training; patient/family education; wound care  -MP     OT Plan Comments See patient for next assessment 3 weeks following radiation treatment.  -MP           User Key  (r) = Recorded By, (t) = Taken By, (c) = Cosigned By    Initials Name Provider Type    Elicia Camara, OT Occupational Therapist              1. Post Breast Surgery Care/at risk for Lymphedema  LTG 1: 90 days:  As an indicator of no exacerbation of lymphedema staging, the patient will present with an L-Dex score less than 7 points from preoperative baseline.   STATUS: New  STG 1a: 30 days: Patient will be independent with self-manual lymphatic massage.    STATUS: New  STG 1b: 30 days:  Patient will be independent with identification of signs and symptoms of lymphedema exasperation per stoplight to recovery education handout.   STATUS: New  STG 1 c: 30 days: Patient will be independent with HEP to prevent advancement in lymphedema staging.   STATUS: New  TREATMENT:  Self Care/ADL retraining, Therapeutic Activity, Neuromuscular Re-education, Therapeutic  Exercise, Bioimpedence Fluid Analysis, Orthotic Management and training,  and Manual Therapy.        OT eval complexity:   Examination:   Performance Deficits include:  Health maintainance   # deficits affecting performance:  1  Decision Making:   Treatment Options Considered : Health Promotion, prevention, maintain  # Treatment options considered : 3  Modification Made for: none  Level of Task Modification: NA  Comorbidity Affect Performance: NA  How is performance affected: NA  Evaluation Level Determined:  Low    Time Calculation:   Timed Charges  75349 - OT Self Care/Mgmt Minutes: 15  Total Minutes  Timed Charges Total Minutes: 15   Total Minutes: 15     Therapy Charges for Today     Code Description Service Date Service Provider Modifiers Qty    [id number]  OT SELF CARE/MGMT/TRAIN EA 15 MIN 12/21/2021 Elicia Palencia OT GO 2    [id number] HC PT BIS XTRACELL FLUID ANALYSIS 12/21/2021 Elicia Palencia OT  1                    Elicia Palencia OT  12/21/2021

## 2021-12-29 PROCEDURE — U0004 COV-19 TEST NON-CDC HGH THRU: HCPCS | Performed by: NURSE PRACTITIONER

## 2021-12-30 ENCOUNTER — TELEPHONE (OUTPATIENT)
Dept: URGENT CARE | Facility: CLINIC | Age: 71
End: 2021-12-30

## 2022-01-10 RX ORDER — MONTELUKAST SODIUM 4 MG/1
2 TABLET, CHEWABLE ORAL 2 TIMES DAILY
Qty: 90 TABLET | Refills: 3 | Status: SHIPPED | OUTPATIENT
Start: 2022-01-10 | End: 2022-04-12 | Stop reason: SDUPTHER

## 2022-01-10 NOTE — TELEPHONE ENCOUNTER
Patient called the office stating [facility] has been out of stock on Colestipol. Patient requesting a new rx be sent to [facility] pharmacy in [place].

## 2022-01-13 ENCOUNTER — OFFICE VISIT (OUTPATIENT)
Dept: RADIATION ONCOLOGY | Facility: HOSPITAL | Age: 72
End: 2022-01-13

## 2022-01-13 ENCOUNTER — HOSPITAL ENCOUNTER (OUTPATIENT)
Dept: RADIATION ONCOLOGY | Facility: HOSPITAL | Age: 72
Setting detail: RADIATION/ONCOLOGY SERIES
Discharge: HOME | End: 2022-01-13

## 2022-01-13 VITALS
DIASTOLIC BLOOD PRESSURE: 67 MMHG | RESPIRATION RATE: 16 BRPM | TEMPERATURE: 98.1 F | BODY MASS INDEX: 35.36 KG/M2 | SYSTOLIC BLOOD PRESSURE: 129 MMHG | WEIGHT: 193.34 LBS | HEART RATE: 61 BPM | OXYGEN SATURATION: 96 %

## 2022-01-13 DIAGNOSIS — C50.211 MALIGNANT NEOPLASM OF UPPER-INNER QUADRANT OF RIGHT BREAST IN FEMALE, ESTROGEN RECEPTOR NEGATIVE: Primary | ICD-10-CM

## 2022-01-13 DIAGNOSIS — Z17.1 MALIGNANT NEOPLASM OF UPPER-INNER QUADRANT OF RIGHT BREAST IN FEMALE, ESTROGEN RECEPTOR NEGATIVE: Primary | ICD-10-CM

## 2022-01-13 DIAGNOSIS — C50.211 MALIGNANT NEOPLASM OF UPPER-INNER QUADRANT OF RIGHT FEMALE BREAST: ICD-10-CM

## 2022-01-13 PROCEDURE — 77332 RADIATION TREATMENT AID(S): CPT | Performed by: RADIOLOGY

## 2022-01-13 PROCEDURE — 77290 THER RAD SIMULAJ FIELD CPLX: CPT | Performed by: RADIOLOGY

## 2022-01-13 PROCEDURE — 99212 OFFICE O/P EST SF 10 MIN: CPT | Performed by: RADIOLOGY

## 2022-01-13 PROCEDURE — G0463 HOSPITAL OUTPT CLINIC VISIT: HCPCS | Performed by: RADIOLOGY

## 2022-01-13 PROCEDURE — 77263 THER RADIOLOGY TX PLNG CPLX: CPT | Performed by: RADIOLOGY

## 2022-01-13 NOTE — PATIENT INSTRUCTIONS
Managing Side Effects of Radiation Therapy  You will learn how to identify the most common side effects that occur from radiation therapy, including how they occur and ways to manage them.  To view the content, go to this web address:  [email or web address]    This video will  on: 2023. If you need access to this video following this date, please reach out to the healthcare provider who assigned it to you.  This information is not intended to replace advice given to you by your health care provider. Make sure you discuss any questions you have with your health care provider.  [employer] Patient Education ©  [employer]  Radiation for Breast Cancer  You will learn what radiation is, the different forms, and the side effects.  To view the content, go to this web address:  [email or web address]    This video will  on: 2023. If you need access to this video following this date, please reach out to the healthcare provider who assigned it to you.  This information is not intended to replace advice given to you by your health care provider. Make sure you discuss any questions you have with your health care provider.  [employer] Patient Education ©  [employer].  Breast Cancer, Female    Breast cancer is a malignant growth of tissue (tumor) in the breast. Unlike noncancerous (benign) tumors, malignant tumors are cancerous and can spread to other parts of the body. The two most common types of breast cancer start in the milk ducts (ductal carcinoma) or in the lobules where milk is made in the breast (lobular carcinoma). Breast cancer is one of the most common types of cancer in women.  What are the causes?  The exact cause of female breast cancer is unknown.  What increases the risk?  The following factors may make you more likely to develop this condition:  · Being older than 55 years of age.  · Race and ethnicity.  women generally have an increased risk, but  -American women are more likely to develop the disease before age 45.  · Having a family history of breast cancer.  · Having had breast cancer in the past.  · Having certain noncancerous conditions of the breast, such as dense breast tissue.  · Having the BRCA1 and BRCA2 genes.  · Having a history of radiation exposure.  · Obesity.  · Starting menopause after age 55.  · Starting your menstrual periods before age 12.  · Having never been pregnant or having your first child after age 30.  · Having never .  · Using hormone therapy after menopause.  · Using birth control pills.  · Drinking more than one alcoholic drink a day.  · Exposure to the drug EVON, which was given to pregnant women from the 1940s to the 1970s.  What are the signs or symptoms?  Symptoms of this condition include:  · A painless lump or thickening in your breast.  · Changes in the size or shape of your breast.  · Breast skin changes, such as puckering or dimpling.  · Nipple abnormalities, such as scaling, crustiness, redness, or pulling in (retraction).  · Nipple discharge that is bloody or clear.  How is this diagnosed?  This condition may be diagnosed by:  · Taking your medical history and doing a physical exam. During the exam, your health care provider will feel the tissue around your breast and under your arms.  · Taking a sample of nipple discharge. The sample will be examined under a microscope.  · Performing imaging tests, such as breast X-rays (mammogram), breast ultrasound exams, or an MRI.  · Taking a tissue sample (biopsy) from the breast. The sample will be examined under a microscope to look for cancer cells.  · Taking a sample from the lymph nodes near the affected breast (sentinel node biopsy).  Your cancer will be staged to determine its severity and extent. Staging is a careful attempt to find out the size of the tumor, whether the cancer has spread, and if so, to what parts of the body. Staging also includes testing  your tumor for certain receptors, such as estrogen, progesterone, and human epidermal growth factor receptor 2 (HER2). This will help your cancer care team decide on a treatment that will work best for you. You may need to have more tests to determine the stage of your cancer. Stages include the following:  · Stage 0--The tumor has not spread to other breast tissue.  · Stage I--The cancer is only found in the breast or may be in the lymph nodes. The tumor may be up to ¾ in (2 cm) wide.  · Stage II--The cancer has spread to nearby lymph nodes. The tumor may be up to 2 in (5 cm) wide.  · Stage III--The cancer has spread to more distant lymph nodes. The tumor may be larger than 2 in (5 cm) wide.  · Stage IV--The cancer has spread to other parts of the body, such as the bones, brain, liver, or lungs.  How is this treated?  Treatment for this condition depends on the type and stage of the breast cancer. It may be treated with:  · Surgery. This may involve breast-conserving surgery (lumpectomy or partial mastectomy) in which only the part of the breast containing the cancer is removed. Some normal tissue surrounding this area may also be removed. In some cases, surgery may be done to remove the entire breast (mastectomy) and nipple. Lymph nodes may also be removed.  · Radiation therapy, which uses high-energy rays to kill cancer cells.  · Chemotherapy, which is the use of drugs to kill cancer cells.  · Hormone therapy, which involves taking medicine to adjust the hormone levels in your body. You may take medicine to decrease your estrogen levels. This can help stop cancer cells from growing.  · Targeted therapy, in which drugs are used to block the growth and spread of cancer cells. These drugs target a specific part of the cancer cell and usually cause fewer side effects than chemotherapy. Targeted therapy may be used alone or in combination with chemotherapy.  · A combination of surgery, radiation, chemotherapy, or  hormone therapy may be needed to treat breast cancer.  Follow these instructions at home:  1. Take over-the-counter and prescription medicines only as told by your health care provider.  2. Eat a healthy diet. A healthy diet includes lots of fruits and vegetables, low-fat dairy products, lean meats, and fiber.  ? Make sure half your plate is filled with fruits or vegetables.  ? Choose high-fiber foods such as whole-grain breads and cereals.  3. Consider joining a support group. This may help you learn to cope with the stress of having breast cancer.  4. Talk to your health care team about exercise and physical activity. The right exercise program can:  ? Help prevent or reduce symptoms such as fatigue or depression.  ? Improve overall health and survival rates.  5. Keep all follow-up visits as told by your health care provider. This is important.  Where to find more information  · American Cancer Society: www.cancer.org  · National Cancer [place]: www.cancer.gov  Contact a health care provider if:  · You have a sudden increase in pain.  · You have any symptoms or changes that concern you.  · You lose weight without trying.  · You notice a new lump in either breast or under your arm.  · You develop swelling in either arm or hand.  · You have a fever.  · You notice new fatigue or weakness.  Get help right away if:  · You have chest pain or trouble breathing.  · You faint.  Summary  · Breast cancer is a malignant growth of tissue (tumor) in the breast.  · Your cancer will be staged to determine its severity and extent.  · Treatment for this condition depends on the type and stage of the breast cancer.  This information is not intended to replace advice given to you by your health care provider. Make sure you discuss any questions you have with your health care provider.  Document Revised: 11/30/2018 Document Reviewed: 08/13/2018  [employer] Patient Education © 2021 [employer] Inc.  External Beam Radiation Therapy, Care  After  This sheet gives you information about how to care for yourself after your procedure. Your health care provider may also give you more specific instructions. If you have problems or questions, contact your health care provider.  What can I expect after the procedure?  After the procedure, it is common to have:  · Tiredness (fatigue).  · Red, flaking, dry skin in the treated area.  · A sunburn-like rash on the skin in the treated area.  · Itching in the treated area.  Other side effects may occur, depending on which part of the body was exposed to radiation and how much radiation was used. These side effects may include:  · Hair loss if the radiation therapy was directed to the head.  · Coughing or difficulty swallowing if the radiation therapy was directed to the head, neck, or chest.  · A type of swelling called lymphedema if the radiation therapy was directed to the head, neck, or chest.  · Nausea, vomiting, or diarrhea if the radiation therapy was directed to the abdomen or pelvis.  · Bladder problems, urinating often, or a lowered ability or desire to have sex (sexual dysfunction). These problems may happen if the radiation therapy was directed to the bladder, kidney, or prostate.  · Memory loss and thinking problems (cognitive changes) if the radiation therapy was directed to the brain.  Some side effects may show up months to years later. However, most side effects are usually temporary and get better over time. It can take up to 3-4 weeks for you to regain your energy or for side effects to get better.  Follow these instructions at home:  Radiation therapy affects everyone differently. Some people do not have side effects. You can take steps at home to help prevent or manage side effects.  Skin care    · Wash your skin with a mild soap as told by your health care provider. Do not scrub or rub your skin. Pat yourself dry.  · Use a mild shampoo and be gentle when washing your hair.  · Apply gentle lotion  or cream to the treated area as told by your health care provider.  · Keep the treated area covered when you are outside. Do not expose treated skin to the sun.  · Avoid scratching the treated area.  Eating and drinking  · Eat small nutritious meals and snacks regularly during the day.  · Choose bland and soft foods that are easy to eat.  · Follow your health care provider's advice on the type and amount of liquids to drink each day.  · If you have diarrhea, drink plenty of clear fluids.  General instructions  · Do not use a heating pad or a warm cloth to relieve pain in the treated area.  · Take over-the-counter and prescription medicines only as told by your health care provider.  · Try to maintain your weight during treatment. Ask your health care team for tips.  · Keep all follow-up visits as told by your health care provider. This is important. The visits are usually scheduled 6 weeks to 6 months after radiation therapy. They are needed to determine if the radiation therapy worked as expected.  Contact a health care provider if:  1. You have any of the following in the treated area:  ? Pain.  ? More redness.  ? Open skin or blisters.  2. You have a fever.  3. You have nausea or vomiting that lasts more than 2 days.  4. You have diarrhea that lasts longer than 2 days.  5. You lose weight without trying to.  Get help right away if you:  · Are unable to swallow.  · Have difficulty breathing.  · Have severe vomiting or diarrhea.  Summary  · After this procedure, it is common to have tiredness (fatigue), skin changes, and other side effects, depending on where the radiation therapy was given.  · Some side effects may show up months to years later. However, most side effects are usually temporary and get better over time. It can take up to 3-4 weeks for you to regain your energy or for side effects to get better.  · Keep all follow-up visits as told by your health care provider. This is important. The visits are  usually scheduled 6 weeks to 6 months after radiation therapy.  This information is not intended to replace advice given to you by your health care provider. Make sure you discuss any questions you have with your health care provider.  Document Revised: 08/31/2020 Document Reviewed: 08/31/2020  [employer] Patient Education © 2021 [employer]      Pt educated on Skin care to the breast during radiation treatments.

## 2022-01-13 NOTE — PROGRESS NOTES
Follow Up Office Visit      Encounter Date: 01/13/2022   Patient Name: Dorothea Cruz  YOB: 1950   Medical Record Number: [id number]   Primary Diagnosis: Malignant neoplasm of upper-inner quadrant of right breast in female, estrogen receptor negative (HCC) [C50.211, Z17.1]   Cancer Staging: Cancer Staging  No matching staging information was found for the patient.      Chief Complaint:    Chief Complaint   Patient presents with   • Follow-up   • Breast Cancer       History of Present Illness: Dorothea Cruz returns to radiation oncology for follow-up in order to initiate external beam radiation. She reports feeling well overall with complete healing of the right breast.      Subjective      Review of Systems: Review of Systems   Constitutional: Negative for fatigue.   Respiratory: Negative for cough and shortness of breath.    Gastrointestinal: Negative for constipation, diarrhea and nausea.   Genitourinary: Negative for dysuria, frequency and urgency.   Musculoskeletal: Negative for back pain.   Skin: Negative for color change.   Neurological: Negative for dizziness and headaches.       The following portions of the patient's history were reviewed and updated as appropriate: allergies, current medications, past family history, past medical history, past social history, past surgical history and problem list.    Medications:     Current Outpatient Medications:   •  colestipol (Colestid) 1 g tablet, Take 2 tablets by mouth 2 (Two) Times a Day., Disp: 90 tablet, Rfl: 3  •  DULoxetine (CYMBALTA) 60 MG capsule, Take 1 capsule by mouth Daily., Disp: 90 capsule, Rfl: 0  •  ergocalciferol (ERGOCALCIFEROL) 1.25 MG (83276 UT) capsule, Take 1 capsule by mouth 1 (One) Time Per Week., Disp: 13 capsule, Rfl: 1  •  gabapentin (NEURONTIN) 300 MG capsule, Take 1 capsule by mouth 3 (Three) Times a Day As Needed (neuropathy)., Disp: 90 capsule, Rfl: 2  •  hydroCHLOROthiazide (HYDRODIURIL) 25 MG tablet,  Take 1 tablet by mouth Daily., Disp: 90 tablet, Rfl: 0  •  metoprolol tartrate (LOPRESSOR) 25 MG tablet, Take 1 tablet by mouth 2 (Two) Times a Day., Disp: 180 tablet, Rfl: 1  •  primidone (MYSOLINE) 50 MG tablet, Take 1 tablet by mouth Every Night., Disp: 90 tablet, Rfl: 1  •  rosuvastatin (CRESTOR) 20 MG tablet, Take 1 tablet by mouth Every Night., Disp: 90 tablet, Rfl: 1  •  TURMERIC PO, Take 1 tablet by mouth Daily., Disp: , Rfl:   •  benzonatate (TESSALON) 200 MG capsule, Take 1 capsule by mouth 3 (Three) Times a Day As Needed for Cough., Disp: 30 capsule, Rfl: 0  •  buPROPion XL (Wellbutrin XL) 150 MG 24 hr tablet, Take 1 tablet by mouth Daily., Disp: 90 tablet, Rfl: 0    Allergies:   Allergies   Allergen Reactions   • Ace Inhibitors Cough   • Sulfamethoxazole-Trimethoprim Hives       ECOG: (0) Fully active, able to carry on all predisease performance without restriction  Quality of Life: 100 - Full Activity     Objective     Physical Exam:   Vital Signs:   Vitals:    01/13/22 0827   BP: 129/67   Pulse: 61   Resp: 16   Temp: 98.1 °F (36.7 °C)   TempSrc: Temporal   SpO2: 96%   Weight: 87.7 kg (193 lb 5.5 oz)   PainSc: 0-No pain     Body mass index is 35.36 kg/m².     Physical Exam  Constitutional:       Appearance: Normal appearance. She is normal weight.   HENT:      Head: Normocephalic and atraumatic.   Pulmonary:      Effort: Pulmonary effort is normal. No respiratory distress.   Chest:      Comments: Well-healed right medial breast incision; westley-areolar incision/scar  Skin:     General: Skin is warm and dry.   Neurological:      General: No focal deficit present.      Mental Status: She is alert.   Psychiatric:         Mood and Affect: Mood normal.         Judgment: Judgment normal.             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  She is doing well overall postoperatively. She was not recommended to undergo adjuvant chemotherapy.    We once again discussed the risks, potential benefits and alternatives to external beam radiotherapy. Understanding the potential late and acute toxicities of radiation, Ms. Cruz is agreeable to external beam radiation and will undergo CT simulation today for treatment planning purposes.        Miguel Angel Arroyo MD  Radiation Oncology  [facility]    This document has been signed by Miguel Angel Arroyo MD on January 13, 2022 09:09 EST

## 2022-01-19 PROCEDURE — 77334 RADIATION TREATMENT AID(S): CPT | Performed by: RADIOLOGY

## 2022-01-19 PROCEDURE — 77300 RADIATION THERAPY DOSE PLAN: CPT | Performed by: RADIOLOGY

## 2022-01-19 PROCEDURE — 77295 3-D RADIOTHERAPY PLAN: CPT | Performed by: RADIOLOGY

## 2022-01-20 ENCOUNTER — HOSPITAL ENCOUNTER (OUTPATIENT)
Dept: RADIATION ONCOLOGY | Facility: HOSPITAL | Age: 72
Discharge: HOME | End: 2022-01-20

## 2022-01-20 PROCEDURE — 77280 THER RAD SIMULAJ FIELD SMPL: CPT | Performed by: RADIOLOGY

## 2022-01-20 PROCEDURE — 77412 RADIATION TX DELIVERY LVL 3: CPT | Performed by: RADIOLOGY

## 2022-01-20 PROCEDURE — 77427 RADIATION TX MANAGEMENT X5: CPT | Performed by: RADIOLOGY

## 2022-01-21 ENCOUNTER — HOSPITAL ENCOUNTER (OUTPATIENT)
Dept: RADIATION ONCOLOGY | Facility: HOSPITAL | Age: 72
Discharge: HOME | End: 2022-01-21

## 2022-01-21 PROCEDURE — 77387 GUIDANCE FOR RADJ TX DLVR: CPT | Performed by: RADIOLOGY

## 2022-01-21 PROCEDURE — G6002 STEREOSCOPIC X-RAY GUIDANCE: HCPCS | Performed by: RADIOLOGY

## 2022-01-21 PROCEDURE — 77412 RADIATION TX DELIVERY LVL 3: CPT | Performed by: RADIOLOGY

## 2022-01-24 ENCOUNTER — HOSPITAL ENCOUNTER (OUTPATIENT)
Dept: RADIATION ONCOLOGY | Facility: HOSPITAL | Age: 72
Discharge: HOME | End: 2022-01-24

## 2022-01-24 ENCOUNTER — DOCUMENTATION (OUTPATIENT)
Dept: RADIATION ONCOLOGY | Facility: HOSPITAL | Age: 72
End: 2022-01-24

## 2022-01-24 PROCEDURE — G6002 STEREOSCOPIC X-RAY GUIDANCE: HCPCS | Performed by: RADIOLOGY

## 2022-01-24 PROCEDURE — 77412 RADIATION TX DELIVERY LVL 3: CPT | Performed by: RADIOLOGY

## 2022-01-24 PROCEDURE — 77387 GUIDANCE FOR RADJ TX DLVR: CPT | Performed by: RADIOLOGY

## 2022-01-24 NOTE — PROGRESS NOTES
Diagnosis: Breast cancer    Reason for referral: First week of tx/rounding    Comments: [facility]W met with pt in rad onc during first week of tx/rounding. Pt presented in a pleasant mood. Reintroduced myself and discussed [facility]W role/psychosocial services available. Pt identifies no needs at this time. Pt is providing her own transportation and identifies no transportation/gas or financial concerns today. Pt continues to have good support from spouse and adult children/family. Pt is prescribed Cymbalta and Wellbutrin. Encouraged OSW support remains available. Pt confirms she has OSW's business card/contact information still if needed.    Services/Referrals Provided: No needs identified at this time

## 2022-01-25 ENCOUNTER — HOSPITAL ENCOUNTER (OUTPATIENT)
Dept: RADIATION ONCOLOGY | Facility: HOSPITAL | Age: 72
Discharge: HOME | End: 2022-01-25

## 2022-01-25 PROCEDURE — 77412 RADIATION TX DELIVERY LVL 3: CPT | Performed by: RADIOLOGY

## 2022-01-25 PROCEDURE — G6002 STEREOSCOPIC X-RAY GUIDANCE: HCPCS | Performed by: RADIOLOGY

## 2022-01-25 PROCEDURE — 77387 GUIDANCE FOR RADJ TX DLVR: CPT | Performed by: RADIOLOGY

## 2022-01-26 ENCOUNTER — HOSPITAL ENCOUNTER (OUTPATIENT)
Dept: RADIATION ONCOLOGY | Facility: HOSPITAL | Age: 72
Discharge: HOME | End: 2022-01-26

## 2022-01-26 VITALS
OXYGEN SATURATION: 90 % | RESPIRATION RATE: 16 BRPM | WEIGHT: 190.48 LBS | DIASTOLIC BLOOD PRESSURE: 67 MMHG | HEART RATE: 59 BPM | SYSTOLIC BLOOD PRESSURE: 132 MMHG | TEMPERATURE: 98.2 F | BODY MASS INDEX: 34.84 KG/M2

## 2022-01-26 DIAGNOSIS — C50.211 MALIGNANT NEOPLASM OF UPPER-INNER QUADRANT OF RIGHT BREAST IN FEMALE, ESTROGEN RECEPTOR NEGATIVE: Primary | ICD-10-CM

## 2022-01-26 DIAGNOSIS — Z17.1 MALIGNANT NEOPLASM OF UPPER-INNER QUADRANT OF RIGHT BREAST IN FEMALE, ESTROGEN RECEPTOR NEGATIVE: Primary | ICD-10-CM

## 2022-01-26 PROCEDURE — G6002 STEREOSCOPIC X-RAY GUIDANCE: HCPCS | Performed by: RADIOLOGY

## 2022-01-26 PROCEDURE — 77336 RADIATION PHYSICS CONSULT: CPT | Performed by: RADIOLOGY

## 2022-01-26 PROCEDURE — 77412 RADIATION TX DELIVERY LVL 3: CPT | Performed by: RADIOLOGY

## 2022-01-26 PROCEDURE — 77387 GUIDANCE FOR RADJ TX DLVR: CPT | Performed by: RADIOLOGY

## 2022-01-26 NOTE — PROGRESS NOTES
On Treatment Visit       Patient: Dorothea Cruz   YOB: 1950   Medical Record Number: [id number]     Date of Visit  January 26, 2022   Primary Diagnosis:Malignant neoplasm of upper-inner quadrant of right breast in female, estrogen receptor negative (HCC) [C50.211, Z17.1]  Cancer Staging: Cancer Staging  No matching staging information was found for the patient.       was seen today for an on treatment visit.  She is receiving radiation therapy to the right breast.  She  has received 1330 cGy in 5 fractions out of a planned dose of 4256 cGy in 16 fractions  Today on exam the patient is tolerating radiation therapy well and has no new disease or treatment-related complaints other than fatigue.                                           Review of Systems:   Review of Systems   Constitutional: Positive for fatigue.   Respiratory: Negative for cough and shortness of breath.    Gastrointestinal: Negative for constipation, diarrhea and nausea.   Genitourinary: Negative for dysuria, frequency and urgency.   Skin: Negative for color change.   Neurological: Negative for dizziness and headaches.       Vitals:     Vitals:    01/26/22 1142   BP: 132/67   Pulse: 59   Resp: 16   Temp: 98.2 °F (36.8 °C)   SpO2: 90%       Weight:   Wt Readings from Last 3 Encounters:   01/26/22 86.4 kg (190 lb 7.6 oz)   01/13/22 87.7 kg (193 lb 5.5 oz)   12/29/21 87 kg (191 lb 14.4 oz)      Pain:    Pain Score    01/26/22 1142   PainSc:   6         Physical Exam:  Physical Exam  Constitutional:       General: She is not in acute distress.  Chest:   Breasts:      Right: No skin change (possible faint erythema upper medial Rt chest).       Neurological:      Mental Status: She is alert.           Plan: I have reviewed treatment setup notes, checked and approved the daily guidance images.  I reviewed dose delivery, treatment parameters and deemed them appropriate. We plan to continue radiation therapy as prescribed.   Discussed skin care.      Viviane Bates MD  Radiation Oncology   Electronically signed 1/26/2022  11:52 EST

## 2022-01-27 ENCOUNTER — HOSPITAL ENCOUNTER (OUTPATIENT)
Dept: RADIATION ONCOLOGY | Facility: HOSPITAL | Age: 72
Discharge: HOME | End: 2022-01-27

## 2022-01-27 PROCEDURE — G6002 STEREOSCOPIC X-RAY GUIDANCE: HCPCS | Performed by: RADIOLOGY

## 2022-01-27 PROCEDURE — 77387 GUIDANCE FOR RADJ TX DLVR: CPT | Performed by: RADIOLOGY

## 2022-01-27 PROCEDURE — 77427 RADIATION TX MANAGEMENT X5: CPT | Performed by: RADIOLOGY

## 2022-01-27 PROCEDURE — 77412 RADIATION TX DELIVERY LVL 3: CPT | Performed by: RADIOLOGY

## 2022-01-28 ENCOUNTER — TELEPHONE (OUTPATIENT)
Dept: FAMILY MEDICINE CLINIC | Facility: CLINIC | Age: 72
End: 2022-01-28

## 2022-01-28 ENCOUNTER — TELEMEDICINE (OUTPATIENT)
Dept: FAMILY MEDICINE CLINIC | Facility: CLINIC | Age: 72
End: 2022-01-28

## 2022-01-28 DIAGNOSIS — R50.9 FEVER, UNSPECIFIED FEVER CAUSE: ICD-10-CM

## 2022-01-28 DIAGNOSIS — D84.9 IMMUNOCOMPROMISED: ICD-10-CM

## 2022-01-28 DIAGNOSIS — J06.9 UPPER RESPIRATORY TRACT INFECTION, UNSPECIFIED TYPE: Primary | ICD-10-CM

## 2022-01-28 LAB
EXPIRATION DATE: NORMAL
EXPIRATION DATE: NORMAL
FLUAV RNA RESP QL NAA+PROBE: NEGATIVE
FLUBV RNA RESP QL NAA+PROBE: NEGATIVE
INTERNAL CONTROL: NORMAL
INTERNAL CONTROL: NORMAL
Lab: NORMAL
Lab: NORMAL
S PYO AG THROAT QL: NEGATIVE
SARS-COV-2 RNA PNL SPEC NAA+PROBE: NOT DETECTED

## 2022-01-28 PROCEDURE — 87880 STREP A ASSAY W/OPTIC: CPT | Performed by: NURSE PRACTITIONER

## 2022-01-28 PROCEDURE — 87502 INFLUENZA DNA AMP PROBE: CPT | Performed by: NURSE PRACTITIONER

## 2022-01-28 PROCEDURE — 99213 OFFICE O/P EST LOW 20 MIN: CPT | Performed by: NURSE PRACTITIONER

## 2022-01-28 PROCEDURE — U0004 COV-19 TEST NON-CDC HGH THRU: HCPCS | Performed by: NURSE PRACTITIONER

## 2022-01-28 RX ORDER — AMOXICILLIN AND CLAVULANATE POTASSIUM 875; 125 MG/1; MG/1
1 TABLET, FILM COATED ORAL 2 TIMES DAILY
Qty: 20 TABLET | Refills: 0 | Status: SHIPPED | OUTPATIENT
Start: 2022-01-28 | End: 2022-02-22

## 2022-01-28 NOTE — TELEPHONE ENCOUNTER
Pt was called to notify that the Rapid Strep and Rapid Flu test performed in office both were negative and the results of the Covid test will result by tomorrow. Pt voiced understanding.

## 2022-01-28 NOTE — PROGRESS NOTES
Chief Complaint  Headache, Nasal Congestion, and Fever    Subjective      History of Present Illness  Dorothea Cruz presents to [facility]     Cough, sore throat sinus congestion and fever.  She has a headache.  Symptoms started yesterday evening.  She is undergoing treatments for breast cancer and says those have been doing really well.  She canceled her treatment this morning.  Patient understanding that this is a telehealth visit.  I cannot perform a full physical exam, therefore something might be missed, or patient may need to come into the office for further evaluation.  Patient is understanding and consents to this type of visit.  This visit was done through [facility] and patient was home alone for the call.            Allergies  Ace inhibitors and Sulfamethoxazole-trimethoprim    Objective     There were no vitals filed for this visit.  There is no height or weight on file to calculate BMI.     Review of Systems    Physical Exam     Gen: well-nourished, no acute distress  HENT: atraumatic, normocephalic  Eyes: extraocular movements intact, no scleral icterus  Lung: breathing comfortably, no cough  Skin: no visible rash, no lesions  Neuro: grossly oriented to person, place, and time. no facial droop   Psych: normal mood and affect      Result Review :    The following data was reviewed by: ARA Asif on 01/28/2022:       Depression: Not at risk   • PHQ-2 Score: 0       Influenza A&B    [facility] 1/28/22   Rapid Influenza A Ag Negative   Rapid Influenza B Ag Negative           Strep    [facility] 1/28/22   POC Strep A, Molecular Negative                           Assessment and Plan     Diagnoses and all orders for this visit:    1. Upper respiratory tract infection, unspecified type (Primary)  -     amoxicillin-clavulanate (Augmentin) 875-125 MG per tablet; Take 1 tablet by mouth 2 (Two) Times a Day.  Dispense: 20 tablet; Refill: 0  -      COVID-19,APTIMA PANTHER(ANIKET), RUCHI/[facility] KENDY, NP/OP SWAB IN UTM/VTM/SALINE TRANSPORT MEDIA,24 HR TAT - Swab, Nasopharynx  -     POCT Flu A&B, Molecular  -     POCT rapid strep A    2. Fever, unspecified fever cause  -     COVID-19,APTIMA PANTHER(ANIKET), RUCHI/[facility] KENDY, NP/OP SWAB IN UTM/VTM/SALINE TRANSPORT MEDIA,24 HR TAT - Swab, Nasopharynx  -     POCT Flu A&B, Molecular  -     POCT rapid strep A    3. Immunocompromised (HCC)  -     amoxicillin-clavulanate (Augmentin) 875-125 MG per tablet; Take 1 tablet by mouth 2 (Two) Times a Day.  Dispense: 20 tablet; Refill: 0      Transmission precautions discussed, OTC medications discussed, discussed when to seek emergency treatment.  Call with worsening of symptoms  Quarantine. To ER with increased shortness of breath. Push fluids.         Follow Up     Return if symptoms worsen or fail to improve.    Patient was given instructions and counseling regarding her condition or for health maintenance advice. Please see specific information pulled into the AVS if appropriate.     Arelis Butt, APRN

## 2022-01-31 ENCOUNTER — HOSPITAL ENCOUNTER (OUTPATIENT)
Dept: RADIATION ONCOLOGY | Facility: HOSPITAL | Age: 72
Discharge: HOME | End: 2022-01-31

## 2022-01-31 VITALS
RESPIRATION RATE: 16 BRPM | OXYGEN SATURATION: 98 % | DIASTOLIC BLOOD PRESSURE: 73 MMHG | WEIGHT: 189.38 LBS | SYSTOLIC BLOOD PRESSURE: 133 MMHG | BODY MASS INDEX: 34.64 KG/M2 | HEART RATE: 67 BPM | TEMPERATURE: 97.5 F

## 2022-01-31 DIAGNOSIS — C50.211 MALIGNANT NEOPLASM OF UPPER-INNER QUADRANT OF RIGHT BREAST IN FEMALE, ESTROGEN RECEPTOR POSITIVE: Primary | ICD-10-CM

## 2022-01-31 DIAGNOSIS — Z17.0 MALIGNANT NEOPLASM OF UPPER-INNER QUADRANT OF RIGHT BREAST IN FEMALE, ESTROGEN RECEPTOR POSITIVE: Primary | ICD-10-CM

## 2022-01-31 PROCEDURE — 77387 GUIDANCE FOR RADJ TX DLVR: CPT | Performed by: RADIOLOGY

## 2022-01-31 PROCEDURE — G6002 STEREOSCOPIC X-RAY GUIDANCE: HCPCS | Performed by: RADIOLOGY

## 2022-01-31 PROCEDURE — 77412 RADIATION TX DELIVERY LVL 3: CPT | Performed by: RADIOLOGY

## 2022-02-01 ENCOUNTER — HOSPITAL ENCOUNTER (OUTPATIENT)
Dept: RADIATION ONCOLOGY | Facility: HOSPITAL | Age: 72
Setting detail: RADIATION/ONCOLOGY SERIES
End: 2022-02-01

## 2022-02-01 ENCOUNTER — HOSPITAL ENCOUNTER (OUTPATIENT)
Dept: RADIATION ONCOLOGY | Facility: HOSPITAL | Age: 72
Discharge: HOME | End: 2022-02-01

## 2022-02-01 PROCEDURE — 77387 GUIDANCE FOR RADJ TX DLVR: CPT | Performed by: RADIOLOGY

## 2022-02-01 PROCEDURE — 77412 RADIATION TX DELIVERY LVL 3: CPT | Performed by: RADIOLOGY

## 2022-02-01 PROCEDURE — G6002 STEREOSCOPIC X-RAY GUIDANCE: HCPCS | Performed by: RADIOLOGY

## 2022-02-02 ENCOUNTER — HOSPITAL ENCOUNTER (OUTPATIENT)
Dept: RADIATION ONCOLOGY | Facility: HOSPITAL | Age: 72
Discharge: HOME | End: 2022-02-02

## 2022-02-02 PROCEDURE — G6002 STEREOSCOPIC X-RAY GUIDANCE: HCPCS | Performed by: RADIOLOGY

## 2022-02-02 PROCEDURE — 77412 RADIATION TX DELIVERY LVL 3: CPT | Performed by: RADIOLOGY

## 2022-02-02 PROCEDURE — 77387 GUIDANCE FOR RADJ TX DLVR: CPT | Performed by: RADIOLOGY

## 2022-02-03 ENCOUNTER — HOSPITAL ENCOUNTER (OUTPATIENT)
Dept: RADIATION ONCOLOGY | Facility: HOSPITAL | Age: 72
Discharge: HOME | End: 2022-02-03

## 2022-02-03 PROCEDURE — G6002 STEREOSCOPIC X-RAY GUIDANCE: HCPCS | Performed by: RADIOLOGY

## 2022-02-03 PROCEDURE — 77412 RADIATION TX DELIVERY LVL 3: CPT | Performed by: RADIOLOGY

## 2022-02-03 PROCEDURE — 77387 GUIDANCE FOR RADJ TX DLVR: CPT | Performed by: RADIOLOGY

## 2022-02-03 PROCEDURE — 77336 RADIATION PHYSICS CONSULT: CPT | Performed by: RADIOLOGY

## 2022-02-07 ENCOUNTER — HOSPITAL ENCOUNTER (OUTPATIENT)
Dept: RADIATION ONCOLOGY | Facility: HOSPITAL | Age: 72
Discharge: HOME | End: 2022-02-07

## 2022-02-07 PROCEDURE — 77412 RADIATION TX DELIVERY LVL 3: CPT | Performed by: RADIOLOGY

## 2022-02-07 PROCEDURE — G6002 STEREOSCOPIC X-RAY GUIDANCE: HCPCS | Performed by: RADIOLOGY

## 2022-02-07 PROCEDURE — 77387 GUIDANCE FOR RADJ TX DLVR: CPT | Performed by: RADIOLOGY

## 2022-02-08 ENCOUNTER — HOSPITAL ENCOUNTER (OUTPATIENT)
Dept: RADIATION ONCOLOGY | Facility: HOSPITAL | Age: 72
Discharge: HOME | End: 2022-02-08

## 2022-02-08 VITALS
HEART RATE: 63 BPM | WEIGHT: 190.26 LBS | DIASTOLIC BLOOD PRESSURE: 71 MMHG | TEMPERATURE: 98.3 F | OXYGEN SATURATION: 97 % | BODY MASS INDEX: 34.8 KG/M2 | SYSTOLIC BLOOD PRESSURE: 117 MMHG | RESPIRATION RATE: 16 BRPM

## 2022-02-08 DIAGNOSIS — C50.211 MALIGNANT NEOPLASM OF UPPER-INNER QUADRANT OF RIGHT BREAST IN FEMALE, ESTROGEN RECEPTOR POSITIVE: Primary | ICD-10-CM

## 2022-02-08 DIAGNOSIS — Z17.0 MALIGNANT NEOPLASM OF UPPER-INNER QUADRANT OF RIGHT BREAST IN FEMALE, ESTROGEN RECEPTOR POSITIVE: Primary | ICD-10-CM

## 2022-02-08 PROCEDURE — 77387 GUIDANCE FOR RADJ TX DLVR: CPT | Performed by: RADIOLOGY

## 2022-02-08 PROCEDURE — 77412 RADIATION TX DELIVERY LVL 3: CPT | Performed by: RADIOLOGY

## 2022-02-08 PROCEDURE — 77427 RADIATION TX MANAGEMENT X5: CPT | Performed by: RADIOLOGY

## 2022-02-08 PROCEDURE — G6002 STEREOSCOPIC X-RAY GUIDANCE: HCPCS | Performed by: RADIOLOGY

## 2022-02-08 NOTE — PROGRESS NOTES
On Treatment Visit       Patient: Dorothea Cruz   YOB: 1950   Medical Record Number: [id number]     Date of Visit  February 8, 2022   Primary Diagnosis:Malignant neoplasm of upper-inner quadrant of right breast in female, estrogen receptor positive (HCC) [C50.211, Z17.0]  Cancer Staging: Cancer Staging  No matching staging information was found for the patient.       was seen today for an on treatment visit.  She is receiving radiation therapy to the right breast. She  has received 3192 cGy in 12 fractions out of a planned dose of 4256 cGy in 16 fractions.     Experienced fever last night >100F. Denies cough or changes in breathing. No changes in skin of right breast.                                      Review of Systems:   Review of Systems   Constitutional: Positive for fatigue and fever.   HENT: Negative for congestion, rhinorrhea and sore throat.    Respiratory: Negative for cough and shortness of breath.    Gastrointestinal: Negative for constipation, diarrhea and nausea.   Genitourinary: Negative for difficulty urinating and dysuria.   Musculoskeletal: Negative.         Generalized achiness   Skin: Negative for color change.   Neurological: Negative for dizziness and headaches.       Vitals:     Vitals:    02/08/22 1135   BP: 117/71   Pulse: 63   Resp: 16   Temp: 98.3 °F (36.8 °C)   SpO2: 97%       Weight:   Wt Readings from Last 3 Encounters:   02/08/22 86.3 kg (190 lb 4.1 oz)   01/31/22 85.9 kg (189 lb 6 oz)   01/26/22 86.4 kg (190 lb 7.6 oz)      Pain:    Pain Score    02/08/22 1135   PainSc: 0-No pain         Physical Exam:  Gen: WD/WN; NAD  HEENT: MMM  Trachea: midline  Chest: symmetric  Resp: normal respiratory effort  Extr: warm, well-perfused  Neuro: awake and alert; no aphasia or neglect    Plan: I have reviewed treatment setup notes, checked and approved the daily guidance images.  I reviewed dose delivery, treatment parameters and deemed them appropriate. We plan to  continue radiation therapy as prescribed.    Advised COVID-19 testing      Radiation Oncology    Electronically signed by Miguel Angel Arroyo MD 2/8/2022  11:56 EST

## 2022-02-09 ENCOUNTER — HOSPITAL ENCOUNTER (OUTPATIENT)
Dept: RADIATION ONCOLOGY | Facility: HOSPITAL | Age: 72
Discharge: HOME | End: 2022-02-09

## 2022-02-09 PROCEDURE — G6002 STEREOSCOPIC X-RAY GUIDANCE: HCPCS | Performed by: RADIOLOGY

## 2022-02-09 PROCEDURE — 77412 RADIATION TX DELIVERY LVL 3: CPT | Performed by: RADIOLOGY

## 2022-02-09 PROCEDURE — 77387 GUIDANCE FOR RADJ TX DLVR: CPT | Performed by: RADIOLOGY

## 2022-02-10 ENCOUNTER — HOSPITAL ENCOUNTER (OUTPATIENT)
Dept: RADIATION ONCOLOGY | Facility: HOSPITAL | Age: 72
Discharge: HOME | End: 2022-02-10

## 2022-02-10 PROCEDURE — 77412 RADIATION TX DELIVERY LVL 3: CPT | Performed by: RADIOLOGY

## 2022-02-10 PROCEDURE — 77387 GUIDANCE FOR RADJ TX DLVR: CPT | Performed by: RADIOLOGY

## 2022-02-10 PROCEDURE — G6002 STEREOSCOPIC X-RAY GUIDANCE: HCPCS | Performed by: RADIOLOGY

## 2022-02-11 ENCOUNTER — HOSPITAL ENCOUNTER (OUTPATIENT)
Dept: RADIATION ONCOLOGY | Facility: HOSPITAL | Age: 72
Discharge: HOME | End: 2022-02-11

## 2022-02-11 PROCEDURE — 77387 GUIDANCE FOR RADJ TX DLVR: CPT | Performed by: RADIOLOGY

## 2022-02-11 PROCEDURE — 77412 RADIATION TX DELIVERY LVL 3: CPT | Performed by: RADIOLOGY

## 2022-02-11 PROCEDURE — G6002 STEREOSCOPIC X-RAY GUIDANCE: HCPCS | Performed by: RADIOLOGY

## 2022-02-11 PROCEDURE — 77336 RADIATION PHYSICS CONSULT: CPT | Performed by: RADIOLOGY

## 2022-02-11 RX ORDER — DULOXETIN HYDROCHLORIDE 60 MG/1
60 CAPSULE, DELAYED RELEASE ORAL DAILY
Qty: 90 CAPSULE | Refills: 0 | Status: SHIPPED | OUTPATIENT
Start: 2022-02-11 | End: 2022-04-14 | Stop reason: SDUPTHER

## 2022-02-11 NOTE — TELEPHONE ENCOUNTER
Caller: Dorothea Cruz    Relationship: Self    Best call back number: [phone]    Requested Prescriptions:   Requested Prescriptions     Pending Prescriptions Disp Refills   • DULoxetine (CYMBALTA) 60 MG capsule 90 capsule 0     Sig: Take 1 capsule by mouth Daily.        Pharmacy where request should be sent: [facility] -  [place], [place] - [address] - [phone] [facility] [phone] FX       Does the patient have less than a 3 day supply:  [] Yes  [x] No    Jakcelyn Cordova Rep   02/11/22 10:02 EST

## 2022-02-14 ENCOUNTER — HOSPITAL ENCOUNTER (OUTPATIENT)
Dept: RADIATION ONCOLOGY | Facility: HOSPITAL | Age: 72
Discharge: HOME | End: 2022-02-14

## 2022-02-14 PROCEDURE — 77412 RADIATION TX DELIVERY LVL 3: CPT | Performed by: RADIOLOGY

## 2022-02-14 PROCEDURE — G6002 STEREOSCOPIC X-RAY GUIDANCE: HCPCS | Performed by: RADIOLOGY

## 2022-02-14 PROCEDURE — 77387 GUIDANCE FOR RADJ TX DLVR: CPT | Performed by: RADIOLOGY

## 2022-02-22 ENCOUNTER — OFFICE VISIT (OUTPATIENT)
Dept: FAMILY MEDICINE CLINIC | Facility: CLINIC | Age: 72
End: 2022-02-22

## 2022-02-22 VITALS
BODY MASS INDEX: 34.8 KG/M2 | WEIGHT: 189.1 LBS | HEART RATE: 60 BPM | TEMPERATURE: 97.5 F | SYSTOLIC BLOOD PRESSURE: 134 MMHG | RESPIRATION RATE: 16 BRPM | DIASTOLIC BLOOD PRESSURE: 58 MMHG | HEIGHT: 62 IN | OXYGEN SATURATION: 98 %

## 2022-02-22 DIAGNOSIS — R39.89 URINE DISCOLORATION: Primary | ICD-10-CM

## 2022-02-22 DIAGNOSIS — R31.9 HEMATURIA, UNSPECIFIED TYPE: ICD-10-CM

## 2022-02-22 DIAGNOSIS — T30.0 RADIATION BURN: ICD-10-CM

## 2022-02-22 LAB
BILIRUB BLD-MCNC: NEGATIVE MG/DL
CLARITY, POC: ABNORMAL
COLOR UR: YELLOW
EXPIRATION DATE: ABNORMAL
GLUCOSE UR STRIP-MCNC: NEGATIVE MG/DL
KETONES UR QL: NEGATIVE
LEUKOCYTE EST, POC: ABNORMAL
Lab: ABNORMAL
NITRITE UR-MCNC: NEGATIVE MG/ML
PH UR: 5.5 [PH] (ref 5–8)
PROT UR STRIP-MCNC: NEGATIVE MG/DL
RBC # UR STRIP: ABNORMAL /UL
SP GR UR: 1.01 (ref 1–1.03)
UROBILINOGEN UR QL: NORMAL

## 2022-02-22 PROCEDURE — 81003 URINALYSIS AUTO W/O SCOPE: CPT | Performed by: NURSE PRACTITIONER

## 2022-02-22 PROCEDURE — 87086 URINE CULTURE/COLONY COUNT: CPT | Performed by: NURSE PRACTITIONER

## 2022-02-22 PROCEDURE — 87088 URINE BACTERIA CULTURE: CPT | Performed by: NURSE PRACTITIONER

## 2022-02-22 PROCEDURE — 99213 OFFICE O/P EST LOW 20 MIN: CPT | Performed by: NURSE PRACTITIONER

## 2022-02-22 PROCEDURE — 87186 SC STD MICRODIL/AGAR DIL: CPT | Performed by: NURSE PRACTITIONER

## 2022-02-22 RX ORDER — MUPIROCIN CALCIUM 20 MG/G
1 CREAM TOPICAL 3 TIMES DAILY
Qty: 30 G | Refills: 0 | Status: SHIPPED | OUTPATIENT
Start: 2022-02-22 | End: 2022-04-14

## 2022-02-22 RX ORDER — NITROFURANTOIN 25; 75 MG/1; MG/1
100 CAPSULE ORAL 2 TIMES DAILY
Qty: 14 CAPSULE | Refills: 0 | Status: SHIPPED | OUTPATIENT
Start: 2022-02-22 | End: 2022-03-08

## 2022-02-22 NOTE — PROGRESS NOTES
Chief Complaint  Rash (Post radiation therapy, warm to touch, red, itching and burning ) and Nocturia (Discoloration)    Subjective      History of Present Illness  Dorothea Cruz presents to [facility]      Dark urine. No other urinary symptoms. Just started this week. She has felt achy, chilled. No fever.     Breast cancer, Rash/burn on the breast. She has been getting radiation treatments. She has completed them. She has some blistered areas at the left chest.            Allergies  Ace inhibitors and Sulfamethoxazole-trimethoprim    Objective     Vitals:    02/22/22 1241   BP: 134/58   Pulse: 60   Resp: 16   Temp: 97.5 °F (36.4 °C)   SpO2: 98%     Body mass index is 34.59 kg/m².     Review of Systems    Physical Exam  Vitals reviewed.   Constitutional:       Appearance: Normal appearance. She is well-developed.   Cardiovascular:      Rate and Rhythm: Normal rate and regular rhythm.      Heart sounds: Normal heart sounds. No murmur heard.      Pulmonary:      Effort: Pulmonary effort is normal.      Breath sounds: Normal breath sounds.   Skin:     Comments: Red area left upper chest, breast. Some small blistered areas left upper chest.    Neurological:      Mental Status: She is alert and oriented to person, place, and time.      Cranial Nerves: No cranial nerve deficit.      Motor: No weakness.   Psychiatric:         Mood and Affect: Mood and affect normal.              Result Review :    The following data was reviewed by: ARA Asif on 02/22/2022:       Depression: Not at risk   • PHQ-2 Score: 0       Common labs    Common Labsle 7/23/21 7/23/21 7/23/21 8/17/21 8/17/21 8/23/21 8/23/21    0928 0928 0928 1110 1110 1643 1643   Glucose 108 (A)   117 (A)  138 (A)    BUN 17   12  15    Creatinine 0.83   0.83  0.82    eGFR Non  Am 68   68  69    Sodium 137   137  139    Potassium 3.9   4.3  3.7    Chloride 97 (A)   101  99    Calcium 10.0   9.7  10.2     Albumin 4.40   4.10  4.70    Total Bilirubin 0.2   0.3  0.2    Alkaline Phosphatase 54   53  52    AST (SGOT) 23   16  23    ALT (SGPT) 16   14  20    WBC   14.85 (A)  17.77 (A)  13.57 (A)   Hemoglobin   15.9  14.8  15.4   Hematocrit   47.1 (A)  44.3  47.5 (A)   Platelets   205  211  235   Total Cholesterol  166        Triglycerides  237 (A)        HDL Cholesterol  63 (A)        LDL Cholesterol   65        (A) Abnormal value                            Assessment and Plan     Diagnoses and all orders for this visit:    1. Urine discoloration (Primary)  -     POCT urinalysis dipstick, automated  -     Urine Culture - Urine, Urine, Clean Catch  -     nitrofurantoin, macrocrystal-monohydrate, (Macrobid) 100 MG capsule; Take 1 capsule by mouth 2 (Two) Times a Day.  Dispense: 14 capsule; Refill: 0    2. Radiation burn  Comments:  Try aloe with lidocaine gel  Orders:  -     mupirocin (Bactroban) 2 % cream; Apply 1 application topically to the appropriate area as directed 3 (Three) Times a Day.  Dispense: 30 g; Refill: 0    3. Hematuria, unspecified type  Comments:  Repeat urine in 2 weeks to document resolution  Orders:  -     nitrofurantoin, macrocrystal-monohydrate, (Macrobid) 100 MG capsule; Take 1 capsule by mouth 2 (Two) Times a Day.  Dispense: 14 capsule; Refill: 0            Follow Up     Return in about 2 weeks (around 3/8/2022) for Recheck.    Patient was given instructions and counseling regarding her condition or for health maintenance advice. Please see specific information pulled into the AVS if appropriate.     Arelis Butt, ARA

## 2022-02-23 ENCOUNTER — PATIENT ROUNDING (BHMG ONLY) (OUTPATIENT)
Dept: RADIATION ONCOLOGY | Facility: HOSPITAL | Age: 72
End: 2022-02-23

## 2022-02-23 NOTE — PROGRESS NOTES
"Patient completed radiation treatment for breast cancer on 02/14/2022. Following up with patient regarding any concerns the patient may have at this time and receiving feedback in regards to patient over all care while under treatment.     Patient asked about symptoms and side effects that continue to be bothersome. Patient states \" I went to my PCP for a UTI and the redness on my breast  and they gave me silvadene cream for the burning of the breast. Its itching and burning.\" Patient continues to moisturize twice daily.     Patient states that Pain is at 0 on scale of 0/10. Patient states medications have not changed. No refills needed at this time.    Patient was asked if there was anything that could've made their experience better at [facility] while they were under treatment. Patient states: \"I don't understand why I'm burning and the redness is now after treatment.\" Explanation and reassurance given. \"thank you for all you all have done for me.\"    Patient encouraged to call the office if any concerns arise. Patient reminded of Follow up appointment on 05/20/2022 1130.  "

## 2022-02-24 LAB — BACTERIA SPEC AEROBE CULT: ABNORMAL

## 2022-03-01 ENCOUNTER — OFFICE VISIT (OUTPATIENT)
Dept: ONCOLOGY | Facility: HOSPITAL | Age: 72
End: 2022-03-01

## 2022-03-01 VITALS
BODY MASS INDEX: 34.27 KG/M2 | OXYGEN SATURATION: 96 % | DIASTOLIC BLOOD PRESSURE: 78 MMHG | WEIGHT: 187.39 LBS | SYSTOLIC BLOOD PRESSURE: 131 MMHG | TEMPERATURE: 97.8 F | RESPIRATION RATE: 16 BRPM | HEART RATE: 66 BPM

## 2022-03-01 DIAGNOSIS — C50.211 MALIGNANT NEOPLASM OF UPPER-INNER QUADRANT OF RIGHT BREAST IN FEMALE, ESTROGEN RECEPTOR NEGATIVE: Primary | ICD-10-CM

## 2022-03-01 DIAGNOSIS — Z17.1 MALIGNANT NEOPLASM OF UPPER-INNER QUADRANT OF RIGHT BREAST IN FEMALE, ESTROGEN RECEPTOR NEGATIVE: Primary | ICD-10-CM

## 2022-03-01 DIAGNOSIS — N10 ACUTE PYELONEPHRITIS: ICD-10-CM

## 2022-03-01 PROCEDURE — G0463 HOSPITAL OUTPT CLINIC VISIT: HCPCS | Performed by: INTERNAL MEDICINE

## 2022-03-01 PROCEDURE — 99214 OFFICE O/P EST MOD 30 MIN: CPT | Performed by: INTERNAL MEDICINE

## 2022-03-01 RX ORDER — LEVOFLOXACIN 750 MG/1
750 TABLET ORAL DAILY
Qty: 7 TABLET | Refills: 0 | Status: SHIPPED | OUTPATIENT
Start: 2022-03-01 | End: 2022-03-08

## 2022-03-01 RX ORDER — POLYETHYLENE GLYCOL 3350, SODIUM SULFATE, SODIUM CHLORIDE, POTASSIUM CHLORIDE, ASCORBIC ACID, SODIUM ASCORBATE 7.5-2.691G
KIT ORAL
COMMUNITY
Start: 2022-02-25 | End: 2022-04-14

## 2022-03-01 NOTE — PROGRESS NOTES
Patient  Dorothea Cruz    Location  [facility]    Chief Complaint  Breast Cancer    Referring Provider: Arelis Butt, *  PCP: Arelis Daniel APRN    Subjective     {Problem List  Visit Diagnosis   Encounters  Notes  Medications  Labs  Result Review Imaging  Media :23}     Oncology/Hematology History Overview Note     Right Triple Negative Breast Cancer:  - screening mammogram 9/10/21: 4mm asymmetry in the right upper inner breast  -Diagnostic mammogram on October 1, 2021: Persistent 5 mm ill-defined nodule in the upper inner mid right breast at 2:00, 7 cm from the nipple.  - core biopsy on 10/8/21: Invasive ductal carcinoma, grade 3, ER negative (less than 1%), AL negative (less than 1%), HER-2 equivocal (2+ HC), HER-2 FISH not amplified  - Right lumpectomy on 11/12/21. Pathology showed a 5mm tumor with negative margins, 0/3 lymph nodes involved, pT1aN0, ER-, AL-, HER2-     Breast cancer (HCC)   10/20/2021 Initial Diagnosis    Breast cancer (HCC)     Malignant neoplasm of upper-inner quadrant of right female breast (HCC)   1/13/2022 Initial Diagnosis    Malignant neoplasm of upper-inner quadrant of right female breast (HCC)     1/20/2022 -  Radiation    RADIATION THERAPY Treatment Details (Noted on 1/13/2022)  Site: Right Breast  Technique: 3D CRT  Goal: No goal specified  Planned Treatment Start Date: 1/20/2022         History of Present Illness  ***    Review of Systems   Constitutional: Positive for fatigue. Negative for appetite change, diaphoresis, fever, unexpected weight gain and unexpected weight loss.   HENT: Negative for hearing loss, sore throat and voice change.    Eyes: Negative for blurred vision, double vision, pain, redness and visual disturbance.   Respiratory: Negative for cough, shortness of breath and wheezing.    Cardiovascular: Negative for chest pain, palpitations and leg swelling.   Endocrine: Negative for cold  intolerance, heat intolerance, polydipsia and polyuria.   Genitourinary: Negative for decreased urine volume, difficulty urinating, frequency and urinary incontinence.   Musculoskeletal: Positive for back pain (lower back pian ) and joint swelling (joint pain all over ). Negative for arthralgias and myalgias.   Skin: Negative for color change, rash, skin lesions and wound.   Neurological: Positive for weakness. Negative for dizziness, seizures, numbness and headache.   Hematological: Negative for adenopathy. Does not bruise/bleed easily.   Psychiatric/Behavioral: Negative for depressed mood. The patient is not nervous/anxious.    All other systems reviewed and are negative.      Past Medical History:   Diagnosis Date   • Anxiety    • Arthritis    • Breast cancer (HCC)    • Cancer (HCC)     BREAST CANCER   • Cholelithiases 4/30@1   • Depression    • Disease of thyroid gland     currently not taking any meds   • Diverticulitis    • Diverticulitis of colon    • Fibromyalgia    • Hyperlipidemia    • Hypertension    • Major depressive disorder 05/07/2020   • Palpitations     ASYMPTOMATIC- DENIES CP/SOB, SEE PCP- NO CARDIOLOGIST    • Post-menopause 09/17/2020   • Sinus trouble    • Sleep apnea    • Vitamin D deficiency 09/17/2020     Past Surgical History:   Procedure Laterality Date   • ABDOMINAL SURGERY  6-   • APPENDECTOMY     • BILATERAL BREAST REDUCTION     • BREAST BIOPSY Right 11/12/2021    Procedure: RIGHT BREAST NEEDLE LOCALIZED  LUMPECTOMY WITH SENTINEL NODE BIOPSY;  Surgeon: Mirtha Esteves MD;  Location: [facility];  Service: General;  Laterality: Right;   • BREAST LUMPECTOMY     • CHOLECYSTECTOMY N/A 9/2/2021    Procedure: CHOLECYSTECTOMY LAPAROSCOPIC INTRAOPERATIVE CHOLANGIOGRAM;  Surgeon: Louie Medina MD;  Location: [facility] OR;  Service: General;  Laterality: N/A;   • COLONOSCOPY  2019   • HYSTERECTOMY     • TONSILLECTOMY     • WRIST ARTHROPLASTY Bilateral      Social History      Socioeconomic History   • Marital status:    Tobacco Use   • Smoking status: Passive Smoke Exposure - Never Smoker   • Smokeless tobacco: Never Used   Vaping Use   • Vaping Use: Never used   Substance and Sexual Activity   • Alcohol use: Never   • Drug use: Never   • Sexual activity: Not Currently     Partners: Male     Birth control/protection: None     Family History   Problem Relation Age of Onset   • No Known Problems Mother    • No Known Problems Father    • Breast cancer Sister    • Colon cancer Neg Hx        Objective   Physical Exam  ***    Vitals:    03/01/22 1443   BP: 131/78   Pulse: 66   Resp: 16   Temp: 97.8 °F (36.6 °C)   SpO2: 96%   Weight: 85 kg (187 lb 6.3 oz)   PainSc:   5   PainLoc: Back     ECOG score: 0         PHQ-9 Total Score:         Result Review :{Labs  Result Review  Imaging  Med Tab  Media  Procedures :23}   The following data was reviewed by: Bebe Nair MD PhD on 03/01/2022:  Lab Results   Component Value Date    HGB 15.4 08/23/2021    HCT 47.5 (H) 08/23/2021    MCV 90.6 08/23/2021     08/23/2021    WBC 13.57 (H) 08/23/2021    NEUTROABS 7.90 (H) 08/23/2021    LYMPHSABS 6.54 (H) 08/17/2021    MONOSABS 1.01 (H) 08/17/2021    EOSABS 0.19 08/17/2021    BASOSABS 0.15 08/23/2021     Lab Results   Component Value Date    GLUCOSE 138 (H) 08/23/2021    BUN 15 08/23/2021    CREATININE 0.82 08/23/2021     08/23/2021    K 3.7 08/23/2021    CL 99 08/23/2021    CO2 26.4 08/23/2021    CALCIUM 10.2 08/23/2021    PROTEINTOT 7.3 08/23/2021    ALBUMIN 4.70 08/23/2021    BILITOT 0.2 08/23/2021    ALKPHOS 52 08/23/2021    AST 23 08/23/2021    ALT 20 08/23/2021          Assessment and Plan {CC Problem List  Visit Diagnosis   ROS  Review (Popup)  Health Maintenance  Quality  BestPractice  Medications  SmartSets  SnapShot Encounters  Media :23}   Diagnoses and all orders for this visit:    1. Malignant neoplasm of upper-inner quadrant of right breast in female,  estrogen receptor negative (HCC)          Patient was given instructions and counseling regarding her condition or for health maintenance advice. Please see specific information pulled into the AVS if appropriate.     Bebe Nair MD PhD    3/1/2022

## 2022-03-03 NOTE — PROGRESS NOTES
Patient  Dorothea Cruz    Location  [facility]AT[facility]    Chief Complaint  Breast Cancer    Referring Provider: Arelis Butt, *  PCP: Arelis Daniel APRN    Subjective          Oncology/Hematology History Overview Note     Right Triple Negative Breast Cancer:  - screening mammogram 9/10/21: 4mm asymmetry in the right upper inner breast  -Diagnostic mammogram on October 1, 2021: Persistent 5 mm ill-defined nodule in the upper inner mid right breast at 2:00, 7 cm from the nipple.  - core biopsy on 10/8/21: Invasive ductal carcinoma, grade 3, ER negative (less than 1%), TN negative (less than 1%), HER-2 equivocal (2+ HC), HER-2 FISH not amplified  - Right lumpectomy on 11/12/21. Pathology showed a 5mm tumor with negative margins, 0/3 lymph nodes involved, pT1aN0, ER-, TN-, HER2-     Breast cancer (HCC)   10/20/2021 Initial Diagnosis    Breast cancer (HCC)     Malignant neoplasm of upper-inner quadrant of right female breast (HCC)   1/13/2022 Initial Diagnosis    Malignant neoplasm of upper-inner quadrant of right female breast (HCC)     1/20/2022 -  Radiation    RADIATION THERAPY Treatment Details (Noted on 1/13/2022)  Site: Right Breast  Technique: 3D CRT  Goal: No goal specified  Planned Treatment Start Date: 1/20/2022         History of Present Illness     The patient reports that she is not feeling well today. She states she has a UTI and she has been on Macrobid for it since 02/24/2022. She states that her urine was cloudy when her symptoms first started. The patient reports of feeling feverish, body aches and back pain on the right today. She states that her primary doctor is Dr. Arelis Santos. She denies any blood in her urine.     The patient states that after she finished her last radiation treatment her breast were really red and under her arm area was red too. She states that her doctor gave her medicine for that and the redness has really gone  down. She reports of no residual edema. The patient states in the middle of 03/2022 she is seeing her occupational therapist for lymphedema.      Review of Systems   Constitutional: Positive for fatigue. Negative for appetite change, diaphoresis, fever, unexpected weight gain and unexpected weight loss.   HENT: Negative for hearing loss, sore throat and voice change.    Eyes: Negative for blurred vision, double vision, pain, redness and visual disturbance.   Respiratory: Negative for cough, shortness of breath and wheezing.    Cardiovascular: Negative for chest pain, palpitations and leg swelling.   Endocrine: Negative for cold intolerance, heat intolerance, polydipsia and polyuria.   Genitourinary: Negative for decreased urine volume, difficulty urinating, frequency and urinary incontinence.   Musculoskeletal: Positive for back pain (lower back pian ) and joint swelling (joint pain all over ). Negative for arthralgias and myalgias.   Skin: Negative for color change, rash, skin lesions and wound.   Neurological: Positive for weakness. Negative for dizziness, seizures, numbness and headache.   Hematological: Negative for adenopathy. Does not bruise/bleed easily.   Psychiatric/Behavioral: Negative for depressed mood. The patient is not nervous/anxious.    All other systems reviewed and are negative.      Past Medical History:   Diagnosis Date   • Anxiety    • Arthritis    • Breast cancer (HCC)    • Cancer (HCC)     BREAST CANCER   • Cholelithiases 4/30@1   • Depression    • Disease of thyroid gland     currently not taking any meds   • Diverticulitis    • Diverticulitis of colon    • Fibromyalgia    • Hyperlipidemia    • Hypertension    • Major depressive disorder 05/07/2020   • Palpitations     ASYMPTOMATIC- DENIES CP/SOB, SEE PCP- NO CARDIOLOGIST    • Post-menopause 09/17/2020   • Sinus trouble    • Sleep apnea    • Vitamin D deficiency 09/17/2020     Past Surgical History:   Procedure Laterality Date   • ABDOMINAL  SURGERY  6-   • APPENDECTOMY     • BILATERAL BREAST REDUCTION     • BREAST BIOPSY Right 11/12/2021    Procedure: RIGHT BREAST NEEDLE LOCALIZED  LUMPECTOMY WITH SENTINEL NODE BIOPSY;  Surgeon: Mirtha Esteves MD;  Location: [facility];  Service: General;  Laterality: Right;   • BREAST LUMPECTOMY     • CHOLECYSTECTOMY N/A 9/2/2021    Procedure: CHOLECYSTECTOMY LAPAROSCOPIC INTRAOPERATIVE CHOLANGIOGRAM;  Surgeon: Louie Medina MD;  Location: [facility];  Service: General;  Laterality: N/A;   • COLONOSCOPY  2019   • HYSTERECTOMY     • TONSILLECTOMY     • WRIST ARTHROPLASTY Bilateral      Social History     Socioeconomic History   • Marital status:    Tobacco Use   • Smoking status: Passive Smoke Exposure - Never Smoker   • Smokeless tobacco: Never Used   Vaping Use   • Vaping Use: Never used   Substance and Sexual Activity   • Alcohol use: Never   • Drug use: Never   • Sexual activity: Not Currently     Partners: Male     Birth control/protection: None     Family History   Problem Relation Age of Onset   • No Known Problems Mother    • No Known Problems Father    • Breast cancer Sister    • Colon cancer Neg Hx        Objective   Physical Exam  General: Alert, cooperative, no acute distress but appears moderately uncomfortable  Eyes: Anicteric sclera, PERRLA  Respiratory: normal respiratory effort  Cardiovascular: no lower extremity edema  Renal/: Right flank pain with palpation  Skin: Normal tone, no rash, no lesions  Psychiatric: Appropriate affect, intact judgment  Neurologic: No focal sensory or motor deficits, normal cognition   Musculoskeletal: Normal muscle strength and tone  Extremities: No clubbing, cyanosis, or deformities    Vitals:    03/01/22 1443   BP: 131/78   Pulse: 66   Resp: 16   Temp: 97.8 °F (36.6 °C)   SpO2: 96%   Weight: 85 kg (187 lb 6.3 oz)   PainSc:   5   PainLoc: Back     ECOG score: 0         PHQ-9 Total Score:         Result Review :   The following data was  reviewed by: Bebe Nair MD PhD on 03/01/2022:  Lab Results   Component Value Date    HGB 14.7 03/08/2022    HCT 45.5 03/08/2022    MCV 93.4 03/08/2022     03/08/2022    WBC 11.84 (H) 03/08/2022    NEUTROABS 6.16 03/08/2022    LYMPHSABS 6.54 (H) 08/17/2021    MONOSABS 1.01 (H) 08/17/2021    EOSABS 0.12 03/08/2022    BASOSABS 0.12 03/08/2022     Lab Results   Component Value Date    GLUCOSE 115 (H) 03/08/2022    BUN 13 03/08/2022    CREATININE 0.68 03/08/2022     03/08/2022    K 3.8 03/08/2022     03/08/2022    CO2 27.5 03/08/2022    CALCIUM 9.8 03/08/2022    PROTEINTOT 7.1 03/08/2022    ALBUMIN 4.40 03/08/2022    BILITOT 0.2 03/08/2022    ALKPHOS 55 03/08/2022    AST 27 03/08/2022    ALT 23 03/08/2022          Assessment and Plan    Diagnoses and all orders for this visit:    1. Malignant neoplasm of upper-inner quadrant of right breast in female, estrogen receptor negative (HCC) (Primary)  -     CBC & Differential; Future  -     Comprehensive Metabolic Panel; Future    2. Acute pyelonephritis    Other orders  -     Discontinue: levoFLOXacin (LEVAQUIN) 750 MG tablet; Take 1 tablet by mouth Daily for 7 days.  Dispense: 7 tablet; Refill: 0        Triple Negative Breast Cancer:  - No new concerns.  Breast erythema has improved. She will be seeing our lymphedema specialist.   - follow up in 3 months with CBC and CMP prior    UTI/ acute pyelonephritis:  - The patient will start on Levaquin and follow up with her primary doctor.   - I have contacted her PCP to notify her of my concerns and the f/u plan.      Patient was given instructions and counseling regarding her condition or for health maintenance advice. Please see specific information pulled into the AVS if appropriate.     3/19/2022      Transcribed from ambient dictation for Bebe Nair MD PhD by Taylor Humes.  03/02/22   19:56 EST    Patient verbalized consent to the visit recording.

## 2022-03-08 ENCOUNTER — OFFICE VISIT (OUTPATIENT)
Dept: FAMILY MEDICINE CLINIC | Facility: CLINIC | Age: 72
End: 2022-03-08

## 2022-03-08 VITALS
HEIGHT: 62 IN | WEIGHT: 188.8 LBS | HEART RATE: 62 BPM | SYSTOLIC BLOOD PRESSURE: 128 MMHG | DIASTOLIC BLOOD PRESSURE: 70 MMHG | TEMPERATURE: 98.6 F | OXYGEN SATURATION: 96 % | RESPIRATION RATE: 12 BRPM | BODY MASS INDEX: 34.74 KG/M2

## 2022-03-08 DIAGNOSIS — R73.09 ELEVATED GLUCOSE: ICD-10-CM

## 2022-03-08 DIAGNOSIS — C50.211 MALIGNANT NEOPLASM OF UPPER-INNER QUADRANT OF RIGHT BREAST IN FEMALE, ESTROGEN RECEPTOR NEGATIVE: ICD-10-CM

## 2022-03-08 DIAGNOSIS — R53.83 FATIGUE, UNSPECIFIED TYPE: ICD-10-CM

## 2022-03-08 DIAGNOSIS — I10 PRIMARY HYPERTENSION: ICD-10-CM

## 2022-03-08 DIAGNOSIS — Z17.1 MALIGNANT NEOPLASM OF UPPER-INNER QUADRANT OF RIGHT BREAST IN FEMALE, ESTROGEN RECEPTOR NEGATIVE: ICD-10-CM

## 2022-03-08 DIAGNOSIS — N39.0 URINARY TRACT INFECTION WITHOUT HEMATURIA, SITE UNSPECIFIED: Primary | ICD-10-CM

## 2022-03-08 DIAGNOSIS — R31.9 HEMATURIA, UNSPECIFIED TYPE: ICD-10-CM

## 2022-03-08 DIAGNOSIS — E55.9 VITAMIN D DEFICIENCY: ICD-10-CM

## 2022-03-08 LAB
25(OH)D3 SERPL-MCNC: 26.9 NG/ML (ref 30–100)
ALBUMIN SERPL-MCNC: 4.4 G/DL (ref 3.5–5.2)
ALBUMIN/GLOB SERPL: 1.6 G/DL
ALP SERPL-CCNC: 55 U/L (ref 39–117)
ALT SERPL W P-5'-P-CCNC: 23 U/L (ref 1–33)
ANION GAP SERPL CALCULATED.3IONS-SCNC: 10.5 MMOL/L (ref 5–15)
AST SERPL-CCNC: 27 U/L (ref 1–32)
BILIRUB BLD-MCNC: NEGATIVE MG/DL
BILIRUB SERPL-MCNC: 0.2 MG/DL (ref 0–1.2)
BUN SERPL-MCNC: 13 MG/DL (ref 8–23)
BUN/CREAT SERPL: 19.1 (ref 7–25)
CALCIUM SPEC-SCNC: 9.8 MG/DL (ref 8.6–10.5)
CHLORIDE SERPL-SCNC: 100 MMOL/L (ref 98–107)
CHOLEST SERPL-MCNC: 152 MG/DL (ref 0–200)
CLARITY, POC: CLEAR
CO2 SERPL-SCNC: 27.5 MMOL/L (ref 22–29)
COLOR UR: YELLOW
CREAT SERPL-MCNC: 0.68 MG/DL (ref 0.57–1)
DEPRECATED RDW RBC AUTO: 52.2 FL (ref 37–54)
EGFRCR SERPLBLD CKD-EPI 2021: 93.2 ML/MIN/1.73
ERYTHROCYTE [DISTWIDTH] IN BLOOD BY AUTOMATED COUNT: 15.1 % (ref 12.3–15.4)
EXPIRATION DATE: ABNORMAL
FOLATE SERPL-MCNC: 14.4 NG/ML (ref 4.78–24.2)
GLOBULIN UR ELPH-MCNC: 2.7 GM/DL
GLUCOSE SERPL-MCNC: 115 MG/DL (ref 65–99)
GLUCOSE UR STRIP-MCNC: NEGATIVE MG/DL
HCT VFR BLD AUTO: 45.5 % (ref 34–46.6)
HDLC SERPL-MCNC: 50 MG/DL (ref 40–60)
HGB BLD-MCNC: 14.7 G/DL (ref 12–15.9)
KETONES UR QL: NEGATIVE
LDLC SERPL CALC-MCNC: 42 MG/DL (ref 0–100)
LDLC/HDLC SERPL: 0.39 {RATIO}
LEUKOCYTE EST, POC: ABNORMAL
Lab: ABNORMAL
MCH RBC QN AUTO: 30.2 PG (ref 26.6–33)
MCHC RBC AUTO-ENTMCNC: 32.3 G/DL (ref 31.5–35.7)
MCV RBC AUTO: 93.4 FL (ref 79–97)
NITRITE UR-MCNC: NEGATIVE MG/ML
PH UR: 7 [PH] (ref 5–8)
PLATELET # BLD AUTO: 166 10*3/MM3 (ref 140–450)
PMV BLD AUTO: 13.4 FL (ref 6–12)
POTASSIUM SERPL-SCNC: 3.8 MMOL/L (ref 3.5–5.2)
PROT SERPL-MCNC: 7.1 G/DL (ref 6–8.5)
PROT UR STRIP-MCNC: NEGATIVE MG/DL
RBC # BLD AUTO: 4.87 10*6/MM3 (ref 3.77–5.28)
RBC # UR STRIP: NEGATIVE /UL
SODIUM SERPL-SCNC: 138 MMOL/L (ref 136–145)
SP GR UR: 1.02 (ref 1–1.03)
TRIGL SERPL-MCNC: 412 MG/DL (ref 0–150)
TSH SERPL DL<=0.05 MIU/L-ACNC: 1.4 UIU/ML (ref 0.27–4.2)
UROBILINOGEN UR QL: NORMAL
VIT B12 BLD-MCNC: 317 PG/ML (ref 211–946)
VLDLC SERPL-MCNC: 60 MG/DL (ref 5–40)
WBC NRBC COR # BLD: 11.84 10*3/MM3 (ref 3.4–10.8)

## 2022-03-08 PROCEDURE — 36415 COLL VENOUS BLD VENIPUNCTURE: CPT | Performed by: NURSE PRACTITIONER

## 2022-03-08 PROCEDURE — 84443 ASSAY THYROID STIM HORMONE: CPT | Performed by: NURSE PRACTITIONER

## 2022-03-08 PROCEDURE — 82746 ASSAY OF FOLIC ACID SERUM: CPT | Performed by: NURSE PRACTITIONER

## 2022-03-08 PROCEDURE — 85025 COMPLETE CBC W/AUTO DIFF WBC: CPT | Performed by: NURSE PRACTITIONER

## 2022-03-08 PROCEDURE — 80053 COMPREHEN METABOLIC PANEL: CPT | Performed by: NURSE PRACTITIONER

## 2022-03-08 PROCEDURE — 81003 URINALYSIS AUTO W/O SCOPE: CPT | Performed by: NURSE PRACTITIONER

## 2022-03-08 PROCEDURE — 80061 LIPID PANEL: CPT | Performed by: NURSE PRACTITIONER

## 2022-03-08 PROCEDURE — 82607 VITAMIN B-12: CPT | Performed by: NURSE PRACTITIONER

## 2022-03-08 PROCEDURE — 87086 URINE CULTURE/COLONY COUNT: CPT | Performed by: NURSE PRACTITIONER

## 2022-03-08 PROCEDURE — 85007 BL SMEAR W/DIFF WBC COUNT: CPT | Performed by: NURSE PRACTITIONER

## 2022-03-08 PROCEDURE — 99214 OFFICE O/P EST MOD 30 MIN: CPT | Performed by: NURSE PRACTITIONER

## 2022-03-08 PROCEDURE — 82306 VITAMIN D 25 HYDROXY: CPT | Performed by: NURSE PRACTITIONER

## 2022-03-08 RX ORDER — HYDROCHLOROTHIAZIDE 25 MG/1
25 TABLET ORAL DAILY
Qty: 90 TABLET | Refills: 0 | Status: CANCELLED | OUTPATIENT
Start: 2022-03-08

## 2022-03-08 RX ORDER — HYDROCHLOROTHIAZIDE 25 MG/1
25 TABLET ORAL DAILY
Qty: 90 TABLET | Refills: 1 | Status: SHIPPED | OUTPATIENT
Start: 2022-03-08 | End: 2022-09-20 | Stop reason: SDUPTHER

## 2022-03-08 NOTE — PROGRESS NOTES
Chief Complaint  Follow-up (UTI) and Hypertension (Refill HCTZ)    Subjective      History of Present Illness  Dorothea Cruz presents to [facility]  F/u on UTI. She saw hematology/oncology for a routine visit. She was still having symptoms so she was changed to levaquin. She couldn't tolerate the levaquin so she stopped it after 3 days. She had hives, headache and nausea. She was still taking the original antibiotic and she has completed that. She is no longer having any symptoms.     She is due for routine labs.     Htn, she needs refills on hctz.    Breast cancer, she has completed radiation treatment. The burn is improving.     Dorothea Cruz  reports that she is a non-smoker but has been exposed to tobacco smoke. She has been exposed to 0.00 packs per day for the past 0.00 years. She has never used smokeless tobacco..         Allergies  Ace inhibitors, Sulfamethoxazole-trimethoprim, and Levaquin [levofloxacin]    Objective     Vitals:    03/08/22 1305   BP: 128/70   Pulse: 62   Resp: 12   Temp: 98.6 °F (37 °C)   SpO2: 96%     Body mass index is 34.53 kg/m².     Review of Systems    Physical Exam  Vitals reviewed.   Constitutional:       Appearance: Normal appearance. She is well-developed.   HENT:      Head: Normocephalic and atraumatic.   Neck:      Vascular: No carotid bruit.   Cardiovascular:      Rate and Rhythm: Normal rate and regular rhythm.      Heart sounds: Normal heart sounds. No murmur heard.  Pulmonary:      Effort: Pulmonary effort is normal.      Breath sounds: Normal breath sounds.   Abdominal:      General: Abdomen is flat. There is no distension.      Palpations: Abdomen is soft.      Tenderness: There is no abdominal tenderness.   Musculoskeletal:      Cervical back: Normal range of motion. No tenderness.   Skin:     General: Skin is warm and dry.   Neurological:      Mental Status: She is alert and oriented to person, place, and time.      Cranial  Nerves: No cranial nerve deficit.      Motor: No weakness.   Psychiatric:         Mood and Affect: Mood and affect normal.         Result Review :    The following data was reviewed by: ARA Asif on 03/08/2022:    Depression: Not at risk   • PHQ-2 Score: 0       Common labs    Common Labsle 7/23/21 7/23/21 7/23/21 8/17/21 8/17/21 8/23/21 8/23/21    0928 0928 0928 1110 1110 1643 1643   Glucose 108 (A)   117 (A)  138 (A)    BUN 17   12  15    Creatinine 0.83   0.83  0.82    eGFR Non  Am 68   68  69    Sodium 137   137  139    Potassium 3.9   4.3  3.7    Chloride 97 (A)   101  99    Calcium 10.0   9.7  10.2    Albumin 4.40   4.10  4.70    Total Bilirubin 0.2   0.3  0.2    Alkaline Phosphatase 54   53  52    AST (SGOT) 23   16  23    ALT (SGPT) 16   14  20    WBC   14.85 (A)  17.77 (A)  13.57 (A)   Hemoglobin   15.9  14.8  15.4   Hematocrit   47.1 (A)  44.3  47.5 (A)   Platelets   205  211  235   Total Cholesterol  166        Triglycerides  237 (A)        HDL Cholesterol  63 (A)        LDL Cholesterol   65        (A) Abnormal value                            Assessment and Plan     Diagnoses and all orders for this visit:    1. Urinary tract infection without hematuria, site unspecified (Primary)  -     CBC & Differential  -     Urine Culture - Urine, Urine, Clean Catch    2. Hematuria, unspecified type  -     POCT urinalysis dipstick, automated    3. Primary hypertension  -     hydroCHLOROthiazide (HYDRODIURIL) 25 MG tablet; Take 1 tablet by mouth Daily.  Dispense: 90 tablet; Refill: 1  -     Comprehensive Metabolic Panel  -     Lipid Panel    4. Vitamin D deficiency  -     Vitamin D 25 Hydroxy    5. Fatigue, unspecified type  -     TSH  -     Vitamin B12  -     Folate    6. Malignant neoplasm of upper-inner quadrant of right breast in female, estrogen receptor negative (HCC)  Comments:  radiation completed.             Follow Up     Return in about 6 months (around 9/8/2022).    Patient was  given instructions and counseling regarding her condition or for health maintenance advice. Please see specific information pulled into the AVS if appropriate.     ARA Asif

## 2022-03-09 LAB
BASOPHILS # BLD MANUAL: 0.12 10*3/MM3 (ref 0–0.2)
BASOPHILS NFR BLD MANUAL: 1 % (ref 0–1.5)
EOSINOPHIL # BLD MANUAL: 0.12 10*3/MM3 (ref 0–0.4)
EOSINOPHIL NFR BLD MANUAL: 1 % (ref 0.3–6.2)
LAB AP CASE REPORT: NORMAL
LYMPHOCYTES # BLD MANUAL: 5.21 10*3/MM3 (ref 0.7–3.1)
LYMPHOCYTES NFR BLD MANUAL: 2 % (ref 5–12)
MONOCYTES # BLD: 0.24 10*3/MM3 (ref 0.1–0.9)
NEUTROPHILS # BLD AUTO: 6.16 10*3/MM3 (ref 1.7–7)
NEUTROPHILS NFR BLD MANUAL: 52 % (ref 42.7–76)
PATH REPORT.FINAL DX SPEC: NORMAL
PATHOLOGY REVIEW: YES
PLAT MORPH BLD: NORMAL
RBC MORPH BLD: NORMAL
VARIANT LYMPHS NFR BLD MANUAL: 10 % (ref 0–5)
VARIANT LYMPHS NFR BLD MANUAL: 34 % (ref 19.6–45.3)
WBC MORPH BLD: NORMAL

## 2022-03-10 LAB — BACTERIA SPEC AEROBE CULT: NO GROWTH

## 2022-03-14 ENCOUNTER — LAB (OUTPATIENT)
Dept: LAB | Facility: HOSPITAL | Age: 72
End: 2022-03-14

## 2022-03-14 DIAGNOSIS — R73.09 ELEVATED GLUCOSE: ICD-10-CM

## 2022-03-14 PROCEDURE — 36415 COLL VENOUS BLD VENIPUNCTURE: CPT

## 2022-03-14 PROCEDURE — 83036 HEMOGLOBIN GLYCOSYLATED A1C: CPT | Performed by: NURSE PRACTITIONER

## 2022-03-15 LAB — HBA1C MFR BLD: 6.2 % (ref 4.8–5.6)

## 2022-03-19 PROBLEM — N10 ACUTE PYELONEPHRITIS: Status: ACTIVE | Noted: 2022-03-19

## 2022-03-31 ENCOUNTER — HOSPITAL ENCOUNTER (OUTPATIENT)
Dept: OCCUPATIONAL THERAPY | Facility: HOSPITAL | Age: 72
Discharge: HOME | End: 2022-03-31
Admitting: INTERNAL MEDICINE

## 2022-03-31 DIAGNOSIS — L90.5 SCAR TISSUE: ICD-10-CM

## 2022-03-31 DIAGNOSIS — Z91.89 AT RISK FOR LYMPHEDEMA: Primary | ICD-10-CM

## 2022-03-31 PROCEDURE — 97110 THERAPEUTIC EXERCISES: CPT

## 2022-03-31 PROCEDURE — 93702 BIS XTRACELL FLUID ANALYSIS: CPT

## 2022-03-31 PROCEDURE — 97535 SELF CARE MNGMENT TRAINING: CPT

## 2022-04-05 DIAGNOSIS — G62.9 NEUROPATHY: ICD-10-CM

## 2022-04-05 DIAGNOSIS — M79.7 FIBROMYALGIA: ICD-10-CM

## 2022-04-05 RX ORDER — GABAPENTIN 300 MG/1
300 CAPSULE ORAL 3 TIMES DAILY PRN
Qty: 90 CAPSULE | Refills: 2 | OUTPATIENT
Start: 2022-04-05

## 2022-04-05 NOTE — TELEPHONE ENCOUNTER
Caller: Dorothea Cruz    Relationship: Self    Best call back number: [phone]    Requested Prescriptions:   Requested Prescriptions     Pending Prescriptions Disp Refills   • gabapentin (NEURONTIN) 300 MG capsule 90 capsule 2     Sig: Take 1 capsule by mouth 3 (Three) Times a Day As Needed (neuropathy).        Pharmacy where request should be sent: [facility] [place]  [address] [phone] [facility] [phone] FX     Additional details provided by patient: PATIENT CURRENTLY HAS 5 LEFT.    Does the patient have less than a 3 day supply:  [] Yes  [x] No    Jackelyn Granados Rep   04/05/22 10:19 EDT

## 2022-04-12 RX ORDER — MONTELUKAST SODIUM 4 MG/1
2 TABLET, CHEWABLE ORAL 2 TIMES DAILY
Qty: 56 TABLET | Refills: 0 | Status: SHIPPED | OUTPATIENT
Start: 2022-04-12 | End: 2022-04-14 | Stop reason: SDUPTHER

## 2022-04-12 NOTE — TELEPHONE ENCOUNTER
Pt called asking for refills on Colestipol ahead of appointment on Thursday April 14th, 2022. Refills approved for 2 week supply. Pt notified by phone of refills and verbalized understanding.

## 2022-04-12 NOTE — TELEPHONE ENCOUNTER
LVM to contact our office-Does not have less than a 3 day supple and is scheduled two days from now. Wanted to make sure she has enough to last until her apt.

## 2022-04-12 NOTE — TELEPHONE ENCOUNTER
Caller: Dorothea Cruz    Relationship: Self    Best call back number: [phone]    Requested Prescriptions:   Requested Prescriptions     Pending Prescriptions Disp Refills   • colestipol (Colestid) 1 g tablet 90 tablet 3     Sig: Take 2 tablets by mouth 2 (Two) Times a Day.        Pharmacy where request should be sent: [place], [place] - [address] - [phone]  - [phone] FX     Additional details provided by patient: PATIENT ONLY HAS 2 DAYS OF MEDICATION LEFT.     Does the patient have less than a 3 day supply:  [] Yes  [x] No    Jackelyn Martinez Rep   04/12/22 09:25 EDT

## 2022-04-14 ENCOUNTER — OFFICE VISIT (OUTPATIENT)
Dept: FAMILY MEDICINE CLINIC | Facility: CLINIC | Age: 72
End: 2022-04-14

## 2022-04-14 VITALS
TEMPERATURE: 96.7 F | OXYGEN SATURATION: 95 % | DIASTOLIC BLOOD PRESSURE: 55 MMHG | WEIGHT: 182.5 LBS | SYSTOLIC BLOOD PRESSURE: 128 MMHG | RESPIRATION RATE: 16 BRPM | BODY MASS INDEX: 33.58 KG/M2 | HEART RATE: 81 BPM | HEIGHT: 62 IN

## 2022-04-14 DIAGNOSIS — I10 ESSENTIAL HYPERTENSION: ICD-10-CM

## 2022-04-14 DIAGNOSIS — F41.9 ANXIETY: ICD-10-CM

## 2022-04-14 DIAGNOSIS — E78.2 MIXED HYPERLIPIDEMIA: ICD-10-CM

## 2022-04-14 DIAGNOSIS — G62.9 NEUROPATHY: ICD-10-CM

## 2022-04-14 DIAGNOSIS — E55.9 VITAMIN D DEFICIENCY: ICD-10-CM

## 2022-04-14 DIAGNOSIS — M79.7 FIBROMYALGIA: Primary | ICD-10-CM

## 2022-04-14 DIAGNOSIS — Z51.81 ENCOUNTER FOR MEDICATION MONITORING: ICD-10-CM

## 2022-04-14 DIAGNOSIS — F32.1 CURRENT MODERATE EPISODE OF MAJOR DEPRESSIVE DISORDER, UNSPECIFIED WHETHER RECURRENT: ICD-10-CM

## 2022-04-14 DIAGNOSIS — R19.7 DIARRHEA, UNSPECIFIED TYPE: ICD-10-CM

## 2022-04-14 PROCEDURE — G0480 DRUG TEST DEF 1-7 CLASSES: HCPCS | Performed by: NURSE PRACTITIONER

## 2022-04-14 PROCEDURE — 80305 DRUG TEST PRSMV DIR OPT OBS: CPT | Performed by: NURSE PRACTITIONER

## 2022-04-14 PROCEDURE — 99214 OFFICE O/P EST MOD 30 MIN: CPT | Performed by: NURSE PRACTITIONER

## 2022-04-14 PROCEDURE — 80171 DRUG SCREEN QUANT GABAPENTIN: CPT | Performed by: NURSE PRACTITIONER

## 2022-04-14 RX ORDER — MONTELUKAST SODIUM 4 MG/1
2 TABLET, CHEWABLE ORAL 2 TIMES DAILY
Qty: 360 TABLET | Refills: 1 | Status: SHIPPED | OUTPATIENT
Start: 2022-04-14 | End: 2022-08-24 | Stop reason: SDUPTHER

## 2022-04-14 RX ORDER — GABAPENTIN 300 MG/1
300 CAPSULE ORAL 3 TIMES DAILY PRN
Qty: 90 CAPSULE | Refills: 2 | Status: SHIPPED | OUTPATIENT
Start: 2022-04-14 | End: 2022-09-20 | Stop reason: SDUPTHER

## 2022-04-14 RX ORDER — ERGOCALCIFEROL 1.25 MG/1
50000 CAPSULE ORAL WEEKLY
Qty: 13 CAPSULE | Refills: 1 | Status: SHIPPED | OUTPATIENT
Start: 2022-04-14 | End: 2022-09-20 | Stop reason: SDUPTHER

## 2022-04-14 RX ORDER — DULOXETIN HYDROCHLORIDE 60 MG/1
60 CAPSULE, DELAYED RELEASE ORAL DAILY
Qty: 90 CAPSULE | Refills: 1 | Status: SHIPPED | OUTPATIENT
Start: 2022-04-14 | End: 2022-09-20 | Stop reason: SDUPTHER

## 2022-04-14 RX ORDER — ROSUVASTATIN CALCIUM 20 MG/1
20 TABLET, COATED ORAL NIGHTLY
Qty: 90 TABLET | Refills: 1 | Status: SHIPPED | OUTPATIENT
Start: 2022-04-14 | End: 2022-09-20 | Stop reason: SDUPTHER

## 2022-04-14 NOTE — PROGRESS NOTES
Chief Complaint  Follow-up (Medication refills), Hyperlipidemia, Depression, and Hypertension    Subjective          Dorothea Cruz presents to [facility]  History of Present Illness  Fibromyalgia/anxiety: She is taking her Cymbalta and her gabapentin.  She said she is taking her gabapentin sometimes 3 times a day sometimes once sometimes twice.  She does not have a set time of if she takes 1, 2, 3.  She takes it for the fibromyalgia.  She also is taking Colestid for the diarrhea and that has helped tremendously.  She needs a refill of her vitamin D and we will check labs today.  She is on her hydrochlorothiazide and metoprolol daily for blood pressure.  She is taking her Crestor daily for cholesterol.      Allergies  Ace inhibitors, Sulfamethoxazole-trimethoprim, and Levaquin [levofloxacin]    Social History     Tobacco Use   • Smoking status: Passive Smoke Exposure - Never Smoker   • Smokeless tobacco: Never Used   Vaping Use   • Vaping Use: Never used   Substance Use Topics   • Alcohol use: Never   • Drug use: Never       Family History   Problem Relation Age of Onset   • No Known Problems Mother    • No Known Problems Father    • Breast cancer Sister    • Colon cancer Neg Hx         Health Maintenance Due   Topic Date Due   • TDAP/TD VACCINES (1 - Tdap) Never done   • ZOSTER VACCINE (1 of 2) Never done   • COVID-19 Vaccine (3 - Moderna risk 4-dose series) 05/20/2021   • HEPATITIS C SCREENING  Never done   • ANNUAL WELLNESS VISIT  Never done   • Pneumococcal Vaccine 65+ (2 - PPSV23 or PCV20) 09/17/2021        Immunization History   Administered Date(s) Administered   • COVID-19 (MODERNA) 1st, 2nd, 3rd Dose Only 03/25/2021, 04/22/2021   • FLUAD TRI 65YR+ 12/27/2019   • Fluzone High Dose =>65 Years ([employer] ONLY) 11/24/2018   • Fluzone High-Dose 65+yrs 10/25/2021   • Influenza, Unspecified 09/17/2020   • Pneumococcal Conjugate 13-Valent (PCV13) 09/17/2020       Review of  "Systems   Constitutional: Negative for fatigue.   Respiratory: Negative for cough and shortness of breath.    Cardiovascular: Negative for chest pain.   Gastrointestinal: Negative for diarrhea, nausea and vomiting.        Objective       Vitals:    04/14/22 1035   BP: 128/55   Pulse: 81   Resp: 16   Temp: 96.7 °F (35.9 °C)   SpO2: 95%   Weight: 82.8 kg (182 lb 8 oz)   Height: 157.5 cm (62\")       Body mass index is 33.38 kg/m².         Physical Exam  Vitals reviewed.   Constitutional:       Appearance: Normal appearance. She is well-developed.   Cardiovascular:      Rate and Rhythm: Normal rate and regular rhythm.      Heart sounds: Normal heart sounds. No murmur heard.  Pulmonary:      Effort: Pulmonary effort is normal.      Breath sounds: Normal breath sounds.   Neurological:      Mental Status: She is alert and oriented to person, place, and time.      Cranial Nerves: No cranial nerve deficit.      Motor: No weakness.   Psychiatric:         Mood and Affect: Mood and affect normal.             Result Review :     The following data was reviewed by: ARA Crews on 04/14/2022:    Common labs    Common Labsle 8/23/21 8/23/21 3/8/22 3/8/22 3/8/22 3/14/22    1643 1643 1336 1336 1336    Glucose 138 (A)   115 (A)     BUN 15   13     Creatinine 0.82   0.68     eGFR Non African Am 69        Sodium 139   138     Potassium 3.7   3.8     Chloride 99   100     Calcium 10.2   9.8     Albumin 4.70   4.40     Total Bilirubin 0.2   0.2     Alkaline Phosphatase 52   55     AST (SGOT) 23   27     ALT (SGPT) 20   23     WBC  13.57 (A) 11.84 (A)      Hemoglobin  15.4 14.7      Hematocrit  47.5 (A) 45.5      Platelets  235 166      Total Cholesterol     152    Triglycerides     412 (A)    HDL Cholesterol     50    LDL Cholesterol      42    Hemoglobin A1C      6.20 (A)   (A) Abnormal value            Most Recent A1C    HGBA1C Most Recent 3/14/22   Hemoglobin A1C 6.20 (A)   (A) Abnormal value            Amphetamine " Screen, Urine   Date Value Ref Range Status   04/14/2022 Negative Negative Final     AMP INTERNAL CONTROL   Date Value Ref Range Status   04/14/2022 Passed Passed Final     Barbiturates Screen, Urine   Date Value Ref Range Status   04/14/2022 Negative Negative Final     Buprenorphine, Screen, Urine   Date Value Ref Range Status   04/14/2022 Negative Negative Final     BUPRENORPHINE INTERNAL CONTROL   Date Value Ref Range Status   04/14/2022 Passed Passed Final     Benzodiazepine Screen, Urine   Date Value Ref Range Status   04/14/2022 Negative Negative Final     BENZODIAZEPINE INTERNAL CONTROL   Date Value Ref Range Status   04/14/2022 Passed Passed Final     Cocaine Screen, Urine   Date Value Ref Range Status   04/14/2022 Negative Negative Final   04/26/2021 Negative  Final     COCAINE INTERNAL CONTROL   Date Value Ref Range Status   04/14/2022 Passed Passed Final     MDMA (ECSTASY)   Date Value Ref Range Status   04/14/2022 Negative Negative Final     MDMA (ECSTASY) INTERNAL CONTROL   Date Value Ref Range Status   04/14/2022 Passed Passed Final     Methamphetamine, Ur   Date Value Ref Range Status   04/14/2022 Negative Negative Final     METHAMPHETAMINE INTERNAL CONTROL   Date Value Ref Range Status   04/14/2022 Passed Passed Final     Methadone Screen, Urine   Date Value Ref Range Status   04/14/2022 Negative Negative Final     OPIATES INTERNAL CONTROL   Date Value Ref Range Status   04/14/2022 Passed Passed Final     Oxycodone Screen, Urine   Date Value Ref Range Status   04/14/2022 Negative Negative Final     OXYCODONE INTERNAL CONTROL   Date Value Ref Range Status   04/14/2022 Passed Passed Final     PHENCYCLIDINE INTERNAL CONTROL   Date Value Ref Range Status   04/14/2022 Passed Passed Final     THC, Screen, Urine   Date Value Ref Range Status   04/14/2022 Negative Negative Final     THC INTERNAL CONTROL   Date Value Ref Range Status   04/14/2022 Passed Passed Final     Lot Number   Date Value Ref Range  Status   04/14/2022 [id number]  Final     Expiration Date   Date Value Ref Range Status   04/14/2022 03/31/2023  Final                    Assessment and Plan      Diagnoses and all orders for this visit:    1. Fibromyalgia (Primary)  -     DULoxetine (CYMBALTA) 60 MG capsule; Take 1 capsule by mouth Daily.  Dispense: 90 capsule; Refill: 1  -     gabapentin (NEURONTIN) 300 MG capsule; Take 1 capsule by mouth 3 (Three) Times a Day As Needed (neuropathy).  Dispense: 90 capsule; Refill: 2  -     POC Urine Drug Screen [facility]  -     Gabapentin level    2. Anxiety  -     DULoxetine (CYMBALTA) 60 MG capsule; Take 1 capsule by mouth Daily.  Dispense: 90 capsule; Refill: 1    3. Current moderate episode of major depressive disorder, unspecified whether recurrent (HCC)  -     DULoxetine (CYMBALTA) 60 MG capsule; Take 1 capsule by mouth Daily.  Dispense: 90 capsule; Refill: 1    4. Vitamin D deficiency  -     ergocalciferol (ERGOCALCIFEROL) 1.25 MG (56150 UT) capsule; Take 1 capsule by mouth 1 (One) Time Per Week.  Dispense: 13 capsule; Refill: 1    5. Essential hypertension  -     metoprolol tartrate (LOPRESSOR) 25 MG tablet; Take 1 tablet by mouth 2 (Two) Times a Day.  Dispense: 180 tablet; Refill: 1    6. Mixed hyperlipidemia  -     rosuvastatin (CRESTOR) 20 MG tablet; Take 1 tablet by mouth Every Night.  Dispense: 90 tablet; Refill: 1    7. Diarrhea, unspecified type  -     colestipol (Colestid) 1 g tablet; Take 2 tablets by mouth 2 (Two) Times a Day for 90 days.  Dispense: 360 tablet; Refill: 1    8. Neuropathy  -     gabapentin (NEURONTIN) 300 MG capsule; Take 1 capsule by mouth 3 (Three) Times a Day As Needed (neuropathy).  Dispense: 90 capsule; Refill: 2  -     Gabapentin level    9. Encounter for medication monitoring  -     POC Urine Drug Screen [facility]  -     Gabapentin level  -     Barbiturate, Urine, Confirmation - Urine, Clean Catch            Follow Up     Return in about 6 months (around  10/14/2022).  I have verified all medication on patient's medication list.  We will follow-up in 6 months for regular appointment.  Discussed about gabapentin and she is not to share with anyone with her medications.  Pablito is negative as she gets her prescriptions from [facility] who is not required to do Pablito.  She is aware to bring her bottle with her the next appointment.  Per her urine we will send out for confirmation on barbiturates there is a faint negative line.  For the gabapentin she will need to follow-up when she needs a refill.  If she is not taking 3 times a day then she could go well over 4 to 5 months with a refill.  Patient was given instructions and counseling regarding her condition or for health maintenance advice. Please see specific information pulled into the AVS if appropriate.         Lesly Barcenas, APRN  04/14/2022

## 2022-04-18 ENCOUNTER — TELEPHONE (OUTPATIENT)
Dept: FAMILY MEDICINE CLINIC | Facility: CLINIC | Age: 72
End: 2022-04-18

## 2022-04-18 LAB — GABAPENTIN SERPLBLD-MCNC: 2.7 UG/ML (ref 4–16)

## 2022-04-20 ENCOUNTER — TELEPHONE (OUTPATIENT)
Dept: FAMILY MEDICINE CLINIC | Facility: CLINIC | Age: 72
End: 2022-04-20

## 2022-04-20 NOTE — TELEPHONE ENCOUNTER
Caller: Dorothea Cruz    Relationship: Self    Best call back number:[phone]     What is the best time to reach you: ANYTIME    Who are you requesting to speak with (clinical staff, provider,  specific staff member): CLINICAL     Do you know the name of the person who called: DENIS    What was the call regarding: CALLED REGARDING STRENGTH OF ONE OF THE MEDS SHE CALLED IN    Do you require a callback: YES, PLEASE CALL AND ADVISE      HUB UNABLE TO WARM TRANSFER

## 2022-04-25 LAB — BARBITURATES UR QL: POSITIVE

## 2022-04-26 ENCOUNTER — TELEPHONE (OUTPATIENT)
Dept: FAMILY MEDICINE CLINIC | Facility: CLINIC | Age: 72
End: 2022-04-26

## 2022-04-26 NOTE — TELEPHONE ENCOUNTER
Caller: Dorothea Cruz    Relationship: Self    Best call back number: [phone]    Requested Prescriptions:   Requested Prescriptions      No prescriptions requested or ordered in this encounter    PRIMIDONE 50 MG, TAKE ONE TABLET DAILY AT NIGHT    Pharmacy where request should be sent:    [facility]  [address] [place] [address]  [phone]    Does the patient have less than a 3 day supply:  [] Yes  [x] No    Jackelyn Guzman Rep   04/26/22 11:10 EDT

## 2022-04-26 NOTE — TELEPHONE ENCOUNTER
I have not rx that for the pt.  Where did she get it?  It is not coming up on her med list that I can see--sussy verify with [facility] who rx'd it for her last.

## 2022-05-09 ENCOUNTER — HOSPITAL ENCOUNTER (OUTPATIENT)
Facility: HOSPITAL | Age: 72
Setting detail: HOSPITAL OUTPATIENT SURGERY
Discharge: HOME | End: 2022-05-09
Attending: INTERNAL MEDICINE | Admitting: INTERNAL MEDICINE

## 2022-05-09 ENCOUNTER — ANESTHESIA (OUTPATIENT)
Dept: GASTROENTEROLOGY | Facility: HOSPITAL | Age: 72
End: 2022-05-09

## 2022-05-09 ENCOUNTER — ANESTHESIA EVENT (OUTPATIENT)
Dept: GASTROENTEROLOGY | Facility: HOSPITAL | Age: 72
End: 2022-05-09

## 2022-05-09 VITALS
SYSTOLIC BLOOD PRESSURE: 110 MMHG | BODY MASS INDEX: 32.82 KG/M2 | HEART RATE: 79 BPM | RESPIRATION RATE: 17 BRPM | WEIGHT: 179.45 LBS | TEMPERATURE: 97 F | OXYGEN SATURATION: 98 % | DIASTOLIC BLOOD PRESSURE: 59 MMHG

## 2022-05-09 DIAGNOSIS — Z87.19 HISTORY OF DIVERTICULITIS: ICD-10-CM

## 2022-05-09 DIAGNOSIS — Z90.49 HISTORY OF CHOLECYSTECTOMY: ICD-10-CM

## 2022-05-09 DIAGNOSIS — Z12.11 SCREENING FOR COLON CANCER: ICD-10-CM

## 2022-05-09 DIAGNOSIS — R19.4 ALTERED BOWEL HABITS: ICD-10-CM

## 2022-05-09 DIAGNOSIS — R19.7 DIARRHEA, UNSPECIFIED TYPE: ICD-10-CM

## 2022-05-09 PROCEDURE — 88305 TISSUE EXAM BY PATHOLOGIST: CPT | Performed by: INTERNAL MEDICINE

## 2022-05-09 PROCEDURE — 45380 COLONOSCOPY AND BIOPSY: CPT | Performed by: INTERNAL MEDICINE

## 2022-05-09 PROCEDURE — 25010000002 PROPOFOL 10 MG/ML EMULSION: Performed by: NURSE ANESTHETIST, CERTIFIED REGISTERED

## 2022-05-09 RX ORDER — SODIUM CHLORIDE, SODIUM LACTATE, POTASSIUM CHLORIDE, CALCIUM CHLORIDE 600; 310; 30; 20 MG/100ML; MG/100ML; MG/100ML; MG/100ML
30 INJECTION, SOLUTION INTRAVENOUS CONTINUOUS
Status: DISCONTINUED | OUTPATIENT
Start: 2022-05-09 | End: 2022-05-09 | Stop reason: HOSPADM

## 2022-05-09 RX ORDER — PROPOFOL 10 MG/ML
VIAL (ML) INTRAVENOUS AS NEEDED
Status: DISCONTINUED | OUTPATIENT
Start: 2022-05-09 | End: 2022-05-09 | Stop reason: SURG

## 2022-05-09 RX ORDER — LIDOCAINE HYDROCHLORIDE 20 MG/ML
INJECTION, SOLUTION EPIDURAL; INFILTRATION; INTRACAUDAL; PERINEURAL AS NEEDED
Status: DISCONTINUED | OUTPATIENT
Start: 2022-05-09 | End: 2022-05-09 | Stop reason: SURG

## 2022-05-09 RX ADMIN — PROPOFOL 150 MCG/KG/MIN: 10 INJECTION, EMULSION INTRAVENOUS at 12:10

## 2022-05-09 RX ADMIN — PROPOFOL 100 MG: 10 INJECTION, EMULSION INTRAVENOUS at 12:08

## 2022-05-09 RX ADMIN — LIDOCAINE HYDROCHLORIDE 60 MG: 20 INJECTION, SOLUTION EPIDURAL; INFILTRATION; INTRACAUDAL; PERINEURAL at 12:08

## 2022-05-09 RX ADMIN — SODIUM CHLORIDE, POTASSIUM CHLORIDE, SODIUM LACTATE AND CALCIUM CHLORIDE 30 ML/HR: 600; 310; 30; 20 INJECTION, SOLUTION INTRAVENOUS at 11:46

## 2022-05-09 NOTE — ANESTHESIA POSTPROCEDURE EVALUATION
Patient: Dorothea Cruz    Procedure Summary     Date: 05/09/22 Room / Location: [facility] 2 / [facility]    Anesthesia Start: 1206 Anesthesia Stop: 1231    Procedure: COLONOSCOPY WITH BIOPSIES (N/A ) Diagnosis:       Screening for colon cancer      History of diverticulitis      History of cholecystectomy      Altered bowel habits      Diarrhea, unspecified type      (Screening for colon cancer [Z12.11])      (History of diverticulitis [Z87.19])      (History of cholecystectomy [Z90.49])      (Altered bowel habits [R19.4])      (Diarrhea, unspecified type [R19.7])    Surgeons: Butch Garcia MD Provider: Stoney Rai MD    Anesthesia Type: general ASA Status: 2          Anesthesia Type: general    Vitals  No vitals data found for the desired time range.          Post Anesthesia Care and Evaluation    Patient location during evaluation: bedside  Patient participation: complete - patient participated  Level of consciousness: awake and alert  Pain management: adequate  Airway patency: patent  Anesthetic complications: No anesthetic complications  PONV Status: none  Cardiovascular status: acceptable  Respiratory status: acceptable  Hydration status: acceptable    Comments: An Anesthesiologist personally participated in the most demanding procedures (including induction and emergence if applicable) in the anesthesia plan, monitored the course of anesthesia administration at frequent intervals and remained physically present and available for immediate diagnosis and treatment of emergencies.

## 2022-05-09 NOTE — ANESTHESIA PREPROCEDURE EVALUATION
Anesthesia Evaluation     Patient summary reviewed and Nursing notes reviewed   no history of anesthetic complications:  NPO Solid Status: > 8 hours  NPO Liquid Status: > 2 hours           Airway   Mallampati: II  TM distance: >3 FB  Neck ROM: full  No difficulty expected  Dental      Pulmonary - normal exam    breath sounds clear to auscultation  (+) sleep apnea on CPAP,   Cardiovascular - normal exam  Exercise tolerance: good (4-7 METS)    Rhythm: regular  Rate: normal    (+) hypertension, hyperlipidemia,       Neuro/Psych- negative ROS  GI/Hepatic/Renal/Endo    (+)   renal disease, thyroid problem hypothyroidism    Musculoskeletal     Abdominal    Substance History - negative use     OB/GYN negative ob/gyn ROS         Other   arthritis,                      Anesthesia Plan    ASA 2     general   (Total IV Anesthesia    Patient understands anesthesia not responsible for dental damage.  )  intravenous induction     Anesthetic plan, all risks, benefits, and alternatives have been provided, discussed and informed consent has been obtained with: patient.    Plan discussed with CRNA.        CODE STATUS:

## 2022-05-09 NOTE — H&P
Pre Procedure History & Physical    Chief Complaint:   Diarrhea  Altered bowel habits    Subjective     HPI:   As above    Past Medical History:   Past Medical History:   Diagnosis Date   • Anxiety    • Arthritis    • Breast cancer (HCC)    • Cancer (HCC)     BREAST CANCER   • Cholelithiases 4/30@1   • Depression    • Disease of thyroid gland     currently not taking any meds   • Diverticulitis    • Diverticulitis of colon    • Fibromyalgia    • Hyperlipidemia    • Hypertension    • Major depressive disorder 05/07/2020   • Palpitations     ASYMPTOMATIC- DENIES CP/SOB, SEE PCP- NO CARDIOLOGIST    • Post-menopause 09/17/2020   • Sinus trouble    • Sleep apnea    • Vitamin D deficiency 09/17/2020       Past Surgical History:  Past Surgical History:   Procedure Laterality Date   • ABDOMINAL SURGERY  6-   • APPENDECTOMY     • BILATERAL BREAST REDUCTION     • BREAST BIOPSY Right 11/12/2021    Procedure: RIGHT BREAST NEEDLE LOCALIZED  LUMPECTOMY WITH SENTINEL NODE BIOPSY;  Surgeon: Mirtha Esteves MD;  Location: [facility];  Service: General;  Laterality: Right;   • BREAST LUMPECTOMY     • CHOLECYSTECTOMY N/A 9/2/2021    Procedure: CHOLECYSTECTOMY LAPAROSCOPIC INTRAOPERATIVE CHOLANGIOGRAM;  Surgeon: Louie Medina MD;  Location: [facility] OR;  Service: General;  Laterality: N/A;   • COLONOSCOPY  2019   • HYSTERECTOMY     • TONSILLECTOMY     • WRIST ARTHROPLASTY Bilateral        Family History:  Family History   Problem Relation Age of Onset   • No Known Problems Mother    • No Known Problems Father    • Breast cancer Sister    • Colon cancer Neg Hx        Social History:   reports that she is a non-smoker but has been exposed to tobacco smoke. She has been exposed to 0.00 packs per day for the past 0.00 years. She has never used smokeless tobacco. She reports that she does not drink alcohol and does not use drugs.    Medications:   Medications Prior to Admission   Medication Sig Dispense Refill Last Dose    • colestipol (Colestid) 1 g tablet Take 2 tablets by mouth 2 (Two) Times a Day for 90 days. 360 tablet 1 5/8/2022 at Unknown time   • DULoxetine (CYMBALTA) 60 MG capsule Take 1 capsule by mouth Daily. 90 capsule 1 5/8/2022 at Unknown time   • hydroCHLOROthiazide (HYDRODIURIL) 25 MG tablet Take 1 tablet by mouth Daily. 90 tablet 1 5/8/2022 at Unknown time   • metoprolol tartrate (LOPRESSOR) 25 MG tablet Take 1 tablet by mouth 2 (Two) Times a Day. 180 tablet 1 5/8/2022 at Unknown time   • rosuvastatin (CRESTOR) 20 MG tablet Take 1 tablet by mouth Every Night. 90 tablet 1 5/8/2022 at Unknown time   • ergocalciferol (ERGOCALCIFEROL) 1.25 MG (81571 UT) capsule Take 1 capsule by mouth 1 (One) Time Per Week. 13 capsule 1 5/3/2022   • gabapentin (NEURONTIN) 300 MG capsule Take 1 capsule by mouth 3 (Three) Times a Day As Needed (neuropathy). 90 capsule 2 5/6/2022   • TURMERIC PO Take 1 tablet by mouth Daily.   5/7/2022       Allergies:  Ace inhibitors, Sulfamethoxazole-trimethoprim, and Levaquin [levofloxacin]        Objective     Weight 81.4 kg (179 lb 7.3 oz), not currently breastfeeding.    Physical Exam   Constitutional: Pt is oriented to person, place, and time and well-developed, well-nourished, and in no distress.   Mouth/Throat: Oropharynx is clear and moist.   Neck: Normal range of motion.   Cardiovascular: Normal rate, regular rhythm and normal heart sounds.    Pulmonary/Chest: Effort normal and breath sounds normal.   Abdominal: Soft. Nontender  Skin: Skin is warm and dry.   Psychiatric: Mood, memory, affect and judgment normal.     Assessment/Plan     Diagnosis:  Diarrhea  Altered bowel habits    Anticipated Surgical Procedure:  colonoscopy    The risks, benefits, and alternatives of this procedure have been discussed with the patient or the responsible party- the patient understands and agrees to proceed.

## 2022-05-19 NOTE — PROGRESS NOTES
Follow Up Office Visit      Encounter Date: 05/20/2022   Patient Name: Dorothea Cruz  YOB: 1950   Medical Record Number: [id number]   Primary Diagnosis: Malignant neoplasm of upper-inner quadrant of right breast in female, estrogen receptor negative (HCC) [C50.211, Z17.1]     Radiation Completion Date:  2/14/2022    Chief Complaint:    Chief Complaint   Patient presents with   • Follow-up   • Breast Cancer       Oncology/Hematology History Overview Note     Right Triple Negative Breast Cancer:  - screening mammogram 9/10/21: 4mm asymmetry in the right upper inner breast  -Diagnostic mammogram on October 1, 2021: Persistent 5 mm ill-defined nodule in the upper inner mid right breast at 2:00, 7 cm from the nipple.  - core biopsy on 10/8/21: Invasive ductal carcinoma, grade 3, ER negative (less than 1%), CO negative (less than 1%), HER-2 equivocal (2+ HC), HER-2 FISH not amplified  - Right lumpectomy on 11/12/21. Pathology showed a 5mm tumor with negative margins, 0/3 lymph nodes involved, pT1aN0, ER-, CO-, HER2-     Breast cancer (HCC)   10/20/2021 Initial Diagnosis    Breast cancer (HCC)     Malignant neoplasm of upper-inner quadrant of right female breast (HCC)   11/12/2021 Cancer Staged    Staging form: Breast, AJCC 8th Edition  - Pathologic: pT1a, pN0, cM0, ER-, CO-, HER2- - Signed by Santa Haskins APRN on 5/20/2022 1/13/2022 Initial Diagnosis    Malignant neoplasm of upper-inner quadrant of right female breast (HCC)     1/20/2022 - 2/14/2022 Radiation    Radiation OncologyTreatment Course:  Dorothea Cruz received 4256 cGy in 16 fractions to right breast.        History of Present Illness: Dorothea Cruz is a 72 y.o. female who returns to radiation oncology for a follow-up after completing external beam radiation on 2/14/2022. She tolerated radiotherapy well overall experiencing only a grade 1 skin toxicity from which she has completely recovered. Today she reports feeling  well with no new complaints or concerns. She has no breast complaints at this time denying breast pain, breast masses or suspicious skin changes. She denies any swelling in her right arm but she does report an occasional aching pain in her right biceps muscle. Of note, she underwent screening colonoscopy on 5/9/2022 which was negative.     Subjective      Review of Systems: Review of Systems   Constitutional: Negative for appetite change, fatigue and unexpected weight change.   HENT: Negative for congestion, sinus pressure, sinus pain, sore throat, trouble swallowing and voice change.    Eyes: Negative for visual disturbance.   Respiratory: Negative for cough, chest tightness, shortness of breath and wheezing.    Cardiovascular: Negative for chest pain, palpitations and leg swelling.   Gastrointestinal: Negative for abdominal pain, constipation and diarrhea.   Genitourinary: Negative for difficulty urinating, frequency and urgency.   Musculoskeletal: Positive for arthralgias and back pain.   Neurological: Negative for dizziness, weakness, light-headedness and headaches.       The following portions of the patient's history were reviewed and updated as appropriate: allergies, current medications, past family history, past medical history, past social history, past surgical history and problem list.    Medications:     Current Outpatient Medications:   •  colestipol (Colestid) 1 g tablet, Take 2 tablets by mouth 2 (Two) Times a Day for 90 days., Disp: 360 tablet, Rfl: 1  •  DULoxetine (CYMBALTA) 60 MG capsule, Take 1 capsule by mouth Daily., Disp: 90 capsule, Rfl: 1  •  ergocalciferol (ERGOCALCIFEROL) 1.25 MG (26242 UT) capsule, Take 1 capsule by mouth 1 (One) Time Per Week., Disp: 13 capsule, Rfl: 1  •  gabapentin (NEURONTIN) 300 MG capsule, Take 1 capsule by mouth 3 (Three) Times a Day As Needed (neuropathy)., Disp: 90 capsule, Rfl: 2  •  hydroCHLOROthiazide (HYDRODIURIL) 25 MG tablet, Take 1 tablet by mouth Daily.,  Disp: 90 tablet, Rfl: 1  •  metoprolol tartrate (LOPRESSOR) 25 MG tablet, Take 1 tablet by mouth 2 (Two) Times a Day., Disp: 180 tablet, Rfl: 1  •  rosuvastatin (CRESTOR) 20 MG tablet, Take 1 tablet by mouth Every Night., Disp: 90 tablet, Rfl: 1  •  TURMERIC PO, Take 1 tablet by mouth Daily., Disp: , Rfl:     Allergies:   Allergies   Allergen Reactions   • Ace Inhibitors Cough   • Sulfamethoxazole-Trimethoprim Hives   • Levaquin [Levofloxacin] Hives       Measures:  Pain: (on a scale of 0-10)   Pain Score    05/20/22 1125   PainSc: 0-No pain       Quality of Life: 100 - Full Activity   ECOG: (0) Fully active, able to carry on all predisease performance without restriction      Objective     Physical Exam:   Vital Signs:   Vitals:    05/20/22 1125   BP: 131/65   Pulse: 77   Resp: 16   Temp: 97 °F (36.1 °C)   TempSrc: Temporal   SpO2: 99%   Weight: 81.7 kg (180 lb 1.9 oz)   PainSc: 0-No pain     Body mass index is 32.94 kg/m².     Physical Exam  Vitals reviewed.   Constitutional:       General: She is not in acute distress.     Appearance: Normal appearance. She is normal weight. She is not ill-appearing.   HENT:      Head: Normocephalic and atraumatic.      Mouth/Throat:      Mouth: Mucous membranes are moist.      Pharynx: Oropharynx is clear. No oropharyngeal exudate or posterior oropharyngeal erythema.   Eyes:      Conjunctiva/sclera: Conjunctivae normal.      Pupils: Pupils are equal, round, and reactive to light.   Cardiovascular:      Rate and Rhythm: Normal rate and regular rhythm.      Pulses: Normal pulses.      Heart sounds: Normal heart sounds.   Pulmonary:      Effort: Pulmonary effort is normal. No respiratory distress.      Breath sounds: Normal breath sounds. No wheezing, rhonchi or rales.   Chest:   Breasts:      Right: No swelling, bleeding, inverted nipple, mass, nipple discharge, skin change, tenderness or axillary adenopathy.         Musculoskeletal:         General: Normal range of motion.       Cervical back: Normal range of motion.   Lymphadenopathy:      Upper Body:      Right upper body: No axillary adenopathy.   Skin:     General: Skin is warm and dry.   Neurological:      General: No focal deficit present.      Mental Status: She is alert and oriented to person, place, and time. Mental status is at baseline.   Psychiatric:         Mood and Affect: Mood normal.         Behavior: Behavior normal.       Data Reviewed: Colonoscopy results dated 5/9/22; Dr. Nair's last office note  Imaging: No radiology results for the last 90 days.     Assessment / Plan      Impression: Dorothea Cruz is a pleasant 72 y.o. female with pT1a pN0(sn) cM0 triple negative invasive ductal carcinoma of the right breast status post lumpectomy and sentinel lymph node biopsy on 11/12/2021. She was not recommended to receive adjuvant chemotherapy. She is status post external beam radiotherapy, completed on 2/14/2022. She tolerated radiotherapy well overall experiencing only a grade 1 skin toxicity from which she has completely recovered.    Assessment/Plan:   Diagnoses and all orders for this visit:    1. Malignant neoplasm of upper-inner quadrant of right breast in female, estrogen receptor negative (HCC) (Primary)    2. Personal history of radiation therapy       Follow Up:   1.) Return for follow-up in 6 months. She will follow-up with Dr. Esteves and Dr. Nair in the interim.  2.) Discussed that patients should wait 6-12 months after completion of radiation to begin their annual mammogram surveillance. She will resume annual screening mammograms with her PCP.  3.) She will continue follow-up as scheduled with [facility] for lymphedema surveillance program; next reassessment in June 2022    Return in about 6 months (around 11/20/2022) for Office Visit.  Dorothea Cruz was encouraged to contact me in the interim with any questions or concerns regarding her care.        ARA Hollis  Radiation  Oncology  [facility]    This document has been signed by ARA Lowe on May 20, 2022 12:10 EDT

## 2022-05-20 ENCOUNTER — APPOINTMENT (OUTPATIENT)
Dept: RADIATION ONCOLOGY | Facility: HOSPITAL | Age: 72
End: 2022-05-20

## 2022-05-20 ENCOUNTER — OFFICE VISIT (OUTPATIENT)
Dept: RADIATION ONCOLOGY | Facility: HOSPITAL | Age: 72
End: 2022-05-20

## 2022-05-20 VITALS
WEIGHT: 180.12 LBS | SYSTOLIC BLOOD PRESSURE: 131 MMHG | DIASTOLIC BLOOD PRESSURE: 65 MMHG | TEMPERATURE: 97 F | OXYGEN SATURATION: 99 % | HEART RATE: 77 BPM | RESPIRATION RATE: 16 BRPM | BODY MASS INDEX: 32.94 KG/M2

## 2022-05-20 DIAGNOSIS — Z17.1 MALIGNANT NEOPLASM OF UPPER-INNER QUADRANT OF RIGHT BREAST IN FEMALE, ESTROGEN RECEPTOR NEGATIVE: Primary | Chronic | ICD-10-CM

## 2022-05-20 DIAGNOSIS — Z92.3 PERSONAL HISTORY OF RADIATION THERAPY: ICD-10-CM

## 2022-05-20 DIAGNOSIS — C50.211 MALIGNANT NEOPLASM OF UPPER-INNER QUADRANT OF RIGHT BREAST IN FEMALE, ESTROGEN RECEPTOR NEGATIVE: Primary | Chronic | ICD-10-CM

## 2022-05-20 PROCEDURE — 99212 OFFICE O/P EST SF 10 MIN: CPT | Performed by: NURSE PRACTITIONER

## 2022-06-02 ENCOUNTER — LAB (OUTPATIENT)
Dept: ONCOLOGY | Facility: HOSPITAL | Age: 72
End: 2022-06-02

## 2022-06-02 ENCOUNTER — OFFICE VISIT (OUTPATIENT)
Dept: ONCOLOGY | Facility: HOSPITAL | Age: 72
End: 2022-06-02

## 2022-06-02 VITALS
DIASTOLIC BLOOD PRESSURE: 58 MMHG | WEIGHT: 180.34 LBS | OXYGEN SATURATION: 95 % | BODY MASS INDEX: 32.98 KG/M2 | HEART RATE: 61 BPM | TEMPERATURE: 98.1 F | RESPIRATION RATE: 16 BRPM | SYSTOLIC BLOOD PRESSURE: 120 MMHG

## 2022-06-02 DIAGNOSIS — D72.820 LYMPHOCYTOSIS: ICD-10-CM

## 2022-06-02 DIAGNOSIS — C50.211 MALIGNANT NEOPLASM OF UPPER-INNER QUADRANT OF RIGHT BREAST IN FEMALE, ESTROGEN RECEPTOR NEGATIVE: ICD-10-CM

## 2022-06-02 DIAGNOSIS — Z17.0 MALIGNANT NEOPLASM OF UPPER-INNER QUADRANT OF RIGHT BREAST IN FEMALE, ESTROGEN RECEPTOR POSITIVE: Primary | ICD-10-CM

## 2022-06-02 DIAGNOSIS — C50.211 MALIGNANT NEOPLASM OF UPPER-INNER QUADRANT OF RIGHT BREAST IN FEMALE, ESTROGEN RECEPTOR POSITIVE: Primary | ICD-10-CM

## 2022-06-02 DIAGNOSIS — Z17.1 MALIGNANT NEOPLASM OF UPPER-INNER QUADRANT OF RIGHT BREAST IN FEMALE, ESTROGEN RECEPTOR NEGATIVE: ICD-10-CM

## 2022-06-02 PROBLEM — M70.62 TROCHANTERIC BURSITIS OF BOTH HIPS: Status: ACTIVE | Noted: 2022-05-27

## 2022-06-02 PROBLEM — M70.61 TROCHANTERIC BURSITIS OF BOTH HIPS: Status: ACTIVE | Noted: 2022-05-27

## 2022-06-02 PROBLEM — M54.50 CHRONIC LOW BACK PAIN: Status: ACTIVE | Noted: 2022-05-27

## 2022-06-02 PROBLEM — G89.29 CHRONIC LOW BACK PAIN: Status: ACTIVE | Noted: 2022-05-27

## 2022-06-02 LAB
ALBUMIN SERPL-MCNC: 4.5 G/DL (ref 3.5–5.2)
ALBUMIN/GLOB SERPL: 1.7 G/DL
ALP SERPL-CCNC: 63 U/L (ref 39–117)
ALT SERPL W P-5'-P-CCNC: 21 U/L (ref 1–33)
ANION GAP SERPL CALCULATED.3IONS-SCNC: 9.1 MMOL/L (ref 5–15)
AST SERPL-CCNC: 21 U/L (ref 1–32)
BASOPHILS # BLD AUTO: 0.06 10*3/MM3 (ref 0–0.2)
BASOPHILS NFR BLD AUTO: 0.4 % (ref 0–1.5)
BILIRUB SERPL-MCNC: 0.3 MG/DL (ref 0–1.2)
BUN SERPL-MCNC: 15 MG/DL (ref 8–23)
BUN/CREAT SERPL: 18.1 (ref 7–25)
CALCIUM SPEC-SCNC: 9.9 MG/DL (ref 8.6–10.5)
CHLORIDE SERPL-SCNC: 101 MMOL/L (ref 98–107)
CO2 SERPL-SCNC: 29.9 MMOL/L (ref 22–29)
CREAT SERPL-MCNC: 0.83 MG/DL (ref 0.57–1)
DEPRECATED RDW RBC AUTO: 46.3 FL (ref 37–54)
EGFRCR SERPLBLD CKD-EPI 2021: 75.5 ML/MIN/1.73
EOSINOPHIL # BLD AUTO: 0.22 10*3/MM3 (ref 0–0.4)
EOSINOPHIL NFR BLD AUTO: 1.6 % (ref 0.3–6.2)
ERYTHROCYTE [DISTWIDTH] IN BLOOD BY AUTOMATED COUNT: 13.5 % (ref 12.3–15.4)
GLOBULIN UR ELPH-MCNC: 2.7 GM/DL
GLUCOSE SERPL-MCNC: 101 MG/DL (ref 65–99)
HCT VFR BLD AUTO: 45.1 % (ref 34–46.6)
HGB BLD-MCNC: 15 G/DL (ref 12–15.9)
IMM GRANULOCYTES # BLD AUTO: 0.02 10*3/MM3 (ref 0–0.05)
IMM GRANULOCYTES NFR BLD AUTO: 0.1 % (ref 0–0.5)
LYMPHOCYTES # BLD AUTO: 7.23 10*3/MM3 (ref 0.7–3.1)
LYMPHOCYTES NFR BLD AUTO: 54.1 % (ref 19.6–45.3)
MCH RBC QN AUTO: 30.7 PG (ref 26.6–33)
MCHC RBC AUTO-ENTMCNC: 33.3 G/DL (ref 31.5–35.7)
MCV RBC AUTO: 92.4 FL (ref 79–97)
MONOCYTES # BLD AUTO: 1.33 10*3/MM3 (ref 0.1–0.9)
MONOCYTES NFR BLD AUTO: 10 % (ref 5–12)
NEUTROPHILS NFR BLD AUTO: 33.8 % (ref 42.7–76)
NEUTROPHILS NFR BLD AUTO: 4.5 10*3/MM3 (ref 1.7–7)
PLATELET # BLD AUTO: 174 10*3/MM3 (ref 140–450)
PMV BLD AUTO: 12.2 FL (ref 6–12)
POTASSIUM SERPL-SCNC: 3.6 MMOL/L (ref 3.5–5.2)
PROT SERPL-MCNC: 7.2 G/DL (ref 6–8.5)
RBC # BLD AUTO: 4.88 10*6/MM3 (ref 3.77–5.28)
SODIUM SERPL-SCNC: 140 MMOL/L (ref 136–145)
WBC NRBC COR # BLD: 13.36 10*3/MM3 (ref 3.4–10.8)

## 2022-06-02 PROCEDURE — 36415 COLL VENOUS BLD VENIPUNCTURE: CPT

## 2022-06-02 PROCEDURE — 85025 COMPLETE CBC W/AUTO DIFF WBC: CPT

## 2022-06-02 PROCEDURE — G0463 HOSPITAL OUTPT CLINIC VISIT: HCPCS | Performed by: INTERNAL MEDICINE

## 2022-06-02 PROCEDURE — 80053 COMPREHEN METABOLIC PANEL: CPT

## 2022-06-02 PROCEDURE — 99214 OFFICE O/P EST MOD 30 MIN: CPT | Performed by: INTERNAL MEDICINE

## 2022-06-02 RX ORDER — TRAMADOL HYDROCHLORIDE 50 MG/1
TABLET ORAL EVERY 12 HOURS
COMMUNITY
End: 2022-12-06

## 2022-06-02 NOTE — PROGRESS NOTES
Patient  Dorothea Cruz    Location  [facility] HEMATOLOGY & ONCOLOGY    Chief Complaint  Breast Cancer    Referring Provider: Arelis Butt, *  PCP: Arelis Daniel APRN    Subjective          Oncology/Hematology History Overview Note     Right Triple Negative Breast Cancer:  - screening mammogram 9/10/21: 4mm asymmetry in the right upper inner breast  -Diagnostic mammogram on October 1, 2021: Persistent 5 mm ill-defined nodule in the upper inner mid right breast at 2:00, 7 cm from the nipple.  - core biopsy on 10/8/21: Invasive ductal carcinoma, grade 3, ER negative (less than 1%), NJ negative (less than 1%), HER-2 equivocal (2+ HC), HER-2 FISH not amplified  - Right lumpectomy on 11/12/21. Pathology showed a 5mm tumor with negative margins, 0/3 lymph nodes involved, pT1aN0, ER-, NJ-, HER2-  - chemotherapy was not recommended due to the small size of the tumor.   - completed adjuvant radiation in late February 2022     Breast cancer (HCC)   10/20/2021 Initial Diagnosis    Breast cancer (HCC)     Malignant neoplasm of upper-inner quadrant of right female breast (HCC)   11/12/2021 Cancer Staged    Staging form: Breast, AJCC 8th Edition  - Pathologic: pT1a, pN0, cM0, ER-, NJ-, HER2- - Signed by Santa Haskins APRN on 5/20/2022 1/13/2022 Initial Diagnosis    Malignant neoplasm of upper-inner quadrant of right female breast (HCC)     1/20/2022 - 2/14/2022 Radiation    Radiation OncologyTreatment Course:  Dorothea Cruz received 4256 cGy in 16 fractions to right breast.          HPI  Patient comes in today for follow-up given her history of triple negative breast cancer.  She has no new complaints or concerns.  She has not yet been scheduled for repeat screening mammogram.    I reviewed the patient's labs to discuss results.  Her white count has remained persistently elevated and is now 13.4.  Her hemoglobin is normal at 15.0 and her platelet count is normal at 174.   Differentials most significant for an absolute lymphocyte count of 7230.  Explained that this is likely result of CLL.  I reassured the patient that this was a chronic leukemia and does not always require treatment.  Patient understood immediately because she has a brother with the same diagnosis and is currently on oral medication.  She also believes her father  from some type of leukemia.    Review of Systems   Constitutional: Positive for fatigue. Negative for appetite change, diaphoresis, fever, unexpected weight gain and unexpected weight loss.   HENT: Negative for hearing loss, sore throat and voice change.    Eyes: Negative for blurred vision, double vision, pain, redness and visual disturbance.   Respiratory: Negative for cough, shortness of breath and wheezing.    Cardiovascular: Negative for chest pain, palpitations and leg swelling.   Endocrine: Negative for cold intolerance, heat intolerance, polydipsia and polyuria.   Genitourinary: Negative for decreased urine volume, difficulty urinating, frequency and urinary incontinence.   Musculoskeletal: Negative for arthralgias, back pain, joint swelling and myalgias.   Skin: Negative for color change, rash, skin lesions and wound.   Neurological: Negative for dizziness, seizures, numbness and headache.   Hematological: Negative for adenopathy. Does not bruise/bleed easily.   Psychiatric/Behavioral: Negative for depressed mood. The patient is not nervous/anxious.    All other systems reviewed and are negative.      Past Medical History:   Diagnosis Date   • Anxiety    • Arthritis    • Breast cancer (HCC)    • Cancer (HCC)     BREAST CANCER   • Cholelithiases @1   • Depression    • Disease of thyroid gland     currently not taking any meds   • Diverticulitis    • Diverticulitis of colon    • Fibromyalgia    • Hyperlipidemia    • Hypertension    • Major depressive disorder 2020   • Palpitations     ASYMPTOMATIC- DENIES CP/SOB, SEE PCP- NO  CARDIOLOGIST    • Post-menopause 09/17/2020   • Sinus trouble    • Sleep apnea    • Vitamin D deficiency 09/17/2020     Past Surgical History:   Procedure Laterality Date   • ABDOMINAL SURGERY  6-   • APPENDECTOMY     • BILATERAL BREAST REDUCTION     • BREAST BIOPSY Right 11/12/2021    Procedure: RIGHT BREAST NEEDLE LOCALIZED  LUMPECTOMY WITH SENTINEL NODE BIOPSY;  Surgeon: Mirtha Esteves MD;  Location: [facility] OR [facility];  Service: General;  Laterality: Right;   • BREAST LUMPECTOMY     • CHOLECYSTECTOMY N/A 9/2/2021    Procedure: CHOLECYSTECTOMY LAPAROSCOPIC INTRAOPERATIVE CHOLANGIOGRAM;  Surgeon: Louie Medina MD;  Location: [facility] MAIN OR;  Service: General;  Laterality: N/A;   • COLONOSCOPY  2019   • COLONOSCOPY N/A 5/9/2022    Procedure: COLONOSCOPY WITH BIOPSIES;  Surgeon: Butch Garcia MD;  Location: [facility] ENDOSCOPY;  Service: Gastroenterology;  Laterality: N/A;  DIVERTICULOSIS   • HYSTERECTOMY     • TONSILLECTOMY     • WRIST ARTHROPLASTY Bilateral      Social History     Socioeconomic History   • Marital status:    Tobacco Use   • Smoking status: Passive Smoke Exposure - Never Smoker   • Smokeless tobacco: Never Used   Vaping Use   • Vaping Use: Never used   Substance and Sexual Activity   • Alcohol use: Never   • Drug use: Never   • Sexual activity: Not Currently     Partners: Male     Birth control/protection: None     Family History   Problem Relation Age of Onset   • No Known Problems Mother    • No Known Problems Father    • Breast cancer Sister    • Colon cancer Neg Hx        Objective   Physical Exam  General: Alert, cooperative, no acute distress  Eyes: Anicteric sclera, PERRLA  Respiratory: normal respiratory effort  Cardiovascular: no lower extremity edema  Skin: Normal tone, no rash, no lesions  Psychiatric: Appropriate affect, intact judgment  Neurologic: No focal sensory or motor deficits, normal cognition   Musculoskeletal: Normal muscle strength and  tone  Extremities: No clubbing, cyanosis, or deformities    Vitals:    06/02/22 1035   BP: 120/58   Pulse: 61   Resp: 16   Temp: 98.1 °F (36.7 °C)   SpO2: 95%   Weight: 81.8 kg (180 lb 5.4 oz)   PainSc: 0-No pain     ECOG score: 0         PHQ-9 Total Score:         Result Review :   The following data was reviewed by: Bebe Nair MD PhD on 06/02/2022:  Lab Results   Component Value Date    HGB 15.0 06/02/2022    HCT 45.1 06/02/2022    MCV 92.4 06/02/2022     06/02/2022    WBC 13.36 (H) 06/02/2022    NEUTROABS 4.50 06/02/2022    LYMPHSABS 7.23 (H) 06/02/2022    MONOSABS 1.33 (H) 06/02/2022    EOSABS 0.22 06/02/2022    BASOSABS 0.06 06/02/2022     Lab Results   Component Value Date    GLUCOSE 101 (H) 06/02/2022    BUN 15 06/02/2022    CREATININE 0.83 06/02/2022     06/02/2022    K 3.6 06/02/2022     06/02/2022    CO2 29.9 (H) 06/02/2022    CALCIUM 9.9 06/02/2022    PROTEINTOT 7.2 06/02/2022    ALBUMIN 4.50 06/02/2022    BILITOT 0.3 06/02/2022    ALKPHOS 63 06/02/2022    AST 21 06/02/2022    ALT 21 06/02/2022          Assessment and Plan    Diagnoses and all orders for this visit:    1. Malignant neoplasm of upper-inner quadrant of right breast in female, estrogen receptor positive (HCC) (Primary)    2. Lymphocytosis  -     Flow Cytometry, Blood; Future      Right Triple Negative Breast Cancer: s/p lumpectomy November 2021. Chemotherapy was not recommended due to the small size of the tumor.  She completed adjuvant radiation in February or March. I will plan for a repeat mammogram in September and f/u with the patient after.      Lymphocytosis: I suspect the patient has CLL.  I will send flow cytometry.  Since she is asymptomatic and has normal counts otherwise, I will call her with the results. If her counts remain normal I will likely see her in 3 months, after her mammogram,and then plan 6 month f/u after.      Patient was given instructions and counseling regarding her condition or for  health maintenance advice. Please see specific information pulled into the AVS if appropriate.

## 2022-06-09 ENCOUNTER — TRANSCRIBE ORDERS (OUTPATIENT)
Dept: GENERAL RADIOLOGY | Facility: HOSPITAL | Age: 72
End: 2022-06-09

## 2022-06-09 ENCOUNTER — HOSPITAL ENCOUNTER (OUTPATIENT)
Dept: GENERAL RADIOLOGY | Facility: HOSPITAL | Age: 72
Discharge: HOME | End: 2022-06-09
Admitting: STUDENT IN AN ORGANIZED HEALTH CARE EDUCATION/TRAINING PROGRAM

## 2022-06-09 DIAGNOSIS — G89.29 OTHER CHRONIC PAIN: Primary | ICD-10-CM

## 2022-06-09 DIAGNOSIS — G89.29 OTHER CHRONIC PAIN: ICD-10-CM

## 2022-06-09 PROCEDURE — 72100 X-RAY EXAM L-S SPINE 2/3 VWS: CPT

## 2022-06-14 ENCOUNTER — OFFICE VISIT (OUTPATIENT)
Dept: ONCOLOGY | Facility: HOSPITAL | Age: 72
End: 2022-06-14

## 2022-06-14 DIAGNOSIS — Z12.31 ENCOUNTER FOR SCREENING MAMMOGRAM FOR MALIGNANT NEOPLASM OF BREAST: ICD-10-CM

## 2022-06-14 DIAGNOSIS — C91.10 CLL (CHRONIC LYMPHOCYTIC LEUKEMIA): Primary | ICD-10-CM

## 2022-06-14 PROCEDURE — 99442 PR PHYS/QHP TELEPHONE EVALUATION 11-20 MIN: CPT | Performed by: INTERNAL MEDICINE

## 2022-06-14 NOTE — PROGRESS NOTES
TELEHEALTH VISIT     You have chosen to receive care through a telephone visit. Do you consent to use a telephone visit for your medical care today? Yes    This visit has been rescheduled as a phone visit to comply with patient safety concerns in accordance with CDC recommendations. Total time of discussion was 15 minutes.      Patient  Dorothea Cruz    Location  [facility] HEMATOLOGY & ONCOLOGY    Chief Complaint  Breast Cancer    Referring Provider: Arelis Butt, *  PCP: Arelis Daniel APRN    Subjective          Oncology/Hematology History Overview Note     Right Triple Negative Breast Cancer:  - screening mammogram 9/10/21: 4mm asymmetry in the right upper inner breast  -Diagnostic mammogram on 2021: Persistent 5 mm ill-defined nodule in the upper inner mid right breast at 2:00, 7 cm from the nipple.  - core biopsy on 10/8/21: Invasive ductal carcinoma, grade 3, ER negative (less than 1%), MA negative (less than 1%), HER-2 equivocal (2+ HC), HER-2 FISH not amplified  - Right lumpectomy on 21. Pathology showed a 5mm tumor with negative margins, 0/3 lymph nodes involved, pT1aN0, ER-, MA-, HER2-  - chemotherapy was not recommended due to the small size of the tumor.   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  Mildly increased CD4 positive T cell LGL (5.2% of analyzed cells).  - CLL FISH positive for TP53/17p13 deletion in 49.5% of cells.  - IgVH somatic hypermutation was detected (3.7%)    Family History:    - brother with CLL  - father  from some type of leukemia  - sister with breast cancer and adult onset retinoblastoma which was fatal  - niece (sister's daughter) also had retinoblastoma as a child  - pt is considering genetic testing     Breast cancer (HCC)    10/20/2021 Initial Diagnosis    Breast cancer (HCC)     Malignant neoplasm of upper-inner quadrant of right female breast (HCC)   11/12/2021 Cancer Staged    Staging form: Breast, AJCC 8th Edition  - Pathologic: pT1a, pN0, cM0, ER-, ND-, HER2- - Signed by Santa Haskins APRN on 5/20/2022 1/13/2022 Initial Diagnosis    Malignant neoplasm of upper-inner quadrant of right female breast (HCC)     1/20/2022 - 2/14/2022 Radiation    Radiation OncologyTreatment Course:  Dorothea Cruz received 4256 cGy in 16 fractions to right breast.          HPI  I contacted the patient today to give her the results of her flow cytometry.  This confirms her diagnosis of CLL.  The patient was happy about this because her brother has the same diagnosis and is currently on treatment.  She was happy that it was not a more serious diagnosis.  I discussed the implications of some of the findings.  For example, she has a p53 mutation that is present in 50% of her cells suggesting it is related to an inherited condition.  I recommend that she have genetic testing.  This could have implications for herself as well as her children.  She told me that she had a sister who was diagnosed with breast cancer as well as retinoblastoma as an adult.  Her sister ended up dying from retinoblastoma.  That sister's daughter (the patient's niece) also had retinoblastoma as a child but survived.  She said she would like to talk to her children about genetic testing.      Past Medical History:   Diagnosis Date   • Anxiety    • Arthritis    • Breast cancer (HCC)    • Cancer (HCC)     BREAST CANCER   • Cholelithiases 4/30@1   • Depression    • Disease of thyroid gland     currently not taking any meds   • Diverticulitis    • Diverticulitis of colon    • Fibromyalgia    • Hyperlipidemia    • Hypertension    • Major depressive disorder 05/07/2020   • Palpitations     ASYMPTOMATIC- DENIES CP/SOB, SEE PCP- NO CARDIOLOGIST    • Post-menopause 09/17/2020   •  Sinus trouble    • Sleep apnea    • Vitamin D deficiency 09/17/2020     Past Surgical History:   Procedure Laterality Date   • ABDOMINAL SURGERY  6-   • APPENDECTOMY     • BILATERAL BREAST REDUCTION     • BREAST BIOPSY Right 11/12/2021    Procedure: RIGHT BREAST NEEDLE LOCALIZED  LUMPECTOMY WITH SENTINEL NODE BIOPSY;  Surgeon: Mirtha Esteves MD;  Location: [facility] OR [facility];  Service: General;  Laterality: Right;   • BREAST LUMPECTOMY     • CHOLECYSTECTOMY N/A 9/2/2021    Procedure: CHOLECYSTECTOMY LAPAROSCOPIC INTRAOPERATIVE CHOLANGIOGRAM;  Surgeon: Louie Medina MD;  Location: [facility] MAIN OR;  Service: General;  Laterality: N/A;   • COLONOSCOPY  2019   • COLONOSCOPY N/A 5/9/2022    Procedure: COLONOSCOPY WITH BIOPSIES;  Surgeon: Butch Garcia MD;  Location: [facility] ENDOSCOPY;  Service: Gastroenterology;  Laterality: N/A;  DIVERTICULOSIS   • HYSTERECTOMY     • TONSILLECTOMY     • WRIST ARTHROPLASTY Bilateral      Social History     Socioeconomic History   • Marital status:    Tobacco Use   • Smoking status: Passive Smoke Exposure - Never Smoker   • Smokeless tobacco: Never Used   Vaping Use   • Vaping Use: Never used   Substance and Sexual Activity   • Alcohol use: Never   • Drug use: Never   • Sexual activity: Not Currently     Partners: Male     Birth control/protection: None     Family History   Problem Relation Age of Onset   • No Known Problems Mother    • No Known Problems Father    • Breast cancer Sister    • Colon cancer Neg Hx        Objective   Physical Exam  [This was a telehealth visit and no physical exam was conducted.]      There were no vitals filed for this visit.              Result Review :   The following data was reviewed by: Bebe Nair MD PhD on 06/14/2022:  Lab Results   Component Value Date    HGB 15.0 06/02/2022    HCT 45.1 06/02/2022    MCV 92.4 06/02/2022     06/02/2022    WBC 13.36 (H) 06/02/2022    NEUTROABS 4.50 06/02/2022    LYMPHSABS 7.23 (H)  06/02/2022    MONOSABS 1.33 (H) 06/02/2022    EOSABS 0.22 06/02/2022    BASOSABS 0.06 06/02/2022     Lab Results   Component Value Date    GLUCOSE 101 (H) 06/02/2022    BUN 15 06/02/2022    CREATININE 0.83 06/02/2022     06/02/2022    K 3.6 06/02/2022     06/02/2022    CO2 29.9 (H) 06/02/2022    CALCIUM 9.9 06/02/2022    PROTEINTOT 7.2 06/02/2022    ALBUMIN 4.50 06/02/2022    BILITOT 0.3 06/02/2022    ALKPHOS 63 06/02/2022    AST 21 06/02/2022    ALT 21 06/02/2022          Assessment and Plan    There are no diagnoses linked to this encounter.    Right Triple Negative Breast Cancer: s/p lumpectomy November 2021. Chemotherapy was not recommended due to the small size of the tumor.  She completed adjuvant radiation in February or March. I will plan for a repeat mammogram in September and f/u with the patient after.      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 Right now the patient does not meet criteria for treatment. I will f/u with her in 3 months with repeat CBC. I strongly encouraged her to get genetic testing to rule of a germline p53 mutation (Li Fraumeni syndrome).     Patient was given instructions and counseling regarding her condition or for health maintenance advice. Please see specific information pulled into the AVS if appropriate.

## 2022-06-16 ENCOUNTER — APPOINTMENT (OUTPATIENT)
Dept: ONCOLOGY | Facility: HOSPITAL | Age: 72
End: 2022-06-16

## 2022-06-30 ENCOUNTER — HOSPITAL ENCOUNTER (OUTPATIENT)
Dept: OCCUPATIONAL THERAPY | Facility: HOSPITAL | Age: 72
Setting detail: THERAPIES SERIES
Discharge: HOME | End: 2022-06-30

## 2022-06-30 DIAGNOSIS — Z91.89 AT RISK FOR LYMPHEDEMA: Primary | ICD-10-CM

## 2022-06-30 DIAGNOSIS — L90.5 SCAR TISSUE: ICD-10-CM

## 2022-06-30 PROCEDURE — 93702 BIS XTRACELL FLUID ANALYSIS: CPT

## 2022-06-30 PROCEDURE — 97535 SELF CARE MNGMENT TRAINING: CPT

## 2022-07-07 LAB — REF LAB TEST METHOD: NORMAL

## 2022-07-14 DIAGNOSIS — Z17.1 MALIGNANT NEOPLASM OF UPPER-INNER QUADRANT OF RIGHT BREAST IN FEMALE, ESTROGEN RECEPTOR NEGATIVE: Primary | ICD-10-CM

## 2022-07-14 DIAGNOSIS — C50.211 MALIGNANT NEOPLASM OF UPPER-INNER QUADRANT OF RIGHT BREAST IN FEMALE, ESTROGEN RECEPTOR NEGATIVE: Primary | ICD-10-CM

## 2022-07-15 ENCOUNTER — HOSPITAL ENCOUNTER (OUTPATIENT)
Dept: OCCUPATIONAL THERAPY | Facility: HOSPITAL | Age: 72
Setting detail: THERAPIES SERIES
Discharge: HOME | End: 2022-07-15

## 2022-07-15 DIAGNOSIS — L90.5 SCAR TISSUE: ICD-10-CM

## 2022-07-15 DIAGNOSIS — I89.0 LYMPHEDEMA: Primary | ICD-10-CM

## 2022-07-15 DIAGNOSIS — Z91.89 AT RISK FOR LYMPHEDEMA: ICD-10-CM

## 2022-07-15 PROCEDURE — 97535 SELF CARE MNGMENT TRAINING: CPT

## 2022-07-15 PROCEDURE — 97140 MANUAL THERAPY 1/> REGIONS: CPT

## 2022-07-15 NOTE — THERAPY TREATMENT NOTE
Outpatient Occupational Therapy Lymphedema Treatment Note   [place]     Patient Name: Dorothea Cruz  : 1950  MRN: [id number]  Today's Date: 7/15/2022      Visit Date: 07/15/2022    Patient Active Problem List   Diagnosis   • Vitamin D deficiency   • Sleep apnea   • Post-menopause   • Major depressive disorder   • Hypertension   • Hyperlipidemia   • Fibromyalgia   • Disease of thyroid gland   • Depression   • Anxiety   • Arthritis   • ACE-inhibitor cough   • Diverticulitis   • Sinus trouble   • Breast cancer (HCC)   • Screening for colon cancer   • History of diverticulitis   • History of cholecystectomy   • Altered bowel habits   • Diarrhea   • Malignant neoplasm of upper-inner quadrant of right female breast (HCC)   • Acute pyelonephritis   • Chronic low back pain   • Trochanteric bursitis of both hips   • CLL (chronic lymphocytic leukemia) (HCC)        Past Medical History:   Diagnosis Date   • Anxiety    • Arthritis    • Breast cancer (HCC)    • Cancer (HCC)     BREAST CANCER   • Cholelithiases @1   • Depression    • Disease of thyroid gland     currently not taking any meds   • Diverticulitis    • Diverticulitis of colon    • Fibromyalgia    • Hyperlipidemia    • Hypertension    • Major depressive disorder 2020   • Palpitations     ASYMPTOMATIC- DENIES CP/SOB, SEE PCP- NO CARDIOLOGIST    • Post-menopause 2020   • Sinus trouble    • Sleep apnea    • Vitamin D deficiency 2020        Past Surgical History:   Procedure Laterality Date   • ABDOMINAL SURGERY  1985   • APPENDECTOMY     • BILATERAL BREAST REDUCTION     • BREAST BIOPSY Right 2021    Procedure: RIGHT BREAST NEEDLE LOCALIZED  LUMPECTOMY WITH SENTINEL NODE BIOPSY;  Surgeon: Mirtha Esteves MD;  Location: [facility] OR [facility];  Service: General;  Laterality: Right;   • BREAST LUMPECTOMY     • CHOLECYSTECTOMY N/A 2021    Procedure: CHOLECYSTECTOMY LAPAROSCOPIC INTRAOPERATIVE CHOLANGIOGRAM;  Surgeon: Adam  Louie CHAVEZ MD;  Location: [facility] MAIN OR;  Service: General;  Laterality: N/A;   • COLONOSCOPY  2019   • COLONOSCOPY N/A 5/9/2022    Procedure: COLONOSCOPY WITH BIOPSIES;  Surgeon: Butch Garcia MD;  Location: [facility] ENDOSCOPY;  Service: Gastroenterology;  Laterality: N/A;  DIVERTICULOSIS   • HYSTERECTOMY     • TONSILLECTOMY     • WRIST ARTHROPLASTY Bilateral          Visit Dx:      ICD-10-CM ICD-9-CM   1. Lymphedema  I89.0 457.1   2. At risk for lymphedema  Z91.89 V49.89   3. Scar tissue  L90.5 709.2        Lymphedema     Row Name 07/15/22 1500             Subjective Pain    Able to rate subjective pain? yes  -MP      Pre-Treatment Pain Level 8  -MP      Post-Treatment Pain Level 8  -MP      Subjective Pain Comment with reach  -MP              Manual Lymphatic Drainage    Manual Lymphatic Drainage initial sequence;opened regional lymph nodes;opened anastamoses;extremity treatment  -MP      Initial Sequence full neck;diaphragmatic breathing  -MP      Diaphragmatic Breathing asbdominal technique  -MP      Opened Regional Lymph Nodes axillary;inguinal;ribs;paraspinal  -MP      Axillary left  -MP      Inguinal right  -MP      Opened Anastamoses anterior axillo-axillary;axillo-inguinal  -MP      Anterior Axillo-Axillary Right to left  -MP      Axillo-Inguinal right  -MP      Extremity Treatment MLD to full limb  -MP      Manual Lymphatic Drainage Comments fullness and tenderness at proximal forearm and volar elbow  -MP              Compression/Skin Care    Compression/Skin Care compression garment  -MP      Compression Garment Comments tubigrip size D full arm  -MP      Compression/Skin Care Comments to wear at all times as able until next session.  -MP              L-Dex Bioimpedence Screening    L-Dex UE Score -0.9  -MP      L-Dex UE Baseline Score -14  -MP      L-Dex UE Value Change 13.1  -MP            User Key  (r) = Recorded By, (t) = Taken By, (c) = Cosigned By    Initials Name Provider Type    USHA Palencia  BRYAN Rubi Occupational Therapist                         OT Assessment/Plan     Row Name 07/15/22 9537          OT Assessment    Assessment Comments Mrs. Cruz presents today with an increase in lymph volume of 13.1 points from initial evaluation in December 2021.  L-Dex score on 6/30/2022 was 7.7 higher than at initial evaluation and today it is, and today it is 13.1 higher, indicating a significant increase from last month as well.  She will benefit at this time from ongoing occupational therapy treatment for lymphedema management until lymphatic dynamics have been normalized or a plateau is reached and she is independent with complete decongestive therapy.  -MP     OT Diagnosis Lymphedema  -MP     OT Rehab Potential Good  -MP     Patient/caregiver participated in establishment of treatment plan and goals Yes  -MP            OT Plan    OT Frequency 2x/week  -MP           User Key  (r) = Recorded By, (t) = Taken By, (c) = Cosigned By    Initials Name Provider Type    Elicia Camara OT Occupational Therapist                    Manual Rx (last 36 hours)     Manual Treatments     Row Name 07/15/22 1642             Total Minutes    97429 - OT Manual Therapy Minutes 30  -MP            User Key  (r) = Recorded By, (t) = Taken By, (c) = Cosigned By    Initials Name Provider Type    Elicia Camara OT Occupational Therapist                  Therapy Education  Education Details: Patient was educated to maintain compression with Tubigrip as able until next session.  She was reinstructed in self manual lymphatic drainage and required moderate cueing, but performed technique independently after therapist demonstration.  She was instructed in diaphragmatic breathing.  She was asked to perform manual lymphatic drainage and diaphragmatic breathing twice a day and to resume active range of motion home exercise program that she received post surgery.  Patient is agreeable.  Patient was educated that she can benefit  from treatment for manual lymphatic drainage and compression wrapping until lymph volume reduces back down to within normal limits.  She is agreeable to treatment  32508 - OT Self Care/Mgmt Minutes: 25        Time Calculation:   Timed Charges  89151 - OT Manual Therapy Minutes: 30  01908 - OT Self Care/Mgmt Minutes: 25  Total Minutes  Timed Charges Total Minutes: 55   Total Minutes: 55     Therapy Charges for Today     Code Description Service Date Service Provider Modifiers Qty    01483754207 HC OT MANUAL THERAPY EA 15 MIN 7/15/2022 Elicia Palencia OT GO 2    67977649122 HC OT SELF CARE/MGMT/TRAIN EA 15 MIN 7/15/2022 Elicia Palencia OT GO 2                      Elicia Palencia OT  7/15/2022

## 2022-07-18 ENCOUNTER — OFFICE VISIT (OUTPATIENT)
Dept: FAMILY MEDICINE CLINIC | Facility: CLINIC | Age: 72
End: 2022-07-18

## 2022-07-18 VITALS
OXYGEN SATURATION: 97 % | TEMPERATURE: 96.8 F | BODY MASS INDEX: 33.16 KG/M2 | DIASTOLIC BLOOD PRESSURE: 67 MMHG | HEART RATE: 65 BPM | HEIGHT: 62 IN | RESPIRATION RATE: 20 BRPM | SYSTOLIC BLOOD PRESSURE: 122 MMHG | WEIGHT: 180.2 LBS

## 2022-07-18 DIAGNOSIS — K57.92 DIVERTICULITIS: ICD-10-CM

## 2022-07-18 DIAGNOSIS — R10.32 ABDOMINAL PAIN, LLQ: Primary | ICD-10-CM

## 2022-07-18 PROBLEM — M51.36 DEGENERATION OF LUMBAR INTERVERTEBRAL DISC: Status: ACTIVE | Noted: 2022-07-18

## 2022-07-18 PROBLEM — M47.816 LUMBAR SPONDYLOSIS: Status: ACTIVE | Noted: 2022-07-18

## 2022-07-18 PROBLEM — M51.369 DEGENERATION OF LUMBAR INTERVERTEBRAL DISC: Status: ACTIVE | Noted: 2022-07-18

## 2022-07-18 PROBLEM — M19.90 CHRONIC OSTEOARTHRITIS: Status: ACTIVE | Noted: 2022-07-18

## 2022-07-18 PROCEDURE — 99213 OFFICE O/P EST LOW 20 MIN: CPT | Performed by: NURSE PRACTITIONER

## 2022-07-18 RX ORDER — SACCHAROMYCES BOULARDII 250 MG
250 CAPSULE ORAL 2 TIMES DAILY
Qty: 28 CAPSULE | Refills: 0 | Status: SHIPPED | OUTPATIENT
Start: 2022-07-18 | End: 2022-07-19 | Stop reason: SDUPTHER

## 2022-07-18 RX ORDER — TRAMADOL HYDROCHLORIDE 50 MG/1
TABLET ORAL EVERY 12 HOURS
COMMUNITY
End: 2022-07-18 | Stop reason: SDUPTHER

## 2022-07-18 RX ORDER — AMOXICILLIN AND CLAVULANATE POTASSIUM 875; 125 MG/1; MG/1
1 TABLET, FILM COATED ORAL 2 TIMES DAILY
Qty: 20 TABLET | Refills: 0 | Status: SHIPPED | OUTPATIENT
Start: 2022-07-18 | End: 2022-07-28

## 2022-07-18 NOTE — PROGRESS NOTES
"Chief Complaint  Abdominal Pain (Pt has history of diverticulitis and and is having abdominal pain-pt denies any genitourinary symptoms)    Subjective          Dorothea Cruz, 72 y.o. female presents to [facility]  History of Present Illness   She presents today for an acute visit and to establish care.  She is a previous patient of [facility].    She is complaining of left lower quadrant pain since yesterday.  She states she has a history of diverticulitis.  She denies diarrhea.  She states that she is having bowel movements.  She does complain of some nausea but denies vomiting.  She denies any dysuria.    Objective   Vital Signs:   /67 (BP Location: Left arm, Patient Position: Sitting, Cuff Size: Adult)   Pulse 65   Temp 96.8 °F (36 °C) (Temporal)   Resp 20   Ht 157.5 cm (62\")   Wt 81.7 kg (180 lb 3.2 oz)   SpO2 97%   BMI 32.96 kg/m²     Current Outpatient Medications on File Prior to Visit   Medication Sig Dispense Refill   • DULoxetine (CYMBALTA) 60 MG capsule Take 1 capsule by mouth Daily. 90 capsule 1   • ergocalciferol (ERGOCALCIFEROL) 1.25 MG (59734 UT) capsule Take 1 capsule by mouth 1 (One) Time Per Week. 13 capsule 1   • gabapentin (NEURONTIN) 300 MG capsule Take 1 capsule by mouth 3 (Three) Times a Day As Needed (neuropathy). 90 capsule 2   • hydroCHLOROthiazide (HYDRODIURIL) 25 MG tablet Take 1 tablet by mouth Daily. 90 tablet 1   • metoprolol tartrate (LOPRESSOR) 25 MG tablet Take 1 tablet by mouth 2 (Two) Times a Day. 180 tablet 1   • rosuvastatin (CRESTOR) 20 MG tablet Take 1 tablet by mouth Every Night. 90 tablet 1   • traMADol (ULTRAM) 50 MG tablet Every 12 (Twelve) Hours.     • colestipol (Colestid) 1 g tablet Take 2 tablets by mouth 2 (Two) Times a Day for 90 days. 360 tablet 1   • [DISCONTINUED] traMADol (ULTRAM) 50 MG tablet Every 12 (Twelve) Hours.       No current facility-administered medications on file prior to visit. "     Past Medical History:   Diagnosis Date   • Anxiety    • Arthritis    • Breast cancer (HCC)    • Cancer (HCC)     BREAST CANCER   • Cholelithiases 4/30@1   • Depression    • Disease of thyroid gland     currently not taking any meds   • Diverticulitis    • Diverticulitis of colon    • Fibromyalgia    • Hyperlipidemia    • Hypertension    • Major depressive disorder 05/07/2020   • Palpitations     ASYMPTOMATIC- DENIES CP/SOB, SEE PCP- NO CARDIOLOGIST    • Post-menopause 09/17/2020   • Sinus trouble    • Sleep apnea    • Vitamin D deficiency 09/17/2020      Physical Exam  Vitals reviewed.   Constitutional:       Appearance: Normal appearance. She is well-developed.   Neck:      Thyroid: No thyroid mass, thyromegaly or thyroid tenderness.   Cardiovascular:      Rate and Rhythm: Normal rate and regular rhythm.      Heart sounds: No murmur heard.    No friction rub. No gallop.   Pulmonary:      Effort: Pulmonary effort is normal.      Breath sounds: Normal breath sounds. No wheezing or rhonchi.   Abdominal:      General: Bowel sounds are normal.      Palpations: Abdomen is soft.      Tenderness: There is abdominal tenderness in the left lower quadrant. There is no rebound.   Lymphadenopathy:      Cervical: No cervical adenopathy.   Skin:     General: Skin is warm and dry.   Neurological:      Mental Status: She is alert and oriented to person, place, and time.      Cranial Nerves: No cranial nerve deficit.   Psychiatric:         Mood and Affect: Mood and affect normal.         Behavior: Behavior normal.         Thought Content: Thought content normal. Thought content does not include homicidal or suicidal ideation.         Judgment: Judgment normal.        Result Review :                 Assessment and Plan    Diagnoses and all orders for this visit:    1. Abdominal pain, LLQ (Primary)  -     CT Abdomen Pelvis With Contrast; Future    2. Diverticulitis  -     CT Abdomen Pelvis With Contrast; Future    Other orders  -      amoxicillin-clavulanate (Augmentin) 875-125 MG per tablet; Take 1 tablet by mouth 2 (Two) Times a Day for 10 days.  Dispense: 20 tablet; Refill: 0  -     saccharomyces boulardii (Florastor) 250 MG capsule; Take 1 capsule by mouth 2 (Two) Times a Day for 14 days.  Dispense: 28 capsule; Refill: 0    Advised to follow clear liquid diet.  I will start her on Augmentin and Florastor.  Stat CT of the abdomen and pelvis with oral contrast ordered, will notify of results.  Advised if her pain worsens that she should go to the emergency room.  Verbalizes understanding.    Follow Up   Return if symptoms worsen or fail to improve.  Patient was given instructions and counseling regarding her condition or for health maintenance advice. Please see specific information pulled into the AVS if appropriate.

## 2022-07-19 ENCOUNTER — HOSPITAL ENCOUNTER (OUTPATIENT)
Dept: CT IMAGING | Facility: HOSPITAL | Age: 72
Discharge: HOME | End: 2022-07-19
Admitting: NURSE PRACTITIONER

## 2022-07-19 ENCOUNTER — TELEPHONE (OUTPATIENT)
Dept: FAMILY MEDICINE CLINIC | Facility: CLINIC | Age: 72
End: 2022-07-19

## 2022-07-19 DIAGNOSIS — K57.92 DIVERTICULITIS: ICD-10-CM

## 2022-07-19 DIAGNOSIS — R10.32 ABDOMINAL PAIN, LLQ: ICD-10-CM

## 2022-07-19 LAB
CREAT BLDA-MCNC: 0.7 MG/DL
EGFRCR SERPLBLD CKD-EPI 2021: 92.6 ML/MIN/1.73

## 2022-07-19 PROCEDURE — 74177 CT ABD & PELVIS W/CONTRAST: CPT

## 2022-07-19 PROCEDURE — 82565 ASSAY OF CREATININE: CPT

## 2022-07-19 PROCEDURE — 0 IOPAMIDOL PER 1 ML: Performed by: NURSE PRACTITIONER

## 2022-07-19 RX ORDER — SACCHAROMYCES BOULARDII 250 MG
250 CAPSULE ORAL 2 TIMES DAILY
Qty: 60 CAPSULE | Refills: 0 | Status: SHIPPED | OUTPATIENT
Start: 2022-07-19 | End: 2022-07-21 | Stop reason: RX

## 2022-07-19 RX ADMIN — IOPAMIDOL 100 ML: 755 INJECTION, SOLUTION INTRAVENOUS at 14:23

## 2022-07-19 NOTE — TELEPHONE ENCOUNTER
WANTS TO SEE IF YOU CAN SEND IN A SCRIPT FOR THE PROBIOTIC YOU WANT HER TO TAKE -    WOULD LIKE IT SENT INTO [facility]

## 2022-07-21 RX ORDER — L. ACIDOPHILUS/L.BULGARICUS 1MM CELL
1 TABLET ORAL 2 TIMES DAILY
Qty: 60 TABLET | Refills: 0 | Status: SHIPPED | OUTPATIENT
Start: 2022-07-21 | End: 2022-09-20 | Stop reason: SDUPTHER

## 2022-07-21 NOTE — TELEPHONE ENCOUNTER
Caller: Dorothea Cruz    Relationship to patient: Self    Best call back number: [phone]    Patient is needing: PATIENT STATED THE [facility] DOES NOT HAVE THE PROBIOTIC VERTREES PRESCRIBED. SHE NEEDS THE LACTOBACIULLUS PRO-BIOTIC SENT INTO [facility].

## 2022-08-01 ENCOUNTER — HOSPITAL ENCOUNTER (OUTPATIENT)
Dept: OCCUPATIONAL THERAPY | Facility: HOSPITAL | Age: 72
Setting detail: THERAPIES SERIES
Discharge: HOME | End: 2022-08-01

## 2022-08-01 DIAGNOSIS — I89.0 LYMPHEDEMA: Primary | ICD-10-CM

## 2022-08-01 DIAGNOSIS — L90.5 SCAR TISSUE: ICD-10-CM

## 2022-08-01 DIAGNOSIS — I97.2 POSTMASTECTOMY LYMPHEDEMA SYNDROME: ICD-10-CM

## 2022-08-01 PROCEDURE — 97535 SELF CARE MNGMENT TRAINING: CPT

## 2022-08-01 NOTE — THERAPY RE-EVALUATION
Outpatient Occupational Therapy Lymphedema Re-Evaluation   [facility]     Patient Name: Dorothea Cruz  : 1950  MRN: [id number]  Today's Date: 2022      Visit Date: 2022    Patient Active Problem List   Diagnosis   • Vitamin D deficiency   • Sleep apnea   • Post-menopause   • Major depressive disorder   • Hypertension   • Hyperlipidemia   • Fibromyalgia   • Disease of thyroid gland   • Depression   • Anxiety   • Arthritis   • ACE-inhibitor cough   • Diverticulitis   • Sinus trouble   • Breast cancer (HCC)   • Screening for colon cancer   • History of diverticulitis   • History of cholecystectomy   • Altered bowel habits   • Diarrhea   • Malignant neoplasm of upper-inner quadrant of right female breast (HCC)   • Acute pyelonephritis   • Chronic low back pain   • Trochanteric bursitis of both hips   • CLL (chronic lymphocytic leukemia) (HCC)   • Degeneration of lumbar intervertebral disc   • Lumbar spondylosis   • Chronic osteoarthritis        Past Medical History:   Diagnosis Date   • Anxiety    • Arthritis    • Breast cancer (HCC)    • Cancer (HCC)     BREAST CANCER   • Cholelithiases @1   • Depression    • Disease of thyroid gland     currently not taking any meds   • Diverticulitis    • Diverticulitis of colon    • Fibromyalgia    • Hyperlipidemia    • Hypertension    • Major depressive disorder 2020   • Palpitations     ASYMPTOMATIC- DENIES CP/SOB, SEE PCP- NO CARDIOLOGIST    • Post-menopause 2020   • Sinus trouble    • Sleep apnea    • Vitamin D deficiency 2020        Past Surgical History:   Procedure Laterality Date   • ABDOMINAL SURGERY  1985   • APPENDECTOMY     • BILATERAL BREAST REDUCTION     • BREAST BIOPSY Right 2021    Procedure: RIGHT BREAST NEEDLE LOCALIZED  LUMPECTOMY WITH SENTINEL NODE BIOPSY;  Surgeon: Mirtha Esteves MD;  Location: [facility] OR [facility];  Service: General;  Laterality: Right;   • BREAST LUMPECTOMY     • CHOLECYSTECTOMY N/A 2021  "   Procedure: CHOLECYSTECTOMY LAPAROSCOPIC INTRAOPERATIVE CHOLANGIOGRAM;  Surgeon: Louie Medina MD;  Location: [facility] MAIN OR;  Service: General;  Laterality: N/A;   • COLONOSCOPY  2019   • COLONOSCOPY N/A 5/9/2022    Procedure: COLONOSCOPY WITH BIOPSIES;  Surgeon: Butch Garcia MD;  Location: [facility] ENDOSCOPY;  Service: Gastroenterology;  Laterality: N/A;  DIVERTICULOSIS   • HYSTERECTOMY     • TONSILLECTOMY     • WRIST ARTHROPLASTY Bilateral          Visit Dx:     ICD-10-CM ICD-9-CM   1. Lymphedema  I89.0 457.1   2. Scar tissue  L90.5 709.2   3. At risk for lymphedema  Z91.89 V49.89            Lymphedema     Row Name 08/01/22 1700             Subjective Pain    Able to rate subjective pain? yes  -CH      Pre-Treatment Pain Level 0  -CH      Post-Treatment Pain Level 0  -CH              Lymphedema Assessment    Lymphedema Classification RUE:;at risk/stage 0  -CH      Lymphedema Cancer Related Sx right;lumpectomy;sentinel node biopsy  -CH      Lymph Nodes Removed # 3  -CH      Positive Lymph Nodes # 0  -CH      Chemo Received no  -CH      Radiation Therapy Received yes  -CH      Radiation Treatments #/Timeframe 16  -CH              LLIS - Physical Concerns    The amount of pain associated with my lymphedema is: 0  -CH      The amount of limb heaviness associated with my lymphedema is: 0  -CH      The amount of skin tightness associated with my lymphedema is: 0  -CH      The size of my swollen limb(s) seems: 0  -CH      Lymphedema affects the movement of my swollen limb(s): 0  -CH      The strength in my swollen limb(s) is: 0  -CH              LLIS - Psychosocial Concerns    Lymphedema affects my body image (i.e., \"how I think I look\"). 0  -CH      Lymphedema affects my socializing with others. 0  -CH      Lymphedema affects my intimate relations with spouse or partner (rate 0 if not applicable 0  -CH      Lymphedema \"gets me down\" (i.e., depression, frustration, or anger) 0  -CH      I must rely on " others for help due to my lymphedema. 0  -CH      I know what to do to manage my lymphedema 3  -CH              LLIS - Functional Concerns    Lymphedema affects my ability to perform self-care activities (i.e. eating, dressing, hygiene) 0  -CH      Lymphedema affects my ability to perform routine home or work-related activities. 0  -CH      Lymphedema affects my performance of preferred leisure activities. 0  -CH              General ROM    GENERAL ROM COMMENTS B UE WNL  -CH              Skin Changes/Observations    Location/Assessment Upper Quadrant  -CH      Upper Quadrant Color/Pigment right:;hyperpigmented  -CH      Skin Observations Comment Patient is observed coming in with the Tubigrip sleeve that was applied to her arm double folded elbow and below.  Patient states that the Tubigrip sleeve has been rolling down and cutting off flow.  -CH              L-Dex Bioimpedence Screening    L-Dex Measurement Extremity RUE  -CH      L-Dex Patient Position Standing  -CH      L-Dex UE Dominate Side Right  -CH      L-Dex UE At Risk Side Right  -CH      L-Dex UE Score 8.2  -CH      L-Dex UE Baseline Score -14  -CH      L-Dex UE Value Change 22.2  -CH              Lymphedema Life Impact Scale Totals    A.  Total Q1 - Q17 (Do not include Q18) 3  -CH      B.  Total number of questions answered (Q1-Q17) 15  -CH      C. Divide A by B 0.2  -CH      D. Multiple C by 25 5  -CH            User Key  (r) = Recorded By, (t) = Taken By, (c) = Cosigned By    Initials Name Provider Type     Melissa Borden OT Occupational Therapist                 OT Ortho     Row Name 08/01/22 1700             Orthotics & Prosthetics Management    Orthosis Location upper extremity orthosis  -CH      Additional Documentation Orthosis Location (Row)  -CH            User Key  (r) = Recorded By, (t) = Taken By, (c) = Cosigned By    Initials Name Provider Type    Melissa Good OT Occupational Therapist              Patient fit into a size 3  extra-large Medi comfort compression sleeve 20 to 30 mmHg      Therapy Education  Education Details: Patient was educated appropriate treatment for stage I lymphedema exasperation.  Patient has not been wearing the Tubigrip because it will not stay in place.  However patient should have been put into a 20-30 compression sleeve immediately after she demonstrated that stage I exasperation.  OT fitted patient and helped her procure a compression garment this date.  Patient was instructed to use the compression garment daily off at night for the next 30 days then she will go 1 week without the compression garment to determine if stabilization of lymphatic functioning has been achieved.  Patient instructed to perform self manual lymphatic drainage twice a day and continue exercise with compression garment on daily.  Patient also instructed to elevate the arm when she is sleeping or sitting and relaxing to help improve overall lymphatic flow.  Given: Symptoms/condition management  Program: New  How Provided: Verbal  Provided to: Patient  Level of Understanding: Verbalized  81613 - OT Self Care/Mgmt Minutes: 30    OT educated patient on don doff technique of compression garment to include folding in half and utilizing rubber gloves to help position the upper portion of the compression sleeve into the axillary crease.     OT Goals     Row Name 08/01/22 1724          Time Calculation    OT Goal Re-Cert Due Date 08/31/22  -           User Key  (r) = Recorded By, (t) = Taken By, (c) = Cosigned By    Initials Name Provider Type    Melissa Good OT Occupational Therapist              Goals:  1. Post Breast Surgery Care/at risk for Lymphedema  LTG 1: 90 days:  As an indicator of no exacerbation of lymphedema staging, the patient will present with an L-Dex score less than 7 points from preoperative baseline.              STATUS: Not met: Ongoing   STG 1a: 30 days: Patient will be independent with self-manual lymphatic  massage.               STATUS: Met.  Ongoing  STG 1b: 30 days:  Patient will be independent with identification of signs and symptoms of lymphedema exasperation per stoplight to recovery education handout.              STATUS: Met.  Ongoing  STG 1 c: 30 days: Patient will be independent with HEP to prevent advancement in lymphedema staging.              STATUS: Met.  Ongoing  TREATMENT:  Self Care/ewADL retraining, Therapeutic Activity, Neuromuscular Re-education, Therapeutic Exercise, Bioimpedence Fluid Analysis, Orthotic Management and training,  and Manual Therapy.     OT Assessment/Plan     Row Name 08/01/22 1722          OT Assessment    Functional Limitations Other (comment)  -     Impairments Other (comment)  -     Assessment Comments Patient is presenting with a stage I lymphedema exasperation that she has not been properly managing at home with improper wearing of the Tubigrip and patient was not instructed to get a compression sleeve immediately with that early stage I exasperation.  Patient was given new instruction and has ordered a compression sleeve this date.  Patient will benefit from reevaluation of ongoing lymphatic functioning after 4 complete weeks of wearing her compression sleeve and 1 week off to establish whether or not stabilization of lymphedema has occurred and to continue education on how to manage lymph exasperations at home to prevent further exasperation and lymphedema staging.  -     Patient/caregiver participated in establishment of treatment plan and goals Yes  -            OT Plan    Predicted Duration of Therapy Intervention (OT) Patient to be reevaluated in 5 weeks  -     Planned CPT's? OT EVAL LOW COMPLEXITY: 09884;OT SELF CARE/MGMT/TRAIN 15 MIN: 16919;OT THER PROC EA 15 MIN: 74548BQ;OT MANUAL THERAPY EA 15 MIN: 76076;OT BIS XTRACELL FLUID ANALYSIS: 17252;OT ORTHOTIC MGMT/TRAIN EA 15 MIN: 97186;OT WC MGMT EA 15 MIN: 51709  -     Planned Therapy Interventions  (Optional Details) home exercise program;joint mobilization;manual therapy techniques;orthotic fitting/training;patient/family education;wound care  -           User Key  (r) = Recorded By, (t) = Taken By, (c) = Cosigned By    Initials Name Provider Type     Melissa Borden OT Occupational Therapist                          Time Calculation:   Timed Charges  08773 - OT Self Care/Mgmt Minutes: 30  Total Minutes  Timed Charges Total Minutes: 30   Total Minutes: 30     Therapy Charges for Today     Code Description Service Date Service Provider Modifiers Qty    83613307658  OT SELF CARE/MGMT/TRAIN EA 15 MIN 8/1/2022 Melissa Borden OT GO 2                    Melissa Borden OT  8/1/2022

## 2022-08-03 ENCOUNTER — APPOINTMENT (OUTPATIENT)
Dept: OCCUPATIONAL THERAPY | Facility: HOSPITAL | Age: 72
End: 2022-08-03

## 2022-08-08 ENCOUNTER — APPOINTMENT (OUTPATIENT)
Dept: OCCUPATIONAL THERAPY | Facility: HOSPITAL | Age: 72
End: 2022-08-08

## 2022-08-10 ENCOUNTER — APPOINTMENT (OUTPATIENT)
Dept: OCCUPATIONAL THERAPY | Facility: HOSPITAL | Age: 72
End: 2022-08-10

## 2022-08-12 ENCOUNTER — TELEPHONE (OUTPATIENT)
Dept: ONCOLOGY | Facility: HOSPITAL | Age: 72
End: 2022-08-12

## 2022-08-12 ENCOUNTER — TRANSCRIBE ORDERS (OUTPATIENT)
Dept: ADMINISTRATIVE | Facility: HOSPITAL | Age: 72
End: 2022-08-12

## 2022-08-12 ENCOUNTER — HOSPITAL ENCOUNTER (OUTPATIENT)
Dept: GENERAL RADIOLOGY | Facility: HOSPITAL | Age: 72
Discharge: HOME | End: 2022-08-12
Admitting: NURSE PRACTITIONER

## 2022-08-12 ENCOUNTER — APPOINTMENT (OUTPATIENT)
Dept: OCCUPATIONAL THERAPY | Facility: HOSPITAL | Age: 72
End: 2022-08-12

## 2022-08-12 ENCOUNTER — TELEPHONE (OUTPATIENT)
Dept: FAMILY MEDICINE CLINIC | Facility: CLINIC | Age: 72
End: 2022-08-12

## 2022-08-12 DIAGNOSIS — M70.61 TROCHANTERIC BURSITIS OF RIGHT HIP: Primary | ICD-10-CM

## 2022-08-12 DIAGNOSIS — M70.61 TROCHANTERIC BURSITIS OF RIGHT HIP: ICD-10-CM

## 2022-08-12 DIAGNOSIS — M70.62 TROCHANTERIC BURSITIS OF LEFT HIP: ICD-10-CM

## 2022-08-12 DIAGNOSIS — E27.8 ADRENAL MASS 1 CM TO 4 CM IN DIAMETER: ICD-10-CM

## 2022-08-12 DIAGNOSIS — R91.1 LUNG NODULE: Primary | ICD-10-CM

## 2022-08-12 PROCEDURE — 73522 X-RAY EXAM HIPS BI 3-4 VIEWS: CPT

## 2022-08-12 NOTE — TELEPHONE ENCOUNTER
pt called and states that she received a letter from the [facility] stating that she has a nodule on her lung. pt asking what to do. This nurse instructed the pt to call the MD who ordered the scan. This nurse also notified [facility] of the pt.

## 2022-08-12 NOTE — TELEPHONE ENCOUNTER
Okay I have ordered the CT of the chest, someone will call her when they scheduled the appointment

## 2022-08-12 NOTE — TELEPHONE ENCOUNTER
Patient called regarding letter dated 8/5/22 - she would like someone to call her she wants to know if any further testing needs to be done - nodule on lung

## 2022-08-17 ENCOUNTER — APPOINTMENT (OUTPATIENT)
Dept: OCCUPATIONAL THERAPY | Facility: HOSPITAL | Age: 72
End: 2022-08-17

## 2022-08-19 ENCOUNTER — APPOINTMENT (OUTPATIENT)
Dept: OCCUPATIONAL THERAPY | Facility: HOSPITAL | Age: 72
End: 2022-08-19

## 2022-08-24 ENCOUNTER — APPOINTMENT (OUTPATIENT)
Dept: OCCUPATIONAL THERAPY | Facility: HOSPITAL | Age: 72
End: 2022-08-24

## 2022-08-24 DIAGNOSIS — R19.7 DIARRHEA, UNSPECIFIED TYPE: ICD-10-CM

## 2022-08-24 RX ORDER — MONTELUKAST SODIUM 4 MG/1
2 TABLET, CHEWABLE ORAL 2 TIMES DAILY
Qty: 360 TABLET | Refills: 1 | Status: SHIPPED | OUTPATIENT
Start: 2022-08-24 | End: 2022-12-06 | Stop reason: SDUPTHER

## 2022-08-24 NOTE — TELEPHONE ENCOUNTER
Caller: Dorothea rCuz    Relationship: Self    Best call back number: [phone]     Requested Prescriptions:   Requested Prescriptions     Pending Prescriptions Disp Refills   • colestipol (Colestid) 1 g tablet 360 tablet 1     Sig: Take 2 tablets by mouth 2 (Two) Times a Day for 90 days.        [facility] [place] [place] [facility] [address] [phone] [facility] [phone] [facility]     Additional details provided by patient: PLEASE CALL WHEN PRESCRIPTION HAS BEEN CALLED IN    Does the patient have less than a 3 day supply:  [] Yes  [x] No    Jackelyn GIANG Rep   08/24/22 12:48 EDT

## 2022-08-26 ENCOUNTER — APPOINTMENT (OUTPATIENT)
Dept: OCCUPATIONAL THERAPY | Facility: HOSPITAL | Age: 72
End: 2022-08-26

## 2022-08-26 ENCOUNTER — OFFICE VISIT (OUTPATIENT)
Dept: FAMILY MEDICINE CLINIC | Facility: CLINIC | Age: 72
End: 2022-08-26

## 2022-08-26 VITALS
WEIGHT: 177 LBS | BODY MASS INDEX: 32.57 KG/M2 | OXYGEN SATURATION: 94 % | HEIGHT: 62 IN | DIASTOLIC BLOOD PRESSURE: 64 MMHG | SYSTOLIC BLOOD PRESSURE: 100 MMHG | TEMPERATURE: 96.6 F | HEART RATE: 55 BPM

## 2022-08-26 DIAGNOSIS — C91.10 CLL (CHRONIC LYMPHOCYTIC LEUKEMIA): ICD-10-CM

## 2022-08-26 DIAGNOSIS — Z17.0 MALIGNANT NEOPLASM OF UPPER-INNER QUADRANT OF RIGHT BREAST IN FEMALE, ESTROGEN RECEPTOR POSITIVE: ICD-10-CM

## 2022-08-26 DIAGNOSIS — C50.211 MALIGNANT NEOPLASM OF UPPER-INNER QUADRANT OF RIGHT BREAST IN FEMALE, ESTROGEN RECEPTOR POSITIVE: ICD-10-CM

## 2022-08-26 DIAGNOSIS — Z20.822 EXPOSURE TO COVID-19 VIRUS: ICD-10-CM

## 2022-08-26 DIAGNOSIS — J01.90 ACUTE NON-RECURRENT SINUSITIS, UNSPECIFIED LOCATION: ICD-10-CM

## 2022-08-26 DIAGNOSIS — J02.9 SORE THROAT: ICD-10-CM

## 2022-08-26 DIAGNOSIS — R51.9 ACUTE NONINTRACTABLE HEADACHE, UNSPECIFIED HEADACHE TYPE: Primary | ICD-10-CM

## 2022-08-26 LAB
EXPIRATION DATE: NORMAL
EXPIRATION DATE: NORMAL
FLUAV AG UPPER RESP QL IA.RAPID: NOT DETECTED
FLUBV AG UPPER RESP QL IA.RAPID: NOT DETECTED
INTERNAL CONTROL: NORMAL
INTERNAL CONTROL: NORMAL
Lab: NORMAL
Lab: NORMAL
S PYO AG THROAT QL: NEGATIVE
SARS-COV-2 AG UPPER RESP QL IA.RAPID: NOT DETECTED

## 2022-08-26 PROCEDURE — 87880 STREP A ASSAY W/OPTIC: CPT | Performed by: NURSE PRACTITIONER

## 2022-08-26 PROCEDURE — 87428 SARSCOV & INF VIR A&B AG IA: CPT | Performed by: NURSE PRACTITIONER

## 2022-08-26 PROCEDURE — 99214 OFFICE O/P EST MOD 30 MIN: CPT | Performed by: NURSE PRACTITIONER

## 2022-08-26 RX ORDER — AZITHROMYCIN 250 MG/1
TABLET, FILM COATED ORAL
Qty: 6 TABLET | Refills: 0 | Status: SHIPPED | OUTPATIENT
Start: 2022-08-26 | End: 2022-08-31

## 2022-08-26 NOTE — PROGRESS NOTES
"Chief Complaint  Sore Throat and Headache    Subjective          Dorothea Cruz, 72 y.o. female presents to [facility]  History of Present Illness   She presents today for an acute visit.  She is complaining of sore throat and headache for the past 2 days.  Her  tested positive for COVID today.  She denies any difficulty breathing or shortness of breath.  She denies any nausea, vomiting or diarrhea.  She is wanting a Z-Miles because she is starting to have some sinus pain and pressure.  She does have history of breast cancer and CLL leukemia.  She states she has had COVID in the past and her symptoms are mild.  She states her symptoms seem mild today.  I discussed with her the option for Paxlovid and she declines Paxlovid treatment.  Objective   Vital Signs:   /64 (BP Location: Left arm, Patient Position: Sitting, Cuff Size: Adult)   Pulse 55   Temp 96.6 °F (35.9 °C) (Temporal)   Ht 157.5 cm (62\")   Wt 80.3 kg (177 lb)   SpO2 94%   BMI 32.37 kg/m²     Current Outpatient Medications on File Prior to Visit   Medication Sig Dispense Refill   • colestipol (Colestid) 1 g tablet Take 2 tablets by mouth 2 (Two) Times a Day for 90 days. 360 tablet 1   • DULoxetine (CYMBALTA) 60 MG capsule Take 1 capsule by mouth Daily. 90 capsule 1   • ergocalciferol (ERGOCALCIFEROL) 1.25 MG (99472 UT) capsule Take 1 capsule by mouth 1 (One) Time Per Week. 13 capsule 1   • gabapentin (NEURONTIN) 300 MG capsule Take 1 capsule by mouth 3 (Three) Times a Day As Needed (neuropathy). 90 capsule 2   • hydroCHLOROthiazide (HYDRODIURIL) 25 MG tablet Take 1 tablet by mouth Daily. 90 tablet 1   • Lactobacillus Probiotic tablet Take 1 tablet by mouth 2 (Two) Times a Day. 60 tablet 0   • metoprolol tartrate (LOPRESSOR) 25 MG tablet Take 1 tablet by mouth 2 (Two) Times a Day. 180 tablet 1   • rosuvastatin (CRESTOR) 20 MG tablet Take 1 tablet by mouth Every Night. 90 tablet 1   • traMADol (ULTRAM) 50 " MG tablet Every 12 (Twelve) Hours.       No current facility-administered medications on file prior to visit.     Past Medical History:   Diagnosis Date   • Anxiety    • Arthritis    • Breast cancer (HCC)    • Cancer (HCC)     BREAST CANCER   • Cholelithiases 4/30@1   • Depression    • Disease of thyroid gland     currently not taking any meds   • Diverticulitis    • Diverticulitis of colon    • Fibromyalgia    • Hyperlipidemia    • Hypertension    • Major depressive disorder 05/07/2020   • Palpitations     ASYMPTOMATIC- DENIES CP/SOB, SEE PCP- NO CARDIOLOGIST    • Post-menopause 09/17/2020   • Sinus trouble    • Sleep apnea    • Vitamin D deficiency 09/17/2020      Physical Exam  Vitals reviewed.   Constitutional:       Appearance: Normal appearance. She is well-developed.   HENT:      Right Ear: Tympanic membrane, ear canal and external ear normal.      Left Ear: Tympanic membrane, ear canal and external ear normal.      Mouth/Throat:      Mouth: Mucous membranes are moist.      Pharynx: No pharyngeal swelling, oropharyngeal exudate or posterior oropharyngeal erythema.   Neck:      Thyroid: No thyroid mass, thyromegaly or thyroid tenderness.   Cardiovascular:      Rate and Rhythm: Normal rate and regular rhythm.      Heart sounds: No murmur heard.    No friction rub. No gallop.   Pulmonary:      Effort: Pulmonary effort is normal.      Breath sounds: Normal breath sounds. No wheezing or rhonchi.   Lymphadenopathy:      Cervical: No cervical adenopathy.   Skin:     General: Skin is warm and dry.   Neurological:      Mental Status: She is alert and oriented to person, place, and time.      Cranial Nerves: No cranial nerve deficit.   Psychiatric:         Mood and Affect: Mood and affect normal.         Behavior: Behavior normal.         Thought Content: Thought content normal. Thought content does not include homicidal or suicidal ideation.         Judgment: Judgment normal.        Result Review :     Strep    Common  Labsle 8/26/22   POC Strep A, Molecular Negative                    POCT SARS-CoV-2 Antigen HERNANDO + Flu  Order: [id number]   Status: Final result     Visible to patient: No (scheduled for 8/27/2022  5:26 AM)     Next appt: 09/06/2022 at 03:30 PM in Radiology ([facility])     Dx: Acute nonintractable headache, unspec...    Specimen Information: Swab         0 Result Notes    Component   Ref Range & Units 15:25   (8/26/22) 15:21   (8/26/22) 4 mo ago   (4/14/22) 5 mo ago   (3/8/22) 6 mo ago   (2/22/22) 7 mo ago   (1/28/22) 7 mo ago   (1/28/22)   SARS Antigen   Not Detected, Presumptive Negative Not Detected          Influenza A Antigen HERNANDO   Not Detected Not Detected          Influenza B Antigen HERNANDO   Not Detected Not Detected          Internal Control   Passed Passed  Passed     Passed  Passed    Lot Number  [id number]  [id number]  [id number]  [id number]  [id number]  [phone]    Expiration Date  03/10/2023  04/21/2024  03/31/2023  12/31/2022  5/15/25  04/03/2022  04/30/2023    Resulting Agency  [facility]  [facility]  [facility]              Specimen Collected: 08/26/22 15:25 Last Resulted: 08/26/22 15:25             Assessment and Plan    Diagnoses and all orders for this visit:    1. Acute nonintractable headache, unspecified headache type (Primary)  -     POCT rapid strep A  -     POCT SARS-CoV-2 Antigen HERNANDO + Flu    2. Sore throat  -     POCT rapid strep A  -     POCT SARS-CoV-2 Antigen HERNANDO + Flu    3. Exposure to COVID-19 virus    4. Acute non-recurrent sinusitis, unspecified location  -     azithromycin (ZITHROMAX) 250 MG tablet; Take 2 tablets by mouth Daily for 1 day, THEN 1 tablet Daily for 4 days.  Dispense: 6 tablet; Refill: 0    5. Malignant neoplasm of upper-inner quadrant of right breast in female, estrogen receptor positive (HCC)    6. CLL (chronic lymphocytic leukemia) (HCC)    I discussed with her  that even though she has a negative test that she most likely has COVID due to her symptoms and close positive exposure.  Advised her to rest and push fluids.  Discussed with patient to quarantine for at least 5 days from onset of symptoms and symptoms have resolved.  Patient instructed not to go anywhere except to seek medical care.  Advised to wear a mask if patient goes anywhere for the next 5 days.  Instructed to seek medical care for any difficulty of breathing, unable to keep fluids down, or worsening of symptoms.  She requested a Z-Miles but I advised her to not take it unless her symptoms worsen.  Verbalizes understanding.    Follow Up   Return if symptoms worsen or fail to improve.  Patient was given instructions and counseling regarding her condition or for health maintenance advice. Please see specific information pulled into the AVS if appropriate.

## 2022-08-29 ENCOUNTER — APPOINTMENT (OUTPATIENT)
Dept: OCCUPATIONAL THERAPY | Facility: HOSPITAL | Age: 72
End: 2022-08-29

## 2022-08-31 ENCOUNTER — APPOINTMENT (OUTPATIENT)
Dept: OCCUPATIONAL THERAPY | Facility: HOSPITAL | Age: 72
End: 2022-08-31

## 2022-09-06 ENCOUNTER — HOSPITAL ENCOUNTER (OUTPATIENT)
Dept: CT IMAGING | Facility: HOSPITAL | Age: 72
Discharge: HOME | End: 2022-09-06
Admitting: NURSE PRACTITIONER

## 2022-09-06 DIAGNOSIS — R91.1 LUNG NODULE: ICD-10-CM

## 2022-09-06 LAB
CREAT BLDA-MCNC: 0.8 MG/DL
EGFRCR SERPLBLD CKD-EPI 2021: 78.9 ML/MIN/1.73

## 2022-09-06 PROCEDURE — 0 IOPAMIDOL PER 1 ML: Performed by: NURSE PRACTITIONER

## 2022-09-06 PROCEDURE — 82565 ASSAY OF CREATININE: CPT

## 2022-09-06 PROCEDURE — 71260 CT THORAX DX C+: CPT

## 2022-09-06 RX ADMIN — IOPAMIDOL 100 ML: 755 INJECTION, SOLUTION INTRAVENOUS at 15:46

## 2022-09-07 ENCOUNTER — APPOINTMENT (OUTPATIENT)
Dept: OCCUPATIONAL THERAPY | Facility: HOSPITAL | Age: 72
End: 2022-09-07

## 2022-09-14 ENCOUNTER — HOSPITAL ENCOUNTER (OUTPATIENT)
Dept: MAMMOGRAPHY | Facility: HOSPITAL | Age: 72
Discharge: HOME | End: 2022-09-14
Admitting: INTERNAL MEDICINE

## 2022-09-14 DIAGNOSIS — Z12.31 ENCOUNTER FOR SCREENING MAMMOGRAM FOR MALIGNANT NEOPLASM OF BREAST: ICD-10-CM

## 2022-09-14 PROCEDURE — 77063 BREAST TOMOSYNTHESIS BI: CPT

## 2022-09-14 PROCEDURE — 77067 SCR MAMMO BI INCL CAD: CPT

## 2022-09-15 ENCOUNTER — HOSPITAL ENCOUNTER (OUTPATIENT)
Dept: OCCUPATIONAL THERAPY | Facility: HOSPITAL | Age: 72
Setting detail: THERAPIES SERIES
Discharge: HOME | End: 2022-09-15

## 2022-09-15 DIAGNOSIS — I89.0 LYMPHEDEMA: Primary | ICD-10-CM

## 2022-09-15 PROCEDURE — 93702 BIS XTRACELL FLUID ANALYSIS: CPT | Performed by: OCCUPATIONAL THERAPIST

## 2022-09-15 PROCEDURE — 97535 SELF CARE MNGMENT TRAINING: CPT | Performed by: OCCUPATIONAL THERAPIST

## 2022-09-15 NOTE — THERAPY RE-EVALUATION
Outpatient Occupational Therapy Lymphedema Re-Evaluation   Daniela     Patient Name: Dorothea Cruz  : 1950  MRN: [id number]  Today's Date: 9/15/2022      Visit Date: 09/15/2022    Patient Active Problem List   Diagnosis   • Vitamin D deficiency   • Sleep apnea   • Post-menopause   • Major depressive disorder   • Hypertension   • Hyperlipidemia   • Fibromyalgia   • Disease of thyroid gland   • Depression   • Anxiety   • Arthritis   • ACE-inhibitor cough   • Diverticulitis   • Sinus trouble   • Breast cancer (HCC)   • Screening for colon cancer   • History of diverticulitis   • History of cholecystectomy   • Altered bowel habits   • Diarrhea   • Malignant neoplasm of upper-inner quadrant of right female breast (HCC)   • Acute pyelonephritis   • Chronic low back pain   • Trochanteric bursitis of both hips   • CLL (chronic lymphocytic leukemia) (HCC)   • Degeneration of lumbar intervertebral disc   • Lumbar spondylosis   • Chronic osteoarthritis        Past Medical History:   Diagnosis Date   • Anxiety    • Arthritis    • Breast cancer (HCC)    • Cancer (HCC)     BREAST CANCER   • Cholelithiases @1   • Depression    • Disease of thyroid gland     currently not taking any meds   • Diverticulitis    • Diverticulitis of colon    • Fibromyalgia    • Hyperlipidemia    • Hypertension    • Major depressive disorder 2020   • Palpitations     ASYMPTOMATIC- DENIES CP/SOB, SEE PCP- NO CARDIOLOGIST    • Post-menopause 2020   • Sinus trouble    • Sleep apnea    • Vitamin D deficiency 2020        Past Surgical History:   Procedure Laterality Date   • ABDOMINAL SURGERY  1985   • APPENDECTOMY     • BILATERAL BREAST REDUCTION     • BREAST BIOPSY Right 2021    Procedure: RIGHT BREAST NEEDLE LOCALIZED  LUMPECTOMY WITH SENTINEL NODE BIOPSY;  Surgeon: Mirtha Esteves MD;  [facility] [place];  Service: General;  Laterality: Right;   • BREAST LUMPECTOMY     • CHOLECYSTECTOMY N/A  9/2/2021    Procedure: CHOLECYSTECTOMY LAPAROSCOPIC INTRAOPERATIVE CHOLANGIOGRAM;  Surgeon: Louie Medina MD;  Location: [facility] MAIN OR;  Service: General;  Laterality: N/A;   • COLONOSCOPY  2019   • COLONOSCOPY N/A 5/9/2022    Procedure: COLONOSCOPY WITH BIOPSIES;  Surgeon: Butch Garcia MD;  Location: [facility] ENDOSCOPY;  Service: Gastroenterology;  Laterality: N/A;  DIVERTICULOSIS   • HYSTERECTOMY     • TONSILLECTOMY     • WRIST ARTHROPLASTY Bilateral          Visit Dx:     ICD-10-CM ICD-9-CM   1. Lymphedema  I89.0 457.1        Patient History     Row Name 09/15/22 1000             History    Chief Complaint --  Pt reports that her symptoms have greatly decreased since wearing the sleeve  -TD      Brief Description of Current Complaint Mrs. Hager is a 71 year old female who had surgery on 11/12/21 for Lumpectomoy on the Right due to Trip negative Breast CA  -TD              Fall Risk Assessment    Any falls in the past year: No  -TD      Does patient have a fear of falling No  -TD              Services    Prior Rehab/Home Health Experiences No  -TD      Are you currently receiving Home Health services No  -TD      Do you plan to receive Home Health services in the near future No  -TD              Daily Activities    Primary Language English  -TD      Are you able to read Yes  -TD      Are you able to write Yes  -TD      How does patient learn best? Reading;Demonstration  -TD      Barriers to learning None  -TD      Pt Participated in POC and Goals Yes  -TD              Safety    Are you being hurt, hit, or frightened by anyone at home or in your life? No  -TD      Are you being neglected by a caregiver No  -TD      Have you had any of the following issues with Depression;Anxiety  -TD            User Key  (r) = Recorded By, (t) = Taken By, (c) = Cosigned By    Initials Name Provider Type    TD Aminah Bryant OT Occupational Therapist                 Lymphedema     Row Name 09/15/22 1000              "Subjective Pain    Able to rate subjective pain? yes  -TD      Pre-Treatment Pain Level 0  -TD      Post-Treatment Pain Level 0  -TD              Subjective Comments    Subjective Comments Pt had a mamogram yesterday and is nervous for the results.  Pt reports that they found a spot on her adrenal glad.  She plans to have an MRI at the end of september.  -TD              Lymphedema Assessment    Lymphedema Classification RUE:;at risk/stage 0  -TD      Lymphedema Cancer Related Sx right;lumpectomy;sentinel node biopsy  -TD      Lymph Nodes Removed # 3  -TD      Positive Lymph Nodes # 0  -TD      Chemo Received no  -TD      Radiation Therapy Received yes  -TD      Radiation Treatments #/Timeframe 16  -TD              LLIS - Physical Concerns    The amount of pain associated with my lymphedema is: 0  -TD      The amount of limb heaviness associated with my lymphedema is: 0  -TD      The amount of skin tightness associated with my lymphedema is: 0  -TD      The size of my swollen limb(s) seems: 0  -TD      Lymphedema affects the movement of my swollen limb(s): 0  -TD      The strength in my swollen limb(s) is: 0  -TD              LLIS - Psychosocial Concerns    Lymphedema affects my body image (i.e., \"how I think I look\"). 0  -TD      Lymphedema affects my socializing with others. 0  -TD      Lymphedema affects my intimate relations with spouse or partner (rate 0 if not applicable 0  -TD      Lymphedema \"gets me down\" (i.e., depression, frustration, or anger) 0  -TD      I must rely on others for help due to my lymphedema. 0  -TD      I know what to do to manage my lymphedema 3  -TD              LLIS - Functional Concerns    Lymphedema affects my ability to perform self-care activities (i.e. eating, dressing, hygiene) 0  -TD      Lymphedema affects my ability to perform routine home or work-related activities. 0  -TD      Lymphedema affects my performance of preferred leisure activities. 0  -TD      Lymphedema affects " proper fit of clothing/shoes 0  -TD      Lymphedema affects my sleep 0  -TD              Posture/Observations    Posture- WNL Posture is WNL  -TD              General ROM    GENERAL ROM COMMENTS BUE WFL  -TD              MMT (Manual Muscle Testing)    General MMT Comments BUE WFL  -TD              Skin Changes/Observations    Location/Assessment Upper Quadrant  -TD      Upper Quadrant Color/Pigment right:  -TD      Skin Observations Comment it was noted that the patient has tissue paper wrinkles on the LUE but is not present in the RUE  -TD              L-Dex Bioimpedence Screening    L-Dex Measurement Extremity RUE  -TD      L-Dex Patient Position Standing  -TD      L-Dex UE Dominate Side Right  -TD      L-Dex UE At Risk Side Right  -TD      L-Dex UE Score -3.2  -TD      L-Dex UE Baseline Score -14  -TD      L-Dex UE Value Change 10.8  -TD      $ L-Dex Charge yes  -TD              Lymphedema Life Impact Scale Totals    A.  Total Q1 - Q17 (Do not include Q18) 3  -TD      B.  Total number of questions answered (Q1-Q17) 17  -TD      C. Divide A by B 0.18  -TD      D. Multiple C by 25 4.5  -TD            User Key  (r) = Recorded By, (t) = Taken By, (c) = Cosigned By    Initials Name Provider Type    Aminah Jones OT Occupational Therapist                        Therapy Education  Education Details: Patient was educated on lymphedema exacerbations and signs and symptoms to be concerned about.  Patient agreed that she will contact therapist if she sees signs or symptoms of increased lymphedema in her right upper extremity.  Therapist educated patient on wear schedule for the next 3 months and possible wear schedule after.  Therapist reviewed exercises and self manual lymphatic drainage to continue for the next 3 months.  Given: Symptoms/condition management  Program: New  How Provided: Verbal  Provided to: Patient  Level of Understanding: Verbalized  52782 - OT Self Care/Mgmt Minutes: 30         OT Goals     Row Name  09/15/22 1112          Time Calculation    OT Goal Re-Cert Due Date 10/15/22  -TD           User Key  (r) = Recorded By, (t) = Taken By, (c) = Cosigned By    Initials Name Provider Type    Aminah Jones OT Occupational Therapist            Goals:  1. Post Breast Surgery Care/at risk for Lymphedema  LTG 1: 90 days:  As an indicator of no exacerbation of lymphedema staging, the patient will present with an L-Dex score less than 7 points from preoperative baseline.              STATUS: Not met: Ongoing   STG 1a: 30 days: Patient will be independent with self-manual lymphatic massage.               STATUS: Met.  Ongoing  STG 1b: 30 days:  Patient will be independent with identification of signs and symptoms of lymphedema exasperation per stoplight to recovery education handout.              STATUS: Met.  Ongoing  STG 1 c: 30 days: Patient will be independent with HEP to prevent advancement in lymphedema staging.              STATUS: Met.  Ongoing  TREATMENT:  Self Care/ewADL retraining, Therapeutic Activity, Neuromuscular Re-education, Therapeutic Exercise, Bioimpedence Fluid Analysis, Orthotic Management and training,  and Manual Therapy.     OT Assessment/Plan     Row Name 09/15/22 1104          OT Assessment    Functional Limitations Performance in self-care ADL  -TD     Impairments Impaired lymphatic circulation  -TD     Assessment Comments Patient demonstrated a large decrease in lymphedema volume this date and is back down into normal range.  It was noted that the patient has a paper appearance on the left upper extremity but does not on the right upper extremity.  This can be indicative of remaining swelling.  Patient has no symptoms of lymphedema or complaints of pain.  Therapist and patient decided on a 3-day a week wear schedule for the next 3 months.  Patient will be measured at next reeval and decisions will be made for remaining wear schedule after that.  Patient would benefit from continued skilled  occupational therapy to prevent increased staging of lymphedema.  -TD     OT Diagnosis Lymphedema  -TD     OT Rehab Potential Good  -TD     Patient/caregiver participated in establishment of treatment plan and goals Yes  -TD     Patient would benefit from skilled therapy intervention Yes  -TD            OT Plan    OT Frequency --  See duration  -TD     Predicted Duration of Therapy Intervention (OT) Patient will be reevaluated in 3 months and then placed back on surveillance program if lymphedema is stabilized.  -TD     Planned CPT's? OT EVAL LOW COMPLEXITY: 86333;OT SELF CARE/MGMT/TRAIN 15 MIN: 49071;OT THER PROC EA 15 MIN: 43893HT;OT MANUAL THERAPY EA 15 MIN: 25354;OT BIS XTRACELL FLUID ANALYSIS: 95386;OT ORTHOTIC MGMT/TRAIN EA 15 MIN: 70787;OT WC MGMT EA 15 MIN: 25235  -TD     Planned Therapy Interventions (Optional Details) home exercise program;joint mobilization;manual therapy techniques;orthotic fitting/training;patient/family education;wound care  -TD     OT Plan Comments Continue plan of care  -TD           User Key  (r) = Recorded By, (t) = Taken By, (c) = Cosigned By    Initials Name Provider Type    TD Aminah Bryant OT Occupational Therapist                          Time Calculation:   Timed Charges  50153 - OT Self Care/Mgmt Minutes: 30  Total Minutes  Timed Charges Total Minutes: 30   Total Minutes: 30     Therapy Charges for Today     Code Description Service Date Service Provider Modifiers Qty    78574364423 HC OT SELF CARE/MGMT/TRAIN EA 15 MIN 9/15/2022 Aminah Bryant OT GO 2    [id number] HC PT BIS XTRACELL FLUID ANALYSIS 9/15/2022 Aminah Bryant OT  1                    Aminah Bryant OT  9/15/2022

## 2022-09-19 NOTE — PROGRESS NOTES
"  Answers for HPI/ROS submitted by the patient on 9/13/2022  What is the primary reason for your visit?: Painful Urination  Chronicity: recurrent  Onset: in the past 7 days  Frequency: every urination  Progression since onset: gradually worsening  Pain quality: aching, burning  Pain - numeric: 7/10  Fever: no fever  Sexually active?: No  History of pyelonephritis?: Yes  chills: Yes  frequency: Yes  hematuria: No  hesitancy: No  nausea: No  possible pregnancy: No  sweats: No  urgency: Yes  vomiting: No  Chief Complaint  Follow-up, Hypertension, and Urinary Tract Infection    Subjective          Dorothea Cruz, 72 y.o. female presents to [facility]  History of Present Illness   She presents today for 6-month follow-up.  She is also having painful urination for 1 week.  Urinalysis in office today shows large leukocytes, moderate blood, positive nitrate.  She did take Azo once last week.  She states she gets frequent UTIs.  She states she did take a bath last week.    Hypertension: She is on hydrochlorothiazide 25 mg daily.  She is also on metoprolol 25 mg twice daily.    Hyperlipidemia: She is currently on rosuvastatin 20 mg every evening.  She is due for lipid panel but she is not fasting today.    Vitamin D deficiency: She is currently on vitamin D 50,000 units weekly.    She has breast cancer.  She is following with oncology.     Fibromyalgia: She is currently on gabapentin 300 mg 3 times daily as needed.  She gets her medication filled at [place] [facility].  She is also on tramadol for pain management.    Depression/anxiety: She states she has been more depressed lately.  No suicidal ideation.  She is currently on Cymbalta 60 mg once daily.  She has not talked to a therapist.    Patient was also seen by Sharmaine Whiteside, [employer] student.    Objective   Vital Signs:   /71   Pulse 67   Temp 96.7 °F (35.9 °C)   Resp 24   Ht 157.5 cm (62\")   Wt 79.1 kg (174 lb 4.8 oz)   " SpO2 98%   BMI 31.88 kg/m²     Current Outpatient Medications on File Prior to Visit   Medication Sig Dispense Refill   • colestipol (Colestid) 1 g tablet Take 2 tablets by mouth 2 (Two) Times a Day for 90 days. 360 tablet 1   • traMADol (ULTRAM) 50 MG tablet Every 12 (Twelve) Hours.     • [DISCONTINUED] DULoxetine (CYMBALTA) 60 MG capsule Take 1 capsule by mouth Daily. 90 capsule 1   • [DISCONTINUED] ergocalciferol (ERGOCALCIFEROL) 1.25 MG (97654 UT) capsule Take 1 capsule by mouth 1 (One) Time Per Week. 13 capsule 1   • [DISCONTINUED] gabapentin (NEURONTIN) 300 MG capsule Take 1 capsule by mouth 3 (Three) Times a Day As Needed (neuropathy). 90 capsule 2   • [DISCONTINUED] hydroCHLOROthiazide (HYDRODIURIL) 25 MG tablet Take 1 tablet by mouth Daily. 90 tablet 1   • [DISCONTINUED] Lactobacillus Probiotic tablet Take 1 tablet by mouth 2 (Two) Times a Day. 60 tablet 0   • [DISCONTINUED] metoprolol tartrate (LOPRESSOR) 25 MG tablet Take 1 tablet by mouth 2 (Two) Times a Day. 180 tablet 1   • [DISCONTINUED] rosuvastatin (CRESTOR) 20 MG tablet Take 1 tablet by mouth Every Night. 90 tablet 1     No current facility-administered medications on file prior to visit.     Past Medical History:   Diagnosis Date   • Anxiety    • Arthritis    • Breast cancer (HCC)    • Cancer (HCC)     BREAST CANCER   • Cholelithiases 4/30@1   • Depression    • Disease of thyroid gland     currently not taking any meds   • Diverticulitis    • Diverticulitis of colon    • Fibromyalgia    • Fibromyalgia, primary    • Hyperlipidemia    • Hypertension    • Major depressive disorder 05/07/2020   • Palpitations     ASYMPTOMATIC- DENIES CP/SOB, SEE PCP- NO CARDIOLOGIST    • Post-menopause 09/17/2020   • Sinus trouble    • Sleep apnea    • Tremor 2018    I was on medicine for tremors   • Urinary tract infection    • Vitamin D deficiency 09/17/2020      Physical Exam  Vitals reviewed.   Constitutional:       Appearance: Normal appearance. She is  well-developed.   Neck:      Thyroid: No thyroid mass, thyromegaly or thyroid tenderness.   Cardiovascular:      Rate and Rhythm: Normal rate and regular rhythm.      Heart sounds: No murmur heard.    No friction rub. No gallop.   Pulmonary:      Effort: Pulmonary effort is normal.      Breath sounds: Normal breath sounds. No wheezing or rhonchi.   Lymphadenopathy:      Cervical: No cervical adenopathy.   Skin:     General: Skin is warm and dry.   Neurological:      Mental Status: She is alert and oriented to person, place, and time.      Cranial Nerves: No cranial nerve deficit.   Psychiatric:         Mood and Affect: Mood and affect normal.         Behavior: Behavior normal.         Thought Content: Thought content normal. Thought content does not include homicidal or suicidal ideation.         Judgment: Judgment normal.        Result Review :     CMP    CMP 6/2/22 7/19/22 9/6/22   Glucose 101 (A)     BUN 15     Creatinine 0.83 0.70 0.80   Sodium 140     Potassium 3.6     Chloride 101     Calcium 9.9     Albumin 4.50     Total Bilirubin 0.3     Alkaline Phosphatase 63     AST (SGOT) 21     ALT (SGPT) 21     (A) Abnormal value       Comments are available for some flowsheets but are not being displayed.           CBC    CBC 3/8/22 6/2/22   WBC 11.84 (A) 13.36 (A)   RBC 4.87 4.88   Hemoglobin 14.7 15.0   Hematocrit 45.5 45.1   MCV 93.4 92.4   MCH 30.2 30.7   MCHC 32.3 33.3   RDW 15.1 13.5   Platelets 166 174   (A) Abnormal value            Lipid Panel    Lipid Panel 3/8/22   Total Cholesterol 152   Triglycerides 412 (A)   HDL Cholesterol 50   VLDL Cholesterol 60 (A)   LDL Cholesterol  42   LDL/HDL Ratio 0.39   (A) Abnormal value            TSH    TSH 3/8/22   TSH 1.400              Contains abnormal data POCT urinalysis dipstick, automated  Order: [id number]   Status: Final result     Visible to patient: No (scheduled for 9/21/2022  1:34 AM)     Next appt: 09/27/2022 at 11:00 AM in Family Medicine (NURSE/[facility])     Dx: Acute cystitis with hematuria    Specimen Information: Urine         0 Result Notes    Component   Ref Range & Units 11:32   (9/20/22) 3 wk ago   (8/26/22) 3 wk ago   (8/26/22) 5 mo ago   (4/14/22) 6 mo ago   (3/8/22) 7 mo ago   (2/22/22) 7 mo ago   (1/28/22)   Color   Yellow, Straw, Dark Yellow, Effie Yellow     Yellow  Yellow     Clarity, UA   Clear Cloudy Abnormal      Clear  Cloudy Abnormal      Specific Gravity    1.005 - 1.030 1.020     1.020  1.015     pH, Urine   5.0 - 8.0 6.0     7.0  5.5     Leukocytes   Negative Large (3+) Abnormal      Trace Abnormal   Large (3+) Abnormal      Nitrite, UA   Negative Positive Abnormal      Negative  Negative     Protein, POC   Negative mg/dL 100 mg/dL Abnormal      Negative  Negative     Glucose, UA   Negative mg/dL Negative     Negative R  Negative R     Ketones, UA   Negative Negative     Negative  Negative     Urobilinogen, UA   Normal, 0.2 E.U./dL 0.2 E.U./dL     Normal R  Normal R     Bilirubin   Negative Negative     Negative  Negative     Blood, UA   Negative Moderate Abnormal      Negative  Moderate Abnormal               POC Urine Drug Screen [facility]  Order: [id number]   Status: Final result     Visible to patient: No (scheduled for 9/21/2022  2:22 AM)     Next appt: 09/27/2022 at 11:00 AM in Family Medicine (NURSE/JOCELYN MCKENZIE)     Dx: Medication monitoring encounter    Specimen Information: Urine         0 Result Notes    Component   Ref Range & Units 12:20   (9/20/22) 11:32   (9/20/22) 3 wk ago   (8/26/22) 3 wk ago   (8/26/22) 5 mo ago   (4/14/22) 5 mo ago   (4/14/22) 6 mo ago   (3/8/22)   Amphetamine Screen, Urine   Negative Negative     Negative      AMP INTERNAL CONTROL   Passed Passed     Passed      Barbiturates Screen, Urine   Negative Negative     Negative  Positive Abnormal  R, CM     BARBITURATE INTERNAL CONTROL   Passed Passed     Passed      Buprenorphine, Screen, Urine   Negative Negative     Negative      BUPRENORPHINE  INTERNAL CONTROL   Passed Passed     Passed      Benzodiazepine Screen, Urine   Negative Negative     Negative      BENZODIAZEPINE INTERNAL CONTROL   Passed Passed     Passed      Cocaine Screen, Urine   Negative Negative     Negative      COCAINE INTERNAL CONTROL   Passed Passed     Passed      MDMA (ECSTASY)   Negative Negative     Negative      MDMA (ECSTASY) INTERNAL CONTROL   Passed Passed     Passed      Methamphetamine, Ur   Negative Negative     Negative      METHAMPHETAMINE INTERNAL CONTROL   Passed Passed     Passed      Methadone Screen, Urine   Negative Negative     Negative      METHADONE INTERNAL CONTROL   Passed Passed     Passed      Opiate Screen   Negative Negative     Negative      OPIATES INTERNAL CONTROL   Passed Passed     Passed      Oxycodone Screen, Urine   Negative Negative     Negative      OXYCODONE INTERNAL CONTROL   Passed Passed     Passed      Phencyclidine (PCP), Urine   Negative Negative     Negative      PHENCYCLIDINE INTERNAL CONTROL   Passed Passed     Passed      THC, Screen, Urine   Negative Negative     Negative      THC INTERNAL CONTROL   Passed Passed     Passed      Lot Number  [id number]  [id number]  [id number]  [id number]  [id number]   [phone]    Expiration Date  02/29/2024  05/31/2023  03/10/2023  04/21/2024  03/31/2023   12/31/2022    Resulting Agency  [facility]  [facility]  [facility]              Specimen Collected: 09/20/22 12:20 Last Resulted: 09/20/22 12:20                Assessment and Plan    Diagnoses and all orders for this visit:    1. Acute cystitis with hematuria (Primary)  -     POCT urinalysis dipstick, automated  -     Urine Culture - Urine, Urine, Clean Catch    2. Fibromyalgia  Assessment & Plan:  Currently stable on gabapentin.  Urine drug screen today is negative which is consistent.  Pablito does not show gabapentin or tramadol because she gets them filled at [place] pharmacy.   Narcotic contract was discussed and signed today.  Written gabapentin prescription given to patient today.    Orders:  -     DULoxetine (CYMBALTA) 60 MG capsule; Take 1 capsule by mouth Daily.  Dispense: 90 capsule; Refill: 1  -     gabapentin (NEURONTIN) 300 MG capsule; Take 1 capsule by mouth 3 (Three) Times a Day As Needed (neuropathy).  Dispense: 90 capsule; Refill: 2    3. Current moderate episode of major depressive disorder, unspecified whether recurrent ([facility])  Assessment & Plan:  Patient's depression is single episode and is moderate without psychosis. Their depression is currently active and the condition is worsening. This will be reassessed in 3 months. F/U as described:patient will continue current medication therapy and patient referred to Mental Health Specialist.    Orders:  -     DULoxetine (CYMBALTA) 60 MG capsule; Take 1 capsule by mouth Daily.  Dispense: 90 capsule; Refill: 1  -     Ambulatory Referral to Psychology    4. Primary hypertension  Assessment & Plan:  Hypertension is improving with treatment.  Continue current treatment regimen.  Continue current medications.  Blood pressure will be reassessed in 3 months.    Orders:  -     hydroCHLOROthiazide (HYDRODIURIL) 25 MG tablet; Take 1 tablet by mouth Daily.  Dispense: 90 tablet; Refill: 1  -     metoprolol tartrate (LOPRESSOR) 25 MG tablet; Take 1 tablet by mouth 2 (Two) Times a Day.  Dispense: 180 tablet; Refill: 1  -     Comprehensive Metabolic Panel; Future  -     Lipid Panel; Future    5. Mixed hyperlipidemia  Assessment & Plan:  Lipid abnormalities are improving with treatment.  Pharmacotherapy as ordered.  Patient will return for fasting labs in the next 1 to 2 weeks.  Lipids will be reassessed in 6 months.    Orders:  -     rosuvastatin (CRESTOR) 20 MG tablet; Take 1 tablet by mouth Every Night.  Dispense: 90 tablet; Refill: 1  -     Comprehensive Metabolic Panel; Future  -     Lipid Panel; Future    6. Anxiety  -     DULoxetine  (CYMBALTA) 60 MG capsule; Take 1 capsule by mouth Daily.  Dispense: 90 capsule; Refill: 1    7. Vitamin D deficiency  Assessment & Plan:  We will check a vitamin D level when she returns for fasting labs.    Orders:  -     ergocalciferol (ERGOCALCIFEROL) 1.25 MG (50737 UT) capsule; Take 1 capsule by mouth 1 (One) Time Per Week.  Dispense: 13 capsule; Refill: 1  -     Vitamin D 25 Hydroxy; Future    8. Medication monitoring encounter  -     POC Urine Drug Screen [facility]    9. Impaired fasting glucose  -     Hemoglobin A1c; Future    Other orders  -     Lactobacillus Probiotic tablet; Take 1 tablet by mouth 2 (Two) Times a Day.  Dispense: 60 tablet; Refill: 0  -     Discontinue: nitrofurantoin, macrocrystal-monohydrate, (Macrobid) 100 MG capsule; Take 1 capsule by mouth 2 (Two) Times a Day for 7 days.  Dispense: 14 capsule; Refill: 0  -     nitrofurantoin, macrocrystal-monohydrate, (Macrobid) 100 MG capsule; Take 1 capsule by mouth 2 (Two) Times a Day for 7 days.  Dispense: 14 capsule; Refill: 0    I spent 45 minutes caring for Doroteha on this date of service. This time includes time spent by me in the following activities:preparing for the visit, reviewing tests, performing a medically appropriate examination and/or evaluation , counseling and educating the patient/family/caregiver, ordering medications, tests, or procedures, referring and communicating with other health care professionals , documenting information in the medical record and Discussing/timing new narcotic contract.  Follow Up   Return in about 3 months (around 12/20/2022) for Next scheduled follow up.  Patient was given instructions and counseling regarding her condition or for health maintenance advice. Please see specific information pulled into the AVS if appropriate.     ARA Peck

## 2022-09-20 ENCOUNTER — OFFICE VISIT (OUTPATIENT)
Dept: FAMILY MEDICINE CLINIC | Facility: CLINIC | Age: 72
End: 2022-09-20

## 2022-09-20 ENCOUNTER — TELEPHONE (OUTPATIENT)
Dept: ONCOLOGY | Facility: HOSPITAL | Age: 72
End: 2022-09-20

## 2022-09-20 VITALS
RESPIRATION RATE: 24 BRPM | SYSTOLIC BLOOD PRESSURE: 132 MMHG | HEIGHT: 62 IN | BODY MASS INDEX: 32.07 KG/M2 | DIASTOLIC BLOOD PRESSURE: 71 MMHG | HEART RATE: 67 BPM | OXYGEN SATURATION: 98 % | WEIGHT: 174.3 LBS | TEMPERATURE: 96.7 F

## 2022-09-20 DIAGNOSIS — E78.2 MIXED HYPERLIPIDEMIA: ICD-10-CM

## 2022-09-20 DIAGNOSIS — M79.7 FIBROMYALGIA: ICD-10-CM

## 2022-09-20 DIAGNOSIS — N30.01 ACUTE CYSTITIS WITH HEMATURIA: Primary | ICD-10-CM

## 2022-09-20 DIAGNOSIS — Z51.81 MEDICATION MONITORING ENCOUNTER: ICD-10-CM

## 2022-09-20 DIAGNOSIS — E55.9 VITAMIN D DEFICIENCY: ICD-10-CM

## 2022-09-20 DIAGNOSIS — R73.01 IMPAIRED FASTING GLUCOSE: ICD-10-CM

## 2022-09-20 DIAGNOSIS — I10 PRIMARY HYPERTENSION: ICD-10-CM

## 2022-09-20 DIAGNOSIS — F32.1 CURRENT MODERATE EPISODE OF MAJOR DEPRESSIVE DISORDER, UNSPECIFIED WHETHER RECURRENT: ICD-10-CM

## 2022-09-20 DIAGNOSIS — F41.9 ANXIETY: ICD-10-CM

## 2022-09-20 PROBLEM — Z80.6 FAMILY HISTORY OF CLL (CHRONIC LYMPHOID LEUKEMIA): Status: ACTIVE | Noted: 2022-06-14

## 2022-09-20 PROBLEM — N10 ACUTE PYELONEPHRITIS: Status: RESOLVED | Noted: 2022-03-19 | Resolved: 2022-09-20

## 2022-09-20 LAB
AMPHET+METHAMPHET UR QL: NEGATIVE
AMPHETAMINE INTERNAL CONTROL: NORMAL
AMPHETAMINES UR QL: NEGATIVE
BARBITURATE INTERNAL CONTROL: NORMAL
BARBITURATES UR QL SCN: NEGATIVE
BENZODIAZ UR QL SCN: NEGATIVE
BENZODIAZEPINE INTERNAL CONTROL: NORMAL
BILIRUB BLD-MCNC: NEGATIVE MG/DL
BUPRENORPHINE INTERNAL CONTROL: NORMAL
BUPRENORPHINE SERPL-MCNC: NEGATIVE NG/ML
CANNABINOIDS SERPL QL: NEGATIVE
CLARITY, POC: ABNORMAL
COCAINE INTERNAL CONTROL: NORMAL
COCAINE UR QL: NEGATIVE
COLOR UR: YELLOW
EXPIRATION DATE: ABNORMAL
EXPIRATION DATE: NORMAL
GLUCOSE UR STRIP-MCNC: NEGATIVE MG/DL
KETONES UR QL: NEGATIVE
LEUKOCYTE EST, POC: ABNORMAL
Lab: ABNORMAL
Lab: NORMAL
MDMA (ECSTASY) INTERNAL CONTROL: NORMAL
MDMA UR QL SCN: NEGATIVE
METHADONE INTERNAL CONTROL: NORMAL
METHADONE UR QL SCN: NEGATIVE
METHAMPHETAMINE INTERNAL CONTROL: NORMAL
NITRITE UR-MCNC: POSITIVE MG/ML
OPIATES INTERNAL CONTROL: NORMAL
OPIATES UR QL: NEGATIVE
OXYCODONE INTERNAL CONTROL: NORMAL
OXYCODONE UR QL SCN: NEGATIVE
PCP UR QL SCN: NEGATIVE
PH UR: 6 [PH] (ref 5–8)
PHENCYCLIDINE INTERNAL CONTROL: NORMAL
PROT UR STRIP-MCNC: ABNORMAL MG/DL
RBC # UR STRIP: ABNORMAL /UL
SP GR UR: 1.02 (ref 1–1.03)
THC INTERNAL CONTROL: NORMAL
UROBILINOGEN UR QL: ABNORMAL

## 2022-09-20 PROCEDURE — 87086 URINE CULTURE/COLONY COUNT: CPT | Performed by: NURSE PRACTITIONER

## 2022-09-20 PROCEDURE — 80305 DRUG TEST PRSMV DIR OPT OBS: CPT | Performed by: NURSE PRACTITIONER

## 2022-09-20 PROCEDURE — 81003 URINALYSIS AUTO W/O SCOPE: CPT | Performed by: NURSE PRACTITIONER

## 2022-09-20 PROCEDURE — 87186 SC STD MICRODIL/AGAR DIL: CPT | Performed by: NURSE PRACTITIONER

## 2022-09-20 PROCEDURE — 99215 OFFICE O/P EST HI 40 MIN: CPT | Performed by: NURSE PRACTITIONER

## 2022-09-20 RX ORDER — ROSUVASTATIN CALCIUM 20 MG/1
20 TABLET, COATED ORAL NIGHTLY
Qty: 90 TABLET | Refills: 1 | Status: SHIPPED | OUTPATIENT
Start: 2022-09-20 | End: 2022-11-04 | Stop reason: SDUPTHER

## 2022-09-20 RX ORDER — DULOXETIN HYDROCHLORIDE 60 MG/1
60 CAPSULE, DELAYED RELEASE ORAL DAILY
Qty: 90 CAPSULE | Refills: 1 | Status: SHIPPED | OUTPATIENT
Start: 2022-09-20 | End: 2023-03-10

## 2022-09-20 RX ORDER — NITROFURANTOIN 25; 75 MG/1; MG/1
100 CAPSULE ORAL 2 TIMES DAILY
Qty: 14 CAPSULE | Refills: 0 | Status: SHIPPED | OUTPATIENT
Start: 2022-09-20 | End: 2022-09-20

## 2022-09-20 RX ORDER — ERGOCALCIFEROL 1.25 MG/1
50000 CAPSULE ORAL WEEKLY
Qty: 13 CAPSULE | Refills: 1 | Status: SHIPPED | OUTPATIENT
Start: 2022-09-20 | End: 2022-11-04 | Stop reason: SDUPTHER

## 2022-09-20 RX ORDER — GABAPENTIN 300 MG/1
300 CAPSULE ORAL 3 TIMES DAILY PRN
Qty: 90 CAPSULE | Refills: 2 | Status: SHIPPED | OUTPATIENT
Start: 2022-09-20

## 2022-09-20 RX ORDER — L. ACIDOPHILUS/L.BULGARICUS 1MM CELL
1 TABLET ORAL 2 TIMES DAILY
Qty: 60 TABLET | Refills: 0 | Status: SHIPPED | OUTPATIENT
Start: 2022-09-20 | End: 2022-11-04 | Stop reason: SDUPTHER

## 2022-09-20 RX ORDER — NITROFURANTOIN 25; 75 MG/1; MG/1
100 CAPSULE ORAL 2 TIMES DAILY
Qty: 14 CAPSULE | Refills: 0 | Status: SHIPPED | OUTPATIENT
Start: 2022-09-20 | End: 2022-09-26

## 2022-09-20 RX ORDER — HYDROCHLOROTHIAZIDE 25 MG/1
25 TABLET ORAL DAILY
Qty: 90 TABLET | Refills: 1 | Status: SHIPPED | OUTPATIENT
Start: 2022-09-20 | End: 2023-03-10 | Stop reason: SDUPTHER

## 2022-09-20 NOTE — ASSESSMENT & PLAN NOTE
Currently stable on gabapentin.  Urine drug screen today is negative which is consistent.  Pablito does not show gabapentin or tramadol because she gets them filled at [place] pharmacy.  Narcotic contract was discussed and signed today.  Written gabapentin prescription given to patient today.

## 2022-09-20 NOTE — ASSESSMENT & PLAN NOTE
Lipid abnormalities are improving with treatment.  Pharmacotherapy as ordered.  Patient will return for fasting labs in the next 1 to 2 weeks.  Lipids will be reassessed in 6 months.

## 2022-09-20 NOTE — ASSESSMENT & PLAN NOTE
Patient's depression is single episode and is moderate without psychosis. Their depression is currently active and the condition is worsening. This will be reassessed in 3 months. F/U as described:patient will continue current medication therapy and patient referred to Mental Health Specialist.

## 2022-09-20 NOTE — TELEPHONE ENCOUNTER
Advised patient that PCP has already ordered an MRI of the abdomen which is the next step to evaluate this. We will watch for these results and proceed with further workup as needed.

## 2022-09-20 NOTE — TELEPHONE ENCOUNTER
Provider: DR BERG  Caller: MARCIA    Reason for Call: MARCIA STATES SHE HAS A NODULE ON HER ADRENAL GLAND AND SHE IS WANTING TO KNOW WHAT DR BERG IS WANTING TO DO      PLEASE ADVISE

## 2022-09-22 ENCOUNTER — TELEPHONE (OUTPATIENT)
Dept: ONCOLOGY | Facility: HOSPITAL | Age: 72
End: 2022-09-22

## 2022-09-22 DIAGNOSIS — R92.8 ABNORMAL MAMMOGRAM OF RIGHT BREAST: Primary | ICD-10-CM

## 2022-09-23 NOTE — TELEPHONE ENCOUNTER
Caller: Dorothea Cruz    Relationship:  Best call back number: [phone]    What is the best time to reach you: ANYTIME    Who are you requesting to speak with (clinical staff, provider,  specific staff member): DOCTOR, NURSE    What was the call regarding: PT CALLED BACK REQUESTING SOMEONE TO CALL HER REGARDING HER MAMMO RESULTS AND NEEDING FURTHER EVALUATION.    PLEASE CALL TO DISCUSS    Do you require a callback: YES    
  Caller: Dorothea Cruz    Relationship: Self    Best call back number: [phone]    What was the call regarding: DOROTHEA CALLED TO SAY THAT SHE GOT A LETTER IN THE MAIL STATING THAT HER MAMMOGRAM NEEDS FURTHER EVALUATION. SHE IS WONDERING WHAT TO DO.    Do you require a callback: YES  
We will order diagnostic mammogram and ultrasound  
[facility]OM

## 2022-09-24 LAB — BACTERIA SPEC AEROBE CULT: ABNORMAL

## 2022-09-26 DIAGNOSIS — I10 PRIMARY HYPERTENSION: ICD-10-CM

## 2022-09-26 RX ORDER — HYDROCHLOROTHIAZIDE 25 MG/1
25 TABLET ORAL DAILY
Qty: 90 TABLET | Refills: 1 | OUTPATIENT
Start: 2022-09-26

## 2022-09-26 RX ORDER — CEFDINIR 300 MG/1
300 CAPSULE ORAL 2 TIMES DAILY
Qty: 14 CAPSULE | Refills: 0 | Status: SHIPPED | OUTPATIENT
Start: 2022-09-26 | End: 2022-10-03

## 2022-09-26 NOTE — TELEPHONE ENCOUNTER
Caller: Dorothea Cruz    Relationship: Self    Best call back number: [phone]  Requested Prescriptions:   Requested Prescriptions     Pending Prescriptions Disp Refills   • hydroCHLOROthiazide (HYDRODIURIL) 25 MG tablet 90 tablet 1     Sig: Take 1 tablet by mouth Daily.        Pharmacy where request should be sent: [facility] [place] [facility] [place] - [address] - [phone] [facility] [phone] FX     Does the patient have less than a 3 day supply:  [] Yes  [x] No    Jackelyn Singh Rep   09/26/22 11:03 EDT

## 2022-09-27 ENCOUNTER — LAB (OUTPATIENT)
Dept: FAMILY MEDICINE CLINIC | Facility: CLINIC | Age: 72
End: 2022-09-27

## 2022-09-27 DIAGNOSIS — I10 PRIMARY HYPERTENSION: ICD-10-CM

## 2022-09-27 DIAGNOSIS — R73.01 IMPAIRED FASTING GLUCOSE: ICD-10-CM

## 2022-09-27 DIAGNOSIS — E78.2 MIXED HYPERLIPIDEMIA: ICD-10-CM

## 2022-09-27 DIAGNOSIS — E55.9 VITAMIN D DEFICIENCY: ICD-10-CM

## 2022-09-27 LAB
25(OH)D3 SERPL-MCNC: 41.6 NG/ML (ref 30–100)
HBA1C MFR BLD: 5.9 % (ref 4.8–5.6)

## 2022-09-27 PROCEDURE — 80061 LIPID PANEL: CPT | Performed by: NURSE PRACTITIONER

## 2022-09-27 PROCEDURE — 82306 VITAMIN D 25 HYDROXY: CPT | Performed by: NURSE PRACTITIONER

## 2022-09-27 PROCEDURE — 83036 HEMOGLOBIN GLYCOSYLATED A1C: CPT | Performed by: NURSE PRACTITIONER

## 2022-09-27 PROCEDURE — 80053 COMPREHEN METABOLIC PANEL: CPT | Performed by: NURSE PRACTITIONER

## 2022-09-28 LAB
ALBUMIN SERPL-MCNC: 4.5 G/DL (ref 3.5–5.2)
ALBUMIN/GLOB SERPL: 1.7 G/DL
ALP SERPL-CCNC: 68 U/L (ref 39–117)
ALT SERPL W P-5'-P-CCNC: 23 U/L (ref 1–33)
ANION GAP SERPL CALCULATED.3IONS-SCNC: 10.4 MMOL/L (ref 5–15)
AST SERPL-CCNC: 28 U/L (ref 1–32)
BILIRUB SERPL-MCNC: 0.4 MG/DL (ref 0–1.2)
BUN SERPL-MCNC: 13 MG/DL (ref 8–23)
BUN/CREAT SERPL: 15.5 (ref 7–25)
CALCIUM SPEC-SCNC: 10.3 MG/DL (ref 8.6–10.5)
CHLORIDE SERPL-SCNC: 102 MMOL/L (ref 98–107)
CHOLEST SERPL-MCNC: 145 MG/DL (ref 0–200)
CO2 SERPL-SCNC: 29.6 MMOL/L (ref 22–29)
CREAT SERPL-MCNC: 0.84 MG/DL (ref 0.57–1)
EGFRCR SERPLBLD CKD-EPI 2021: 74.4 ML/MIN/1.73
GLOBULIN UR ELPH-MCNC: 2.6 GM/DL
GLUCOSE SERPL-MCNC: 106 MG/DL (ref 65–99)
HDLC SERPL-MCNC: 55 MG/DL (ref 40–60)
LDLC SERPL CALC-MCNC: 52 MG/DL (ref 0–100)
LDLC/HDLC SERPL: 0.75 {RATIO}
POTASSIUM SERPL-SCNC: 4.3 MMOL/L (ref 3.5–5.2)
PROT SERPL-MCNC: 7.1 G/DL (ref 6–8.5)
SODIUM SERPL-SCNC: 142 MMOL/L (ref 136–145)
TRIGL SERPL-MCNC: 244 MG/DL (ref 0–150)
VLDLC SERPL-MCNC: 38 MG/DL (ref 5–40)

## 2022-09-29 ENCOUNTER — HOSPITAL ENCOUNTER (OUTPATIENT)
Dept: MRI IMAGING | Facility: HOSPITAL | Age: 72
Discharge: HOME | End: 2022-09-29
Admitting: NURSE PRACTITIONER

## 2022-09-29 DIAGNOSIS — E27.8 ADRENAL MASS 1 CM TO 4 CM IN DIAMETER: ICD-10-CM

## 2022-09-29 PROCEDURE — 0 GADOBENATE DIMEGLUMINE 529 MG/ML SOLUTION: Performed by: NURSE PRACTITIONER

## 2022-09-29 PROCEDURE — 74183 MRI ABD W/O CNTR FLWD CNTR: CPT

## 2022-09-29 PROCEDURE — A9577 INJ MULTIHANCE: HCPCS | Performed by: NURSE PRACTITIONER

## 2022-09-29 RX ADMIN — GADOBENATE DIMEGLUMINE 15 ML: 529 INJECTION, SOLUTION INTRAVENOUS at 15:46

## 2022-09-30 ENCOUNTER — TELEPHONE (OUTPATIENT)
Dept: FAMILY MEDICINE CLINIC | Facility: CLINIC | Age: 72
End: 2022-09-30

## 2022-09-30 ENCOUNTER — APPOINTMENT (OUTPATIENT)
Dept: OCCUPATIONAL THERAPY | Facility: HOSPITAL | Age: 72
End: 2022-09-30

## 2022-09-30 DIAGNOSIS — N30.01 ACUTE CYSTITIS WITH HEMATURIA: Primary | ICD-10-CM

## 2022-09-30 RX ORDER — PHENAZOPYRIDINE HYDROCHLORIDE 200 MG/1
200 TABLET, FILM COATED ORAL 3 TIMES DAILY
Qty: 6 TABLET | Refills: 0 | Status: SHIPPED | OUTPATIENT
Start: 2022-09-30 | End: 2022-10-02

## 2022-09-30 NOTE — TELEPHONE ENCOUNTER
Caller: Dorothea Cruz    Relationship: Self    Best call back number: [phone]    What test was performed: MRI    When was the test performed: 9/29/22    Where was the test performed: [facility]     Additional notes: REQUESTING CALL TO DISCUSS RESULTS ON HER MYCHART AS SOON AS POSSIBLE

## 2022-09-30 NOTE — TELEPHONE ENCOUNTER
It is most likely due to her UTI.  I will send her in Pyridium that will help with the urinary pain.  Advised her to also drink lots of water to help flush out her kidneys.

## 2022-09-30 NOTE — TELEPHONE ENCOUNTER
Patient is aware of MRI results. She states she is having a lot of pain in her abdomin and is unsure if UTI or mass is causing the pain. She is requesting a Rx to help with the pain.

## 2022-10-03 ENCOUNTER — HOSPITAL ENCOUNTER (OUTPATIENT)
Dept: ULTRASOUND IMAGING | Facility: HOSPITAL | Age: 72
Discharge: HOME | End: 2022-10-03

## 2022-10-03 ENCOUNTER — HOSPITAL ENCOUNTER (OUTPATIENT)
Dept: MAMMOGRAPHY | Facility: HOSPITAL | Age: 72
Discharge: HOME | End: 2022-10-03

## 2022-10-03 DIAGNOSIS — N64.89 SEROMA OF BREAST: ICD-10-CM

## 2022-10-03 DIAGNOSIS — R92.8 ABNORMAL MAMMOGRAM OF RIGHT BREAST: ICD-10-CM

## 2022-10-03 DIAGNOSIS — N64.89 SEROMA OF BREAST: Primary | ICD-10-CM

## 2022-10-03 PROCEDURE — 76942 ECHO GUIDE FOR BIOPSY: CPT

## 2022-10-03 PROCEDURE — 76642 ULTRASOUND BREAST LIMITED: CPT

## 2022-10-03 PROCEDURE — 0 LIDOCAINE 1 % SOLUTION: Performed by: SURGERY

## 2022-10-03 RX ORDER — LIDOCAINE HYDROCHLORIDE 10 MG/ML
10 INJECTION, SOLUTION INFILTRATION; PERINEURAL ONCE
Status: COMPLETED | OUTPATIENT
Start: 2022-10-03 | End: 2022-10-03

## 2022-10-03 RX ADMIN — LIDOCAINE HYDROCHLORIDE 10 ML: 10 INJECTION, SOLUTION INFILTRATION; PERINEURAL at 12:41

## 2022-10-31 ENCOUNTER — TELEPHONE (OUTPATIENT)
Dept: FAMILY MEDICINE CLINIC | Facility: CLINIC | Age: 72
End: 2022-10-31

## 2022-10-31 DIAGNOSIS — E55.9 VITAMIN D DEFICIENCY: ICD-10-CM

## 2022-10-31 DIAGNOSIS — E78.2 MIXED HYPERLIPIDEMIA: ICD-10-CM

## 2022-10-31 RX ORDER — ROSUVASTATIN CALCIUM 20 MG/1
20 TABLET, COATED ORAL NIGHTLY
Qty: 90 TABLET | Refills: 1 | Status: CANCELLED | OUTPATIENT
Start: 2022-10-31

## 2022-10-31 RX ORDER — L. ACIDOPHILUS/L.BULGARICUS 1MM CELL
1 TABLET ORAL 2 TIMES DAILY
Qty: 60 TABLET | Refills: 0 | Status: CANCELLED | OUTPATIENT
Start: 2022-10-31

## 2022-10-31 RX ORDER — ERGOCALCIFEROL 1.25 MG/1
50000 CAPSULE ORAL WEEKLY
Qty: 13 CAPSULE | Refills: 1 | Status: CANCELLED | OUTPATIENT
Start: 2022-10-31

## 2022-10-31 NOTE — TELEPHONE ENCOUNTER
Let patient know that the medications would have to be sent to a [facility] in [place] and then she can have them transferred to the [facility] where she is at because I am not allowed to prescribe out of the state of [place].

## 2022-10-31 NOTE — TELEPHONE ENCOUNTER
These Rx's were sent to [facility] is September. They have not been picked up as of today. Patient is currently visiting out of state and requesting they michael sent to [facility] in [place]. Please advise if appropriate.

## 2022-10-31 NOTE — TELEPHONE ENCOUNTER
Caller: Dorothea Cruz    Relationship: Self    Best call back number: [phone]    Requested Prescriptions:   Requested Prescriptions     Pending Prescriptions Disp Refills   • rosuvastatin (CRESTOR) 20 MG tablet 90 tablet 1     Sig: Take 1 tablet by mouth Every Night.   • ergocalciferol (ERGOCALCIFEROL) 1.25 MG (83538 UT) capsule 13 capsule 1     Sig: Take 1 capsule by mouth 1 (One) Time Per Week.   • Lactobacillus Probiotic tablet 60 tablet 0     Sig: Take 1 tablet by mouth 2 (Two) Times a Day.        Pharmacy where request should be sent:      [facility] [place] [address]  PHONE: [phone]    Does the patient have less than a 3 day supply:  [] Yes  [x] No    Jackelyn Rosas Rep   10/31/22 10:49 EDT

## 2022-11-04 ENCOUNTER — TELEPHONE (OUTPATIENT)
Dept: FAMILY MEDICINE CLINIC | Facility: CLINIC | Age: 72
End: 2022-11-04

## 2022-11-04 DIAGNOSIS — E55.9 VITAMIN D DEFICIENCY: ICD-10-CM

## 2022-11-04 DIAGNOSIS — E78.2 MIXED HYPERLIPIDEMIA: ICD-10-CM

## 2022-11-04 RX ORDER — ERGOCALCIFEROL 1.25 MG/1
50000 CAPSULE ORAL WEEKLY
Qty: 13 CAPSULE | Refills: 1 | Status: SHIPPED | OUTPATIENT
Start: 2022-11-04 | End: 2022-11-07 | Stop reason: SDUPTHER

## 2022-11-04 RX ORDER — ROSUVASTATIN CALCIUM 20 MG/1
20 TABLET, COATED ORAL NIGHTLY
Qty: 90 TABLET | Refills: 1 | Status: SHIPPED | OUTPATIENT
Start: 2022-11-04 | End: 2022-11-07 | Stop reason: SDUPTHER

## 2022-11-04 RX ORDER — L. ACIDOPHILUS/L.BULGARICUS 1MM CELL
1 TABLET ORAL 2 TIMES DAILY
Qty: 60 TABLET | Refills: 0 | Status: SHIPPED | OUTPATIENT
Start: 2022-11-04 | End: 2022-12-13 | Stop reason: SDUPTHER

## 2022-11-04 NOTE — TELEPHONE ENCOUNTER
Rx sent to [facility] in [place] so that patient can have them transferred [place]. Patient aware

## 2022-11-07 DIAGNOSIS — E55.9 VITAMIN D DEFICIENCY: ICD-10-CM

## 2022-11-07 DIAGNOSIS — E78.2 MIXED HYPERLIPIDEMIA: ICD-10-CM

## 2022-11-07 RX ORDER — ERGOCALCIFEROL 1.25 MG/1
50000 CAPSULE ORAL WEEKLY
Qty: 13 CAPSULE | Refills: 1 | Status: SHIPPED | OUTPATIENT
Start: 2022-11-07 | End: 2023-03-10

## 2022-11-07 RX ORDER — ROSUVASTATIN CALCIUM 20 MG/1
20 TABLET, COATED ORAL NIGHTLY
Qty: 90 TABLET | Refills: 1 | Status: SHIPPED | OUTPATIENT
Start: 2022-11-07

## 2022-11-18 DIAGNOSIS — I10 PRIMARY HYPERTENSION: ICD-10-CM

## 2022-11-18 NOTE — TELEPHONE ENCOUNTER
Caller: Dorothea Cruz    Relationship: Self    Best call back number: [phone]    Requested Prescriptions:   Requested Prescriptions     Pending Prescriptions Disp Refills   • metoprolol tartrate (LOPRESSOR) 25 MG tablet 180 tablet 1     Sig: Take 1 tablet by mouth 2 (Two) Times a Day.        Pharmcy where request should be sent: [id number] - [place], [place] - [address] AT [facility] [phone] [facility] [phone] FX     Does the patient have less than a 3 day supply:  [] Yes  [x] No    Jackelyn Guzman Rep   11/18/22 10:45 EST

## 2022-11-29 DIAGNOSIS — K57.92 DIVERTICULITIS: Primary | ICD-10-CM

## 2022-12-01 ENCOUNTER — APPOINTMENT (OUTPATIENT)
Dept: RADIATION ONCOLOGY | Facility: HOSPITAL | Age: 72
End: 2022-12-01

## 2022-12-06 ENCOUNTER — TELEPHONE (OUTPATIENT)
Dept: FAMILY MEDICINE CLINIC | Facility: CLINIC | Age: 72
End: 2022-12-06

## 2022-12-06 ENCOUNTER — OFFICE VISIT (OUTPATIENT)
Dept: GASTROENTEROLOGY | Facility: CLINIC | Age: 72
End: 2022-12-06

## 2022-12-06 VITALS
SYSTOLIC BLOOD PRESSURE: 145 MMHG | HEIGHT: 62 IN | HEART RATE: 70 BPM | OXYGEN SATURATION: 100 % | DIASTOLIC BLOOD PRESSURE: 75 MMHG | WEIGHT: 179 LBS | BODY MASS INDEX: 32.94 KG/M2

## 2022-12-06 DIAGNOSIS — R19.4 ALTERED BOWEL HABITS: ICD-10-CM

## 2022-12-06 DIAGNOSIS — Z90.49 HISTORY OF CHOLECYSTECTOMY: ICD-10-CM

## 2022-12-06 DIAGNOSIS — R19.7 DIARRHEA, UNSPECIFIED TYPE: ICD-10-CM

## 2022-12-06 DIAGNOSIS — K57.92 DIVERTICULITIS: Primary | ICD-10-CM

## 2022-12-06 PROCEDURE — 99213 OFFICE O/P EST LOW 20 MIN: CPT | Performed by: INTERNAL MEDICINE

## 2022-12-06 RX ORDER — MONTELUKAST SODIUM 4 MG/1
2 TABLET, CHEWABLE ORAL 2 TIMES DAILY
Qty: 360 TABLET | Refills: 1 | Status: SHIPPED | OUTPATIENT
Start: 2022-12-06 | End: 2023-03-10 | Stop reason: SDUPTHER

## 2022-12-06 NOTE — PROGRESS NOTES
Chief Complaint    Dorothea Cruz is a 72 y.o. female who presents to [facility] for follow-up of a recent hospitalization for sigmoid diverticulitis.  Patient had a similar episode last year.  She has had prior cholecystectomy and takes colestipol 2 g p.o. twice daily with resolution of her diarrhea.  We had performed colonoscopy earlier this year that showed diverticulosis and random biopsies being unremarkable.  She was visiting [place] and developed acute pain and was hospitalized from October 24 to October 28.  She returns now doing quite well with no persistent pain.  No change in bowel habits, no rectal bleeding.  She is being cautious with her diet and taking fiber supplements.    Result Review :     The following data was reviewed by: Butch Garcia MD on 12/06/2022:    CMP    CMP 7/19/22 9/6/22 9/27/22   Glucose   106 (A)   BUN   13   Creatinine 0.70 0.80 0.84   Sodium   142   Potassium   4.3   Chloride   102   Calcium   10.3   Albumin   4.50   Total Bilirubin   0.4   Alkaline Phosphatase   68   AST (SGOT)   28   ALT (SGPT)   23   (A) Abnormal value       Comments are available for some flowsheets but are not being displayed.           CBC    CBC 3/8/22 6/2/22   WBC 11.84 (A) 13.36 (A)   RBC 4.87 4.88   Hemoglobin 14.7 15.0   Hematocrit 45.5 45.1   MCV 93.4 92.4   MCH 30.2 30.7   MCHC 32.3 33.3   RDW 15.1 13.5   Platelets 166 174   (A) Abnormal value              Data reviewed: GI studies Reviewed     Past Medical History:   Diagnosis Date   • Anxiety    • Arthritis    • Breast cancer (HCC)    • Cancer (HCC)     BREAST CANCER   • Cholelithiases 4/30@1   • Depression    • Disease of thyroid gland     currently not taking any meds   • Diverticulitis    • Diverticulitis of colon    • Fibromyalgia    • Fibromyalgia, primary    • Hyperlipidemia    • Hypertension    • Major depressive disorder 05/07/2020   • Palpitations     ASYMPTOMATIC- DENIES CP/SOB, SEE PCP- NO  "CARDIOLOGIST    • Post-menopause 09/17/2020   • Sinus trouble    • Sleep apnea    • Tremor 2018    I was on medicine for tremors   • Urinary tract infection    • Vitamin D deficiency 09/17/2020       Past Surgical History:   Procedure Laterality Date   • ABDOMINAL SURGERY  06/14/1985   • APPENDECTOMY     • BILATERAL BREAST REDUCTION     • BREAST BIOPSY Right 11/12/2021    Procedure: RIGHT BREAST NEEDLE LOCALIZED  LUMPECTOMY WITH SENTINEL NODE BIOPSY;  Surgeon: Mirtha Esteves MD;  Location: [facility] OR [facility];  Service: General;  Laterality: Right;   • BREAST LUMPECTOMY     • CHOLECYSTECTOMY N/A 09/02/2021    Procedure: CHOLECYSTECTOMY LAPAROSCOPIC INTRAOPERATIVE CHOLANGIOGRAM;  Surgeon: Louie Medina MD;  Location: [facility] MAIN OR;  Service: General;  Laterality: N/A;   • COLONOSCOPY  2019   • COLONOSCOPY N/A 05/09/2022    Procedure: COLONOSCOPY WITH BIOPSIES;  Surgeon: Butch Garcia MD;  Location: [facility] ENDOSCOPY;  Service: Gastroenterology;  Laterality: N/A;  DIVERTICULOSIS   • HYSTERECTOMY     • LYMPH NODE BIOPSY     • SINUS SURGERY  2018   • TONSILLECTOMY     • US GUIDED CYST ASPIRATION BREAST N/A 10/3/2022   • WRIST ARTHROPLASTY Bilateral        Social History     Social History Narrative   • Not on file       Objective     Vital Signs:   /75 (BP Location: Left arm, Patient Position: Sitting, Cuff Size: Adult)   Pulse 70   Ht 157.5 cm (62\")   Wt 81.2 kg (179 lb)   SpO2 100%   BMI 32.74 kg/m²     Body mass index is 32.74 kg/m².    Physical Exam            Assessment and Plan    Diagnoses and all orders for this visit:    1. Diverticulitis (Primary)    2. History of cholecystectomy    3. Altered bowel habits    Patient has recovered from recent episode of acute sigmoid diverticulitis.  She has been having 1 episode per year on average but this was the worst episode she had had which required hospitalization.  Fortunately she has recovered and hopefully will not need surgery in the future.  " She will continue fiber and dietary modifications.  She will follow-up with me as needed.              Follow Up   No follow-ups on file.  Patient was given instructions and counseling regarding her condition or for health maintenance advice. Please see specific information pulled into the AVS if appropriate.

## 2022-12-06 NOTE — TELEPHONE ENCOUNTER
Caller: Dorothea Cruz    Relationship: Self    Best call back number: [phone]    Requested Prescriptions:   Requested Prescriptions     Pending Prescriptions Disp Refills   • colestipol (Colestid) 1 g tablet 360 tablet 1     Sig: Take 2 tablets by mouth 2 (Two) Times a Day for 90 days.        Pharm[facility] where request should be sent[facility]  [place] [place] [facility] [address] [phone] [facility] [phone] FX         Does the patient have less than a 3 day supply:  [x] Yes  [] No    Would you like a call back once the refill request has been completed: [x] Yes [] No    If the office needs to give you a call back, can they leave a voicemail: [x] Yes [] No    Jackelyn Núñez Rep   12/06/22 12:17 EST

## 2022-12-13 ENCOUNTER — OFFICE VISIT (OUTPATIENT)
Dept: FAMILY MEDICINE CLINIC | Facility: CLINIC | Age: 72
End: 2022-12-13

## 2022-12-13 VITALS
HEIGHT: 62 IN | TEMPERATURE: 96.2 F | SYSTOLIC BLOOD PRESSURE: 140 MMHG | HEART RATE: 60 BPM | DIASTOLIC BLOOD PRESSURE: 74 MMHG | BODY MASS INDEX: 31.91 KG/M2 | OXYGEN SATURATION: 98 % | WEIGHT: 173.4 LBS

## 2022-12-13 DIAGNOSIS — E55.9 VITAMIN D DEFICIENCY: ICD-10-CM

## 2022-12-13 DIAGNOSIS — R73.03 PREDIABETES: ICD-10-CM

## 2022-12-13 DIAGNOSIS — E78.2 MIXED HYPERLIPIDEMIA: ICD-10-CM

## 2022-12-13 DIAGNOSIS — E66.9 CLASS 1 OBESITY WITH SERIOUS COMORBIDITY AND BODY MASS INDEX (BMI) OF 31.0 TO 31.9 IN ADULT, UNSPECIFIED OBESITY TYPE: ICD-10-CM

## 2022-12-13 DIAGNOSIS — I10 PRIMARY HYPERTENSION: ICD-10-CM

## 2022-12-13 DIAGNOSIS — Z00.00 MEDICARE ANNUAL WELLNESS VISIT, SUBSEQUENT: Primary | ICD-10-CM

## 2022-12-13 PROBLEM — E66.811 CLASS 1 OBESITY WITH SERIOUS COMORBIDITY AND BODY MASS INDEX (BMI) OF 31.0 TO 31.9 IN ADULT: Status: ACTIVE | Noted: 2022-12-13

## 2022-12-13 PROBLEM — D72.829 LEUKOCYTOSIS: Status: ACTIVE | Noted: 2022-10-25

## 2022-12-13 PROCEDURE — 1170F FXNL STATUS ASSESSED: CPT | Performed by: NURSE PRACTITIONER

## 2022-12-13 PROCEDURE — 1160F RVW MEDS BY RX/DR IN RCRD: CPT | Performed by: NURSE PRACTITIONER

## 2022-12-13 PROCEDURE — G0439 PPPS, SUBSEQ VISIT: HCPCS | Performed by: NURSE PRACTITIONER

## 2022-12-13 PROCEDURE — 96160 PT-FOCUSED HLTH RISK ASSMT: CPT | Performed by: NURSE PRACTITIONER

## 2022-12-13 RX ORDER — L. ACIDOPHILUS/PECTIN, CITRUS 25MM-100MG
1 TABLET ORAL 2 TIMES DAILY
COMMUNITY
Start: 2022-12-08 | End: 2022-12-13 | Stop reason: SDUPTHER

## 2022-12-13 RX ORDER — HYDROCODONE BITARTRATE AND ACETAMINOPHEN 5; 325 MG/1; MG/1
1 TABLET ORAL DAILY
COMMUNITY
Start: 2022-12-08

## 2022-12-13 RX ORDER — L. ACIDOPHILUS/L.BULGARICUS 1MM CELL
1 TABLET ORAL 2 TIMES DAILY
Qty: 180 TABLET | Refills: 3 | Status: SHIPPED | OUTPATIENT
Start: 2022-12-13

## 2022-12-13 NOTE — ASSESSMENT & PLAN NOTE
Blood pressure is above goal today but stable.  Patient has ordered a blood pressure monitor and will monitor at home.  She will continue hydrochlorothiazide 25 g daily.  She will follow-up in 3 months.

## 2022-12-13 NOTE — PROGRESS NOTES
Answers for HPI/ROS submitted by the patient on 12/6/2022  What is the primary reason for your visit?: Diabetes  Diabetes type: type 2  MedicAlert ID: No  Symptom course: stable  confusion: Yes  dizziness: Yes  tremors: Yes  blackouts: No  hospitalization: No  nocturnal hypoglycemia: No  required assistance: No  required glucagon: No  CVA: No  heart disease: No  impotence: No  nephropathy: No  peripheral neuropathy: No  PVD: No  retinopathy: No  CAD risks: dyslipidemia, obesity, post-menopausal  Current treatments: diet  Treatment compliance: most of the time  Monitoring compliance: inadequate  Blood glucose trend: increasing steadily  Weight trend: stable  Current diet: generally healthy  Meal planning: avoidance of concentrated sweets  Exercise: every other day  Dietitian visit: No  Eye exam current: Yes  Sees podiatrist: No

## 2022-12-13 NOTE — ASSESSMENT & PLAN NOTE
Patient's (Body mass index is 31.72 kg/m².) indicates that they are obese (BMI >30) with health conditions that include hypertension, dyslipidemias and Prediabetes . Weight is improving with lifestyle modifications. BMI is is above average; BMI management plan is completed. We discussed portion control and increasing exercise.

## 2022-12-13 NOTE — PROGRESS NOTES
The ABCs of the Annual Wellness Visit  Subsequent Medicare Wellness Visit    Subjective    Dorothea Cruz is a 72 y.o. female who presents for a Subsequent Medicare Wellness Visit.    The following portions of the patient's history were reviewed and   updated as appropriate: allergies, current medications, past family history, past medical history, past social history, past surgical history and problem list.    Compared to one year ago, the patient feels her physical   health is the same.    Compared to one year ago, the patient feels her mental   health is the same.    Recent Hospitalizations:  She was admitted within the past 365 days at [facility] in [place].       Current Medical Providers:  Patient Care Team:  Monserrat Grover APRN as PCP - General (Nurse Practitioner)  Louie Medina MD as Consulting Physician (General Surgery)  Bebe Nair MD PhD as Consulting Physician (Hematology and Oncology)  Mirtha Esteves MD as Consulting Physician (General Surgery)    Outpatient Medications Prior to Visit   Medication Sig Dispense Refill   • colestipol (Colestid) 1 g tablet Take 2 tablets by mouth 2 (Two) Times a Day for 90 days. 360 tablet 1   • DULoxetine (CYMBALTA) 60 MG capsule Take 1 capsule by mouth Daily. 90 capsule 1   • ergocalciferol (ERGOCALCIFEROL) 1.25 MG (46275 UT) capsule Take 1 capsule by mouth 1 (One) Time Per Week. 13 capsule 1   • gabapentin (NEURONTIN) 300 MG capsule Take 1 capsule by mouth 3 (Three) Times a Day As Needed (neuropathy). 90 capsule 2   • hydroCHLOROthiazide (HYDRODIURIL) 25 MG tablet Take 1 tablet by mouth Daily. 90 tablet 1   • HYDROcodone-acetaminophen (NORCO) 5-325 MG per tablet Take 1 tablet by mouth Daily.     • metoprolol tartrate (LOPRESSOR) 25 MG tablet Take 1 tablet by mouth 2 (Two) Times a Day. 180 tablet 0   • rosuvastatin (CRESTOR) 20 MG tablet Take 1 tablet by mouth Every Night. 90 tablet 1   • Lactobacillus Acid-Pectin  (Acidophilus/Citrus Pectin) tablet tablet Take 1 tablet by mouth 2 (Two) Times a Day.     • Lactobacillus Probiotic tablet Take 1 tablet by mouth 2 (Two) Times a Day. 60 tablet 0     No facility-administered medications prior to visit.       Opioid medication/s are on active medication list.  and I have evaluated her active treatment plan and pain score trends (see table).  There were no vitals filed for this visit.  I have reviewed the chart for potential of high risk medication and harmful drug interactions in the elderly.            Aspirin is not on active medication list.  Aspirin use is not indicated based on review of current medical condition/s. Risk of harm outweighs potential benefits.  .    Patient Active Problem List   Diagnosis   • Vitamin D deficiency   • Sleep apnea   • Post-menopause   • Major depressive disorder   • Primary hypertension   • Hyperlipidemia   • Fibromyalgia   • Disease of thyroid gland   • Depression   • Anxiety   • Arthritis   • ACE-inhibitor cough   • Diverticulitis   • Sinus trouble   • Breast cancer (HCC)   • Screening for colon cancer   • History of diverticulitis   • History of cholecystectomy   • Altered bowel habits   • Diarrhea   • Malignant neoplasm of upper-inner quadrant of right female breast (HCC)   • Chronic low back pain   • Trochanteric bursitis of both hips   • Family history of CLL (chronic lymphoid leukemia)   • Degeneration of lumbar intervertebral disc   • Lumbar spondylosis   • Chronic osteoarthritis   • Leukocytosis   • Prediabetes   • Class 1 obesity with serious comorbidity and body mass index (BMI) of 31.0 to 31.9 in adult     Advance Care Planning  Advance Directive is not on file.  ACP discussion was held with the patient during this visit. Patient does not have an advance directive, declines further assistance.     Objective    Vitals:    12/13/22 1133 12/13/22 1143   BP: 148/78 140/74   BP Location: Left arm    Patient Position: Sitting    Cuff Size:  "Adult    Pulse: 60    Temp: 96.2 °F (35.7 °C)    SpO2: 98%    Weight: 78.7 kg (173 lb 6.4 oz)    Height: 157.5 cm (62\")      Estimated body mass index is 31.72 kg/m² as calculated from the following:    Height as of this encounter: 157.5 cm (62\").    Weight as of this encounter: 78.7 kg (173 lb 6.4 oz).    BMI is >= 30 and <35. (Class 1 Obesity). The following options were offered after discussion;: weight loss educational material (shared in after visit summary) and nutrition counseling/recommendations      Does the patient have evidence of cognitive impairment? No    Lab Results   Component Value Date    TRIG 244 (H) 09/27/2022    HDL 55 09/27/2022    LDL 52 09/27/2022    VLDL 38 09/27/2022    HGBA1C 5.90 (H) 09/27/2022        HEALTH RISK ASSESSMENT    Smoking Status:  Social History     Tobacco Use   Smoking Status Never   • Passive exposure: Yes   Smokeless Tobacco Never     Alcohol Consumption:  Social History     Substance and Sexual Activity   Alcohol Use Never     Fall Risk Screen:    STEADI Fall Risk Assessment was completed, and patient is at LOW risk for falls.Assessment completed on:12/13/2022    Depression Screening:  PHQ-2/PHQ-9 Depression Screening 12/13/2022   Retired PHQ-9 Total Score -   Retired Total Score -   Little Interest or Pleasure in Doing Things 0-->not at all   Feeling Down, Depressed or Hopeless 0-->not at all   PHQ-9: Brief Depression Severity Measure Score 0       Health Habits and Functional and Cognitive Screening:  Functional & Cognitive Status 12/13/2022   Do you have difficulty preparing food and eating? No   Do you have difficulty bathing yourself, getting dressed or grooming yourself? No   Do you have difficulty using the toilet? No   Do you have difficulty moving around from place to place? No   Do you have trouble with steps or getting out of a bed or a chair? No   Current Diet Well Balanced Diet   Dental Exam Up to date   Eye Exam Up to date   Exercise (times per week) 2 " times per week   Current Exercises Include Walking   Do you need help using the phone?  No   Are you deaf or do you have serious difficulty hearing?  No   Do you need help with transportation? No   Do you need help shopping? No   Do you need help preparing meals?  No   Do you need help with housework?  No   Do you need help with laundry? No   Do you need help taking your medications? No   Do you need help managing money? No   Do you ever drive or ride in a car without wearing a seat belt? No   Have you felt unusual stress, anger or loneliness in the last month? No   Who do you live with? Spouse   If you need help, do you have trouble finding someone available to you? No   Have you been bothered in the last four weeks by sexual problems? No   Do you have difficulty concentrating, remembering or making decisions? No       Age-appropriate Screening Schedule:  Refer to the list below for future screening recommendations based on patient's age, sex and/or medical conditions. Orders for these recommended tests are listed in the plan section. The patient has been provided with a written plan.    Health Maintenance   Topic Date Due   • DXA SCAN  10/15/2022   • TDAP/TD VACCINES (1 - Tdap) 07/18/2023 (Originally 1/18/1969)   • ZOSTER VACCINE (1 of 2) 12/13/2023 (Originally 1/18/1969)   • MAMMOGRAM  09/14/2023   • LIPID PANEL  09/27/2023   • INFLUENZA VACCINE  Completed                CMS Preventative Services Quick Reference  Risk Factors Identified During Encounter  Chronic Pain: Following with pain mgmt, will discuss for pain mgmt to also prescribe gabapentin.  Immunizations Discussed/Encouraged: Prevnar 20 (Pneumococcal 20-valent conjugate)  Polypharmacy: Medication List reviewed and Medications are appropriate for patient  The above risks/problems have been discussed with the patient.  Pertinent information has been shared with the patient in the After Visit Summary.  An After Visit Summary and PPPS were made available to  "the patient.    Follow Up:   Next Medicare Wellness visit to be scheduled in 1 year.       Additional E&M Note during same encounter follows:  Patient has multiple medical problems which are significant and separately identifiable that require additional work above and beyond the Medicare Wellness Visit.      Chief Complaint  Hypertension, Medicare Wellness-subsequent, Prediabetes, Hyperlipidemia, and Vitamin D Deficiency    Subjective      Dorothea Cruz is also being seen today for for 3-month follow-up for hypertension, hyperlipidemia, prediabetes, vitamin D deficiency, and fibromyalgia.  She is currently stable on medications as listed.    Her blood pressure is noted to be borderline elevated today.  She is not checking blood pressures at home but states she is recently ordered her blood pressure monitor.    She states she is actively been working on losing weight and she has lost 7 pounds since July.    She states she feels her depression is better now.  She also takes Cymbalta for fibromyalgia.    [place]  She states that the doctor at the hospital where she was staying wanted her to have bowel reconstructive surgery.  She is no longer having any issues with diverticulitis.  She does follow with gastroenterologist who is monitoring her.    Review of Systems   Respiratory: Negative for cough, shortness of breath and wheezing.    Cardiovascular: Negative for chest pain, palpitations and leg swelling.   Gastrointestinal: Negative for abdominal pain, diarrhea, nausea and vomiting.   Genitourinary: Negative for difficulty urinating, frequency and impotence.   Neurological: Negative for dizziness, tremors and confusion.   Psychiatric/Behavioral: Negative for suicidal ideas. The patient is not nervous/anxious.        Objective   Vital Signs:  /74   Pulse 60   Temp 96.2 °F (35.7 °C)   Ht 157.5 cm (62\")   Wt 78.7 kg (173 lb 6.4 oz)   SpO2 98%   BMI 31.72 kg/m² "     Physical Exam  Vitals reviewed.   Constitutional:       Appearance: Normal appearance. She is well-developed. She is obese.   Neck:      Thyroid: No thyroid mass, thyromegaly or thyroid tenderness.   Cardiovascular:      Rate and Rhythm: Normal rate and regular rhythm.      Heart sounds: No murmur heard.    No friction rub. No gallop.   Pulmonary:      Effort: Pulmonary effort is normal.      Breath sounds: Normal breath sounds. No wheezing or rhonchi.   Lymphadenopathy:      Cervical: No cervical adenopathy.   Skin:     General: Skin is warm and dry.   Neurological:      Mental Status: She is alert and oriented to person, place, and time.      Cranial Nerves: No cranial nerve deficit.   Psychiatric:         Mood and Affect: Mood and affect normal.         Behavior: Behavior normal.         Thought Content: Thought content normal. Thought content does not include homicidal or suicidal ideation.         Judgment: Judgment normal.          The following data was reviewed by: ARA Peck on 12/13/2022:  CMP    CMP 7/19/22 9/6/22 9/27/22   Glucose   106 (A)   BUN   13   Creatinine 0.70 0.80 0.84   Sodium   142   Potassium   4.3   Chloride   102   Calcium   10.3   Albumin   4.50   Total Bilirubin   0.4   Alkaline Phosphatase   68   AST (SGOT)   28   ALT (SGPT)   23   (A) Abnormal value       Comments are available for some flowsheets but are not being displayed.           CBC    CBC 3/8/22 6/2/22   WBC 11.84 (A) 13.36 (A)   RBC 4.87 4.88   Hemoglobin 14.7 15.0   Hematocrit 45.5 45.1   MCV 93.4 92.4   MCH 30.2 30.7   MCHC 32.3 33.3   RDW 15.1 13.5   Platelets 166 174   (A) Abnormal value            Lipid Panel    Lipid Panel 3/8/22 9/27/22   Total Cholesterol 152 145   Triglycerides 412 (A) 244 (A)   HDL Cholesterol 50 55   VLDL Cholesterol 60 (A) 38   LDL Cholesterol  42 52   LDL/HDL Ratio 0.39 0.75   (A) Abnormal value            TSH    TSH 3/8/22   TSH 1.400           A1C Last 3 Results    HGBA1C Last  3 Results 3/14/22 9/27/22   Hemoglobin A1C 6.20 (A) 5.90 (A)   (A) Abnormal value               Assessment and Plan   Diagnoses and all orders for this visit:    1. Medicare annual wellness visit, subsequent (Primary)    2. Prediabetes  Assessment & Plan:  She is working on weight loss.  We discussed continue to decrease carbs and sugars in her diet.    Orders:  -     TSH Rfx On Abnormal To Free T4; Future  -     Hemoglobin A1c; Future    3. Primary hypertension  Assessment & Plan:  Blood pressure is above goal today but stable.  Patient has ordered a blood pressure monitor and will monitor at home.  She will continue hydrochlorothiazide 25 g daily.  She will follow-up in 3 months.    Orders:  -     CBC Auto Differential; Future  -     Comprehensive Metabolic Panel; Future  -     Lipid Panel; Future  -     TSH Rfx On Abnormal To Free T4; Future    4. Vitamin D deficiency  Assessment & Plan:  She will continue vitamin D 50,000 units weekly.  We will plan to recheck her vitamin D with her next labs in 3 months.    Orders:  -     Vitamin D,25-Hydroxy; Future    5. Mixed hyperlipidemia  Assessment & Plan:  We will plan to check fasting labs in 3 months prior to her next 3-month follow-up.  She will continue Crestor as prescribed.    Orders:  -     Comprehensive Metabolic Panel; Future  -     Lipid Panel; Future  -     TSH Rfx On Abnormal To Free T4; Future    6. Class 1 obesity with serious comorbidity and body mass index (BMI) of 31.0 to 31.9 in adult, unspecified obesity type  Assessment & Plan:  Patient's (Body mass index is 31.72 kg/m².) indicates that they are obese (BMI >30) with health conditions that include hypertension, dyslipidemias and Prediabetes . Weight is improving with lifestyle modifications. BMI is is above average; BMI management plan is completed. We discussed portion control and increasing exercise.       Other orders  -     Lactobacillus Probiotic tablet; Take 1 tablet by mouth 2 (Two) Times a  Day.  Dispense: 180 tablet; Refill: 3           Follow Up   Return in about 3 months (around 3/13/2023) for Next scheduled follow up HTN, Prediabetes, Fasting Labs one week prior to next Appointment.  Patient was given instructions and counseling regarding her condition or for health maintenance advice. Please see specific information pulled into the AVS if appropriate.

## 2022-12-14 NOTE — ASSESSMENT & PLAN NOTE
She will continue vitamin D 50,000 units weekly.  We will plan to recheck her vitamin D with her next labs in 3 months.

## 2022-12-14 NOTE — ASSESSMENT & PLAN NOTE
We will plan to check fasting labs in 3 months prior to her next 3-month follow-up.  She will continue Crestor as prescribed.

## 2022-12-15 ENCOUNTER — APPOINTMENT (OUTPATIENT)
Dept: OCCUPATIONAL THERAPY | Facility: HOSPITAL | Age: 72
End: 2022-12-15

## 2022-12-20 NOTE — PROGRESS NOTES
Follow Up Office Visit      Encounter Date: 2022   Patient Name: Dorothea Cruz  YOB: 1950   Medical Record Number: [id number]   Primary Diagnosis: Malignant neoplasm of upper-inner quadrant of right breast in female, estrogen receptor negative (HCC) [C50.211, Z17.1]     Cancer Staging   Malignant neoplasm of upper-inner quadrant of right female breast (HCC)  Staging form: Breast, AJCC 8th Edition  - Pathologic: pT1a, pN0, cM0, ER-, IA-, HER2- - Signed by Santa Haskins APRN on 2022    Radiation Completion Date:  2022    Chief Complaint:    Chief Complaint   Patient presents with   • Follow-up   • Breast Cancer       Oncology/Hematology History Overview Note     Right Triple Negative Breast Cancer:  - screening mammogram 9/10/21: 4mm asymmetry in the right upper inner breast  -Diagnostic mammogram on 2021: Persistent 5 mm ill-defined nodule in the upper inner mid right breast at 2:00, 7 cm from the nipple.  - core biopsy on 10/8/21: Invasive ductal carcinoma, grade 3, ER negative (less than 1%), IA negative (less than 1%), HER-2 equivocal (2+ HC), HER-2 FISH not amplified  - Right lumpectomy on 21. Pathology showed a 5mm tumor with negative margins, 0/3 lymph nodes involved, pT1aN0, ER-, IA-, HER2-  - chemotherapy was not recommended due to the small size of the tumor.   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  Mildly increased CD4 positive T cell LGL (5.2% of analyzed cells).  - CLL FISH positive for TP53/17p13 deletion in 49.5% of cells.  - IgVH somatic hypermutation was detected (3.7%)    Family History:    - brother with CLL  - father  from some type of leukemia  - sister with breast cancer and adult onset retinoblastoma which was fatal  - niece (sister's  daughter) also had retinoblastoma as a child  - pt is considering genetic testing     Breast cancer (HCC)   10/20/2021 Initial Diagnosis    Breast cancer (HCC)     Malignant neoplasm of upper-inner quadrant of right female breast (HCC)   11/12/2021 Cancer Staged    Staging form: Breast, AJCC 8th Edition  - Pathologic: pT1a, pN0, cM0, ER-, AL-, HER2- - Signed by Santa Haskins APRN on 5/20/2022 1/13/2022 Initial Diagnosis    Malignant neoplasm of upper-inner quadrant of right female breast (HCC)     1/20/2022 - 2/14/2022 Radiation    Radiation OncologyTreatment Course:  Dorothea Cruz received 4256 cGy in 16 fractions to right breast.          History of Present Illness: Dorothea Cruz is a 72 y.o. female who returns to [facility] for routine 6-month follow-up. Her bilateral screening mammogram on 9/14/2022 showed probably 7 cm seroma within the right lumpectomy bed with recommendation for confirmatory ultrasound; left breast benign mammogram. Ultrasound of the right breast on 10/3/2022 showed a 5.3 cm complex collection at the 2 o'clock position 5 cm from the nipple in the location of increasing breast pain most compatible with developing seroma.  Recommended ultrasound-guided cyst aspiration to improve clinical complaint. She underwent ultrasound guided aspiration of the seroma on 10/3/2022 with complete resolution of the collection. She reports feeling well overall with no new complaints or concerns. She has no breast related concerns today, denying breast pain/tenderness, new palpable masses or skin changes. She states that she was experiencing pain in her right breast that resolved after aspiration of seroma. She continues to follow-up with lymphedema therapy.     Of note, she had a hospitalization in October 2022 for sigmoid diverticulitis and she is being followed by Dr. Garcia. Her CT chest on 9/6/2022 showed a 2.5 cm right adrenal mass consistent with an adenoma. Her MRI abdomen  with and without contrast on 9/29/2022 showed 2.5 cm right adrenal mass consistent with adenoma with recommendation for repeat MRI in 6 months.      Subjective      Review of Systems: Review of Systems   Constitutional: Positive for fatigue (3/10). Negative for appetite change and unexpected weight change.   HENT: Positive for tinnitus (ongoing). Negative for hearing loss, sore throat, trouble swallowing and voice change.    Eyes: Negative.  Negative for visual disturbance.   Respiratory: Negative.  Negative for cough and shortness of breath.    Cardiovascular: Negative.  Negative for chest pain, palpitations and leg swelling.   Gastrointestinal: Negative.  Negative for blood in stool, constipation, diarrhea, nausea and vomiting.   Genitourinary: Positive for frequency (x last couple of days.). Negative for difficulty urinating, dysuria, hematuria and urgency.   Musculoskeletal: Positive for arthralgias (Generalized, has fibromyalgia- ongoing) and back pain (8/10, ongoing, everyday pain.). Negative for gait problem and joint swelling.   Skin: Negative.  Negative for color change and rash.   Neurological: Negative.  Negative for dizziness, weakness and headaches.   Psychiatric/Behavioral: Negative.  Negative for sleep disturbance.       The following portions of the patient's history were reviewed and updated as appropriate: allergies, current medications, past family history, past medical history, past social history, past surgical history and problem list.    Medications:     Current Outpatient Medications:   •  colestipol (Colestid) 1 g tablet, Take 2 tablets by mouth 2 (Two) Times a Day for 90 days., Disp: 360 tablet, Rfl: 1  •  DULoxetine (CYMBALTA) 60 MG capsule, Take 1 capsule by mouth Daily., Disp: 90 capsule, Rfl: 1  •  ergocalciferol (ERGOCALCIFEROL) 1.25 MG (03193 UT) capsule, Take 1 capsule by mouth 1 (One) Time Per Week., Disp: 13 capsule, Rfl: 1  •  gabapentin (NEURONTIN) 300 MG capsule, Take 1 capsule  by mouth 3 (Three) Times a Day As Needed (neuropathy)., Disp: 90 capsule, Rfl: 2  •  hydroCHLOROthiazide (HYDRODIURIL) 25 MG tablet, Take 1 tablet by mouth Daily., Disp: 90 tablet, Rfl: 1  •  HYDROcodone-acetaminophen (NORCO) 5-325 MG per tablet, Take 1 tablet by mouth Daily., Disp: , Rfl:   •  Lactobacillus Probiotic tablet, Take 1 tablet by mouth 2 (Two) Times a Day., Disp: 180 tablet, Rfl: 3  •  metoprolol tartrate (LOPRESSOR) 25 MG tablet, Take 1 tablet by mouth 2 (Two) Times a Day., Disp: 180 tablet, Rfl: 0  •  rosuvastatin (CRESTOR) 20 MG tablet, Take 1 tablet by mouth Every Night., Disp: 90 tablet, Rfl: 1    Allergies:   Allergies   Allergen Reactions   • Ace Inhibitors Cough   • Sulfamethoxazole-Trimethoprim Hives   • Levaquin [Levofloxacin] Hives   • Lisinopril Cough and Hives   • Nsaids Itching, Nausea Only and Rash   • Sulfa Antibiotics Itching, Nausea Only, Rash and Hives       Measures:  Pain: (on a scale of 0-10)   Pain Score    12/22/22 1236   PainSc:   8   PainLoc: Back     Dorothea Cruz reports a pain score of 8.  Given her pain assessment as noted, treatment options were discussed and the following options were decided upon as a follow-up plan to address the patient's pain: use of non-medical modalities (ice, heat, stretching and/or behavior modifications).    Quality of Life: 100 - Full Activity   ECOG: (0) Fully active, able to carry on all predisease performance without restriction      Objective     Physical Exam:   Vital Signs:   Vitals:    12/22/22 1236   BP: 129/83   Pulse: 71   Resp: 16   Temp: 98.2 °F (36.8 °C)   TempSrc: Temporal   SpO2: 98%   Weight: 79.4 kg (175 lb 0.7 oz)   PainSc:   8   PainLoc: Back     Body mass index is 32.02 kg/m².   Wt Readings from Last 3 Encounters:   12/22/22 79.4 kg (175 lb 0.7 oz)   12/13/22 78.7 kg (173 lb 6.4 oz)   12/06/22 81.2 kg (179 lb)       Physical Exam  Vitals reviewed.   Constitutional:       General: She is not in acute distress.      Appearance: Normal appearance. She is normal weight. She is not ill-appearing.   HENT:      Head: Normocephalic and atraumatic.      Mouth/Throat:      Mouth: Mucous membranes are moist.      Pharynx: Oropharynx is clear. No oropharyngeal exudate or posterior oropharyngeal erythema.   Eyes:      Conjunctiva/sclera: Conjunctivae normal.      Pupils: Pupils are equal, round, and reactive to light.   Cardiovascular:      Rate and Rhythm: Normal rate and regular rhythm.      Pulses: Normal pulses.      Heart sounds: Normal heart sounds.   Pulmonary:      Effort: Pulmonary effort is normal. No respiratory distress.      Breath sounds: Normal breath sounds. No wheezing, rhonchi or rales.   Chest:   Breasts:     Right: No swelling, bleeding, inverted nipple, mass, nipple discharge, skin change or tenderness.      Left: No swelling, bleeding, inverted nipple, mass, nipple discharge, skin change or tenderness.       Musculoskeletal:         General: Normal range of motion.      Cervical back: Normal range of motion.   Lymphadenopathy:      Upper Body:      Right upper body: No supraclavicular or axillary adenopathy.      Left upper body: No supraclavicular or axillary adenopathy.   Skin:     General: Skin is warm and dry.   Neurological:      General: No focal deficit present.      Mental Status: She is alert and oriented to person, place, and time. Mental status is at baseline.   Psychiatric:         Mood and Affect: Mood normal.         Behavior: Behavior normal.       Result Review: I personally reviewed the following data. The pertinent findings are as above in the HPI.  -- Mammo Screening Digital Tomosynthesis Bilateral With CAD (09/14/2022 11:28)  -- US Breast Right Limited (10/03/2022 08:48)  -- US Guided Cyst Aspiration Breast (10/03/2022 12:42)  -- CT Chest With Contrast Diagnostic (09/06/2022 15:45)  -- MRI Abdomen With & Without Contrast (09/29/2022 15:47)    Pathology:   Lab Results   Component Value Date     CLININFO  05/09/2022      Comment:      Screening for colon cancer  History of diverticulitis  History of cholecystectomy  Altered bowel habits  Diarrhea, unspecified type      FINALDX  05/09/2022     Random colon, biopsy:    - No significant pathologic change    - Negative for microscopic colitis        SYNOPTIC  11/12/2021     INVASIVE CARCINOMA OF THE BREAST: Resection  INVASIVE CARCINOMA OF THE BREAST: COMPLETE EXCISION - All Specimens  8th Edition - Protocol posted: 6/30/2021    SPECIMEN     Procedure:    Excision (less than total mastectomy)      Specimen Laterality:    Right     TUMOR     Histologic Type:    Invasive carcinoma of no special type (ductal)      Histologic Grade ([place] Histologic Score):           Glandular (Acinar) / Tubular Differentiation:    Score 3        Nuclear Pleomorphism:    Score 3        Mitotic Rate:    Score 3        Overall Grade:    Grade 3 (scores of 8 or 9)      Tumor Size:    Greatest dimension of largest invasive focus (Millimeters): 5 mm     Tumor Focality:    Single focus of invasive carcinoma      Ductal Carcinoma In Situ (DCIS):    Not identified      Tumor Extent:         Treatment Effect in the Breast:    No known presurgical therapy     MARGINS     Margin Status for Invasive Carcinoma:    All margins negative for invasive carcinoma        Distance from Invasive Carcinoma to Closest Margin:    Greater than: 20 mm    REGIONAL LYMPH NODES     Regional Lymph Node Status:           :    All regional lymph nodes negative for tumor        Total Number of Lymph Nodes Examined (sentinel and non-sentinel):    3        Number of [place] Nodes Examined:    3     DISTANT METASTASIS    PATHOLOGIC STAGE CLASSIFICATION (pTNM, AJCC 8th Edition)     Reporting of pT, pN, and (when applicable) pM categories is based on information available to the pathologist at the time the report is issued. As per the AJCC (Chapter 1, 8th Ed.) it is the managing physician’s responsibility to  establish the final pathologic stage based upon all pertinent information, including but potentially not limited to this pathology report.     pT Category:    pT1a      Regional Lymph Nodes Modifier:    (sn): [place] node(s) evaluated.      pN Category:    pN0        Breast Biomarker Testing Performed on Previous Biopsy:           Estrogen Receptor (ER) Status:    Negative      Breast Biomarker Testing Performed on Previous Biopsy:           Progesterone Receptor (PgR) Status:    Negative      Breast Biomarker Testing Performed on Previous Biopsy:           HER2 (by in situ hybridization):    Negative (not amplified)        Testing Performed on Case Number:    [id number]          Imaging: US Guided Cyst Aspiration Breast    Result Date: 10/3/2022   Technically successful aspiration of a complex seroma at the 2 o'clock position 5 cm from the nipple of the right breast with complete resolution post aspiration.      Carmen Tracy M.D.       Electronically Signed and Approved By: Carmen Tracy M.D. on 10/03/2022 at 12:55             US Breast Right Limited    Result Date: 10/3/2022  5.3 cm complex collection at the 2 o'clock position 5 cm from the nipple of the right breast most compatible with developing seroma.  Recommend ultrasound-guided cyst aspiration to improve clinical complaint.  If there is prominent echogenic masslike debris persisting, consider ultrasound-guided core biopsy given high risk status.  All findings and recommendations discussed with the patient and she has been scheduled for ultrasound-guided cyst aspiration biopsy.   RECOMMENDATION(S):  ULTRASOUND-GUIDED CYST ASPIRATION/possible core biopsy: RIGHT BREAST   BIRADS:  DIAGNOSTIC CATEGORY 4--SUSPICIOUS   PLEASE NOTE:  THE AMERICAN CANCER SOCIETY NOW RECOMMENDS THAT ALL WOMEN WITH >20% LIFETIME RISK OF BREAST CANCER UNDERGO ANNUAL SCREENING BREAST MRI AND WOMEN WITH 15-20% SPEAK WITH THEIR DOCTORS ABOUT ANNUAL SCREENING BREAST MRI.  A  NORMAL ULTRASOUND EXAMINATION DOES NOT EXCLUDE THE POSSIBILITY OF BREAST CANCER.  A CLINICALLY SUSPICIOUS PALPABLE LUMP SHOULD BE BIOPSIED.     Carmen Tracy M.D.       Electronically Signed and Approved By: Carmen Tracy M.D. on 10/03/2022 at 8:56                Labs:   WBC   Date Value Ref Range Status   06/02/2022 13.36 (H) 3.40 - 10.80 10*3/mm3 Final     Hemoglobin   Date Value Ref Range Status   06/02/2022 15.0 12.0 - 15.9 g/dL Final     Hematocrit   Date Value Ref Range Status   06/02/2022 45.1 34.0 - 46.6 % Final     Platelets   Date Value Ref Range Status   06/02/2022 174 140 - 450 10*3/mm3 Final     TSH   Date Value Ref Range Status   03/08/2022 1.400 0.270 - 4.200 uIU/mL Final       Assessment / Plan      Impression: Dorothea Cruz is a pleasant 72 y.o. female with pT1a pN0(sn) cM0 triple negative invasive ductal carcinoma of the right breast status post lumpectomy and sentinel lymph node biopsy on 11/12/2021. She was not recommended to receive adjuvant chemotherapy due to the small size of the tumor. She is status post external beam radiotherapy to the right breast, completed on 2/14/2022. She has had interval development of painful seroma in the postsurgical bed of the right breast. Her screening mammogram on 9/14/2022 showed a 7 cm circumscribed mass within the right lumpectomy bed favoring post-lumpectomy seroma with benign-type calcifications in the right breast. She underwent ultrasound guided aspiration of the seroma on 10/3/2022 with complete resolution. She is doing well overall with no palpable abnormalities on clinical breast exam.     Assessment/Plan:   Diagnoses and all orders for this visit:    1. Malignant neoplasm of upper-inner quadrant of right breast in female, estrogen receptor negative (HCC) (Primary)    2. Triple negative malignant neoplasm of breast (HCC)    3. Personal history of radiation therapy    4. At risk for lymphedema    5. Adrenal adenoma, right    6. History of  diverticulitis       Follow Up:   1.  Return for follow-up in 1 year.  2.  Recommend continuation of annual screening mammogram; next mammography due in September 2023.   3.  Will schedule appointment for her to follow-up with Dr. Nair.   4.  Follow-up with Lymphedema Therapy as scheduled.   5.  Follow-up with Dr. Garcia as needed for management of sigmoid diverticulitis.   6.  Right adrenal adenoma being monitored by her PCP with recommendation for repeat MRI in 6 months.     Return in about 1 year (around 12/22/2023) for Office Visit.  Dorothea Cruz was encouraged to contact me in the interim with any questions or concerns regarding her care.        ARA Hollis  Radiation Oncology  [facility]    This document has been signed by ARA Lowe on December 22, 2022 13:25 EST

## 2022-12-22 ENCOUNTER — OFFICE VISIT (OUTPATIENT)
Dept: RADIATION ONCOLOGY | Facility: HOSPITAL | Age: 72
End: 2022-12-22

## 2022-12-22 VITALS
HEART RATE: 71 BPM | OXYGEN SATURATION: 98 % | DIASTOLIC BLOOD PRESSURE: 83 MMHG | TEMPERATURE: 98.2 F | RESPIRATION RATE: 16 BRPM | SYSTOLIC BLOOD PRESSURE: 129 MMHG | WEIGHT: 175.04 LBS | BODY MASS INDEX: 32.02 KG/M2

## 2022-12-22 DIAGNOSIS — C50.919 TRIPLE NEGATIVE MALIGNANT NEOPLASM OF BREAST: ICD-10-CM

## 2022-12-22 DIAGNOSIS — Z17.1 MALIGNANT NEOPLASM OF UPPER-INNER QUADRANT OF RIGHT BREAST IN FEMALE, ESTROGEN RECEPTOR NEGATIVE: Primary | ICD-10-CM

## 2022-12-22 DIAGNOSIS — Z91.89 AT RISK FOR LYMPHEDEMA: ICD-10-CM

## 2022-12-22 DIAGNOSIS — D35.01 ADRENAL ADENOMA, RIGHT: ICD-10-CM

## 2022-12-22 DIAGNOSIS — C50.211 MALIGNANT NEOPLASM OF UPPER-INNER QUADRANT OF RIGHT BREAST IN FEMALE, ESTROGEN RECEPTOR NEGATIVE: Primary | ICD-10-CM

## 2022-12-22 DIAGNOSIS — Z87.19 HISTORY OF DIVERTICULITIS: ICD-10-CM

## 2022-12-22 DIAGNOSIS — Z92.3 PERSONAL HISTORY OF RADIATION THERAPY: ICD-10-CM

## 2022-12-22 PROCEDURE — 99214 OFFICE O/P EST MOD 30 MIN: CPT | Performed by: NURSE PRACTITIONER

## 2022-12-22 PROCEDURE — G0463 HOSPITAL OUTPT CLINIC VISIT: HCPCS | Performed by: NURSE PRACTITIONER

## 2023-01-06 ENCOUNTER — OFFICE VISIT (OUTPATIENT)
Dept: FAMILY MEDICINE CLINIC | Facility: CLINIC | Age: 73
End: 2023-01-06
Payer: MEDICARE

## 2023-01-06 VITALS
WEIGHT: 177.6 LBS | OXYGEN SATURATION: 99 % | DIASTOLIC BLOOD PRESSURE: 78 MMHG | SYSTOLIC BLOOD PRESSURE: 126 MMHG | TEMPERATURE: 97.4 F | HEIGHT: 62 IN | HEART RATE: 59 BPM | BODY MASS INDEX: 32.68 KG/M2

## 2023-01-06 DIAGNOSIS — J01.10 ACUTE NON-RECURRENT FRONTAL SINUSITIS: Primary | ICD-10-CM

## 2023-01-06 DIAGNOSIS — R51.9 ACUTE NONINTRACTABLE HEADACHE, UNSPECIFIED HEADACHE TYPE: ICD-10-CM

## 2023-01-06 DIAGNOSIS — R52 BODY ACHES: ICD-10-CM

## 2023-01-06 DIAGNOSIS — R05.1 ACUTE COUGH: ICD-10-CM

## 2023-01-06 DIAGNOSIS — J02.9 SORE THROAT: ICD-10-CM

## 2023-01-06 PROCEDURE — 99213 OFFICE O/P EST LOW 20 MIN: CPT | Performed by: NURSE PRACTITIONER

## 2023-01-06 PROCEDURE — 87880 STREP A ASSAY W/OPTIC: CPT | Performed by: NURSE PRACTITIONER

## 2023-01-06 PROCEDURE — 87428 SARSCOV & INF VIR A&B AG IA: CPT | Performed by: NURSE PRACTITIONER

## 2023-01-06 RX ORDER — PREDNISONE 20 MG/1
TABLET ORAL
Qty: 9 TABLET | Refills: 0 | Status: SHIPPED | OUTPATIENT
Start: 2023-01-06 | End: 2023-01-11

## 2023-01-06 RX ORDER — BENZONATATE 200 MG/1
200 CAPSULE ORAL 3 TIMES DAILY PRN
Qty: 21 CAPSULE | Refills: 0 | Status: SHIPPED | OUTPATIENT
Start: 2023-01-06 | End: 2023-01-13

## 2023-01-06 NOTE — PROGRESS NOTES
Chief Complaint  Headache, Nasal Congestion, Sore Throat, and Cough    Subjective          Dorothea Cruz, 72 y.o. female presents to [facility]  History of Present Illness   She presents today for an acute visit.  She is complaining of sinus issues, headache, nasal congestion, sore throat and cough.  She has been taking NyQuil which did help.  She states her symptoms started 4 days ago.  She denies any known sick contacts.    In office rapid strep swab is negative, rapid COVID and flu swabs are also negative.      Tobacco Use: Medium Risk   • Smoking Tobacco Use: Never   • Smokeless Tobacco Use: Never   • Passive Exposure: Yes       Objective   Vital Signs:   /78   Pulse 59   Temp 97.4 °F (36.3 °C)   Ht 157.5 cm (62\")   Wt 80.6 kg (177 lb 9.6 oz)   SpO2 99%   BMI 32.48 kg/m²       Current Outpatient Medications:   •  colestipol (Colestid) 1 g tablet, Take 2 tablets by mouth 2 (Two) Times a Day for 90 days., Disp: 360 tablet, Rfl: 1  •  DULoxetine (CYMBALTA) 60 MG capsule, Take 1 capsule by mouth Daily., Disp: 90 capsule, Rfl: 1  •  ergocalciferol (ERGOCALCIFEROL) 1.25 MG (32285 UT) capsule, Take 1 capsule by mouth 1 (One) Time Per Week., Disp: 13 capsule, Rfl: 1  •  gabapentin (NEURONTIN) 300 MG capsule, Take 1 capsule by mouth 3 (Three) Times a Day As Needed (neuropathy)., Disp: 90 capsule, Rfl: 2  •  hydroCHLOROthiazide (HYDRODIURIL) 25 MG tablet, Take 1 tablet by mouth Daily., Disp: 90 tablet, Rfl: 1  •  HYDROcodone-acetaminophen (NORCO) 5-325 MG per tablet, Take 1 tablet by mouth Daily., Disp: , Rfl:   •  Lactobacillus Probiotic tablet, Take 1 tablet by mouth 2 (Two) Times a Day., Disp: 180 tablet, Rfl: 3  •  metoprolol tartrate (LOPRESSOR) 25 MG tablet, Take 1 tablet by mouth 2 (Two) Times a Day., Disp: 180 tablet, Rfl: 0  •  rosuvastatin (CRESTOR) 20 MG tablet, Take 1 tablet by mouth Every Night., Disp: 90 tablet, Rfl: 1  •  benzonatate (TESSALON) 200 MG  capsule, Take 1 capsule by mouth 3 (Three) Times a Day As Needed for Cough for up to 7 days., Disp: 21 capsule, Rfl: 0  •  predniSONE (DELTASONE) 20 MG tablet, Take 3 tablets by mouth Daily for 1 day, THEN 2 tablets Daily for 2 days, THEN 1 tablet Daily for 2 days., Disp: 9 tablet, Rfl: 0   Past Medical History:   Diagnosis Date   • Anxiety    • Arthritis    • Breast cancer (HCC)    • Cancer (HCC)     BREAST CANCER   • Cholelithiases 4/30@1   • Depression    • Diabetes mellitus (HCC)    • Disease of thyroid gland     currently not taking any meds   • Diverticulitis    • Diverticulitis of colon    • Diverticulosis 95   • Fibromyalgia    • Fibromyalgia, primary    • Hyperlipidemia    • Hypertension    • Liver disease    • Major depressive disorder 05/07/2020   • Palpitations     ASYMPTOMATIC- DENIES CP/SOB, SEE PCP- NO CARDIOLOGIST    • Post-menopause 09/17/2020   • Sinus trouble    • Sleep apnea    • Tremor 2018    I was on medicine for tremors   • Urinary tract infection    • Vitamin D deficiency 09/17/2020      Physical Exam  Vitals reviewed.   Constitutional:       Appearance: Normal appearance. She is well-developed.   HENT:      Right Ear: Tympanic membrane, ear canal and external ear normal.      Left Ear: Tympanic membrane, ear canal and external ear normal.      Nose:      Right Sinus: Maxillary sinus tenderness present.      Left Sinus: Maxillary sinus tenderness present.      Mouth/Throat:      Mouth: Mucous membranes are moist.      Pharynx: No pharyngeal swelling, oropharyngeal exudate or posterior oropharyngeal erythema.      Comments: Postnasal drainage noted.    Neck:      Thyroid: No thyroid mass, thyromegaly or thyroid tenderness.   Cardiovascular:      Rate and Rhythm: Normal rate and regular rhythm.      Heart sounds: No murmur heard.    No friction rub. No gallop.   Pulmonary:      Effort: Pulmonary effort is normal.      Breath sounds: Normal breath sounds. No wheezing or rhonchi.    Lymphadenopathy:      Cervical: No cervical adenopathy.   Skin:     General: Skin is warm and dry.   Neurological:      Mental Status: She is alert and oriented to person, place, and time.      Cranial Nerves: No cranial nerve deficit.   Psychiatric:         Mood and Affect: Mood and affect normal.         Behavior: Behavior normal.         Thought Content: Thought content normal. Thought content does not include homicidal or suicidal ideation.         Judgment: Judgment normal.        Result Review :     Strep    Common Labsle 1/6/23   POC Strep A, Molecular Negative           SARS Antigen   Date Value Ref Range Status   01/06/2023 Not Detected Not Detected, Presumptive Negative Final     Influenza A Antigen HERNANDO   Date Value Ref Range Status   01/06/2023 Not Detected Not Detected Final     Influenza B Antigen HERNANDO   Date Value Ref Range Status   01/06/2023 Not Detected Not Detected Final               Assessment and Plan    Diagnoses and all orders for this visit:    1. Acute non-recurrent frontal sinusitis (Primary)  -     POCT SARS-CoV-2 Antigen HERNANDO + Flu  -     predniSONE (DELTASONE) 20 MG tablet; Take 3 tablets by mouth Daily for 1 day, THEN 2 tablets Daily for 2 days, THEN 1 tablet Daily for 2 days.  Dispense: 9 tablet; Refill: 0    2. Acute nonintractable headache, unspecified headache type  -     POCT SARS-CoV-2 Antigen HERNANDO + Flu    3. Sore throat  -     POCT SARS-CoV-2 Antigen HERNANDO + Flu  -     POCT rapid strep A    4. Body aches  -     POCT SARS-CoV-2 Antigen HERNANDO + Flu  -     POCT rapid strep A    5. Acute cough  -     POCT SARS-CoV-2 Antigen HERNANDO + Flu  -     benzonatate (TESSALON) 200 MG capsule; Take 1 capsule by mouth 3 (Three) Times a Day As Needed for Cough for up to 7 days.  Dispense: 21 capsule; Refill: 0    Discussed with patient upper respiratory infection is viral and antibiotics are not warranted at this time.  Discussed symptom management, take Tylenol or ibuprofen as needed, drink lots of  fluids and rest.  I will prescribe her prednisone Dosepak to help with her symptoms and Tessalon Perles to take as needed for cough.  Instructed to call back if symptoms do not improve or worsen by day 10.    Follow Up   Return if symptoms worsen or fail to improve.  Patient was given instructions and counseling regarding her condition or for health maintenance advice. Please see specific information pulled into the AVS if appropriate.       Monserrat Grover, ARA  01/06/2023

## 2023-01-11 ENCOUNTER — HOSPITAL ENCOUNTER (OUTPATIENT)
Dept: OCCUPATIONAL THERAPY | Facility: HOSPITAL | Age: 73
Setting detail: THERAPIES SERIES
Discharge: HOME | End: 2023-01-11
Payer: MEDICARE

## 2023-01-11 DIAGNOSIS — Z91.89 AT RISK FOR LYMPHEDEMA: Primary | ICD-10-CM

## 2023-01-11 DIAGNOSIS — R52 PAIN: ICD-10-CM

## 2023-01-11 DIAGNOSIS — M25.60 JOINT STIFFNESS: ICD-10-CM

## 2023-01-11 DIAGNOSIS — L90.5 SCAR CONDITION AND FIBROSIS OF SKIN: ICD-10-CM

## 2023-01-11 PROCEDURE — 93702 BIS XTRACELL FLUID ANALYSIS: CPT | Performed by: OCCUPATIONAL THERAPIST

## 2023-01-11 PROCEDURE — 97535 SELF CARE MNGMENT TRAINING: CPT | Performed by: OCCUPATIONAL THERAPIST

## 2023-01-11 NOTE — TELEPHONE ENCOUNTER
Caller: Dorothea Cruz    Relationship: Self    Best call back number: [phone]    Requested Prescriptions:   Requested Prescriptions     Pending Prescriptions Disp Refills   • Lactobacillus Probiotic tablet 180 tablet 3     Sig: Take 1 tablet by mouth 2 (Two) Times a Day.        Pharmacy where request should be sent: [facility] [place]  [address] [facility] - [phone]  - [phone]     Additional details provided by patient: PATIENT HAS MORE THAN A 3 DAY SUPPLY    Does the patient have less than a 3 day supply:  [] Yes  [x] No    Would you like a call back once the refill request has been completed: [] Yes [] No    If the office needs to give you a call back, can they leave a voicemail: [] Yes [] No    Jackelyn Cuello Rep   01/11/23 10:53 EST

## 2023-01-11 NOTE — THERAPY RE-EVALUATION
INR is a little high so hold for 2 days and then resume usual dose. Kidneys without significant change. Sed rate also without significant change. Spoke to patient regarding. Advised to hold his blood thinner x 2 days then resume at 7.5 mg on Monday and 10 mg all other days. Other lab work stable. F/up as scheduled. Outpatient Occupational Therapy Hand Re-Evaluation   JOSE JUAN Suarez     Patient Name: Dorothea Cruz  : 1950  MRN: [id number]  Today's Date: 2023       Visit Date: 2023    Patient Active Problem List   Diagnosis   • Vitamin D deficiency   • Sleep apnea   • Post-menopause   • Major depressive disorder   • Primary hypertension   • Hyperlipidemia   • Fibromyalgia   • Disease of thyroid gland   • Depression   • Anxiety   • Arthritis   • ACE-inhibitor cough   • Diverticulitis   • Sinus trouble   • Breast cancer (HCC)   • Screening for colon cancer   • History of diverticulitis   • History of cholecystectomy   • Altered bowel habits   • Diarrhea   • Malignant neoplasm of upper-inner quadrant of right female breast (HCC)   • Chronic low back pain   • Trochanteric bursitis of both hips   • Family history of CLL (chronic lymphoid leukemia)   • Degeneration of lumbar intervertebral disc   • Lumbar spondylosis   • Chronic osteoarthritis   • Leukocytosis   • Prediabetes   • Class 1 obesity with serious comorbidity and body mass index (BMI) of 31.0 to 31.9 in adult        Past Medical History:   Diagnosis Date   • Anxiety    • Arthritis    • Breast cancer (HCC)    • Cancer (HCC)     BREAST CANCER   • Cholelithiases @1   • Depression    • Diabetes mellitus (HCC)    • Disease of thyroid gland     currently not taking any meds   • Diverticulitis    • Diverticulitis of colon    • Diverticulosis 95   • Fibromyalgia    • Fibromyalgia, primary    • Hyperlipidemia    • Hypertension    • Liver disease    • Major depressive disorder 2020   • Palpitations     ASYMPTOMATIC- DENIES CP/SOB, SEE PCP- NO CARDIOLOGIST    • Post-menopause 2020   • Sinus trouble    • Sleep apnea    • Tremor 2018    I was on medicine for tremors   • Urinary tract infection    • Vitamin D deficiency 2020        Past Surgical History:   Procedure Laterality Date   • ABDOMINAL SURGERY  1985   • APPENDECTOMY     • BILATERAL  BREAST REDUCTION     • BREAST BIOPSY Right 11/12/2021    Procedure: RIGHT BREAST NEEDLE LOCALIZED  LUMPECTOMY WITH SENTINEL NODE BIOPSY;  Surgeon: Mirtha Esteves MD;  Location: [facility] OR OSC;  Service: General;  Laterality: Right;   • BREAST LUMPECTOMY     • CHOLECYSTECTOMY N/A 09/02/2021    Procedure: CHOLECYSTECTOMY LAPAROSCOPIC INTRAOPERATIVE CHOLANGIOGRAM;  Surgeon: Louie Medina MD;  Location: [facility] MAIN OR;  Service: General;  Laterality: N/A;   • COLONOSCOPY  2019   • COLONOSCOPY N/A 05/09/2022    Procedure: COLONOSCOPY WITH BIOPSIES;  Surgeon: Butch Garcia MD;  Location: [facility] ENDOSCOPY;  Service: Gastroenterology;  Laterality: N/A;  DIVERTICULOSIS   • HYSTERECTOMY     • LYMPH NODE BIOPSY     • SINUS SURGERY  2018   • TONSILLECTOMY     • US GUIDED CYST ASPIRATION BREAST N/A 10/3/2022   • WRIST ARTHROPLASTY Bilateral          Visit Dx:    ICD-10-CM ICD-9-CM   1. At risk for lymphedema  Z91.89 V49.89   2. Scar condition and fibrosis of skin  L90.5 709.2   3. Joint stiffness  M25.60 719.50   4. Pain  R52 780.96        Patient History     Row Name 01/11/23 0700             History    Chief Complaint --  Pt reports that her symptoms have greatly decreased since wearing the sleeve  -TD      Brief Description of Current Complaint Mrs. Hager is a 71 year old female who had surgery on 11/12/21 for Lumpectomoy on the Right due to Trip negative Breast CA  -TD         Fall Risk Assessment    Any falls in the past year: No  -TD      Does patient have a fear of falling No  -TD         Services    Prior Rehab/Home Health Experiences No  -TD      Are you currently receiving Home Health services No  -TD      Do you plan to receive Home Health services in the near future No  -TD         Daily Activities    Primary Language English  -TD      Are you able to read Yes  -TD      Are you able to write Yes  -TD      How does patient learn best? Reading;Demonstration  -TD      Barriers to learning None  -TD    "   Pt Participated in POC and Goals Yes  -TD         Safety    Are you being hurt, hit, or frightened by anyone at home or in your life? No  -TD      Are you being neglected by a caregiver No  -TD      Have you had any of the following issues with Depression;Anxiety  -TD            User Key  (r) = Recorded By, (t) = Taken By, (c) = Cosigned By    Initials Name Provider Type    TD Aminah Bryant OT Occupational Therapist                       Lymphedema     Row Name 01/11/23 0700             Subjective Pain    Able to rate subjective pain? yes  -TD      Pre-Treatment Pain Level 0  -TD      Post-Treatment Pain Level 0  -TD         Subjective Comments    Subjective Comments No complaints  -TD         Lymphedema Assessment    Lymphedema Classification RUE:;at risk/stage 0  -TD      Lymphedema Cancer Related Sx right;lumpectomy;sentinel node biopsy  -TD      Lymph Nodes Removed # 3  -TD      Positive Lymph Nodes # 0  -TD      Chemo Received no  -TD      Radiation Therapy Received yes  -TD      Radiation Treatments #/Timeframe 16  -TD         LLIS - Physical Concerns    The amount of pain associated with my lymphedema is: 0  -TD      The amount of limb heaviness associated with my lymphedema is: 0  -TD      The amount of skin tightness associated with my lymphedema is: 0  -TD      The size of my swollen limb(s) seems: 0  -TD      Lymphedema affects the movement of my swollen limb(s): 0  -TD      The strength in my swollen limb(s) is: 0  -TD         LLIS - Psychosocial Concerns    Lymphedema affects my body image (i.e., \"how I think I look\"). 0  -TD      Lymphedema affects my socializing with others. 0  -TD      Lymphedema affects my intimate relations with spouse or partner (rate 0 if not applicable 0  -TD      Lymphedema \"gets me down\" (i.e., depression, frustration, or anger) 0  -TD      I must rely on others for help due to my lymphedema. 0  -TD      I know what to do to manage my lymphedema 3  -TD         LLIS - " Functional Concerns    Lymphedema affects my ability to perform self-care activities (i.e. eating, dressing, hygiene) 0  -TD      Lymphedema affects my ability to perform routine home or work-related activities. 0  -TD      Lymphedema affects my performance of preferred leisure activities. 0  -TD      Lymphedema affects proper fit of clothing/shoes 0  -TD      Lymphedema affects my sleep 0  -TD         Posture/Observations    Posture- WNL Posture is WNL  -TD         General ROM    GENERAL ROM COMMENTS BUE WFL  -TD         MMT (Manual Muscle Testing)    General MMT Comments BUE WFL  -TD         Skin Changes/Observations    Location/Assessment Upper Quadrant  -TD      Upper Quadrant Color/Pigment right:  -TD         L-Dex Bioimpedence Screening    L-Dex Measurement Extremity RUE  -TD      L-Dex Patient Position Standing  -TD      L-Dex UE Dominate Side Right  -TD      L-Dex UE At Risk Side Right  -TD      L-Dex UE Score -10.2  -TD      L-Dex UE Baseline Score -14  -TD      L-Dex UE Value Change 3.8  -TD      $ L-Dex Charge yes  -TD         Lymphedema Life Impact Scale Totals    A.  Total Q1 - Q17 (Do not include Q18) 3  -TD      B.  Total number of questions answered (Q1-Q17) 17  -TD      C. Divide A by B 0.18  -TD      D. Multiple C by 25 4.5  -TD            User Key  (r) = Recorded By, (t) = Taken By, (c) = Cosigned By    Initials Name Provider Type    Aminah Jones OT Occupational Therapist                  Therapy Education  Education Details: Patient was educated on continued wear schedule.  Therapist reviewed signs and symptoms of lymphedema  Given: Symptoms/condition management  Program: New  How Provided: Verbal  Provided to: Patient  Level of Understanding: Verbalized  77856 - OT Self Care/Mgmt Minutes: 20       OT Goals     Row Name 01/11/23 0800          Time Calculation    OT Goal Re-Cert Due Date 02/10/23  -TD           User Key  (r) = Recorded By, (t) = Taken By, (c) = Cosigned By    Initials Name  Provider Type    TD Aminah Bryant OT Occupational Therapist              Goals:  1. Post Breast Surgery Care/at risk for Lymphedema  LTG 1: 90 days:  As an indicator of no exacerbation of lymphedema staging, the patient will present with an L-Dex score less than 7 points from preoperative baseline.              STATUS: Not met: Ongoing   STG 1a: 30 days: Patient will be independent with self-manual lymphatic massage.               STATUS: Met.  Ongoing  STG 1b: 30 days:  Patient will be independent with identification of signs and symptoms of lymphedema exasperation per stoplight to recovery education handout.              STATUS: Met.  Ongoing  STG 1 c: 30 days: Patient will be independent with HEP to prevent advancement in lymphedema staging.              STATUS: Met.  Ongoing  TREATMENT:  Self Care/ewADL retraining, Therapeutic Activity, Neuromuscular Re-education, Therapeutic Exercise, Bioimpedence Fluid Analysis, Orthotic Management and training,  and Manual Therapy.   OT Assessment/Plan     Row Name 01/11/23 0756 01/11/23 0744       OT Assessment    Functional Limitations Performance in self-care ADL  -TD Performance in self-care ADL  -TD    Impairments Impaired lymphatic circulation  -TD Impaired lymphatic circulation  -TD    Assessment Comments Patient demonstrated a normalized L-Dex score this date with wearing compression sleeve 3 days a week.  Patient will trial 30 days without sleeve to see if body is able to maintain lymphatic load.  Patient would benefit from continued skilled occupational therapy to prevent increased aging of lymphedema, decreased range of motion, and increased pain.  -TD --    OT Diagnosis At risk for lymphedema  -TD --    OT Rehab Potential Good  -TD --    Patient/caregiver participated in establishment of treatment plan and goals Yes  -TD --    Patient would benefit from skilled therapy intervention Yes  -TD --       OT Plan    OT Frequency --  See duration  -TD --    Predicted  Duration of Therapy Intervention (OT) Patient will be seen in 30 days to evaluate lymphatic function.  -TD --    Planned CPT's? OT EVAL LOW COMPLEXITY: 49945;OT SELF CARE/MGMT/TRAIN 15 MIN: 83549;OT THER PROC EA 15 MIN: 77180KT;OT MANUAL THERAPY EA 15 MIN: 07783;OT BIS XTRACELL FLUID ANALYSIS: 43857;OT ORTHOTIC MGMT/TRAIN EA 15 MIN: 72497;OT WC MGMT EA 15 MIN: 26421  -TD --    Planned Therapy Interventions (Optional Details) home exercise program;joint mobilization;manual therapy techniques;orthotic fitting/training;patient/family education;wound care  -TD --    OT Plan Comments Continue plan of care  -TD --          User Key  (r) = Recorded By, (t) = Taken By, (c) = Cosigned By    Initials Name Provider Type    TD Aminah Bryant, OT Occupational Therapist                              Time Calculation:   Timed Charges  32233 - OT Self Care/Mgmt Minutes: 20  Total Minutes  Timed Charges Total Minutes: 20   Total Minutes: 20     Therapy Charges for Today     Code Description Service Date Service Provider Modifiers Qty    72052590931 HC PT BIS XTRACELL FLUID ANALYSIS 1/11/2023 Aminah Bryant OT  1    67861015826 HC OT SELF CARE/MGMT/TRAIN EA 15 MIN 1/11/2023 Aminah Bryant OT GO 1                 Aminah Bryant OT  1/11/2023

## 2023-01-12 RX ORDER — L. ACIDOPHILUS/L.BULGARICUS 1MM CELL
1 TABLET ORAL 2 TIMES DAILY
Qty: 180 TABLET | Refills: 3 | OUTPATIENT
Start: 2023-01-12

## 2023-01-19 ENCOUNTER — LAB (OUTPATIENT)
Dept: ONCOLOGY | Facility: HOSPITAL | Age: 73
End: 2023-01-19
Payer: MEDICARE

## 2023-01-19 ENCOUNTER — OFFICE VISIT (OUTPATIENT)
Dept: ONCOLOGY | Facility: HOSPITAL | Age: 73
End: 2023-01-19
Payer: MEDICARE

## 2023-01-19 VITALS
HEIGHT: 62 IN | DIASTOLIC BLOOD PRESSURE: 63 MMHG | RESPIRATION RATE: 16 BRPM | OXYGEN SATURATION: 96 % | BODY MASS INDEX: 32.54 KG/M2 | HEART RATE: 64 BPM | SYSTOLIC BLOOD PRESSURE: 109 MMHG | TEMPERATURE: 97.1 F | WEIGHT: 176.81 LBS

## 2023-01-19 DIAGNOSIS — C50.219 MALIGNANT NEOPLASM OF UPPER-INNER QUADRANT OF BREAST IN FEMALE, ESTROGEN RECEPTOR NEGATIVE, UNSPECIFIED LATERALITY: ICD-10-CM

## 2023-01-19 DIAGNOSIS — C91.10 CLL (CHRONIC LYMPHOCYTIC LEUKEMIA): Primary | ICD-10-CM

## 2023-01-19 DIAGNOSIS — C91.10 CLL (CHRONIC LYMPHOCYTIC LEUKEMIA): ICD-10-CM

## 2023-01-19 DIAGNOSIS — Z17.1 MALIGNANT NEOPLASM OF UPPER-INNER QUADRANT OF BREAST IN FEMALE, ESTROGEN RECEPTOR NEGATIVE, UNSPECIFIED LATERALITY: ICD-10-CM

## 2023-01-19 LAB
ALBUMIN SERPL-MCNC: 4.4 G/DL (ref 3.5–5.2)
ALBUMIN/GLOB SERPL: 1.5 G/DL
ALP SERPL-CCNC: 65 U/L (ref 39–117)
ALT SERPL W P-5'-P-CCNC: 32 U/L (ref 1–33)
ANION GAP SERPL CALCULATED.3IONS-SCNC: 11.6 MMOL/L (ref 5–15)
AST SERPL-CCNC: 28 U/L (ref 1–32)
BASOPHILS # BLD AUTO: 0.12 10*3/MM3 (ref 0–0.2)
BASOPHILS NFR BLD AUTO: 0.5 % (ref 0–1.5)
BILIRUB SERPL-MCNC: 0.3 MG/DL (ref 0–1.2)
BUN SERPL-MCNC: 17 MG/DL (ref 8–23)
BUN/CREAT SERPL: 19.8 (ref 7–25)
CALCIUM SPEC-SCNC: 10.1 MG/DL (ref 8.6–10.5)
CHLORIDE SERPL-SCNC: 100 MMOL/L (ref 98–107)
CO2 SERPL-SCNC: 29.4 MMOL/L (ref 22–29)
CREAT SERPL-MCNC: 0.86 MG/DL (ref 0.57–1)
DEPRECATED RDW RBC AUTO: 47.2 FL (ref 37–54)
EGFRCR SERPLBLD CKD-EPI 2021: 71.9 ML/MIN/1.73
EOSINOPHIL # BLD AUTO: 0.29 10*3/MM3 (ref 0–0.4)
EOSINOPHIL NFR BLD AUTO: 1.3 % (ref 0.3–6.2)
ERYTHROCYTE [DISTWIDTH] IN BLOOD BY AUTOMATED COUNT: 13.7 % (ref 12.3–15.4)
GLOBULIN UR ELPH-MCNC: 3 GM/DL
GLUCOSE SERPL-MCNC: 101 MG/DL (ref 65–99)
HCT VFR BLD AUTO: 47.6 % (ref 34–46.6)
HGB BLD-MCNC: 16.3 G/DL (ref 12–15.9)
IMM GRANULOCYTES # BLD AUTO: 0.05 10*3/MM3 (ref 0–0.05)
IMM GRANULOCYTES NFR BLD AUTO: 0.2 % (ref 0–0.5)
LARGE PLATELETS: NORMAL
LYMPHOCYTES # BLD AUTO: 15.05 10*3/MM3 (ref 0.7–3.1)
LYMPHOCYTES NFR BLD AUTO: 68.3 % (ref 19.6–45.3)
MCH RBC QN AUTO: 32.2 PG (ref 26.6–33)
MCHC RBC AUTO-ENTMCNC: 34.2 G/DL (ref 31.5–35.7)
MCV RBC AUTO: 94.1 FL (ref 79–97)
MONOCYTES # BLD AUTO: 0.7 10*3/MM3 (ref 0.1–0.9)
MONOCYTES NFR BLD AUTO: 3.2 % (ref 5–12)
NEUTROPHILS NFR BLD AUTO: 26.5 % (ref 42.7–76)
NEUTROPHILS NFR BLD AUTO: 5.83 10*3/MM3 (ref 1.7–7)
NRBC BLD AUTO-RTO: 0.2 /100 WBC (ref 0–0.2)
PLATELET # BLD AUTO: 169 10*3/MM3 (ref 140–450)
PMV BLD AUTO: 12.1 FL (ref 6–12)
POTASSIUM SERPL-SCNC: 3.9 MMOL/L (ref 3.5–5.2)
PROT SERPL-MCNC: 7.4 G/DL (ref 6–8.5)
RBC # BLD AUTO: 5.06 10*6/MM3 (ref 3.77–5.28)
RBC MORPH BLD: NORMAL
SMALL PLATELETS BLD QL SMEAR: ADEQUATE
SMUDGE CELLS BLD QL SMEAR: NORMAL
SODIUM SERPL-SCNC: 141 MMOL/L (ref 136–145)
WBC NRBC COR # BLD: 22.04 10*3/MM3 (ref 3.4–10.8)

## 2023-01-19 PROCEDURE — 36415 COLL VENOUS BLD VENIPUNCTURE: CPT

## 2023-01-19 PROCEDURE — 85025 COMPLETE CBC W/AUTO DIFF WBC: CPT

## 2023-01-19 PROCEDURE — 85007 BL SMEAR W/DIFF WBC COUNT: CPT

## 2023-01-19 PROCEDURE — 99214 OFFICE O/P EST MOD 30 MIN: CPT | Performed by: INTERNAL MEDICINE

## 2023-01-19 PROCEDURE — 80053 COMPREHEN METABOLIC PANEL: CPT

## 2023-01-19 PROCEDURE — G0463 HOSPITAL OUTPT CLINIC VISIT: HCPCS | Performed by: INTERNAL MEDICINE

## 2023-01-25 ENCOUNTER — TELEPHONE (OUTPATIENT)
Dept: ONCOLOGY | Facility: HOSPITAL | Age: 73
End: 2023-01-25
Payer: MEDICARE

## 2023-01-25 DIAGNOSIS — E27.8 ADRENAL NODULE: Primary | ICD-10-CM

## 2023-01-25 DIAGNOSIS — C91.10 CLL (CHRONIC LYMPHOCYTIC LEUKEMIA): ICD-10-CM

## 2023-01-25 NOTE — TELEPHONE ENCOUNTER
Advised patient Dr. Nair would like to recheck labs in 3 months. We will also schedule the repeat CT scan at that time to follow up on adrenal nodule.  Patient voiced understanding.

## 2023-01-25 NOTE — TELEPHONE ENCOUNTER
Caller: Nancy Dorothea    Relationship: Self    Best call back number:[phone]    What is the best time to reach you: ANYTIME    Who are you requesting to speak with (clinical staff, provider,  specific staff member): CLINICAL    What was the call regarding: DOROTHEA WAS IN LAST WEEK NOTICED HER WBC WAS HIGH. SHE WAS WANTING TO SEE IF SHE NEEDED TO COME IN SOONER TO DISCUSS THE RESULT    DOROTHEA ALSO WAS WANTING TO BE SET UP FOR A SCAN FOR THE NODULE THAT WAS FOUND ABOVE HER KIDNEY    Do you require a callback: YES

## 2023-02-14 ENCOUNTER — OFFICE VISIT (OUTPATIENT)
Dept: FAMILY MEDICINE CLINIC | Facility: CLINIC | Age: 73
End: 2023-02-14
Payer: MEDICARE

## 2023-02-14 VITALS
WEIGHT: 178.6 LBS | TEMPERATURE: 96.9 F | HEART RATE: 63 BPM | HEIGHT: 62 IN | DIASTOLIC BLOOD PRESSURE: 76 MMHG | BODY MASS INDEX: 32.87 KG/M2 | OXYGEN SATURATION: 97 % | SYSTOLIC BLOOD PRESSURE: 132 MMHG

## 2023-02-14 DIAGNOSIS — N39.0 URINARY TRACT INFECTION IN FEMALE: Primary | ICD-10-CM

## 2023-02-14 DIAGNOSIS — R50.9 LOW GRADE FEVER: ICD-10-CM

## 2023-02-14 DIAGNOSIS — M54.50 ACUTE LOW BACK PAIN, UNSPECIFIED BACK PAIN LATERALITY, UNSPECIFIED WHETHER SCIATICA PRESENT: ICD-10-CM

## 2023-02-14 LAB
BILIRUB BLD-MCNC: NEGATIVE MG/DL
CLARITY, POC: ABNORMAL
COLOR UR: YELLOW
EXPIRATION DATE: ABNORMAL
EXPIRATION DATE: NORMAL
FLUAV AG UPPER RESP QL IA.RAPID: NOT DETECTED
FLUBV AG UPPER RESP QL IA.RAPID: NOT DETECTED
GLUCOSE UR STRIP-MCNC: NEGATIVE MG/DL
INTERNAL CONTROL: NORMAL
KETONES UR QL: ABNORMAL
LEUKOCYTE EST, POC: ABNORMAL
Lab: ABNORMAL
Lab: NORMAL
NITRITE UR-MCNC: NEGATIVE MG/ML
PH UR: 6 [PH] (ref 5–8)
PROT UR STRIP-MCNC: NEGATIVE MG/DL
RBC # UR STRIP: ABNORMAL /UL
SARS-COV-2 AG UPPER RESP QL IA.RAPID: NOT DETECTED
SP GR UR: 1.02 (ref 1–1.03)
UROBILINOGEN UR QL: NORMAL

## 2023-02-14 PROCEDURE — 81003 URINALYSIS AUTO W/O SCOPE: CPT | Performed by: NURSE PRACTITIONER

## 2023-02-14 PROCEDURE — 99213 OFFICE O/P EST LOW 20 MIN: CPT | Performed by: NURSE PRACTITIONER

## 2023-02-14 PROCEDURE — 87086 URINE CULTURE/COLONY COUNT: CPT | Performed by: NURSE PRACTITIONER

## 2023-02-14 PROCEDURE — 87428 SARSCOV & INF VIR A&B AG IA: CPT | Performed by: NURSE PRACTITIONER

## 2023-02-14 RX ORDER — CEPHALEXIN 500 MG/1
500 CAPSULE ORAL 2 TIMES DAILY
Qty: 10 CAPSULE | Refills: 0 | Status: SHIPPED | OUTPATIENT
Start: 2023-02-14 | End: 2023-02-19

## 2023-02-14 RX ORDER — BENZONATATE 200 MG/1
CAPSULE ORAL
COMMUNITY
End: 2023-03-10

## 2023-02-14 NOTE — PROGRESS NOTES
"Chief Complaint  Fever (Low grade) and Generalized Body Aches    Subjective          Dorothea Cruz, 73 y.o. female presents to [facility]  History of Present Illness     Patient presents today for an acute visit.  She is complaining of a low-grade fever and complains of some body aches for the past 8 days.  She has been taking Tylenol with some relief.  She does admit to having some mid to low back pain that is different from her normal back pain.  She denies any cough, congestion, sore throat, ear pain or abdominal pain.    In office COVID and flu swabs are negative.      Tobacco Use: Medium Risk   • Smoking Tobacco Use: Never   • Smokeless Tobacco Use: Never   • Passive Exposure: Yes      Objective   Vital Signs:   /76   Pulse 63   Temp 96.9 °F (36.1 °C)   Ht 157.5 cm (62.01\")   Wt 81 kg (178 lb 9.6 oz)   SpO2 97%   BMI 32.66 kg/m²       Current Outpatient Medications:   •  colestipol (Colestid) 1 g tablet, Take 2 tablets by mouth 2 (Two) Times a Day for 90 days., Disp: 360 tablet, Rfl: 1  •  DULoxetine (CYMBALTA) 60 MG capsule, Take 1 capsule by mouth Daily., Disp: 90 capsule, Rfl: 1  •  ergocalciferol (ERGOCALCIFEROL) 1.25 MG (12066 UT) capsule, Take 1 capsule by mouth 1 (One) Time Per Week., Disp: 13 capsule, Rfl: 1  •  gabapentin (NEURONTIN) 300 MG capsule, Take 1 capsule by mouth 3 (Three) Times a Day As Needed (neuropathy)., Disp: 90 capsule, Rfl: 2  •  hydroCHLOROthiazide (HYDRODIURIL) 25 MG tablet, Take 1 tablet by mouth Daily., Disp: 90 tablet, Rfl: 1  •  HYDROcodone-acetaminophen (NORCO) 5-325 MG per tablet, Take 1 tablet by mouth Daily., Disp: , Rfl:   •  Lactobacillus Probiotic tablet, Take 1 tablet by mouth 2 (Two) Times a Day., Disp: 180 tablet, Rfl: 3  •  metoprolol tartrate (LOPRESSOR) 25 MG tablet, Take 1 tablet by mouth 2 (Two) Times a Day., Disp: 180 tablet, Rfl: 0  •  rosuvastatin (CRESTOR) 20 MG tablet, Take 1 tablet by mouth Every Night., " Disp: 90 tablet, Rfl: 1  •  benzonatate (TESSALON) 200 MG capsule, benzonatate 200 mg capsule  TAKE 1 CAPSULE BY MOUTH THREE TIMES DAILY FOR UP TO 7 DAYS AS NEEDED FOR COUGH, Disp: , Rfl:   •  cephalexin (Keflex) 500 MG capsule, Take 1 capsule by mouth 2 (Two) Times a Day for 5 days., Disp: 10 capsule, Rfl: 0   Past Medical History:   Diagnosis Date   • Anxiety    • Arthritis    • Breast cancer (HCC)    • Cancer (HCC)     BREAST CANCER   • Cholelithiases 4/30@1   • Depression    • Diabetes mellitus (HCC)    • Disease of thyroid gland     currently not taking any meds   • Diverticulitis    • Diverticulitis of colon    • Diverticulosis 95   • Fibromyalgia    • Fibromyalgia, primary    • Hyperlipidemia    • Hypertension    • Liver disease    • Major depressive disorder 05/07/2020   • Palpitations     ASYMPTOMATIC- DENIES CP/SOB, SEE PCP- NO CARDIOLOGIST    • Post-menopause 09/17/2020   • Sinus trouble    • Sleep apnea    • Tremor 2018    I was on medicine for tremors   • Urinary tract infection    • Vitamin D deficiency 09/17/2020      Physical Exam  Vitals reviewed.   Constitutional:       Appearance: Normal appearance. She is well-developed.   HENT:      Right Ear: Tympanic membrane and external ear normal.      Left Ear: Tympanic membrane and external ear normal.      Ears:      Comments: Some cerumen noted bilaterally.     Mouth/Throat:      Mouth: Mucous membranes are moist.      Pharynx: No pharyngeal swelling, oropharyngeal exudate or posterior oropharyngeal erythema.   Neck:      Thyroid: No thyroid mass, thyromegaly or thyroid tenderness.   Cardiovascular:      Rate and Rhythm: Normal rate and regular rhythm.      Heart sounds: No murmur heard.    No friction rub. No gallop.   Pulmonary:      Effort: Pulmonary effort is normal.      Breath sounds: Normal breath sounds. No wheezing or rhonchi.   Lymphadenopathy:      Cervical: No cervical adenopathy.   Skin:     General: Skin is warm and dry.   Neurological:       Mental Status: She is alert and oriented to person, place, and time.      Cranial Nerves: No cranial nerve deficit.   Psychiatric:         Mood and Affect: Mood and affect normal.         Behavior: Behavior normal.         Thought Content: Thought content normal. Thought content does not include homicidal or suicidal ideation.         Judgment: Judgment normal.        Result Review :     SARS Antigen   Date Value Ref Range Status   02/14/2023 Not Detected Not Detected, Presumptive Negative Final     Influenza A Antigen HERNANDO   Date Value Ref Range Status   02/14/2023 Not Detected Not Detected Final     Influenza B Antigen HERNANDO   Date Value Ref Range Status   02/14/2023 Not Detected Not Detected Final            Component  Ref Range & Units 10:31  (2/14/23) 10:29  (2/14/23) 1 mo ago  (1/6/23) 1 mo ago  (1/6/23) 4 mo ago  (9/20/22) 4 mo ago  (9/20/22) 5 mo ago  (8/26/22)   Color  Yellow, Straw, Dark Yellow, Effie Yellow      Yellow     Clarity, UA  Clear Cloudy Abnormal       Cloudy Abnormal      Specific Gravity   1.005 - 1.030 1.020      1.020     pH, Urine  5.0 - 8.0 6.0      6.0     Leukocytes  Negative Trace Abnormal       Large (3+) Abnormal      Nitrite, UA  Negative Negative      Positive Abnormal      Protein, POC  Negative mg/dL Negative      100 mg/dL Abnormal      Glucose, UA  Negative mg/dL Negative      Negative     Ketones, UA  Negative Trace Abnormal       Negative     Urobilinogen, UA  Normal, 0.2 E.U./dL Normal      0.2 E.U./dL     Bilirubin  Negative Negative      Negative     Blood, UA  Negative Trace Abnormal                  Assessment and Plan    Diagnoses and all orders for this visit:    1. Urinary tract infection in female (Primary)  -     cephalexin (Keflex) 500 MG capsule; Take 1 capsule by mouth 2 (Two) Times a Day for 5 days.  Dispense: 10 capsule; Refill: 0  -     Urine Culture - Urine, Urine, Clean Catch    2. Low grade fever  -     POCT SARS-CoV-2 Antigen HERNANDO + Flu  -     POCT  urinalysis dipstick, automated  -     cephalexin (Keflex) 500 MG capsule; Take 1 capsule by mouth 2 (Two) Times a Day for 5 days.  Dispense: 10 capsule; Refill: 0  -     Urine Culture - Urine, Urine, Clean Catch    3. Acute low back pain, unspecified back pain laterality, unspecified whether sciatica present  -     POCT urinalysis dipstick, automated  -     cephalexin (Keflex) 500 MG capsule; Take 1 capsule by mouth 2 (Two) Times a Day for 5 days.  Dispense: 10 capsule; Refill: 0  -     Urine Culture - Urine, Urine, Clean Catch    Urine is abnormal, UTI is differential and most likely due to her low-grade fever and acute back pain.  I will start her on Keflex.  We will send urine for culture.  Advised patient to increase fluid intake.  Advised to call or follow-up with any new or worsening of symptoms.    Follow Up   Return if symptoms worsen or fail to improve.  Keep Scheduled Follow-up on 3/2/2023.  Patient was given instructions and counseling regarding her condition or for health maintenance advice. Please see specific information pulled into the AVS if appropriate.     Parts of this note are electronic transcriptions/translations of spoken language to printed text using the Dragon Dictation system.      Monserrat Grover, APRN  02/14/2023

## 2023-02-15 ENCOUNTER — HOSPITAL ENCOUNTER (OUTPATIENT)
Dept: OCCUPATIONAL THERAPY | Facility: HOSPITAL | Age: 73
Setting detail: THERAPIES SERIES
Discharge: HOME | End: 2023-02-15
Payer: MEDICARE

## 2023-02-15 DIAGNOSIS — L90.5 SCAR CONDITION AND FIBROSIS OF SKIN: ICD-10-CM

## 2023-02-15 DIAGNOSIS — M25.60 JOINT STIFFNESS: ICD-10-CM

## 2023-02-15 DIAGNOSIS — R52 PAIN: ICD-10-CM

## 2023-02-15 DIAGNOSIS — I89.0 LYMPHEDEMA: Primary | ICD-10-CM

## 2023-02-15 PROCEDURE — 97535 SELF CARE MNGMENT TRAINING: CPT | Performed by: OCCUPATIONAL THERAPIST

## 2023-02-15 PROCEDURE — 93702 BIS XTRACELL FLUID ANALYSIS: CPT | Performed by: OCCUPATIONAL THERAPIST

## 2023-02-15 NOTE — THERAPY RE-EVALUATION
Outpatient Occupational Therapy Lymphedema Re-Evaluation   Daniela     Patient Name: Dorothea Cruz  : 1950  MRN: [id number]  Today's Date: 2/15/2023      Visit Date: 02/15/2023    Patient Active Problem List   Diagnosis   • Vitamin D deficiency   • Sleep apnea   • Post-menopause   • Major depressive disorder   • Primary hypertension   • Hyperlipidemia   • Fibromyalgia   • Disease of thyroid gland   • Depression   • Anxiety   • Arthritis   • ACE-inhibitor cough   • Diverticulitis   • Sinus trouble   • Breast cancer (HCC)   • Screening for colon cancer   • History of diverticulitis   • History of cholecystectomy   • Altered bowel habits   • Diarrhea   • Malignant neoplasm of upper-inner quadrant of right female breast (HCC)   • Chronic low back pain   • Trochanteric bursitis of both hips   • Family history of CLL (chronic lymphoid leukemia)   • Degeneration of lumbar intervertebral disc   • Lumbar spondylosis   • Chronic osteoarthritis   • Leukocytosis   • Prediabetes   • Class 1 obesity with serious comorbidity and body mass index (BMI) of 31.0 to 31.9 in adult        Past Medical History:   Diagnosis Date   • Anxiety    • Arthritis    • Breast cancer (HCC)    • Cancer (HCC)     BREAST CANCER   • Cholelithiases @1   • Depression    • Diabetes mellitus (HCC)    • Disease of thyroid gland     currently not taking any meds   • Diverticulitis    • Diverticulitis of colon    • Diverticulosis 95   • Fibromyalgia    • Fibromyalgia, primary    • Hyperlipidemia    • Hypertension    • Liver disease    • Major depressive disorder 2020   • Palpitations     ASYMPTOMATIC- DENIES CP/SOB, SEE PCP- NO CARDIOLOGIST    • Post-menopause 2020   • Sinus trouble    • Sleep apnea    • Tremor 2018    I was on medicine for tremors   • Urinary tract infection    • Vitamin D deficiency 2020        Past Surgical History:   Procedure Laterality Date   • ABDOMINAL SURGERY  1985   • APPENDECTOMY     •  BILATERAL BREAST REDUCTION     • BREAST BIOPSY Right 11/12/2021    Procedure: RIGHT BREAST NEEDLE LOCALIZED  LUMPECTOMY WITH SENTINEL NODE BIOPSY;  Surgeon: Mirtha Esteves MD;  Location: [facility] OR [facility];  Service: General;  Laterality: Right;   • BREAST LUMPECTOMY     • CHOLECYSTECTOMY N/A 09/02/2021    Procedure: CHOLECYSTECTOMY LAPAROSCOPIC INTRAOPERATIVE CHOLANGIOGRAM;  Surgeon: Louie Medina MD;  Location: [facility] MAIN OR;  Service: General;  Laterality: N/A;   • COLONOSCOPY  2019   • COLONOSCOPY N/A 05/09/2022    Procedure: COLONOSCOPY WITH BIOPSIES;  Surgeon: Butch Garica MD;  Location: [facility] ENDOSCOPY;  Service: Gastroenterology;  Laterality: N/A;  DIVERTICULOSIS   • HYSTERECTOMY     • LYMPH NODE BIOPSY     • SINUS SURGERY  2018   • TONSILLECTOMY     • US GUIDED CYST ASPIRATION BREAST N/A 10/3/2022   • WRIST ARTHROPLASTY Bilateral          Visit Dx:     ICD-10-CM ICD-9-CM   1. Lymphedema  I89.0 457.1   2. Scar condition and fibrosis of skin  L90.5 709.2   3. Joint stiffness  M25.60 719.50   4. Pain  R52 780.96        Patient History     Row Name 02/15/23 0600             History    Chief Complaint --  Pt reports that her symptoms have greatly decreased since wearing the sleeve  -TD      Brief Description of Current Complaint Mrs. Hager is a 71 year old female who had surgery on 11/12/21 for Lumpectomoy on the Right due to Trip negative Breast CA  -TD         Fall Risk Assessment    Any falls in the past year: No  -TD      Does patient have a fear of falling No  -TD         Services    Prior Rehab/Home Health Experiences No  -TD      Are you currently receiving Home Health services No  -TD      Do you plan to receive Home Health services in the near future No  -TD         Daily Activities    Primary Language English  -TD      Are you able to read Yes  -TD      Are you able to write Yes  -TD      How does patient learn best? Reading;Demonstration  -TD      Barriers to learning None  -TD       "Pt Participated in POC and Goals Yes  -TD         Safety    Are you being hurt, hit, or frightened by anyone at home or in your life? No  -TD      Are you being neglected by a caregiver No  -TD      Have you had any of the following issues with Depression;Anxiety  -TD            User Key  (r) = Recorded By, (t) = Taken By, (c) = Cosigned By    Initials Name Provider Type    TD Aminah Bryant OT Occupational Therapist                 Lymphedema     Row Name 02/15/23 0600             Subjective Pain    Able to rate subjective pain? yes  -TD      Pre-Treatment Pain Level 0  -TD      Post-Treatment Pain Level 0  -TD         Subjective Comments    Subjective Comments Pt has no complaints this date  -TD         Lymphedema Assessment    Lymphedema Classification RUE:;at risk/stage 0  -TD      Lymphedema Cancer Related Sx right;lumpectomy;sentinel node biopsy  -TD      Lymph Nodes Removed # 3  -TD      Positive Lymph Nodes # 0  -TD      Chemo Received no  -TD      Radiation Therapy Received yes  -TD      Radiation Treatments #/Timeframe 16  -TD         LLIS - Physical Concerns    The amount of pain associated with my lymphedema is: 0  -TD      The amount of limb heaviness associated with my lymphedema is: 0  -TD      The amount of skin tightness associated with my lymphedema is: 0  -TD      The size of my swollen limb(s) seems: 0  -TD      Lymphedema affects the movement of my swollen limb(s): 0  -TD      The strength in my swollen limb(s) is: 0  -TD         LLIS - Psychosocial Concerns    Lymphedema affects my body image (i.e., \"how I think I look\"). 0  -TD      Lymphedema affects my socializing with others. 0  -TD      Lymphedema affects my intimate relations with spouse or partner (rate 0 if not applicable 0  -TD      Lymphedema \"gets me down\" (i.e., depression, frustration, or anger) 0  -TD      I must rely on others for help due to my lymphedema. 0  -TD      I know what to do to manage my lymphedema 3  -TD         LLIS " - Functional Concerns    Lymphedema affects my ability to perform self-care activities (i.e. eating, dressing, hygiene) 0  -TD      Lymphedema affects my ability to perform routine home or work-related activities. 0  -TD      Lymphedema affects my performance of preferred leisure activities. 0  -TD      Lymphedema affects proper fit of clothing/shoes 0  -TD      Lymphedema affects my sleep 0  -TD         Posture/Observations    Posture- WNL Posture is WNL  -TD         General ROM    GENERAL ROM COMMENTS BUE are WFL  -TD         MMT (Manual Muscle Testing)    General MMT Comments BUE are WFL  -TD         L-Dex Bioimpedence Screening    L-Dex Measurement Extremity RUE  -TD      L-Dex Patient Position Standing  -TD      L-Dex UE Dominate Side Right  -TD      L-Dex UE At Risk Side Right  -TD      L-Dex UE Score -6.4  -TD      L-Dex UE Baseline Score -14  -TD      L-Dex UE Value Change 7.6  -TD      $ L-Dex Charge yes  -TD         Lymphedema Life Impact Scale Totals    A.  Total Q1 - Q17 (Do not include Q18) 3  -TD      B.  Total number of questions answered (Q1-Q17) 17  -TD      C. Divide A by B 0.18  -TD      D. Multiple C by 25 4.5  -TD            User Key  (r) = Recorded By, (t) = Taken By, (c) = Cosigned By    Initials Name Provider Type    Aminah Jones OT Occupational Therapist                        Therapy Education  Education Details: Therapist reviewed stop light program this date.  Given: Symptoms/condition management  Program: New  How Provided: Verbal  Provided to: Patient  Level of Understanding: Verbalized  08147 - OT Self Care/Mgmt Minutes: 15         OT Goals     Row Name 02/15/23 1129          Time Calculation    OT Goal Re-Cert Due Date 03/17/23  -TD           User Key  (r) = Recorded By, (t) = Taken By, (c) = Cosigned By    Initials Name Provider Type    Aminah Jones OT Occupational Therapist              Goals:  1. Post Breast Surgery Care/at risk for Lymphedema  LTG 1: 90 days:  As an  indicator of no exacerbation of lymphedema staging, the patient will present with an L-Dex score less than 7 points from preoperative baseline.              STATUS: Not met: Ongoing   STG 1a: 30 days: Patient will be independent with self-manual lymphatic massage.               STATUS: Met.  Ongoing  STG 1b: 30 days:  Patient will be independent with identification of signs and symptoms of lymphedema exasperation per stoplight to recovery education handout.              STATUS: Met.  Ongoing  STG 1 c: 30 days: Patient will be independent with HEP to prevent advancement in lymphedema staging.              STATUS: Met.  Ongoing  TREATMENT:  Self Care/ewADL retraining, Therapeutic Activity, Neuromuscular Re-education, Therapeutic Exercise, Bioimpedence Fluid Analysis, Orthotic Management and training,  and Manual Therapy.   OT Assessment/Plan     Row Name 02/15/23 0653          OT Assessment    Functional Limitations Performance in self-care ADL  -TD     Impairments Impaired lymphatic circulation  -TD     Assessment Comments Taniya's L-dex score is within normal limits this date. Pt will be placed back on the surviellance program. Patient would benefit from continued skilled occupational therapy to prevent increased aging of lymphedema, decreased range of motion, and increased pain  -TD     OT Diagnosis at risk for lymphedema  -TD     OT Rehab Potential Good  -TD     Patient/caregiver participated in establishment of treatment plan and goals Yes  -TD     Patient would benefit from skilled therapy intervention Yes  -TD        OT Plan    OT Frequency --  see duration  -TD     Predicted Duration of Therapy Intervention (OT) Pt will be seen every 3 months years 1-3 and every 6 months years 4-5.  -TD     Planned CPT's? OT EVAL LOW COMPLEXITY: 70970;OT SELF CARE/MGMT/TRAIN 15 MIN: 26252;OT THER PROC EA 15 MIN: 59025GM;OT MANUAL THERAPY EA 15 MIN: 06123;OT BIS XTRACELL FLUID ANALYSIS: 27667;OT ORTHOTIC MGMT/TRAIN EA 15 MIN:  02940;OT WC MGMT EA 15 MIN: 82945  -TD     Planned Therapy Interventions (Optional Details) home exercise program;joint mobilization;manual therapy techniques;orthotic fitting/training;patient/family education;wound care  -TD     OT Plan Comments continue POC  -TD           User Key  (r) = Recorded By, (t) = Taken By, (c) = Cosigned By    Initials Name Provider Type    TD Aminah Bryant OT Occupational Therapist                          Time Calculation:   Timed Charges  70617 - OT Self Care/Mgmt Minutes: 15  Total Minutes  Timed Charges Total Minutes: 15   Total Minutes: 15     Therapy Charges for Today     Code Description Service Date Service Provider Modifiers Qty    93880648713 HC PT BIS XTRACELL FLUID ANALYSIS 2/15/2023 Aminah Bryant OT  1    83587661751 HC OT SELF CARE/MGMT/TRAIN EA 15 MIN 2/15/2023 Aminah Bryant OT GO 1                    Aminah Bryant OT  2/15/2023

## 2023-02-16 LAB — BACTERIA SPEC AEROBE CULT: NO GROWTH

## 2023-03-02 ENCOUNTER — LAB (OUTPATIENT)
Dept: FAMILY MEDICINE CLINIC | Facility: CLINIC | Age: 73
End: 2023-03-02
Payer: MEDICARE

## 2023-03-02 DIAGNOSIS — E55.9 VITAMIN D DEFICIENCY: ICD-10-CM

## 2023-03-02 DIAGNOSIS — R73.03 PREDIABETES: ICD-10-CM

## 2023-03-02 DIAGNOSIS — I10 PRIMARY HYPERTENSION: ICD-10-CM

## 2023-03-02 DIAGNOSIS — E78.2 MIXED HYPERLIPIDEMIA: ICD-10-CM

## 2023-03-02 LAB
25(OH)D3 SERPL-MCNC: 47.6 NG/ML (ref 30–100)
ALBUMIN SERPL-MCNC: 4.8 G/DL (ref 3.5–5.2)
ALBUMIN/GLOB SERPL: 3.2 G/DL
ALP SERPL-CCNC: 55 U/L (ref 39–117)
ALT SERPL W P-5'-P-CCNC: 24 U/L (ref 1–33)
ANION GAP SERPL CALCULATED.3IONS-SCNC: 11.8 MMOL/L (ref 5–15)
AST SERPL-CCNC: 24 U/L (ref 1–32)
BASOPHILS # BLD MANUAL: 0.25 10*3/MM3 (ref 0–0.2)
BASOPHILS NFR BLD MANUAL: 1.1 % (ref 0–1.5)
BILIRUB SERPL-MCNC: 0.5 MG/DL (ref 0–1.2)
BUN SERPL-MCNC: 18 MG/DL (ref 8–23)
BUN/CREAT SERPL: 20.5 (ref 7–25)
CALCIUM SPEC-SCNC: 10.4 MG/DL (ref 8.6–10.5)
CHLORIDE SERPL-SCNC: 101 MMOL/L (ref 98–107)
CHOLEST SERPL-MCNC: 148 MG/DL (ref 0–200)
CO2 SERPL-SCNC: 30.2 MMOL/L (ref 22–29)
CREAT SERPL-MCNC: 0.88 MG/DL (ref 0.57–1)
DEPRECATED RDW RBC AUTO: 47.7 FL (ref 37–54)
EGFRCR SERPLBLD CKD-EPI 2021: 69.9 ML/MIN/1.73
ERYTHROCYTE [DISTWIDTH] IN BLOOD BY AUTOMATED COUNT: 13.9 % (ref 12.3–15.4)
GLOBULIN UR ELPH-MCNC: 1.5 GM/DL
GLUCOSE SERPL-MCNC: 104 MG/DL (ref 65–99)
HBA1C MFR BLD: 6.1 % (ref 4.8–5.6)
HCT VFR BLD AUTO: 46.8 % (ref 34–46.6)
HDLC SERPL-MCNC: 50 MG/DL (ref 40–60)
HGB BLD-MCNC: 16 G/DL (ref 12–15.9)
LDLC SERPL CALC-MCNC: 54 MG/DL (ref 0–100)
LDLC/HDLC SERPL: 0.82 {RATIO}
LYMPHOCYTES # BLD MANUAL: 13.77 10*3/MM3 (ref 0.7–3.1)
LYMPHOCYTES NFR BLD MANUAL: 5.4 % (ref 5–12)
MCH RBC QN AUTO: 32.3 PG (ref 26.6–33)
MCHC RBC AUTO-ENTMCNC: 34.2 G/DL (ref 31.5–35.7)
MCV RBC AUTO: 94.4 FL (ref 79–97)
MONOCYTES # BLD: 1.23 10*3/MM3 (ref 0.1–0.9)
NEUTROPHILS # BLD AUTO: 7.62 10*3/MM3 (ref 1.7–7)
NEUTROPHILS NFR BLD MANUAL: 33.3 % (ref 42.7–76)
PLAT MORPH BLD: NORMAL
PLATELET # BLD AUTO: 180 10*3/MM3 (ref 140–450)
PMV BLD AUTO: 12.3 FL (ref 6–12)
POTASSIUM SERPL-SCNC: 4.1 MMOL/L (ref 3.5–5.2)
PROT SERPL-MCNC: 6.3 G/DL (ref 6–8.5)
RBC # BLD AUTO: 4.96 10*6/MM3 (ref 3.77–5.28)
RBC MORPH BLD: NORMAL
SMUDGE CELLS BLD QL SMEAR: ABNORMAL
SODIUM SERPL-SCNC: 143 MMOL/L (ref 136–145)
TRIGL SERPL-MCNC: 284 MG/DL (ref 0–150)
TSH SERPL DL<=0.05 MIU/L-ACNC: 3.94 UIU/ML (ref 0.27–4.2)
VARIANT LYMPHS NFR BLD MANUAL: 60.2 % (ref 19.6–45.3)
VLDLC SERPL-MCNC: 44 MG/DL (ref 5–40)
WBC NRBC COR # BLD: 22.87 10*3/MM3 (ref 3.4–10.8)

## 2023-03-02 PROCEDURE — 82306 VITAMIN D 25 HYDROXY: CPT | Performed by: NURSE PRACTITIONER

## 2023-03-02 PROCEDURE — 84443 ASSAY THYROID STIM HORMONE: CPT | Performed by: NURSE PRACTITIONER

## 2023-03-02 PROCEDURE — 85007 BL SMEAR W/DIFF WBC COUNT: CPT | Performed by: NURSE PRACTITIONER

## 2023-03-02 PROCEDURE — 80053 COMPREHEN METABOLIC PANEL: CPT | Performed by: NURSE PRACTITIONER

## 2023-03-02 PROCEDURE — 85025 COMPLETE CBC W/AUTO DIFF WBC: CPT | Performed by: NURSE PRACTITIONER

## 2023-03-02 PROCEDURE — 80061 LIPID PANEL: CPT | Performed by: NURSE PRACTITIONER

## 2023-03-02 PROCEDURE — 83036 HEMOGLOBIN GLYCOSYLATED A1C: CPT | Performed by: NURSE PRACTITIONER

## 2023-03-10 ENCOUNTER — OFFICE VISIT (OUTPATIENT)
Dept: FAMILY MEDICINE CLINIC | Facility: CLINIC | Age: 73
End: 2023-03-10
Payer: MEDICARE

## 2023-03-10 VITALS
DIASTOLIC BLOOD PRESSURE: 82 MMHG | HEART RATE: 62 BPM | HEIGHT: 62 IN | WEIGHT: 179.6 LBS | OXYGEN SATURATION: 97 % | BODY MASS INDEX: 33.05 KG/M2 | TEMPERATURE: 97.2 F | SYSTOLIC BLOOD PRESSURE: 128 MMHG

## 2023-03-10 DIAGNOSIS — Z23 NEED FOR VACCINATION: ICD-10-CM

## 2023-03-10 DIAGNOSIS — M79.7 FIBROMYALGIA: ICD-10-CM

## 2023-03-10 DIAGNOSIS — E78.2 MIXED HYPERLIPIDEMIA: ICD-10-CM

## 2023-03-10 DIAGNOSIS — I10 PRIMARY HYPERTENSION: ICD-10-CM

## 2023-03-10 DIAGNOSIS — F32.1 CURRENT MODERATE EPISODE OF MAJOR DEPRESSIVE DISORDER, UNSPECIFIED WHETHER RECURRENT: Primary | ICD-10-CM

## 2023-03-10 DIAGNOSIS — E55.9 VITAMIN D DEFICIENCY: ICD-10-CM

## 2023-03-10 DIAGNOSIS — C91.10 CLL (CHRONIC LYMPHOCYTIC LEUKEMIA): ICD-10-CM

## 2023-03-10 DIAGNOSIS — R19.7 DIARRHEA, UNSPECIFIED TYPE: ICD-10-CM

## 2023-03-10 DIAGNOSIS — Z78.0 POSTMENOPAUSE: ICD-10-CM

## 2023-03-10 DIAGNOSIS — D35.01 ADENOMA OF RIGHT ADRENAL GLAND: ICD-10-CM

## 2023-03-10 PROBLEM — J34.9 SINUS TROUBLE: Status: RESOLVED | Noted: 2021-10-15 | Resolved: 2023-03-10

## 2023-03-10 PROCEDURE — 99214 OFFICE O/P EST MOD 30 MIN: CPT | Performed by: NURSE PRACTITIONER

## 2023-03-10 PROCEDURE — 1159F MED LIST DOCD IN RCRD: CPT | Performed by: NURSE PRACTITIONER

## 2023-03-10 PROCEDURE — 3079F DIAST BP 80-89 MM HG: CPT | Performed by: NURSE PRACTITIONER

## 2023-03-10 PROCEDURE — 90677 PCV20 VACCINE IM: CPT | Performed by: NURSE PRACTITIONER

## 2023-03-10 PROCEDURE — 3074F SYST BP LT 130 MM HG: CPT | Performed by: NURSE PRACTITIONER

## 2023-03-10 PROCEDURE — G0009 ADMIN PNEUMOCOCCAL VACCINE: HCPCS | Performed by: NURSE PRACTITIONER

## 2023-03-10 PROCEDURE — 1160F RVW MEDS BY RX/DR IN RCRD: CPT | Performed by: NURSE PRACTITIONER

## 2023-03-10 RX ORDER — HYDROCHLOROTHIAZIDE 25 MG/1
25 TABLET ORAL DAILY
Qty: 90 TABLET | Refills: 1 | Status: SHIPPED | OUTPATIENT
Start: 2023-03-10

## 2023-03-10 RX ORDER — MULTIVIT-MIN/IRON/FOLIC ACID/K 18-600-40
2000 CAPSULE ORAL DAILY
Qty: 90 CAPSULE | Refills: 3 | Status: SHIPPED | OUTPATIENT
Start: 2023-03-10

## 2023-03-10 RX ORDER — DULOXETIN HYDROCHLORIDE 60 MG/1
60 CAPSULE, DELAYED RELEASE ORAL DAILY
Qty: 90 CAPSULE | Refills: 1 | Status: CANCELLED | OUTPATIENT
Start: 2023-03-10

## 2023-03-10 RX ORDER — MONTELUKAST SODIUM 4 MG/1
2 TABLET, CHEWABLE ORAL 2 TIMES DAILY
Qty: 360 TABLET | Refills: 1 | Status: SHIPPED | OUTPATIENT
Start: 2023-03-10

## 2023-03-10 RX ORDER — DESVENLAFAXINE SUCCINATE 50 MG/1
50 TABLET, EXTENDED RELEASE ORAL DAILY
Qty: 90 TABLET | Refills: 1 | Status: SHIPPED | OUTPATIENT
Start: 2023-03-10

## 2023-03-10 NOTE — ASSESSMENT & PLAN NOTE
Patient's depression is recurrent and is moderate without psychosis. Their depression is currently active and the condition is worsening. This will be reassessed in 3 months. F/U as described:Depression is not well controlled on Cymbalta.  I will have her stop Cymbalta and start her on Pristiq 50 mg daily.

## 2023-03-10 NOTE — ASSESSMENT & PLAN NOTE
Message sent to oncologist Dr. Bebe Nair to make her aware of chronic elevated WBCs and to see if she wants to see patient sooner.  Dr. Nair messaged back and said that patient has CLL and that she would just see her at her regular follow-up appointment.

## 2023-03-10 NOTE — PROGRESS NOTES
"Chief Complaint  Hypertension, Hyperlipidemia, Depression, and Fibromyalgia    Subjective          Dorothea Cruz, 73 y.o. female presents to [facility]  History of Present Illness   Patient presents today for 3-month follow-up.  She had lab work-up done last week.    She is due for a repeat MRI of the abdomen with and without contrast for adrenal adenoma.    She has borderline diabetes.  Her A1c did increase to 6.1%.    Vitamin D level has improved and is now 47.    Hypertension: She is on hydrochlorothiazide 25 mg daily.    Hyperlipidemia: She is on rosuvastatin 20 mg every evening.  LDL is at goal at 54.  Triglyceride levels were slightly elevated at 284.    Depression/fibromyalgia: She is currently taking Cymbalta 60 mg daily.  She does not feel it is helping with her depression. She is also on gabapentin 300 mg 3 times daily as needed but does not need a refill yet.     She has breast cancer.  Her white blood cell count is consistently elevated.  Hemoglobin/hematocrit levels are mildly elevated.    Adrenal adenoma on previous CT, recommendation is to get MRI of abdomen with and without contrast for further evaluation.    She has chronic diarrhea and takes colestipol which does control her diarrhea.     Tobacco Use: Medium Risk   • Smoking Tobacco Use: Never   • Smokeless Tobacco Use: Never   • Passive Exposure: Yes      Objective   Vital Signs:   /82   Pulse 62   Temp 97.2 °F (36.2 °C)   Ht 157.5 cm (62.01\")   Wt 81.5 kg (179 lb 9.6 oz)   SpO2 97%   BMI 32.84 kg/m²       Current Outpatient Medications:   •  colestipol (Colestid) 1 g tablet, Take 2 tablets by mouth 2 (Two) Times a Day., Disp: 360 tablet, Rfl: 1  •  gabapentin (NEURONTIN) 300 MG capsule, Take 1 capsule by mouth 3 (Three) Times a Day As Needed (neuropathy)., Disp: 90 capsule, Rfl: 2  •  hydroCHLOROthiazide (HYDRODIURIL) 25 MG tablet, Take 1 tablet by mouth Daily., Disp: 90 tablet, Rfl: 1  •  " HYDROcodone-acetaminophen (NORCO) 5-325 MG per tablet, Take 1 tablet by mouth Daily., Disp: , Rfl:   •  Lactobacillus Probiotic tablet, Take 1 tablet by mouth 2 (Two) Times a Day., Disp: 180 tablet, Rfl: 3  •  metoprolol tartrate (LOPRESSOR) 25 MG tablet, Take 1 tablet by mouth 2 (Two) Times a Day., Disp: 180 tablet, Rfl: 1  •  rosuvastatin (CRESTOR) 20 MG tablet, Take 1 tablet by mouth Every Night., Disp: 90 tablet, Rfl: 1  •  Cholecalciferol (Vitamin D) 50 MCG (2000 UT) capsule, Take 1 capsule by mouth Daily., Disp: 90 capsule, Rfl: 3  •  desvenlafaxine (Pristiq) 50 MG 24 hr tablet, Take 1 tablet by mouth Daily., Disp: 90 tablet, Rfl: 1   Past Medical History:   Diagnosis Date   • Anxiety    • Arthritis    • Breast cancer (HCC)    • Cancer (HCC)     BREAST CANCER   • Cholelithiases 4/30@1   • Depression    • Diabetes mellitus (HCC)    • Disease of thyroid gland     currently not taking any meds   • Diverticulitis    • Diverticulitis of colon    • Diverticulosis 95   • Fibromyalgia    • Fibromyalgia, primary    • Hyperlipidemia    • Hypertension    • Liver disease    • Major depressive disorder 05/07/2020   • Palpitations     ASYMPTOMATIC- DENIES CP/SOB, SEE PCP- NO CARDIOLOGIST    • Post-menopause 09/17/2020   • Sinus trouble    • Sleep apnea    • Tremor 2018    I was on medicine for tremors   • Urinary tract infection    • Vitamin D deficiency 09/17/2020      Physical Exam  Vitals reviewed.   Constitutional:       Appearance: Normal appearance. She is well-developed. She is obese.   Neck:      Thyroid: No thyroid mass, thyromegaly or thyroid tenderness.   Cardiovascular:      Rate and Rhythm: Normal rate and regular rhythm.      Heart sounds: No murmur heard.    No friction rub. No gallop.   Pulmonary:      Effort: Pulmonary effort is normal.      Breath sounds: Normal breath sounds. No wheezing or rhonchi.   Lymphadenopathy:      Cervical: No cervical adenopathy.   Skin:     General: Skin is warm and dry.    Neurological:      Mental Status: She is alert and oriented to person, place, and time.      Cranial Nerves: No cranial nerve deficit.   Psychiatric:         Mood and Affect: Mood and affect normal.         Behavior: Behavior normal.         Thought Content: Thought content normal. Thought content does not include homicidal or suicidal ideation.         Judgment: Judgment normal.        Result Review :   {The following data was reviewed by ARA Peck    No Images in the past 120 days found..    Common Labs   Common labs    Common Labs 9/27/22 9/27/22 9/27/22 1/19/23 1/19/23 3/2/23 3/2/23 3/2/23 3/2/23    1059 1059 1059 1401 1401 0758 0758 0758 0758   Glucose   106 (A) 101 (A)   104 (A)     BUN   13 17   18     Creatinine   0.84 0.86   0.88     Sodium   142 141   143     Potassium   4.3 3.9   4.1     Chloride   102 100   101     Calcium   10.3 10.1   10.4     Albumin   4.50 4.4   4.8     Total Bilirubin   0.4 0.3   0.5     Alkaline Phosphatase   68 65   55     AST (SGOT)   28 28   24     ALT (SGPT)   23 32   24     WBC     22.04 (A)    22.87 (A)   Hemoglobin     16.3 (A)    16.0 (A)   Hematocrit     47.6 (A)    46.8 (A)   Platelets     169    180   Total Cholesterol  145      148    Triglycerides  244 (A)      284 (A)    HDL Cholesterol  55      50    LDL Cholesterol   52      54    Hemoglobin A1C 5.90 (A)     6.10 (A)      (A) Abnormal value            TSH   TSH    TSH 3/2/23   TSH 3.940           VITD   Lab Results   Component Value Date    VFBU81CL 47.6 03/02/2023       Assessment and Plan    Diagnoses and all orders for this visit:    1. Current moderate episode of major depressive disorder, unspecified whether recurrent (HCC) (Primary)  Assessment & Plan:  Patient's depression is recurrent and is moderate without psychosis. Their depression is currently active and the condition is worsening. This will be reassessed in 3 months. F/U as described:Depression is not well controlled on Cymbalta.  I  will have her stop Cymbalta and start her on Pristiq 50 mg daily.    Orders:  -     desvenlafaxine (Pristiq) 50 MG 24 hr tablet; Take 1 tablet by mouth Daily.  Dispense: 90 tablet; Refill: 1    2. Fibromyalgia  Assessment & Plan:  Fibromyalgia is not well controlled on Cymbalta.  I will have her stop Cymbalta and start her on Pristiq 50 mg daily.  I will have her follow-up with me in 3 months.    Orders:  -     desvenlafaxine (Pristiq) 50 MG 24 hr tablet; Take 1 tablet by mouth Daily.  Dispense: 90 tablet; Refill: 1    3. Diarrhea, unspecified type  Assessment & Plan:  Stable on colestipol, will continue current dose    Orders:  -     colestipol (Colestid) 1 g tablet; Take 2 tablets by mouth 2 (Two) Times a Day.  Dispense: 360 tablet; Refill: 1    4. Primary hypertension  Assessment & Plan:  Hypertension is improving with treatment.  Continue current treatment regimen.  Continue current medications.  Blood pressure will be reassessed at the next regular appointment.    Orders:  -     hydroCHLOROthiazide (HYDRODIURIL) 25 MG tablet; Take 1 tablet by mouth Daily.  Dispense: 90 tablet; Refill: 1  -     metoprolol tartrate (LOPRESSOR) 25 MG tablet; Take 1 tablet by mouth 2 (Two) Times a Day.  Dispense: 180 tablet; Refill: 1    5. Postmenopause  Assessment & Plan:  She is due for a bone density scan, order placed.    Orders:  -     DEXA Bone Density Axial; Future    6. Vitamin D deficiency  Assessment & Plan:  Vitamin D has improved so I will have her stop the vitamin D 50,000 unit dose and start her on a maintenance dose of 2000 units once daily.    Orders:  -     Cholecalciferol (Vitamin D) 50 MCG (2000 UT) capsule; Take 1 capsule by mouth Daily.  Dispense: 90 capsule; Refill: 3    7. Adenoma of right adrenal gland  Assessment & Plan:  Previous CT scan recommended MRI of abdomen with and without contrast for further evaluation and surveillance of adrenal gland.  Order placed.    Orders:  -     MRI Abdomen With & Without  Contrast; Future    8. Need for vaccination  -     Pneumococcal Conjugate Vaccine 20-Valent All    9. Mixed hyperlipidemia  Assessment & Plan:  Lipid abnormalities are improving with treatment.  Pharmacotherapy as ordered.  Lipids will be reassessed in 6 months.      10. CLL (chronic lymphocytic leukemia) (HCC)  Comments:  Chronic elevated WBCs due to CLL, following with oncology.      Follow Up   Return in about 3 months (around 6/10/2023) for Next scheduled follow up depression/fibromyalgia.  Patient was given instructions and counseling regarding her condition or for health maintenance advice. Please see specific information pulled into the AVS if appropriate.     Parts of this note are electronic transcriptions/translations of spoken language to printed text using the Dragon Dictation system.      Monserrat Grover, APRN  03/09/2023

## 2023-03-10 NOTE — ASSESSMENT & PLAN NOTE
Fibromyalgia is not well controlled on Cymbalta.  I will have her stop Cymbalta and start her on Pristiq 50 mg daily.  I will have her follow-up with me in 3 months.

## 2023-03-10 NOTE — ASSESSMENT & PLAN NOTE
OB FOLLOW UP    CC: Scheduled OB routine FU     Prenatal care complicated by:   Patient Active Problem List   Diagnosis   • PFO (patent foramen ovale)   • Polycystic ovaries   • Seizure (HCC)   • Supervision of other normal pregnancy, antepartum   • Endometriosis   • Anxiety   • Group B Streptococcus urinary tract infection affecting pregnancy in third trimester   • Fetal size inconsistent with dates   • Supervision of normal first pregnancy, antepartum       Subjective:   Patient has: No complaints, No leaking fluid, No vaginal bleeding, No contractions, Adequate FM    Objective:  Urine glucose/protein- see flow sheet      /76   Wt 70.8 kg (156 lb)   LMP 2021 (Approximate)   BMI 26.78 kg/m²   See OB flow for LE edema, and cvx exam if applicable  FHT: 139 BPM   Uterine Size: 30 cm      Assessment and Plan:  Diagnoses and all orders for this visit:    1. Supervision of other normal pregnancy, antepartum (Primary)  Overview:  2022 Tdap Rx provided   2022: Breast pump Rx given    Assessment & Plan:  Continue prenatal vitamins  Fetal kick counts   labor warnings    Orders:  -     POC Urinalysis Dipstick    2. Group B Streptococcus urinary tract infection affecting pregnancy in third trimester  Overview:  2022 urine culture for AMEE. Will need PCN in labor       3. PFO (patent foramen ovale)  Overview:  [facility] - M referral   Status post consult with adult congenital cardiac cardiologist Dr. Dolan    Assessment & Plan:  The patient had an MFM appointment, however anatomy was incomplete.  It appears as though [facility] wanted the patient back for completion of the anatomy ultrasound and a fetal echo which was never done.  The patient states that she did never had an appointment even scheduled, however it appears as though it was scheduled by MFM per their imaging note.  Refer back to [facility] ASAP for completion of the anatomical survey.    Orders:  -     Ambulatory Referral to  Previous CT scan recommended MRI of abdomen with and without contrast for further evaluation and surveillance of adrenal gland.  Order placed.   Perinatology    4. Fetal size inconsistent with dates  Assessment & Plan:  Size less than dates  MFM referral made we will complete growth evaluation at that visit.          33w2d  Reassuring pregnancy progress    Counseling: OB precautions, leaking, VB, thomas wagner vs PTL/Labor  [facility]    Questions answered    Return in about 2 weeks (around 8/1/2022) for Recheck.      Kd Browne MD  07/18/2022

## 2023-03-10 NOTE — ASSESSMENT & PLAN NOTE
Vitamin D has improved so I will have her stop the vitamin D 50,000 unit dose and start her on a maintenance dose of 2000 units once daily.

## 2023-03-14 ENCOUNTER — TELEPHONE (OUTPATIENT)
Dept: FAMILY MEDICINE CLINIC | Facility: CLINIC | Age: 73
End: 2023-03-14

## 2023-03-14 NOTE — TELEPHONE ENCOUNTER
I did not prescribe her antibiotic recently, I did not prescribe nitrofurantoin.  I am not sure where/who she received nitrofurantoin.

## 2023-03-14 NOTE — TELEPHONE ENCOUNTER
"  Caller: Dorothea Cruz \"Taniya\"    Relationship: Self    Best call back number: [phone]    What is the best time to reach you: ANY    Who are you requesting to speak with (clinical staff, provider,  specific staff member): CLINICAL    What was the call regarding: PATIENT STATED THAT SHE WAS IN OFFICE 3/9/23 AND NEEDING TO KNOW WHY THE ANTIBIOTIC NITROFURANTOIN WAS PRESCRIBED.     [facility]      Do you require a callback: YES  "

## 2023-03-28 ENCOUNTER — HOSPITAL ENCOUNTER (OUTPATIENT)
Dept: BONE DENSITY | Facility: HOSPITAL | Age: 73
Discharge: HOME | End: 2023-03-28
Admitting: NURSE PRACTITIONER
Payer: MEDICARE

## 2023-03-28 DIAGNOSIS — Z78.0 POSTMENOPAUSE: ICD-10-CM

## 2023-03-28 PROCEDURE — 77080 DXA BONE DENSITY AXIAL: CPT

## 2023-03-30 ENCOUNTER — OFFICE VISIT (OUTPATIENT)
Dept: SLEEP MEDICINE | Facility: HOSPITAL | Age: 73
End: 2023-03-30
Payer: MEDICARE

## 2023-03-30 VITALS
HEIGHT: 62 IN | SYSTOLIC BLOOD PRESSURE: 117 MMHG | HEART RATE: 72 BPM | DIASTOLIC BLOOD PRESSURE: 55 MMHG | OXYGEN SATURATION: 91 % | BODY MASS INDEX: 32.94 KG/M2 | WEIGHT: 179 LBS

## 2023-03-30 DIAGNOSIS — G47.33 OSA (OBSTRUCTIVE SLEEP APNEA): Primary | ICD-10-CM

## 2023-03-30 PROCEDURE — G0463 HOSPITAL OUTPT CLINIC VISIT: HCPCS

## 2023-03-30 RX ORDER — MELATONIN
COMMUNITY
Start: 2023-03-14

## 2023-03-30 NOTE — PROGRESS NOTES
CONSULT NOTE    Patient Identification:  Dorothea Cruz  73 y.o.  female  1950  [id number]            Requesting physician: Monserrat Grover    Reason for Consultation: HESHAM establish care    CC:     History of Present Illness:  Very pleasant 73-year-old female she had previously seen Dr. Mariano known history of HESHAM currently on CPAP 13 cm.  She is religiously compliant with her CPAP machine.  Her compliance today 100% average daily use 8 hours 20 minutes.  AHI and leak within normal limits.  She feels rested with the CPAP.  No issues with the mask or machine as such.  She goes to bed 10 PM gets of 8 gets about 8+ hours of sleep and more rested.  No tobacco no alcohol no caffeine abuse.  Currently has a nasal pillow that fits well.  Supplies have been adequate.      Review of Systems  Positive for dry mouth anxiety depression rest of the 12 point review of system negative  [place] 7 out of 24 within normal limits  Past Medical History:  Past Medical History:   Diagnosis Date   • Anxiety    • Arthritis    • Breast cancer (HCC)    • Cancer (HCC)     BREAST CANCER   • Cholelithiases 4/30@1   • Depression    • Diabetes mellitus (HCC)    • Disease of thyroid gland     currently not taking any meds   • Diverticulitis    • Diverticulitis of colon    • Diverticulosis 95   • Fibromyalgia    • Fibromyalgia, primary    • Hyperlipidemia    • Hypertension    • Liver disease    • Major depressive disorder 05/07/2020   • Palpitations     ASYMPTOMATIC- DENIES CP/SOB, SEE PCP- NO CARDIOLOGIST    • Post-menopause 09/17/2020   • Sinus trouble    • Sleep apnea    • Tremor 2018    I was on medicine for tremors   • Urinary tract infection    • Vitamin D deficiency 09/17/2020       Past Surgical History:  Past Surgical History:   Procedure Laterality Date   • ABDOMINAL SURGERY  06/14/1985   • APPENDECTOMY     • BILATERAL BREAST REDUCTION     • BREAST BIOPSY Right 11/12/2021    Procedure: RIGHT BREAST NEEDLE LOCALIZED  LUMPECTOMY  WITH SENTINEL NODE BIOPSY;  Surgeon: Mirtha Esteves MD;  Location: [facility] OR OSC;  Service: General;  Laterality: Right;   • BREAST LUMPECTOMY     • CHOLECYSTECTOMY N/A 2021    Procedure: CHOLECYSTECTOMY LAPAROSCOPIC INTRAOPERATIVE CHOLANGIOGRAM;  Surgeon: Louie Medina MD;  Location: [facility] MAIN OR;  Service: General;  Laterality: N/A;   • COLONOSCOPY  2019   • COLONOSCOPY N/A 2022    Procedure: COLONOSCOPY WITH BIOPSIES;  Surgeon: Butch Garcia MD;  Location: [facility] ENDOSCOPY;  Service: Gastroenterology;  Laterality: N/A;  DIVERTICULOSIS   • HYSTERECTOMY     • LYMPH NODE BIOPSY     • SINUS SURGERY  2018   • TONSILLECTOMY     • US GUIDED CYST ASPIRATION BREAST N/A 10/3/2022   • WRIST ARTHROPLASTY Bilateral         Home Meds:  (Not in a hospital admission)      Allergies:  Allergies   Allergen Reactions   • Ace Inhibitors Cough   • Sulfamethoxazole-Trimethoprim Hives   • Levaquin [Levofloxacin] Hives   • Lisinopril Cough and Hives   • Nsaids Itching, Nausea Only and Rash   • Sulfa Antibiotics Itching, Nausea Only, Rash and Hives       Social History:   Social History     Socioeconomic History   • Marital status:    Tobacco Use   • Smoking status: Never     Passive exposure: Yes   • Smokeless tobacco: Never   Vaping Use   • Vaping Use: Never used   Substance and Sexual Activity   • Alcohol use: Never   • Drug use: Never   • Sexual activity: Not Currently     Partners: Male     Birth control/protection: None       Family History:  Family History   Problem Relation Age of Onset   • No Known Problems Mother    • Cancer Father         Leukemia     • Breast cancer Sister    • Cancer Sister         Had breast cancer   • Cancer Brother         Leukemia   • Cancer Sister         Had stomach cancer   • Cancer Brother         Bladder cancer   • Cancer Brother         Bladder cancer   • Cancer Sister         Retinal blastoma.   • Colon cancer Neg Hx        Physical Exam:  /55   " Pulse 72   Ht 157.5 cm (62.01\")   Wt 81.2 kg (179 lb)   SpO2 91%   BMI 32.73 kg/m²  Body mass index is 32.73 kg/m². 91% 81.2 kg (179 lb)  Physical Exam  Patient is examined using the personal protective equipment as per guidelines from infection control for this particular patient as enacted.  Hand hygiene was performed before and after patient interaction.  Well-developed normal body habitus  Eyes normal conjunctivae and pupils reactive to light  ENT Mallampati between 3 and 4 normal nasal exam  Neck midline trachea no thyromegaly  Chest no labored breathing clear  CVS regular rate and rhythm no lower extremity edema  Abdomen soft nontender no hepatosplenomegaly  CNS intact normal sensory exam  Skin no rashes no nodules  Psych oriented to time place and person normal memory  Musculoskeletal no cyanosis no clubbing normal range of motion        LABS:  Lab Results   Component Value Date    CALCIUM 10.4 03/02/2023       No results found for: CKTOTAL, CKMB, CKMBINDEX, TROPONINI, TROPONINT                              Lab Results   Component Value Date    TSH 3.940 03/02/2023     Estimated Creatinine Clearance: 57 mL/min (by C-G formula based on SCr of 0.88 mg/dL).         Imaging: I personally visualized the images of scans/x-rays performed within last 3 days.      Assessment:  HESHAM  Hypertension  Obesity        Recommendations:  At this time we have a middle-aged female with known history of HESHAM currently on CPAP 13 cm.  HESHAM currently treated well with the current CPAP of 13 cm  Reviewed compliance download excellent compliance AHI leak  Continue current CPAP 13 cm  Reviewed cleaning methods and avoidance of ozone   Sleep hygiene measures  Treatment of underlying comorbidities  Patient benefiting from CPAP  We will follow-up in 1 year              Sheridan Fleming MD  3/30/2023  14:54 EDT      Much of this encounter note is an electronic transcription/translation of spoken language to printed text using " Dragon Software.

## 2023-04-10 ENCOUNTER — HOSPITAL ENCOUNTER (OUTPATIENT)
Dept: MRI IMAGING | Facility: HOSPITAL | Age: 73
Discharge: HOME | End: 2023-04-10
Admitting: NURSE PRACTITIONER
Payer: MEDICARE

## 2023-04-10 DIAGNOSIS — D35.01 ADENOMA OF RIGHT ADRENAL GLAND: ICD-10-CM

## 2023-04-10 PROCEDURE — A9577 INJ MULTIHANCE: HCPCS | Performed by: NURSE PRACTITIONER

## 2023-04-10 PROCEDURE — 0 GADOBENATE DIMEGLUMINE 529 MG/ML SOLUTION: Performed by: NURSE PRACTITIONER

## 2023-04-10 PROCEDURE — 74183 MRI ABD W/O CNTR FLWD CNTR: CPT

## 2023-04-10 RX ADMIN — GADOBENATE DIMEGLUMINE 15 ML: 529 INJECTION, SOLUTION INTRAVENOUS at 12:41

## 2023-04-19 ENCOUNTER — APPOINTMENT (OUTPATIENT)
Dept: OCCUPATIONAL THERAPY | Facility: HOSPITAL | Age: 73
End: 2023-04-19
Payer: MEDICARE

## 2023-04-19 ENCOUNTER — HOSPITAL ENCOUNTER (OUTPATIENT)
Dept: CT IMAGING | Facility: HOSPITAL | Age: 73
Discharge: HOME | End: 2023-04-19
Payer: MEDICARE

## 2023-04-19 DIAGNOSIS — E27.8 ADRENAL NODULE: ICD-10-CM

## 2023-04-19 LAB
CREAT BLDA-MCNC: 0.7 MG/DL
EGFRCR SERPLBLD CKD-EPI 2021: 92 ML/MIN/1.73

## 2023-04-19 PROCEDURE — 82565 ASSAY OF CREATININE: CPT

## 2023-04-19 PROCEDURE — 74170 CT ABD WO CNTRST FLWD CNTRST: CPT

## 2023-04-19 PROCEDURE — 25510000001 IOPAMIDOL PER 1 ML: Performed by: INTERNAL MEDICINE

## 2023-04-19 RX ADMIN — IOPAMIDOL 100 ML: 755 INJECTION, SOLUTION INTRAVENOUS at 09:50

## 2023-04-20 ENCOUNTER — OFFICE VISIT (OUTPATIENT)
Dept: ONCOLOGY | Facility: HOSPITAL | Age: 73
End: 2023-04-20
Payer: MEDICARE

## 2023-04-20 ENCOUNTER — LAB (OUTPATIENT)
Dept: ONCOLOGY | Facility: HOSPITAL | Age: 73
End: 2023-04-20
Payer: MEDICARE

## 2023-04-20 VITALS
OXYGEN SATURATION: 96 % | BODY MASS INDEX: 32.74 KG/M2 | RESPIRATION RATE: 16 BRPM | SYSTOLIC BLOOD PRESSURE: 126 MMHG | WEIGHT: 177.91 LBS | HEART RATE: 58 BPM | HEIGHT: 62 IN | TEMPERATURE: 97.5 F | DIASTOLIC BLOOD PRESSURE: 69 MMHG

## 2023-04-20 DIAGNOSIS — R93.5 ABNORMAL FINDINGS ON DIAGNOSTIC IMAGING OF OTHER ABDOMINAL REGIONS, INCLUDING RETROPERITONEUM: ICD-10-CM

## 2023-04-20 DIAGNOSIS — C50.219 MALIGNANT NEOPLASM OF UPPER-INNER QUADRANT OF BREAST IN FEMALE, ESTROGEN RECEPTOR NEGATIVE, UNSPECIFIED LATERALITY: ICD-10-CM

## 2023-04-20 DIAGNOSIS — C91.10 CLL (CHRONIC LYMPHOCYTIC LEUKEMIA): ICD-10-CM

## 2023-04-20 DIAGNOSIS — E27.8 ADRENAL NODULE: Primary | ICD-10-CM

## 2023-04-20 DIAGNOSIS — Z17.1 MALIGNANT NEOPLASM OF UPPER-INNER QUADRANT OF BREAST IN FEMALE, ESTROGEN RECEPTOR NEGATIVE, UNSPECIFIED LATERALITY: ICD-10-CM

## 2023-04-20 PROBLEM — M47.817 LUMBOSACRAL SPONDYLOSIS WITHOUT MYELOPATHY: Status: ACTIVE | Noted: 2023-04-20

## 2023-04-20 LAB
BASOPHILS # BLD AUTO: 0.03 10*3/MM3 (ref 0–0.2)
BASOPHILS NFR BLD AUTO: 0.2 % (ref 0–1.5)
DEPRECATED RDW RBC AUTO: 47.1 FL (ref 37–54)
EOSINOPHIL # BLD AUTO: 0.19 10*3/MM3 (ref 0–0.4)
EOSINOPHIL NFR BLD AUTO: 1 % (ref 0.3–6.2)
ERYTHROCYTE [DISTWIDTH] IN BLOOD BY AUTOMATED COUNT: 13.5 % (ref 12.3–15.4)
HCT VFR BLD AUTO: 45.6 % (ref 34–46.6)
HGB BLD-MCNC: 15.7 G/DL (ref 12–15.9)
IMM GRANULOCYTES # BLD AUTO: 0.02 10*3/MM3 (ref 0–0.05)
IMM GRANULOCYTES NFR BLD AUTO: 0.1 % (ref 0–0.5)
LARGE PLATELETS: NORMAL
LYMPHOCYTES # BLD AUTO: 12.34 10*3/MM3 (ref 0.7–3.1)
LYMPHOCYTES NFR BLD AUTO: 67.4 % (ref 19.6–45.3)
MCH RBC QN AUTO: 32.4 PG (ref 26.6–33)
MCHC RBC AUTO-ENTMCNC: 34.4 G/DL (ref 31.5–35.7)
MCV RBC AUTO: 94.2 FL (ref 79–97)
MONOCYTES # BLD AUTO: 1.98 10*3/MM3 (ref 0.1–0.9)
MONOCYTES NFR BLD AUTO: 10.8 % (ref 5–12)
NEUTROPHILS NFR BLD AUTO: 20.5 % (ref 42.7–76)
NEUTROPHILS NFR BLD AUTO: 3.75 10*3/MM3 (ref 1.7–7)
PLAT MORPH BLD: NORMAL
PLATELET # BLD AUTO: 171 10*3/MM3 (ref 140–450)
PMV BLD AUTO: 11.9 FL (ref 6–12)
RBC # BLD AUTO: 4.84 10*6/MM3 (ref 3.77–5.28)
RBC MORPH BLD: NORMAL
SMALL PLATELETS BLD QL SMEAR: ADEQUATE
WBC MORPH BLD: NORMAL
WBC NRBC COR # BLD: 18.31 10*3/MM3 (ref 3.4–10.8)

## 2023-04-20 PROCEDURE — 36415 COLL VENOUS BLD VENIPUNCTURE: CPT

## 2023-04-20 PROCEDURE — G0463 HOSPITAL OUTPT CLINIC VISIT: HCPCS | Performed by: INTERNAL MEDICINE

## 2023-04-20 PROCEDURE — 85025 COMPLETE CBC W/AUTO DIFF WBC: CPT

## 2023-04-20 PROCEDURE — 85007 BL SMEAR W/DIFF WBC COUNT: CPT

## 2023-04-20 RX ORDER — NALOXONE HYDROCHLORIDE 4 MG/.1ML
SPRAY NASAL
COMMUNITY
Start: 2023-04-05

## 2023-04-20 NOTE — PROGRESS NOTES
Patient  Dorothea Cruz    Location  [facility] HEMATOLOGY & ONCOLOGY    Chief Complaint  Breast Cancer    Referring Provider: TYRELL Peck*  PCP: Monserrat Grover APRN    Subjective          Oncology/Hematology History Overview Note     Right Triple Negative Breast Cancer:  - screening mammogram 9/10/21: 4mm asymmetry in the right upper inner breast  -Diagnostic mammogram on 2021: Persistent 5 mm ill-defined nodule in the upper inner mid right breast at 2:00, 7 cm from the nipple.  - core biopsy on 10/8/21: Invasive ductal carcinoma, grade 3, ER negative (less than 1%), NM negative (less than 1%), HER-2 equivocal (2+ HC), HER-2 FISH not amplified  - Right lumpectomy on 21. Pathology showed a 5mm tumor with negative margins, 0/3 lymph nodes involved, pT1aN0, ER-, NM-, HER2-  - chemotherapy was not recommended due to the small size of the tumor.   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  Mildly increased CD4 positive T cell LGL (5.2% of analyzed cells).  - CLL FISH positive for TP53/17p13 deletion in 49.5% of cells.  - IgVH somatic hypermutation was detected (3.7%)    Family History:    - brother with CLL  - father  from some type of leukemia  - sister with breast cancer and adult onset retinoblastoma which was fatal  - niece (sister's daughter) also had retinoblastoma as a child  - pt is considering genetic testing     Breast cancer   10/20/2021 Initial Diagnosis    Breast cancer (HCC)     Malignant neoplasm of upper-inner quadrant of right female breast   2021 Cancer Staged    Staging form: Breast, AJCC 8th Edition  - Pathologic: pT1a, pN0, cM0, ER-, NM-, HER2- - Signed by Santa Haskins APRN on 2022 Initial Diagnosis    Malignant  neoplasm of upper-inner quadrant of right female breast (HCC)     1/20/2022 - 2/14/2022 Radiation    Radiation OncologyTreatment Course:  Dorothea Cruz received 4256 cGy in 16 fractions to right breast.          History of Present Illness  Patient presents today for follow-up regarding her history of triple negative breast cancer.  She recently had a CT scan and an abdominal MRI.  Strangely, the MRI report suggest that the adrenal nodule had not significantly changed in size.  However, measurements from prior scans indicate otherwise.  The CT of the abdomen/pelvis as described increase in size.  I explained to the patient I am very concerned about the possibility of Li-Fraumeni syndrome.  If this is the case then she could have an adrenal cortical carcinoma.  She denies spikes in her blood pressure that she is aware of.  However, she does have episodes of feeling febrile with a temperature of .  During this episode she has body aches and feels her heart pounding.    Review of Systems   Constitutional: Positive for fatigue (7/10) and fever. Negative for appetite change, diaphoresis, unexpected weight gain and unexpected weight loss.   HENT: Negative for hearing loss, mouth sores, sore throat, swollen glands, trouble swallowing and voice change.    Eyes: Negative for blurred vision.   Respiratory: Negative for cough, shortness of breath and wheezing.    Cardiovascular: Negative for chest pain and palpitations.   Gastrointestinal: Positive for nausea. Negative for abdominal pain, blood in stool, constipation, diarrhea and vomiting.   Endocrine: Negative for cold intolerance and heat intolerance.   Genitourinary: Negative for difficulty urinating, dysuria, frequency, hematuria and urinary incontinence.   Musculoskeletal: Negative for arthralgias, back pain and myalgias.   Skin: Negative for rash, skin lesions and wound.   Neurological: Negative for dizziness, seizures, weakness, numbness and headache.    Hematological: Does not bruise/bleed easily.   Psychiatric/Behavioral: Negative for depressed mood. The patient is not nervous/anxious.    All other systems reviewed and are negative.      Past Medical History:   Diagnosis Date   • Anxiety    • Arthritis    • Breast cancer    • Cancer     BREAST CANCER   • Cholelithiases 4/30@1   • Depression    • Diabetes mellitus    • Disease of thyroid gland     currently not taking any meds   • Diverticulitis    • Diverticulitis of colon    • Diverticulosis 95   • Fibromyalgia    • Fibromyalgia, primary    • Hyperlipidemia    • Hypertension    • Liver disease    • Major depressive disorder 05/07/2020   • Palpitations     ASYMPTOMATIC- DENIES CP/SOB, SEE PCP- NO CARDIOLOGIST    • Post-menopause 09/17/2020   • Sinus trouble    • Sleep apnea    • Tremor 2018    I was on medicine for tremors   • Urinary tract infection    • Vitamin D deficiency 09/17/2020     Past Surgical History:   Procedure Laterality Date   • ABDOMINAL SURGERY  06/14/1985   • APPENDECTOMY     • BILATERAL BREAST REDUCTION     • BREAST BIOPSY Right 11/12/2021    Procedure: RIGHT BREAST NEEDLE LOCALIZED  LUMPECTOMY WITH SENTINEL NODE BIOPSY;  Surgeon: Mirtha Esteves MD;  Location: [facility] OR OSC;  Service: General;  Laterality: Right;   • BREAST LUMPECTOMY     • CHOLECYSTECTOMY N/A 09/02/2021    Procedure: CHOLECYSTECTOMY LAPAROSCOPIC INTRAOPERATIVE CHOLANGIOGRAM;  Surgeon: Louie Medina MD;  Location: [facility] MAIN OR;  Service: General;  Laterality: N/A;   • COLONOSCOPY  2019   • COLONOSCOPY N/A 05/09/2022    Procedure: COLONOSCOPY WITH BIOPSIES;  Surgeon: Butch Garcia MD;  Location: [facility] ENDOSCOPY;  Service: Gastroenterology;  Laterality: N/A;  DIVERTICULOSIS   • HYSTERECTOMY     • LYMPH NODE BIOPSY     • SINUS SURGERY  2018   • TONSILLECTOMY     • US GUIDED CYST ASPIRATION BREAST N/A 10/3/2022   • WRIST ARTHROPLASTY Bilateral      Social History     Socioeconomic History   • Marital status:  "   Tobacco Use   • Smoking status: Never     Passive exposure: Yes   • Smokeless tobacco: Never   Vaping Use   • Vaping Use: Never used   Substance and Sexual Activity   • Alcohol use: Never   • Drug use: Never   • Sexual activity: Not Currently     Partners: Male     Birth control/protection: None     Family History   Problem Relation Age of Onset   • No Known Problems Mother    • Cancer Father         Leukemia     • Breast cancer Sister    • Cancer Sister         Had breast cancer   • Cancer Brother         Leukemia   • Cancer Sister         Had stomach cancer   • Cancer Brother         Bladder cancer   • Cancer Brother         Bladder cancer   • Cancer Sister         Retinal blastoma.   • Colon cancer Neg Hx        Objective   Physical Exam  General: Alert, cooperative, no acute distress  Eyes: Anicteric sclera, PERRLA  Respiratory: normal respiratory effort  Cardiovascular: no lower extremity edema  Skin: Normal tone, no rash, no lesions  Psychiatric: Appropriate affect, intact judgment  Neurologic: No focal sensory or motor deficits, normal cognition   Musculoskeletal: Normal muscle strength and tone  Extremities: No clubbing, cyanosis, or deformities    Vitals:    23 1304   BP: 126/69   Pulse: 58   Resp: 16   Temp: 97.5 °F (36.4 °C)   SpO2: 96%   Weight: 80.7 kg (177 lb 14.6 oz)   Height: 157.5 cm (62.01\")   PainSc: 0-No pain     ECOG score: 0         PHQ-9 Total Score:         Result Review :   The following data was reviewed by: Bebe Nair MD PhD on 2023:  Lab Results   Component Value Date    HGB 15.7 2023    HCT 45.6 2023    MCV 94.2 2023     2023    WBC 18.31 (H) 2023    NEUTROABS 3.75 2023    LYMPHSABS 12.34 (H) 2023    MONOSABS 1.98 (H) 2023    EOSABS 0.19 2023    BASOSABS 0.03 2023     Lab Results   Component Value Date    GLUCOSE 104 (H) 2023    BUN 18 2023    CREATININE 0.70 2023    "  03/02/2023    K 4.1 03/02/2023     03/02/2023    CO2 30.2 (H) 03/02/2023    CALCIUM 10.4 03/02/2023    PROTEINTOT 6.3 03/02/2023    ALBUMIN 4.8 03/02/2023    BILITOT 0.5 03/02/2023    ALKPHOS 55 03/02/2023    AST 24 03/02/2023    ALT 24 03/02/2023     No results found for: IRON, LABIRON, TRANSFERRIN, TIBC  Lab Results   Component Value Date    XSVQPULK23 317 03/08/2022    FOLATE 14.40 03/08/2022     No results found for: PSA, CEA, AFP, ,        Assessment and Plan    Diagnoses and all orders for this visit:    1. Malignant neoplasm of upper-inner quadrant of breast in female, estrogen receptor negative, unspecified laterality        Right Triple Negative Breast Cancer: s/p lumpectomy November 2021. Chemotherapy was not recommended due to the small size of the tumor.  She completed adjuvant radiation in March.  Repeat screening mammogram will be due in September.  DEXA scan will be due in October.    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 Right now the patient does not meet criteria for treatment.    Genetic Risk:  I strongly encouraged her to get genetic testing to rule out a germline p53 mutation (Li Fraumeni syndrome) due to 50% of alleles having the p53 gene mutation on peripheral blood FISH.  Also, her family history included two people with retinoblastoma, her brother has CLL and there are other cancers as well. The patient herself has had two cancers.  I will send the breast cancer panel and expanded panel as well.     Adrenal Nodule: Noted on CT in Sept. Repeat CT shows this is growing in size. I will refer her to endocrinology for evaluation of possible pheochromocytoma or other endocrine tumors. I will also order a PET/CT and follow up with the patient after.  If this appears malignant she will need referral to an academic hospital for specialized surgical care.        Patient was given instructions and counseling regarding her condition or for health maintenance advice.  Please see specific information pulled into the AVS if appropriate.     Bebe Nair MD PhD    4/20/2023

## 2023-04-26 ENCOUNTER — LAB (OUTPATIENT)
Dept: LAB | Facility: HOSPITAL | Age: 73
End: 2023-04-26
Payer: MEDICARE

## 2023-04-26 DIAGNOSIS — C91.10 CLL (CHRONIC LYMPHOCYTIC LEUKEMIA): ICD-10-CM

## 2023-04-26 DIAGNOSIS — E27.8 ADRENAL NODULE: ICD-10-CM

## 2023-04-26 LAB
CORTIS SERPL-MCNC: 13.3 MCG/DL
DEPRECATED RDW RBC AUTO: 48.3 FL (ref 37–54)
EOSINOPHIL # BLD MANUAL: 0.32 10*3/MM3 (ref 0–0.4)
EOSINOPHIL NFR BLD MANUAL: 2 % (ref 0.3–6.2)
ERYTHROCYTE [DISTWIDTH] IN BLOOD BY AUTOMATED COUNT: 13.6 % (ref 12.3–15.4)
HCT VFR BLD AUTO: 43.6 % (ref 34–46.6)
HGB BLD-MCNC: 14.8 G/DL (ref 12–15.9)
LYMPHOCYTES # BLD MANUAL: 11.59 10*3/MM3 (ref 0.7–3.1)
LYMPHOCYTES NFR BLD MANUAL: 7 % (ref 5–12)
MCH RBC QN AUTO: 32.6 PG (ref 26.6–33)
MCHC RBC AUTO-ENTMCNC: 33.9 G/DL (ref 31.5–35.7)
MCV RBC AUTO: 96 FL (ref 79–97)
MONOCYTES # BLD: 1.11 10*3/MM3 (ref 0.1–0.9)
NEUTROPHILS # BLD AUTO: 2.86 10*3/MM3 (ref 1.7–7)
NEUTROPHILS NFR BLD MANUAL: 18 % (ref 42.7–76)
PLATELET # BLD AUTO: 158 10*3/MM3 (ref 140–450)
PMV BLD AUTO: 11.6 FL (ref 6–12)
RBC # BLD AUTO: 4.54 10*6/MM3 (ref 3.77–5.28)
RBC MORPH BLD: NORMAL
SCAN SLIDE: NORMAL
SMALL PLATELETS BLD QL SMEAR: ABNORMAL
SMUDGE CELLS BLD QL SMEAR: ABNORMAL
VARIANT LYMPHS NFR BLD MANUAL: 6 % (ref 0–5)
VARIANT LYMPHS NFR BLD MANUAL: 67 % (ref 19.6–45.3)
WBC NRBC COR # BLD: 15.87 10*3/MM3 (ref 3.4–10.8)

## 2023-04-26 PROCEDURE — 36415 COLL VENOUS BLD VENIPUNCTURE: CPT

## 2023-04-26 PROCEDURE — 82533 TOTAL CORTISOL: CPT

## 2023-04-26 PROCEDURE — 85007 BL SMEAR W/DIFF WBC COUNT: CPT

## 2023-04-26 PROCEDURE — 83835 ASSAY OF METANEPHRINES: CPT

## 2023-04-26 PROCEDURE — 85025 COMPLETE CBC W/AUTO DIFF WBC: CPT

## 2023-04-26 PROCEDURE — 82570 ASSAY OF URINE CREATININE: CPT

## 2023-04-28 ENCOUNTER — HOSPITAL ENCOUNTER (EMERGENCY)
Facility: HOSPITAL | Age: 73
Discharge: HOME | End: 2023-04-28
Attending: EMERGENCY MEDICINE
Payer: MEDICARE

## 2023-04-28 ENCOUNTER — APPOINTMENT (OUTPATIENT)
Dept: CT IMAGING | Facility: HOSPITAL | Age: 73
End: 2023-04-28
Payer: MEDICARE

## 2023-04-28 VITALS
WEIGHT: 181.44 LBS | SYSTOLIC BLOOD PRESSURE: 136 MMHG | HEART RATE: 57 BPM | HEIGHT: 62 IN | DIASTOLIC BLOOD PRESSURE: 54 MMHG | TEMPERATURE: 98.6 F | RESPIRATION RATE: 18 BRPM | OXYGEN SATURATION: 97 % | BODY MASS INDEX: 33.39 KG/M2

## 2023-04-28 DIAGNOSIS — K57.92 ACUTE DIVERTICULITIS: Primary | ICD-10-CM

## 2023-04-28 LAB
ALBUMIN SERPL-MCNC: 4.3 G/DL (ref 3.5–5.2)
ALBUMIN/GLOB SERPL: 1.5 G/DL
ALP SERPL-CCNC: 57 U/L (ref 39–117)
ALT SERPL W P-5'-P-CCNC: 21 U/L (ref 1–33)
ANION GAP SERPL CALCULATED.3IONS-SCNC: 10.4 MMOL/L (ref 5–15)
AST SERPL-CCNC: 19 U/L (ref 1–32)
BILIRUB SERPL-MCNC: 0.3 MG/DL (ref 0–1.2)
BILIRUB UR QL STRIP: NEGATIVE
BUN SERPL-MCNC: 10 MG/DL (ref 8–23)
BUN/CREAT SERPL: 13.2 (ref 7–25)
CALCIUM SPEC-SCNC: 10 MG/DL (ref 8.6–10.5)
CHLORIDE SERPL-SCNC: 101 MMOL/L (ref 98–107)
CLARITY UR: CLEAR
CO2 SERPL-SCNC: 28.6 MMOL/L (ref 22–29)
COLOR UR: YELLOW
CREAT SERPL-MCNC: 0.76 MG/DL (ref 0.57–1)
D-LACTATE SERPL-SCNC: 1.8 MMOL/L (ref 0.5–2)
DEPRECATED RDW RBC AUTO: 47.5 FL (ref 37–54)
EGFRCR SERPLBLD CKD-EPI 2021: 83.4 ML/MIN/1.73
EOSINOPHIL # BLD MANUAL: 0.32 10*3/MM3 (ref 0–0.4)
EOSINOPHIL NFR BLD MANUAL: 2 % (ref 0.3–6.2)
ERYTHROCYTE [DISTWIDTH] IN BLOOD BY AUTOMATED COUNT: 13.6 % (ref 12.3–15.4)
GLOBULIN UR ELPH-MCNC: 2.9 GM/DL
GLUCOSE SERPL-MCNC: 97 MG/DL (ref 65–99)
GLUCOSE UR STRIP-MCNC: NEGATIVE MG/DL
HCT VFR BLD AUTO: 45 % (ref 34–46.6)
HGB BLD-MCNC: 15.3 G/DL (ref 12–15.9)
HGB UR QL STRIP.AUTO: NEGATIVE
HOLD SPECIMEN: NORMAL
HOLD SPECIMEN: NORMAL
KETONES UR QL STRIP: NEGATIVE
LEUKOCYTE ESTERASE UR QL STRIP.AUTO: NEGATIVE
LIPASE SERPL-CCNC: 31 U/L (ref 13–60)
LYMPHOCYTES # BLD MANUAL: 12.34 10*3/MM3 (ref 0.7–3.1)
LYMPHOCYTES NFR BLD MANUAL: 2 % (ref 5–12)
MCH RBC QN AUTO: 32.3 PG (ref 26.6–33)
MCHC RBC AUTO-ENTMCNC: 34 G/DL (ref 31.5–35.7)
MCV RBC AUTO: 94.9 FL (ref 79–97)
MONOCYTES # BLD: 0.32 10*3/MM3 (ref 0.1–0.9)
NEUTROPHILS # BLD AUTO: 3.04 10*3/MM3 (ref 1.7–7)
NEUTROPHILS NFR BLD MANUAL: 19 % (ref 42.7–76)
NITRITE UR QL STRIP: NEGATIVE
PH UR STRIP.AUTO: 5.5 [PH] (ref 5–8)
PLATELET # BLD AUTO: 180 10*3/MM3 (ref 140–450)
PMV BLD AUTO: 11.3 FL (ref 6–12)
POTASSIUM SERPL-SCNC: 3.5 MMOL/L (ref 3.5–5.2)
PROT SERPL-MCNC: 7.2 G/DL (ref 6–8.5)
PROT UR QL STRIP: NEGATIVE
RBC # BLD AUTO: 4.74 10*6/MM3 (ref 3.77–5.28)
RBC MORPH BLD: NORMAL
SMALL PLATELETS BLD QL SMEAR: ADEQUATE
SODIUM SERPL-SCNC: 140 MMOL/L (ref 136–145)
SP GR UR STRIP: 1.01 (ref 1–1.03)
UROBILINOGEN UR QL STRIP: NORMAL
VARIANT LYMPHS NFR BLD MANUAL: 10 % (ref 0–5)
VARIANT LYMPHS NFR BLD MANUAL: 67 % (ref 19.6–45.3)
WBC MORPH BLD: NORMAL
WBC NRBC COR # BLD: 16.02 10*3/MM3 (ref 3.4–10.8)
WHOLE BLOOD HOLD COAG: NORMAL
WHOLE BLOOD HOLD SPECIMEN: NORMAL

## 2023-04-28 PROCEDURE — 25010000002 ONDANSETRON PER 1 MG: Performed by: EMERGENCY MEDICINE

## 2023-04-28 PROCEDURE — 80053 COMPREHEN METABOLIC PANEL: CPT

## 2023-04-28 PROCEDURE — 25510000001 IOPAMIDOL PER 1 ML: Performed by: EMERGENCY MEDICINE

## 2023-04-28 PROCEDURE — 25010000002 MORPHINE PER 10 MG: Performed by: EMERGENCY MEDICINE

## 2023-04-28 PROCEDURE — 96375 TX/PRO/DX INJ NEW DRUG ADDON: CPT

## 2023-04-28 PROCEDURE — 81003 URINALYSIS AUTO W/O SCOPE: CPT

## 2023-04-28 PROCEDURE — 83605 ASSAY OF LACTIC ACID: CPT

## 2023-04-28 PROCEDURE — 83690 ASSAY OF LIPASE: CPT

## 2023-04-28 PROCEDURE — 85007 BL SMEAR W/DIFF WBC COUNT: CPT

## 2023-04-28 PROCEDURE — 36415 COLL VENOUS BLD VENIPUNCTURE: CPT

## 2023-04-28 PROCEDURE — 99283 EMERGENCY DEPT VISIT LOW MDM: CPT

## 2023-04-28 PROCEDURE — 74177 CT ABD & PELVIS W/CONTRAST: CPT

## 2023-04-28 PROCEDURE — 96374 THER/PROPH/DIAG INJ IV PUSH: CPT

## 2023-04-28 PROCEDURE — 85025 COMPLETE CBC W/AUTO DIFF WBC: CPT

## 2023-04-28 RX ORDER — AMOXICILLIN AND CLAVULANATE POTASSIUM 875; 125 MG/1; MG/1
1 TABLET, FILM COATED ORAL EVERY 12 HOURS
Qty: 20 TABLET | Refills: 0 | Status: SHIPPED | OUTPATIENT
Start: 2023-04-28

## 2023-04-28 RX ORDER — AMOXICILLIN AND CLAVULANATE POTASSIUM 875; 125 MG/1; MG/1
1 TABLET, FILM COATED ORAL ONCE
Status: COMPLETED | OUTPATIENT
Start: 2023-04-28 | End: 2023-04-28

## 2023-04-28 RX ORDER — SODIUM CHLORIDE 0.9 % (FLUSH) 0.9 %
10 SYRINGE (ML) INJECTION AS NEEDED
Status: DISCONTINUED | OUTPATIENT
Start: 2023-04-28 | End: 2023-04-28 | Stop reason: HOSPADM

## 2023-04-28 RX ORDER — ONDANSETRON 2 MG/ML
4 INJECTION INTRAMUSCULAR; INTRAVENOUS ONCE
Status: COMPLETED | OUTPATIENT
Start: 2023-04-28 | End: 2023-04-28

## 2023-04-28 RX ADMIN — IOPAMIDOL 100 ML: 755 INJECTION, SOLUTION INTRAVENOUS at 13:52

## 2023-04-28 RX ADMIN — AMOXICILLIN AND CLAVULANATE POTASSIUM 1 TABLET: 875; 125 TABLET, FILM COATED ORAL at 15:54

## 2023-04-28 RX ADMIN — ONDANSETRON 4 MG: 2 INJECTION INTRAMUSCULAR; INTRAVENOUS at 13:28

## 2023-04-28 RX ADMIN — MORPHINE SULFATE 4 MG: 4 INJECTION, SOLUTION INTRAMUSCULAR; INTRAVENOUS at 13:28

## 2023-04-28 NOTE — ED PROVIDER NOTES
Time: 11:43 AM EDT  Date of encounter:  4/28/2023  Independent Historian/Clinical History and Information was obtained by:   Patient  Chief Complaint: abdominal pain    History is limited by: N/A    History of Present Illness:  Patient is a 73 y.o. year old female who presents to the emergency department for evaluation of abdominal pain since Monday. Patient states she has hx of diverticulitis. Patient states she has been having a low grade fever under 100. Patient denies nausea, vomiting, diarrhea.  Patient reports the pain is progressively gotten worse.  Patient states the pain feels similar to when she had prior episodes of diverticulitis.  Patient reports the pain is sharp.  Patient states movement makes the pain worse and nothing seems to make the pain better.      History provided by:  Patient   used: No        Patient Care Team  Primary Care Provider: Monserrat Grover APRN    Past Medical History:     Allergies   Allergen Reactions   • Ace Inhibitors Cough   • Sulfamethoxazole-Trimethoprim Hives   • Levaquin [Levofloxacin] Hives   • Lisinopril Cough and Hives   • Nsaids Itching, Nausea Only and Rash   • Sulfa Antibiotics Itching, Nausea Only, Rash and Hives     Past Medical History:   Diagnosis Date   • Anxiety    • Arthritis    • Breast cancer    • Cancer     BREAST CANCER   • Cholelithiases 4/30@1   • Depression    • Diabetes mellitus    • Disease of thyroid gland     currently not taking any meds   • Diverticulitis    • Diverticulitis of colon    • Diverticulosis 95   • Fibromyalgia    • Fibromyalgia, primary    • Hyperlipidemia    • Hypertension    • Liver disease    • Major depressive disorder 05/07/2020   • Palpitations     ASYMPTOMATIC- DENIES CP/SOB, SEE PCP- NO CARDIOLOGIST    • Post-menopause 09/17/2020   • Sinus trouble    • Sleep apnea    • Tremor 2018    I was on medicine for tremors   • Urinary tract infection    • Vitamin D deficiency 09/17/2020     Past Surgical  History:   Procedure Laterality Date   • ABDOMINAL SURGERY  1985   • APPENDECTOMY     • BILATERAL BREAST REDUCTION     • BREAST BIOPSY Right 2021    Procedure: RIGHT BREAST NEEDLE LOCALIZED  LUMPECTOMY WITH SENTINEL NODE BIOPSY;  Surgeon: Mirtha Esteves MD;  Location: [facility] OR [facility];  Service: General;  Laterality: Right;   • BREAST LUMPECTOMY     • CHOLECYSTECTOMY N/A 2021    Procedure: CHOLECYSTECTOMY LAPAROSCOPIC INTRAOPERATIVE CHOLANGIOGRAM;  Surgeon: Louie Medina MD;  Location: [facility] MAIN OR;  Service: General;  Laterality: N/A;   • COLONOSCOPY     • COLONOSCOPY N/A 2022    Procedure: COLONOSCOPY WITH BIOPSIES;  Surgeon: Butch Garcia MD;  Location: [facility] ENDOSCOPY;  Service: Gastroenterology;  Laterality: N/A;  DIVERTICULOSIS   • HYSTERECTOMY     • LYMPH NODE BIOPSY     • SINUS SURGERY     • TONSILLECTOMY     • US GUIDED CYST ASPIRATION BREAST N/A 10/3/2022   • WRIST ARTHROPLASTY Bilateral      Family History   Problem Relation Age of Onset   • No Known Problems Mother    • Cancer Father         Leukemia     • Breast cancer Sister    • Cancer Sister         Had breast cancer   • Cancer Brother         Leukemia   • Cancer Sister         Had stomach cancer   • Cancer Brother         Bladder cancer   • Cancer Brother         Bladder cancer   • Cancer Sister         Retinal blastoma.   • Colon cancer Neg Hx        Home Medications:  Prior to Admission medications    Medication Sig Start Date End Date Taking? Authorizing Provider   Cholecalciferol (Vitamin D) 50 MCG (2000 UT) capsule Take 1 capsule by mouth Daily. 3/10/23   Monserrat Grover APRN   cholecalciferol (VITAMIN D3) 25 MCG (1000 UT) tablet  3/14/23   Provider, MD Larry   colestipol (Colestid) 1 g tablet Take 2 tablets by mouth 2 (Two) Times a Day. 3/10/23   Monserrat Grover APRN   desvenlafaxine (Pristiq) 50 MG 24 hr tablet Take 1 tablet by mouth Daily. 3/10/23   Reilly  ARA Paredes   gabapentin (NEURONTIN) 300 MG capsule Take 1 capsule by mouth 3 (Three) Times a Day As Needed (neuropathy). 9/20/22   Monserrat Grover APRN   hydroCHLOROthiazide (HYDRODIURIL) 25 MG tablet Take 1 tablet by mouth Daily. 3/10/23   Monserrat Grover APRN   HYDROcodone-acetaminophen (NORCO) 5-325 MG per tablet Take 1 tablet by mouth Daily. 12/8/22   ProviderLarry MD   Lactobacillus Probiotic tablet Take 1 tablet by mouth 2 (Two) Times a Day. 12/13/22   Monserrat Grover APRN   metoprolol tartrate (LOPRESSOR) 25 MG tablet Take 1 tablet by mouth 2 (Two) Times a Day. 3/10/23   Monserrat Grover APRN   Narcan 4 MG/0.1ML nasal spray  4/5/23   ProviderLarry MD   rosuvastatin (CRESTOR) 20 MG tablet Take 1 tablet by mouth Every Night. 11/7/22   Monserrat Grover APRN        Social History:   Social History     Tobacco Use   • Smoking status: Never     Passive exposure: Yes   • Smokeless tobacco: Never   Vaping Use   • Vaping Use: Never used   Substance Use Topics   • Alcohol use: Never   • Drug use: Never         Review of Systems:  Review of Systems   Constitutional: Negative for chills and fever.   HENT: Negative for congestion, rhinorrhea and sore throat.    Eyes: Negative for photophobia.   Respiratory: Negative for apnea, cough, chest tightness and shortness of breath.    Cardiovascular: Negative for chest pain and palpitations.   Gastrointestinal: Positive for abdominal pain. Negative for diarrhea, nausea and vomiting.   Endocrine: Negative.    Genitourinary: Negative for difficulty urinating and dysuria.   Musculoskeletal: Negative for back pain, joint swelling and myalgias.   Skin: Negative for color change and wound.   Allergic/Immunologic: Negative.    Neurological: Negative for seizures and headaches.   Psychiatric/Behavioral: Negative.    All other systems reviewed and are negative.       Physical Exam:  /54   Pulse 57   Temp 98.6 °F (37 °C) (Oral)    "Resp 18   Ht 157.5 cm (62\")   Wt 82.3 kg (181 lb 7 oz)   SpO2 97%   BMI 33.19 kg/m²     Physical Exam  Vitals and nursing note reviewed.   Constitutional:       General: She is awake. She is not in acute distress.     Appearance: Normal appearance.   HENT:      Head: Normocephalic and atraumatic.      Nose: Nose normal.      Mouth/Throat:      Mouth: Mucous membranes are moist.   Eyes:      Extraocular Movements: Extraocular movements intact.      Pupils: Pupils are equal, round, and reactive to light.   Cardiovascular:      Rate and Rhythm: Normal rate and regular rhythm.      Heart sounds: Normal heart sounds.   Pulmonary:      Effort: Pulmonary effort is normal. No respiratory distress.      Breath sounds: Normal breath sounds. No wheezing, rhonchi or rales.   Abdominal:      General: Bowel sounds are normal.      Palpations: Abdomen is soft.      Tenderness: There is abdominal tenderness (mild) in the suprapubic area. There is no guarding or rebound.      Comments: No rigidity  No palpable masses   Musculoskeletal:         General: No tenderness. Normal range of motion.      Cervical back: Normal range of motion and neck supple.   Skin:     General: Skin is warm and dry.      Coloration: Skin is not jaundiced.   Neurological:      General: No focal deficit present.      Mental Status: She is alert and oriented to person, place, and time. Mental status is at baseline.      Sensory: Sensation is intact.      Motor: Motor function is intact.      Coordination: Coordination is intact.   Psychiatric:         Attention and Perception: Attention and perception normal.         Mood and Affect: Mood and affect normal.         Speech: Speech normal.         Behavior: Behavior normal.         Judgment: Judgment normal.                 Procedures:  Procedures      Medical Decision Making:      Comorbidities that affect care:    Hyperlipidemia, Hx of diverticulitis, Hypertension    External Notes reviewed:    Previous " Clinic Note: Office visit with Dr. Nair from 4/20/2023 was reviewed.      The following orders were placed and all results were independently analyzed by me:  Orders Placed This Encounter   Procedures   • CT Abdomen Pelvis With Contrast   • [place] Draw   • Comprehensive Metabolic Panel   • Lipase   • Urinalysis With Microscopic If Indicated (No Culture) - Urine, Clean Catch   • Lactic Acid, Plasma   • CBC Auto Differential   • Manual Differential   • Undress & Gown   • CBC & Differential   • Green Top (Gel)   • Lavender Top   • Gold Top - SST   • Light Blue Top       Medications Given in the Emergency Department:  Medications   morphine injection 4 mg (4 mg Intravenous Given 4/28/23 1328)   ondansetron (ZOFRAN) injection 4 mg (4 mg Intravenous Given 4/28/23 1328)   iopamidol (ISOVUE-370) 76 % injection 100 mL (100 mL Intravenous Given 4/28/23 1352)   amoxicillin-clavulanate (AUGMENTIN) 875-125 MG per tablet 1 tablet (1 tablet Oral Given 4/28/23 1554)        ED Course:     The patient was seen and evaluated in the ED by me.  The above history and physical examination was performed as documented.  Diagnostic data was obtained.  Results reviewed.  Discussed with the patient.  The patient's work-up showed the patient to have a flareup of her diverticulitis.  There is no complications associated.  Patient stable for outpatient treatment and follow-up.    Labs:    Lab Results (last 24 hours)     Procedure Component Value Units Date/Time    CBC & Differential [[id number]]  (Abnormal) Collected: 04/28/23 0954    Specimen: Blood Updated: 04/28/23 1043    Narrative:      The following orders were created for panel order CBC & Differential.  Procedure                               Abnormality         Status                     ---------                               -----------         ------                     CBC Auto Differential[[id number]]        Abnormal            Final result               Scan Slide[[id number]]                                                                     Please view results for these tests on the individual orders.    Comprehensive Metabolic Panel [[id number]] Collected: 04/28/23 0954    Specimen: Blood Updated: 04/28/23 1019     Glucose 97 mg/dL      BUN 10 mg/dL      Creatinine 0.76 mg/dL      Sodium 140 mmol/L      Potassium 3.5 mmol/L      Chloride 101 mmol/L      CO2 28.6 mmol/L      Calcium 10.0 mg/dL      Total Protein 7.2 g/dL      Albumin 4.3 g/dL      ALT (SGPT) 21 U/L      AST (SGOT) 19 U/L      Alkaline Phosphatase 57 U/L      Total Bilirubin 0.3 mg/dL      Globulin 2.9 gm/dL      A/G Ratio 1.5 g/dL      BUN/Creatinine Ratio 13.2     Anion Gap 10.4 mmol/L      eGFR 83.4 mL/min/1.73     Narrative:      GFR Normal >60  Chronic Kidney Disease <60  Kidney Failure <15    The GFR formula is only valid for adults with stable renal function between ages 18 and 70.    Lipase [[id number]]  (Normal) Collected: 04/28/23 0954    Specimen: Blood Updated: 04/28/23 1019     Lipase 31 U/L     Lactic Acid, Plasma [[id number]]  (Normal) Collected: 04/28/23 0954    Specimen: Blood Updated: 04/28/23 1015     Lactate 1.8 mmol/L     CBC Auto Differential [[id number]]  (Abnormal) Collected: 04/28/23 0954    Specimen: Blood Updated: 04/28/23 1043     WBC 16.02 10*3/mm3      RBC 4.74 10*6/mm3      Hemoglobin 15.3 g/dL      Hematocrit 45.0 %      MCV 94.9 fL      MCH 32.3 pg      MCHC 34.0 g/dL      RDW 13.6 %      RDW-SD 47.5 fl      MPV 11.3 fL      Platelets 180 10*3/mm3     Manual Differential [[id number]]  (Abnormal) Collected: 04/28/23 0954    Specimen: Blood Updated: 04/28/23 1043     Neutrophil % 19.0 %      Lymphocyte % 67.0 %      Monocyte % 2.0 %      Eosinophil % 2.0 %      Atypical Lymphocyte % 10.0 %      Neutrophils Absolute 3.04 10*3/mm3      Lymphocytes Absolute 12.34 10*3/mm3      Monocytes Absolute 0.32 10*3/mm3      Eosinophils Absolute 0.32 10*3/mm3      RBC Morphology Normal     WBC Morphology Normal      Platelet Estimate Adequate    Urinalysis With Microscopic If Indicated (No Culture) - Urine, Clean Catch [[id number]]  (Normal) Collected: 04/28/23 0959    Specimen: Urine, Clean Catch Updated: 04/28/23 1017     Color, UA Yellow     Appearance, UA Clear     pH, UA 5.5     Specific Gravity, UA 1.012     Glucose, UA Negative     Ketones, UA Negative     Bilirubin, UA Negative     Blood, UA Negative     Protein, UA Negative     Leuk Esterase, UA Negative     Nitrite, UA Negative     Urobilinogen, UA 0.2 E.U./dL    Narrative:      Urine microscopic not indicated.           Imaging:    CT Abdomen Pelvis With Contrast    Result Date: 4/28/2023  PROCEDURE: CT ABDOMEN PELVIS W CONTRAST  COMPARISON: [facility], CT, CT ABDOMEN PELVIS W CONTRAST, 7/19/2022, 14:24.  [facility], CT, CT ABDOMEN ADRENALS W WO CONTRAST, 4/19/2023, 9:19.  INDICATIONS: Left lower quadrant abdominal pain/NAUSEA  TECHNIQUE: After obtaining the patient's consent, CT images were created with non-ionic intravenous contrast material.   PROTOCOL:   Standard imaging protocol performed    RADIATION:   DLP: 598.5 mGy*cm   Automated exposure control was utilized to minimize radiation dose. CONTRAST: 100 cc Isovue 370 I.V.  FINDINGS:  No consolidations or pleural effusions are seen involving the lung bases.  The liver, spleen, and pancreas are unremarkable. The patient is status post cholecystectomy.  A stable right adrenal soft tissue mass is again noted demonstrating heterogeneous attenuation.  This finding measures 2.6 cm and is stable in size when compared to the prior.  The findings suggest a complex adenoma.  The left adrenal gland is unremarkable.  The bilateral kidneys are symmetric and unremarkable.  There is diverticulosis throughout the colon.  There is mild stranding in the mesentery adjacent to the descending colon in the left abdomen.  An inflamed diverticulum is seen at the posterior margin of the colon in this region.   There is mild bowel wall thickening.  The findings suggest early signs of developing acute diverticulitis.  There is no perforation.  There is no evidence for abscess.  No significant bowel dilatation or obstruction is seen. A normal-appearing air-filled appendix is observed. There is no evidence for acute appendicitis. No abnormal fluid collections are seen. No significant free fluid is observed.  Mildly prominent lymph nodes are noted in the retroperitoneal soft tissues.  These findings may be reactive in nature.  The bladder is decompressed. The celiac and superior mesenteric arterial distributions are well opacified without evidence for occlusion.  No acute osseous abnormalities are seen.        1. Evidence for mild early signs of developing acute diverticulitis involving the descending colon in the left abdomen.  There is no evidence for perforation.  There is no evidence for abscess. 2. Stable right adrenal mass suggesting a complex adenoma. 3. Mildly prominent nonspecific lymph nodes involving the retroperitoneal soft tissues at the level of the mid to upper abdomen.  The findings may be reactive in nature.  Recommend follow-up CT in 6 months to assess for stability/resolution.      FACUNDO OTERO MD       Electronically Signed and Approved By: FACUNDO OTERO MD on 4/28/2023 at 14:17                 Differential Diagnosis and Discussion:    Abdominal Pain: Based on the patient's signs and symptoms, I considered abdominal aortic aneurysm, small bowel obstruction, pancreatitis, acute cholecystitis, acute appendecitis, peptic ulcer disease, gastritis, colitis, endocrine disorders, irritable bowel syndrome and other differential diagnosis an etiology of the patient's abdominal pain.    All labs were reviewed and interpreted by me.  CT scan radiology impression was interpreted by me.    MDM         Patient Care Considerations:          Consultants/Shared Management Plan:    None    Social Determinants of  Health:    Patient is independent, reliable, and has access to care.       Disposition and Care Coordination:    Discharged: I considered escalation of care by admitting this patient for observation, however the patient has improved and is suitable and  stable for discharge.    I have explained the patient´s condition, diagnoses and treatment plan based on the information available to me at this time. I have answered questions and addressed any concerns. The patient has a good  understanding of the patient´s diagnosis, condition, and treatment plan as can be expected at this point. The vital signs have been stable. The patient´s condition is stable and appropriate for discharge from the emergency department.      The patient will pursue further outpatient evaluation with the primary care physician or other designated or consulting physician as outlined in the discharge instructions. They are agreeable to this plan of care and follow-up instructions have been explained in detail. The patient has received these instructions in written format and have expressed an understanding of the discharge instructions. The patient is aware that any significant change in condition or worsening of symptoms should prompt an immediate return to this or the closest emergency department or call to 911.  I have explained discharge medications and the need for follow up with the patient/caretakers. This was also printed in the discharge instructions. Patient was discharged with the following medications and follow up:      Medication List      New Prescriptions    amoxicillin-clavulanate 875-125 MG per tablet  Commonly known as: AUGMENTIN  Take 1 tablet by mouth Every 12 (Twelve) Hours.           Where to Get Your Medications      These medications were sent to [facility] [place]  [address] [place] [address]    Hours: Mon-Fri 9:00AM-7:30PM Saturday 9:00AM-2:00PM Phone: [phone]   · amoxicillin-clavulanate  875-125 MG per tablet      Monserrat Grover, APRN  [address] [place] [address] [phone]    In 1 week  If symptoms fail to resolve or improve       Final diagnoses:   Acute diverticulitis        ED Disposition     ED Disposition   Discharge    Condition   Stable    Comment   --             This medical record created using voice recognition software.        Documentation assistance provided by Marycarmen Starr acting as scribe for No att. providers found. Information recorded by the scribe was done at my direction and has been verified and validated by me.          Marycarmen Starr  04/28/23 1148       Ricci Sue DO  04/29/23 1816

## 2023-05-01 LAB
METANEPH FREE SERPL-MCNC: 14 PG/ML (ref 0–88)
NORMETANEPHRINE SERPL-MCNC: 85.1 PG/ML (ref 0–285.2)

## 2023-05-07 LAB
CREAT UR-MCNC: 104.1 MG/DL
METANEPHS 24H UR-MCNC: 47 UG/L
METANEPHS/CREAT UR: 0.5 UG/MG CREAT (ref 0–1)
NORMETANEPHRINE 24H UR-MCNC: 373 UG/L

## 2023-05-18 ENCOUNTER — HOSPITAL ENCOUNTER (OUTPATIENT)
Dept: OCCUPATIONAL THERAPY | Facility: HOSPITAL | Age: 73
Setting detail: THERAPIES SERIES
Discharge: HOME | End: 2023-05-18
Payer: MEDICARE

## 2023-05-18 DIAGNOSIS — Z91.89 AT RISK FOR LYMPHEDEMA: Primary | ICD-10-CM

## 2023-05-18 DIAGNOSIS — Z17.0 MALIGNANT NEOPLASM OF UPPER-OUTER QUADRANT OF RIGHT BREAST IN FEMALE, ESTROGEN RECEPTOR POSITIVE: ICD-10-CM

## 2023-05-18 DIAGNOSIS — C50.411 MALIGNANT NEOPLASM OF UPPER-OUTER QUADRANT OF RIGHT BREAST IN FEMALE, ESTROGEN RECEPTOR POSITIVE: ICD-10-CM

## 2023-05-18 PROCEDURE — 93702 BIS XTRACELL FLUID ANALYSIS: CPT | Performed by: OCCUPATIONAL THERAPIST

## 2023-05-18 PROCEDURE — 97140 MANUAL THERAPY 1/> REGIONS: CPT | Performed by: OCCUPATIONAL THERAPIST

## 2023-05-18 NOTE — THERAPY RE-EVALUATION
Outpatient Occupational Therapy Lymphedema Re-Evaluation   Daniela     Patient Name: Dorothea Cruz  : 1950  MRN: [id number]  Today's Date: 2023      Visit Date: 2023    Patient Active Problem List   Diagnosis   • Vitamin D deficiency   • Sleep apnea   • Postmenopause   • Major depressive disorder   • Primary hypertension   • Mixed hyperlipidemia   • Fibromyalgia   • Disease of thyroid gland   • Depression   • Anxiety   • Arthritis   • ACE-inhibitor cough   • Diverticulitis   • Breast cancer   • History of diverticulitis   • History of cholecystectomy   • Altered bowel habits   • Diarrhea   • Malignant neoplasm of upper-inner quadrant of right female breast   • Chronic low back pain   • Trochanteric bursitis of both hips   • Family history of CLL (chronic lymphoid leukemia)   • Degeneration of lumbar intervertebral disc   • Lumbar spondylosis   • Chronic osteoarthritis   • Leukocytosis   • Prediabetes   • Class 1 obesity with serious comorbidity and body mass index (BMI) of 31.0 to 31.9 in adult   • Adenoma of right adrenal gland   • CLL (chronic lymphocytic leukemia)   • Lumbosacral spondylosis without myelopathy        Past Medical History:   Diagnosis Date   • Anxiety    • Arthritis    • Breast cancer    • Cancer     BREAST CANCER   • Cholelithiases @1   • Depression    • Diabetes mellitus    • Disease of thyroid gland     currently not taking any meds   • Diverticulitis    • Diverticulitis of colon    • Diverticulosis 95   • Fibromyalgia    • Fibromyalgia, primary    • Hyperlipidemia    • Hypertension    • Liver disease    • Major depressive disorder 2020   • Palpitations     ASYMPTOMATIC- DENIES CP/SOB, SEE PCP- NO CARDIOLOGIST    • Post-menopause 2020   • Sinus trouble    • Sleep apnea    • Tremor     I was on medicine for tremors   • Urinary tract infection    • Vitamin D deficiency 2020        Past Surgical History:   Procedure Laterality Date   • ABDOMINAL  SURGERY  06/14/1985   • APPENDECTOMY     • BILATERAL BREAST REDUCTION     • BREAST BIOPSY Right 11/12/2021    Procedure: RIGHT BREAST NEEDLE LOCALIZED  LUMPECTOMY WITH SENTINEL NODE BIOPSY;  Surgeon: Mirtha Esteves MD;  Location: [facility] OR OSC;  Service: General;  Laterality: Right;   • BREAST LUMPECTOMY     • CHOLECYSTECTOMY N/A 09/02/2021    Procedure: CHOLECYSTECTOMY LAPAROSCOPIC INTRAOPERATIVE CHOLANGIOGRAM;  Surgeon: Louie Medina MD;  Location: [facility] MAIN OR;  Service: General;  Laterality: N/A;   • COLONOSCOPY  2019   • COLONOSCOPY N/A 05/09/2022    Procedure: COLONOSCOPY WITH BIOPSIES;  Surgeon: Butch Garcia MD;  Location: [facility] ENDOSCOPY;  Service: Gastroenterology;  Laterality: N/A;  DIVERTICULOSIS   • HYSTERECTOMY     • LYMPH NODE BIOPSY     • SINUS SURGERY  2018   • TONSILLECTOMY     • US GUIDED CYST ASPIRATION BREAST N/A 10/3/2022   • WRIST ARTHROPLASTY Bilateral          Visit Dx:     ICD-10-CM ICD-9-CM   1. At risk for lymphedema  Z91.89 V49.89   2. Malignant neoplasm of upper-outer quadrant of right breast in female, estrogen receptor positive  C50.411 174.4    Z17.0 V86.0        Patient History     Row Name 05/18/23 0800             History    Chief Complaint --  Pt reports that her symptoms have greatly decreased since wearing the sleeve  -TD      Brief Description of Current Complaint Mrs. Hager is a 71 year old female who had surgery on 11/12/21 for Lumpectomoy on the Right due to Trip negative Breast CA  -TD         Fall Risk Assessment    Any falls in the past year: No  -TD      Does patient have a fear of falling No  -TD         Services    Prior Rehab/Home Health Experiences No  -TD      Are you currently receiving Home Health services No  -TD      Do you plan to receive Home Health services in the near future No  -TD         Daily Activities    Primary Language English  -TD      Are you able to read Yes  -TD      Are you able to write Yes  -TD      How does patient  "learn best? Reading;Demonstration  -TD      Barriers to learning None  -TD      Pt Participated in POC and Goals Yes  -TD         Safety    Are you being hurt, hit, or frightened by anyone at home or in your life? No  -TD      Are you being neglected by a caregiver No  -TD      Have you had any of the following issues with Depression;Anxiety  -TD            User Key  (r) = Recorded By, (t) = Taken By, (c) = Cosigned By    Initials Name Provider Type    TD Aminah Bryant OT Occupational Therapist                 Lymphedema     Row Name 05/18/23 0800             Subjective Pain    Able to rate subjective pain? yes  -TD      Pre-Treatment Pain Level 0  -TD      Post-Treatment Pain Level 0  -TD         Subjective Comments    Subjective Comments Pt has no complaints.  -TD         Lymphedema Assessment    Lymphedema Classification RUE:;at risk/stage 0  -TD      Lymphedema Cancer Related Sx right;lumpectomy;sentinel node biopsy  -TD      Lymphedema Surgery Comments 11/2021  -TD      Lymph Nodes Removed # 3  -TD      Positive Lymph Nodes # 0  -TD      Chemo Received no  -TD      Radiation Therapy Received yes  -TD      Radiation Treatments #/Timeframe 16  -TD         LLIS - Physical Concerns    The amount of pain associated with my lymphedema is: 0  -TD      The amount of limb heaviness associated with my lymphedema is: 0  -TD      The amount of skin tightness associated with my lymphedema is: 0  -TD      The size of my swollen limb(s) seems: 0  -TD      Lymphedema affects the movement of my swollen limb(s): 0  -TD      The strength in my swollen limb(s) is: 0  -TD         LLIS - Psychosocial Concerns    Lymphedema affects my body image (i.e., \"how I think I look\"). 0  -TD      Lymphedema affects my socializing with others. 0  -TD      Lymphedema affects my intimate relations with spouse or partner (rate 0 if not applicable 0  -TD      Lymphedema \"gets me down\" (i.e., depression, frustration, or anger) 0  -TD      I must " rely on others for help due to my lymphedema. 0  -TD      I know what to do to manage my lymphedema 3  -TD         LLIS - Functional Concerns    Lymphedema affects my ability to perform self-care activities (i.e. eating, dressing, hygiene) 0  -TD      Lymphedema affects my ability to perform routine home or work-related activities. 0  -TD      Lymphedema affects my performance of preferred leisure activities. 0  -TD      Lymphedema affects proper fit of clothing/shoes 0  -TD      Lymphedema affects my sleep 0  -TD         Posture/Observations    Posture- WNL Posture is WNL  -TD         General ROM    GENERAL ROM COMMENTS BUE are WFL  -TD         MMT (Manual Muscle Testing)    General MMT Comments BUE are WFL  -TD         Skin Changes/Observations    Location/Assessment Upper Quadrant  -TD      Upper Quadrant Conditions right:;normal  -TD      Upper Quadrant Color/Pigment right:;normal  -TD         L-Dex Bioimpedence Screening    L-Dex Measurement Extremity RUE  -TD      L-Dex Patient Position Standing  -TD      L-Dex UE Dominate Side Right  -TD      L-Dex UE At Risk Side Right  -TD      L-Dex UE Score -5.7  -TD      L-Dex UE Baseline Score -14  -TD      L-Dex UE Value Change 8.3  -TD      $ L-Dex Charge yes  -TD         Lymphedema Life Impact Scale Totals    A.  Total Q1 - Q17 (Do not include Q18) 3  -TD      B.  Total number of questions answered (Q1-Q17) 17  -TD      C. Divide A by B 0.18  -TD      D. Multiple C by 25 4.5  -TD            User Key  (r) = Recorded By, (t) = Taken By, (c) = Cosigned By    Initials Name Provider Type    TD Aminah Bryant OT Occupational Therapist                        Therapy Education  Education Details: Therpaist reviewed stop light program this date and stretches.  Given: HEP, Symptoms/condition management  Program: New  How Provided: Verbal  Provided to: Patient  Level of Understanding: Teach back education performed      Manual Rx (last 36 hours)     Manual Treatments     Row  Name 05/18/23 0922             Total Minutes    75308 - OT Manual Therapy Minutes 15  -TD            User Key  (r) = Recorded By, (t) = Taken By, (c) = Cosigned By    Initials Name Provider Type    Aminah Jones OT Occupational Therapist               OT Goals     Row Name 05/18/23 0922          Time Calculation    OT Goal Re-Cert Due Date 06/17/23  -TD           User Key  (r) = Recorded By, (t) = Taken By, (c) = Cosigned By    Initials Name Provider Type    TD Aminah Bryant, OT Occupational Therapist            Goals:  1. Post Breast Surgery Care/at risk for Lymphedema  LTG 1: 90 days:  As an indicator of no exacerbation of lymphedema staging, the patient will present with an L-Dex score less than 7 points from preoperative baseline.              STATUS: Not met: Ongoing   STG 1a: 30 days: Patient will be independent with self-manual lymphatic massage.               STATUS: Met.  Ongoing  STG 1b: 30 days:  Patient will be independent with identification of signs and symptoms of lymphedema exasperation per stoplight to recovery education handout.              STATUS: Met.  Ongoing  STG 1 c: 30 days: Patient will be independent with HEP to prevent advancement in lymphedema staging.              STATUS: Met.  Ongoing  TREATMENT:  Self Care/ewADL retraining, Therapeutic Activity, Neuromuscular Re-education, Therapeutic Exercise, Bioimpedence Fluid Analysis, Orthotic Management and training,  and Manual Therapy.     OT Assessment/Plan     Row Name 05/18/23 0821          OT Assessment    Functional Limitations Performance in self-care ADL  -TD     Impairments Impaired lymphatic circulation  -TD     Assessment Comments Pt L-dex score is within functional limits this date. Patient would benefit from continued skilled occupational therapy to prevent increased aging of lymphedema, decreased range of motion, and increased pain  -TD     OT Diagnosis at risk for lymphedema  -TD     OT Rehab Potential Good  -TD      Patient/caregiver participated in establishment of treatment plan and goals Yes  -TD     Patient would benefit from skilled therapy intervention Yes  -TD        OT Plan    OT Frequency --  see duration  -TD     Predicted Duration of Therapy Intervention (OT) Pt will be seen every 3 months years 1-3 and every 6 months years 4-5.  -TD     Planned CPT's? OT EVAL LOW COMPLEXITY: 31917;OT SELF CARE/MGMT/TRAIN 15 MIN: 09787;OT THER PROC EA 15 MIN: 42228RJ;OT MANUAL THERAPY EA 15 MIN: 29649;OT BIS XTRACELL FLUID ANALYSIS: 67395;OT ORTHOTIC MGMT/TRAIN EA 15 MIN: 40251;OT WC MGMT EA 15 MIN: 24138  -TD     Planned Therapy Interventions (Optional Details) home exercise program;joint mobilization;manual therapy techniques;orthotic fitting/training;patient/family education;wound care  -TD     OT Plan Comments contineu POC  -TD           User Key  (r) = Recorded By, (t) = Taken By, (c) = Cosigned By    Initials Name Provider Type    TD Aminah Bryant OT Occupational Therapist                          Time Calculation:   Timed Charges  73402 - OT Manual Therapy Minutes: 15  Total Minutes  Timed Charges Total Minutes: 15   Total Minutes: 15     Therapy Charges for Today     Code Description Service Date Service Provider Modifiers Qty    37763306897 HC PT BIS XTRACELL FLUID ANALYSIS 5/18/2023 Aminah Bryant OT  1    64369557107 HC OT MANUAL THERAPY EA 15 MIN 5/18/2023 Aminah Bryant OT GO 1                    Aminah Bryant OT  5/18/2023

## 2023-05-19 DIAGNOSIS — I10 PRIMARY HYPERTENSION: ICD-10-CM

## 2023-05-19 RX ORDER — HYDROCHLOROTHIAZIDE 25 MG/1
25 TABLET ORAL DAILY
Qty: 90 TABLET | Refills: 1 | OUTPATIENT
Start: 2023-05-19

## 2023-05-19 NOTE — TELEPHONE ENCOUNTER
"Caller: TashiarositaDorothea suresh \"Taniya\"    Relationship: Self    Best call back number: [phone]    Requested Prescriptions:   Requested Prescriptions     Pending Prescriptions Disp Refills   • hydroCHLOROthiazide (HYDRODIURIL) 25 MG tablet 90 tablet 1     Sig: Take 1 tablet by mouth Daily.        [facility] be sent[facility]  [place] [place] [facility] [address] [phone] [facility] [phone]      Last office visit with prescribing clinician: 3/10/2023   Last telemedicine visit with prescribing clinician: 3/14/2023   Next office visit with prescribing clinician: 6/9/2023     Does the patient have less than a 3 day supply:  [] Yes  [x] No    Would you like a call back once the refill request has been completed: [x] Yes [] No    If the office needs to give you a call back, can they leave a voicemail: [x] Yes [] No    Jackelyn Le Rep   05/19/23 09:53 EDT     "

## 2023-05-25 DIAGNOSIS — M79.7 FIBROMYALGIA: ICD-10-CM

## 2023-05-25 RX ORDER — GABAPENTIN 300 MG/1
300 CAPSULE ORAL 3 TIMES DAILY PRN
Qty: 90 CAPSULE | Refills: 0 | Status: SHIPPED | OUTPATIENT
Start: 2023-05-25 | End: 2023-05-26 | Stop reason: SDUPTHER

## 2023-05-25 NOTE — TELEPHONE ENCOUNTER
"    Caller: TashiarositaDorothea suresh \"Taniya\"    Relationship: Self    Best call back number: [phone]    Requested Prescriptions:   Requested Prescriptions     Pending Prescriptions Disp Refills   • gabapentin (NEURONTIN) 300 MG capsule 90 capsule 2     Sig: Take 1 capsule by mouth 3 (Three) Times a Day As Needed (neuropathy).        [facility] be sent[facility]  [place] [place] - [address] [phone] [facility] [phone]      Last office visit with prescribing clinician: 3/10/2023   Last telemedicine visit with prescribing clinician: Visit date not found   Next office visit with prescribing clinician: 6/9/2023       Does the patient have less than a 3 day supply:  [x] Yes  [] No    Would you like a call back once the refill request has been completed: [] Yes [x] No    If the office needs to give you a call back, can they leave a voicemail: [] Yes [x] No    Jackelyn Gomez Rep   05/25/23 09:12 EDT             "

## 2023-05-26 DIAGNOSIS — M79.7 FIBROMYALGIA: ICD-10-CM

## 2023-05-26 RX ORDER — GABAPENTIN 300 MG/1
300 CAPSULE ORAL 3 TIMES DAILY PRN
Qty: 90 CAPSULE | Refills: 0 | Status: SHIPPED | OUTPATIENT
Start: 2023-05-26

## 2023-05-26 RX ORDER — GABAPENTIN 300 MG/1
300 CAPSULE ORAL 3 TIMES DAILY PRN
Qty: 90 CAPSULE | Refills: 0 | Status: SHIPPED | OUTPATIENT
Start: 2023-05-26 | End: 2023-05-26

## 2023-05-26 NOTE — TELEPHONE ENCOUNTER
Had requested refill for gabapentin.  I sent prescription to pharmacy.  Pablito reviewed and is consistent.

## 2023-05-30 ENCOUNTER — HOSPITAL ENCOUNTER (OUTPATIENT)
Dept: PET IMAGING | Facility: HOSPITAL | Age: 73
Discharge: HOME | End: 2023-05-30

## 2023-05-30 DIAGNOSIS — E27.8 ADRENAL NODULE: ICD-10-CM

## 2023-05-30 DIAGNOSIS — R93.5 ABNORMAL FINDINGS ON DIAGNOSTIC IMAGING OF OTHER ABDOMINAL REGIONS, INCLUDING RETROPERITONEUM: ICD-10-CM

## 2023-05-30 PROCEDURE — A9552 F18 FDG: HCPCS | Performed by: INTERNAL MEDICINE

## 2023-05-30 PROCEDURE — 0 FLUDEOXYGLUCOSE F18 SOLUTION: Performed by: INTERNAL MEDICINE

## 2023-05-30 PROCEDURE — 78815 PET IMAGE W/CT SKULL-THIGH: CPT

## 2023-05-30 RX ADMIN — FLUDEOXYGLUCOSE F18 1 DOSE: 300 INJECTION INTRAVENOUS at 12:48

## 2023-06-01 ENCOUNTER — OFFICE VISIT (OUTPATIENT)
Dept: ONCOLOGY | Facility: HOSPITAL | Age: 73
End: 2023-06-01
Payer: MEDICARE

## 2023-06-01 VITALS
TEMPERATURE: 97.1 F | OXYGEN SATURATION: 95 % | RESPIRATION RATE: 18 BRPM | WEIGHT: 180.34 LBS | SYSTOLIC BLOOD PRESSURE: 111 MMHG | HEART RATE: 78 BPM | HEIGHT: 62 IN | DIASTOLIC BLOOD PRESSURE: 54 MMHG | BODY MASS INDEX: 33.19 KG/M2

## 2023-06-01 DIAGNOSIS — C50.211 MALIGNANT NEOPLASM OF UPPER-INNER QUADRANT OF RIGHT FEMALE BREAST, UNSPECIFIED ESTROGEN RECEPTOR STATUS: ICD-10-CM

## 2023-06-01 DIAGNOSIS — Z12.31 ENCOUNTER FOR SCREENING MAMMOGRAM FOR MALIGNANT NEOPLASM OF BREAST: Primary | ICD-10-CM

## 2023-06-01 DIAGNOSIS — Z15.01 LI-FRAUMENI SYNDROME: ICD-10-CM

## 2023-06-01 RX ORDER — L. ACIDOPHILUS/PECTIN, CITRUS 25MM-100MG
TABLET ORAL
COMMUNITY
Start: 2023-04-21

## 2023-06-01 NOTE — PROGRESS NOTES
Patient  Dorothea Cruz    Location  [facility] HEMATOLOGY & ONCOLOGY    Chief Complaint  Malignant neoplasm of upper-inner quadrant of right female     Referring Provider: Bebe Nair MD PhD  PCP: Monserrat Grover APRN    Subjective     {Problem List  Visit Diagnosis   Encounters  Notes  Medications  Labs  Result Review Imaging  Media :23}     Oncology/Hematology History Overview Note     Right Triple Negative Breast Cancer:  - screening mammogram 9/10/21: 4mm asymmetry in the right upper inner breast  -Diagnostic mammogram on 2021: Persistent 5 mm ill-defined nodule in the upper inner mid right breast at 2:00, 7 cm from the nipple.  - core biopsy on 10/8/21: Invasive ductal carcinoma, grade 3, ER negative (less than 1%), NC negative (less than 1%), HER-2 equivocal (2+ HC), HER-2 FISH not amplified  - Right lumpectomy on 21. Pathology showed a 5mm tumor with negative margins, 0/3 lymph nodes involved, pT1aN0, ER-, NC-, HER2-  - chemotherapy was not recommended due to the small size of the tumor.   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  Mildly increased CD4 positive T cell LGL (5.2% of analyzed cells).  - CLL FISH positive for TP53/17p13 deletion in 49.5% of cells.  - IgVH somatic hypermutation was detected (3.7%)    Family History:    - brother with CLL  - father  from some type of leukemia  - sister with breast cancer and adult onset retinoblastoma which was fatal  - niece (sister's daughter) also had retinoblastoma as a child  - pt is considering genetic testing     Breast cancer   10/20/2021 Initial Diagnosis    Breast cancer (HCC)     Malignant neoplasm of upper-inner quadrant of right female breast   2021 Cancer Staged    Staging form: Breast, AJCC  8th Edition  - Pathologic: pT1a, pN0, cM0, ER-, MO-, HER2- - Signed by Santa Haskins APRN on 5/20/2022 1/13/2022 Initial Diagnosis    Malignant neoplasm of upper-inner quadrant of right female breast (HCC)     1/20/2022 - 2/14/2022 Radiation    Radiation OncologyTreatment Course:  Dorothea Cruz received 4256 cGy in 16 fractions to right breast.          History of Present Illness  ***    Review of Systems   Constitutional: Positive for fatigue (6/10). Negative for appetite change, diaphoresis, fever, unexpected weight gain and unexpected weight loss.   HENT: Negative for hearing loss, mouth sores, sore throat, swollen glands, trouble swallowing and voice change.    Eyes: Negative for blurred vision.   Respiratory: Negative for cough, shortness of breath and wheezing.    Cardiovascular: Negative for chest pain and palpitations.   Gastrointestinal: Negative for abdominal pain, blood in stool, constipation, diarrhea, nausea and vomiting.   Endocrine: Negative for cold intolerance and heat intolerance.   Genitourinary: Negative for difficulty urinating, dysuria, frequency, hematuria and urinary incontinence.   Musculoskeletal: Negative for arthralgias, back pain and myalgias.   Skin: Negative for rash, skin lesions and wound.   Neurological: Negative for dizziness, seizures, weakness, numbness and headache.   Hematological: Does not bruise/bleed easily.   Psychiatric/Behavioral: Negative for depressed mood. The patient is not nervous/anxious.    All other systems reviewed and are negative.      Past Medical History:   Diagnosis Date   • Anxiety    • Arthritis    • Breast cancer    • Cancer     BREAST CANCER   • Cholelithiases 4/30@1   • Depression    • Diabetes mellitus    • Disease of thyroid gland     currently not taking any meds   • Diverticulitis    • Diverticulitis of colon    • Diverticulosis 95   • Fibromyalgia    • Fibromyalgia, primary    • Hyperlipidemia    • Hypertension    • Liver disease    •  Major depressive disorder 2020   • Palpitations     ASYMPTOMATIC- DENIES CP/SOB, SEE PCP- NO CARDIOLOGIST    • Post-menopause 2020   • Sinus trouble    • Sleep apnea    • Tremor     I was on medicine for tremors   • Urinary tract infection    • Vitamin D deficiency 2020     Past Surgical History:   Procedure Laterality Date   • ABDOMINAL SURGERY  1985   • APPENDECTOMY     • BILATERAL BREAST REDUCTION     • BREAST BIOPSY Right 2021    Procedure: RIGHT BREAST NEEDLE LOCALIZED  LUMPECTOMY WITH SENTINEL NODE BIOPSY;  Surgeon: Mirtha Esteves MD;  Location: [facility] OR OSC;  Service: General;  Laterality: Right;   • BREAST LUMPECTOMY     • CHOLECYSTECTOMY N/A 2021    Procedure: CHOLECYSTECTOMY LAPAROSCOPIC INTRAOPERATIVE CHOLANGIOGRAM;  Surgeon: Louie Medina MD;  Location: [facility] MAIN OR;  Service: General;  Laterality: N/A;   • COLONOSCOPY     • COLONOSCOPY N/A 2022    Procedure: COLONOSCOPY WITH BIOPSIES;  Surgeon: Butch Garcia MD;  Location: [facility] ENDOSCOPY;  Service: Gastroenterology;  Laterality: N/A;  DIVERTICULOSIS   • HYSTERECTOMY     • LYMPH NODE BIOPSY     • SINUS SURGERY     • TONSILLECTOMY     • US GUIDED CYST ASPIRATION BREAST N/A 10/3/2022   • WRIST ARTHROPLASTY Bilateral      Social History     Socioeconomic History   • Marital status:    Tobacco Use   • Smoking status: Never     Passive exposure: Yes   • Smokeless tobacco: Never   Vaping Use   • Vaping Use: Never used   Substance and Sexual Activity   • Alcohol use: Never   • Drug use: Never   • Sexual activity: Not Currently     Partners: Male     Birth control/protection: None     Family History   Problem Relation Age of Onset   • No Known Problems Mother    • Cancer Father         Leukemia     • Breast cancer Sister    • Cancer Sister         Had breast cancer   • Cancer Brother         Leukemia   • Cancer Sister         Had stomach cancer   • Cancer Brother          Bladder cancer   • Cancer Brother         Bladder cancer   • Cancer Sister         Retinal blastoma.   • Colon cancer Neg Hx        Objective   Physical Exam  ***    There were no vitals filed for this visit.            PHQ-9 Total Score:         Result Review :{Labs  Result Review  Imaging  Med Tab  Media  Procedures :23}   The following data was reviewed by: Tami Chicas MA on 06/01/2023:  Lab Results   Component Value Date    HGB 15.3 04/28/2023    HCT 45.0 04/28/2023    MCV 94.9 04/28/2023     04/28/2023    WBC 16.02 (H) 04/28/2023    NEUTROABS 3.04 04/28/2023    LYMPHSABS 12.34 (H) 04/20/2023    MONOSABS 1.98 (H) 04/20/2023    EOSABS 0.32 04/28/2023    BASOSABS 0.03 04/20/2023     Lab Results   Component Value Date    GLUCOSE 97 04/28/2023    BUN 10 04/28/2023    CREATININE 0.76 04/28/2023     04/28/2023    K 3.5 04/28/2023     04/28/2023    CO2 28.6 04/28/2023    CALCIUM 10.0 04/28/2023    PROTEINTOT 7.2 04/28/2023    ALBUMIN 4.3 04/28/2023    BILITOT 0.3 04/28/2023    ALKPHOS 57 04/28/2023    AST 19 04/28/2023    ALT 21 04/28/2023     No results found for: IRON, LABIRON, TRANSFERRIN, TIBC  Lab Results   Component Value Date    [id number] 317 03/08/2022    FOLATE 14.40 03/08/2022     No results found for: PSA, CEA, AFP, ,        Assessment and Plan {CC Problem List  Visit Diagnosis   ROS  Review (Popup)  Health Maintenance  Quality  BestPractice  Medications  SmartSets  SnapShot Encounters  Media :23}   There are no diagnoses linked to this encounter.      Patient was given instructions and counseling regarding her condition or for health maintenance advice. Please see specific information pulled into the AVS if appropriate.     Tami Chicas MA    6/1/2023       (H) 07/05/2023    NEUTROABS 3.94 07/05/2023    LYMPHSABS 16.74 (H) 07/05/2023    MONOSABS 1.24 (H) 07/05/2023    EOSABS 0.19 07/05/2023    BASOSABS 0.12 07/05/2023     Lab Results   Component Value Date    GLUCOSE 97 04/28/2023    BUN 10 04/28/2023    CREATININE 0.90 06/19/2023     04/28/2023    K 3.5 04/28/2023     04/28/2023    CO2 28.6 04/28/2023    CALCIUM 10.0 04/28/2023    PROTEINTOT 7.2 04/28/2023    ALBUMIN 4.3 04/28/2023    BILITOT 0.3 04/28/2023    ALKPHOS 57 04/28/2023    AST 19 04/28/2023    ALT 21 04/28/2023     No results found for: IRON, LABIRON, TRANSFERRIN, TIBC  Lab Results   Component Value Date    [id number] 317 03/08/2022    FOLATE 14.40 03/08/2022     No results found for: PSA, CEA, AFP, ,        Assessment and Plan    Diagnoses and all orders for this visit:    1. CLL (chronic lymphocytic leukemia) (Primary)    2. Malignant neoplasm of upper-inner quadrant of right female breast, unspecified estrogen receptor status  -     Ambulatory Referral to Genetic Counseling/Testing    3. Encounter for screening mammogram for malignant neoplasm of breast  -     Mammo screening digital tomosynthesis bilateral w CAD; Future    4. Li-Fraumeni syndrome  -     MRI Brain With & Without Contrast; Future        Li-Fraumeni Syndrome: pathologic germline p53 mutation. I will refer the pt to genetic counseling for guidance of the needed screening tests.  I will order a brain MRI at this time and defer further imaging.      Right Triple Negative Breast Cancer: s/p lumpectomy November 2021. Chemotherapy was not recommended due to the small size of the tumor.  She completed adjuvant radiation in March.  Repeat screening mammogram will be due in September.  DEXA scan will be due in 2025.    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 Right now the patient does not meet criteria for treatment.    Adrenal Nodule: not hypermetabolic on recent PET.  Given her underlying genetic mutation I am  uncertain as to whether this should continue to be followed.  It will likely be followed on other recommended imaging per genetics.         I spent 42 minutes caring for Dorothea on this date of service. This time includes time spent by me in the following activities: preparing for the visit, reviewing tests, performing a medically appropriate examination and/or evaluation, counseling and educating the patient/family/caregiver, ordering medications, tests, or procedures, referring and communicating with other health care professionals, documenting information in the medical record, and independently interpreting results and communicating that information with the patient/family/caregiver     Patient was given instructions and counseling regarding her condition or for health maintenance advice. Please see specific information pulled into the AVS if appropriate.     Bebe Nair MD PhD    7/17/2023

## 2023-06-09 ENCOUNTER — OFFICE VISIT (OUTPATIENT)
Dept: FAMILY MEDICINE CLINIC | Facility: CLINIC | Age: 73
End: 2023-06-09
Payer: MEDICARE

## 2023-06-09 VITALS
TEMPERATURE: 97.8 F | SYSTOLIC BLOOD PRESSURE: 131 MMHG | OXYGEN SATURATION: 98 % | HEIGHT: 62 IN | WEIGHT: 179.4 LBS | DIASTOLIC BLOOD PRESSURE: 66 MMHG | HEART RATE: 65 BPM | BODY MASS INDEX: 33.01 KG/M2

## 2023-06-09 DIAGNOSIS — M79.7 FIBROMYALGIA: ICD-10-CM

## 2023-06-09 DIAGNOSIS — F32.1 CURRENT MODERATE EPISODE OF MAJOR DEPRESSIVE DISORDER, UNSPECIFIED WHETHER RECURRENT: Primary | ICD-10-CM

## 2023-06-09 DIAGNOSIS — I10 PRIMARY HYPERTENSION: ICD-10-CM

## 2023-06-09 DIAGNOSIS — E78.2 MIXED HYPERLIPIDEMIA: ICD-10-CM

## 2023-06-09 PROCEDURE — 3075F SYST BP GE 130 - 139MM HG: CPT | Performed by: NURSE PRACTITIONER

## 2023-06-09 PROCEDURE — 1159F MED LIST DOCD IN RCRD: CPT | Performed by: NURSE PRACTITIONER

## 2023-06-09 PROCEDURE — 1160F RVW MEDS BY RX/DR IN RCRD: CPT | Performed by: NURSE PRACTITIONER

## 2023-06-09 PROCEDURE — 3078F DIAST BP <80 MM HG: CPT | Performed by: NURSE PRACTITIONER

## 2023-06-09 PROCEDURE — 99214 OFFICE O/P EST MOD 30 MIN: CPT | Performed by: NURSE PRACTITIONER

## 2023-06-09 RX ORDER — DESVENLAFAXINE 100 MG/1
100 TABLET, EXTENDED RELEASE ORAL DAILY
Qty: 90 TABLET | Refills: 1 | Status: SHIPPED | OUTPATIENT
Start: 2023-06-09 | End: 2023-06-12 | Stop reason: SDUPTHER

## 2023-06-09 RX ORDER — ROSUVASTATIN CALCIUM 20 MG/1
20 TABLET, COATED ORAL NIGHTLY
Qty: 90 TABLET | Refills: 1 | Status: SHIPPED | OUTPATIENT
Start: 2023-06-09

## 2023-06-09 NOTE — ASSESSMENT & PLAN NOTE
Patient's depression is recurrent and is moderate without psychosis. Their depression is currently active and the condition is improving with treatment. This will be reassessed in 3 months. F/U as described: Her depression has improved but she is still having fibromyalgia pain so we are increasing Pristiq dose to 100 mg .

## 2023-06-09 NOTE — PROGRESS NOTES
"Chief Complaint  Depression and Fibromyalgia    Subjective          Dorothea Cruz, 73 y.o. female presents to [facility]  History of Present Illness       PHQ-2 Depression Screening  Little interest or pleasure in doing things?     Feeling down, depressed, or hopeless?     PHQ-2 Total Score            Tobacco Use: Medium Risk    Smoking Tobacco Use: Never    Smokeless Tobacco Use: Never    Passive Exposure: Yes      Objective   Vital Signs:   /66 (BP Location: Left arm, Patient Position: Sitting, Cuff Size: Adult)   Pulse 65   Temp 97.8 °F (36.6 °C)   Ht 157.5 cm (62.01\")   Wt 81.4 kg (179 lb 6.4 oz)   SpO2 98%   BMI 32.80 kg/m²       Current Outpatient Medications:     Cholecalciferol (Vitamin D) 50 MCG (2000 UT) capsule, Take 1 capsule by mouth Daily., Disp: 90 capsule, Rfl: 3    colestipol (Colestid) 1 g tablet, Take 2 tablets by mouth 2 (Two) Times a Day., Disp: 360 tablet, Rfl: 1    desvenlafaxine (Pristiq) 100 MG 24 hr tablet, Take 1 tablet by mouth Daily., Disp: 90 tablet, Rfl: 1    gabapentin (NEURONTIN) 300 MG capsule, Take 1 capsule by mouth 3 (Three) Times a Day As Needed (neuropathy)., Disp: 90 capsule, Rfl: 0    hydroCHLOROthiazide (HYDRODIURIL) 25 MG tablet, Take 1 tablet by mouth Daily., Disp: 90 tablet, Rfl: 1    HYDROcodone-acetaminophen (NORCO) 5-325 MG per tablet, Take 1 tablet by mouth Daily., Disp: , Rfl:     Lactobacillus Acid-Pectin (Acidophilus/Citrus Pectin) tablet tablet, , Disp: , Rfl:     Lactobacillus Probiotic tablet, Take 1 tablet by mouth 2 (Two) Times a Day., Disp: 180 tablet, Rfl: 3    metoprolol tartrate (LOPRESSOR) 25 MG tablet, Take 1 tablet by mouth 2 (Two) Times a Day., Disp: 180 tablet, Rfl: 1    rosuvastatin (CRESTOR) 20 MG tablet, Take 1 tablet by mouth Every Night., Disp: 90 tablet, Rfl: 1   Past Medical History:   Diagnosis Date    Anxiety     Arthritis     Breast cancer     Cancer     BREAST CANCER    Cataract     " Cholelithiases 4/30@1    Clotting disorder     Depression     Diabetes mellitus     Disease of thyroid gland     currently not taking any meds    Diverticulitis     Diverticulitis of colon     Diverticulosis 95    Fibromyalgia     Fibromyalgia, primary     HIV disease     Hyperlipidemia     Hypertension     Irritable bowel syndrome     Kidney stone     Liver disease     Low back pain 2015    Major depressive disorder 05/07/2020    Palpitations     ASYMPTOMATIC- DENIES CP/SOB, SEE PCP- NO CARDIOLOGIST     Post-menopause 09/17/2020    Sinus trouble     Sleep apnea     Substance abuse     Tremor 2018    I was on medicine for tremors    Urinary tract infection     Vitamin D deficiency 09/17/2020      Physical Exam   Result Review :   {The following data was reviewed by ARA Peck    MRI Abdomen With & Without Contrast    Result Date: 4/10/2023    1. Stable size of right adrenal mass likely adenoma.  Continued serial follow-up would be recommended to document long-term stability.     VIVIEN WHALEY MD       Electronically Signed and Approved By: VIVIEN WHALEY MD on 4/10/2023 at 13:49             CT Abdomen Pelvis With Contrast    Result Date: 4/28/2023    1. Evidence for mild early signs of developing acute diverticulitis involving the descending colon in the left abdomen.  There is no evidence for perforation.  There is no evidence for abscess. 2. Stable right adrenal mass suggesting a complex adenoma. 3. Mildly prominent nonspecific lymph nodes involving the retroperitoneal soft tissues at the level of the mid to upper abdomen.  The findings may be reactive in nature.  Recommend follow-up CT in 6 months to assess for stability/resolution.      FACUNDO OTERO MD       Electronically Signed and Approved By: FACUNDO OETRO MD on 4/28/2023 at 14:17             NM PET/CT Skull Base to Mid Thigh    Result Date: 5/30/2023    1. Stable right adrenal mass likely an adenoma given the findings on prior adrenal  protocol CT and lack of hypermetabolic activity on today's PET scan.  2. No hypermetabolic activity seen within the neck, chest, abdomen, or pelvis to suggest metastatic disease.     VIVIEN WHALEY MD       Electronically Signed and Approved By: VIVIEN WHALEY MD on 5/30/2023 at 15:58             CT Abdomen Adrenals With & Without Contrast    Result Date: 4/19/2023    1. There is a 3.4 centimeter right adrenal mass which has slowly increased in size compared with more remote CT scans.  On CT scan from 2019 this nodule measured up to about 1.4 centimeters.  Absolute washout measures about 65 percent which is suggestive of lipid poor adenoma however there are several features which are concerning including heterogeneous enhancement.  The posterior portion of the lesion demonstrates venous phase enhancement of about 140 Hounsfield units.  Patient reportedly has history of breast cancer.  A PET-CT scan may be useful to evaluate for any for hypermetabolic activity although given the very slow increase in size, metastatic lesion unlikely.  Correlate clinically and with lab values to exclude pheochromocytoma.  Biopsy may be considered.  Recommend at least continued attention on follow-up. 2. Increased number of retroperitoneal lymph nodes which have increased compared more remote CT scans.  The largest lymph node measures up to about 1.5 x 0.9 centimeters.  Etiology is indeterminate. 3. Other findings as above.      MERY MCDOWELL MD       Electronically Signed and Approved By: MERY MCDOWELL MD on 4/19/2023 at 15:16                            Assessment and Plan    Diagnoses and all orders for this visit:    1. Current moderate episode of major depressive disorder, unspecified whether recurrent (Primary)  Assessment & Plan:  Patient's depression is recurrent and is moderate without psychosis. Their depression is currently active and the condition is improving with treatment. This will be reassessed in 3 months. F/U  as described: Her depression has improved but she is still having fibromyalgia pain so we are increasing Pristiq dose to 100 mg .    Orders:  -     desvenlafaxine (Pristiq) 100 MG 24 hr tablet; Take 1 tablet by mouth Daily.  Dispense: 90 tablet; Refill: 1    2. Mixed hyperlipidemia  Assessment & Plan:  Lipid abnormalities are improving with treatment.  Pharmacotherapy as ordered.  Lipids will be reassessed in 3 months.    Orders:  -     rosuvastatin (CRESTOR) 20 MG tablet; Take 1 tablet by mouth Every Night.  Dispense: 90 tablet; Refill: 1    3. Fibromyalgia  Assessment & Plan:  Fibromyalgia not well controlled.  I will increase Pristiq dose to 100 mg to see if she gets better control of her fibromyalgia.  New prescription sent to pharmacy.    Orders:  -     desvenlafaxine (Pristiq) 100 MG 24 hr tablet; Take 1 tablet by mouth Daily.  Dispense: 90 tablet; Refill: 1    4. Primary hypertension  Assessment & Plan:  Hypertension is improving with treatment.  Continue current treatment regimen.  Continue current medications.  Blood pressure will be reassessed in 3 months.          Follow Up   Return in about 3 months (around 9/9/2023) for Next scheduled follow up depression/fibromyalgia/HLD/HTN.  Patient was given instructions and counseling regarding her condition or for health maintenance advice. Please see specific information pulled into the AVS if appropriate.     Parts of this note are electronic transcriptions/translations of spoken language to printed text using the Dragon Dictation system.      Monserrat Grover, ARA  06/09/2023

## 2023-06-09 NOTE — ASSESSMENT & PLAN NOTE
Fibromyalgia not well controlled.  I will increase Pristiq dose to 100 mg to see if she gets better control of her fibromyalgia.  New prescription sent to pharmacy.

## 2023-06-09 NOTE — PROGRESS NOTES
"Chief Complaint  Depression and Fibromyalgia    Subjective          Dorothea Cruz, 73 y.o. female presents to [facility]  History of Present Illness   Patient presents today for a follow-up and medication refills.    Depression and fibromyalgia: She states she feels her depression is doing well with the Pristiq 50 mg daily.  She states she is still having fibromyalgia pain and wants to know if the dose of Pristiq could be increased.  She only takes gabapentin once or twice per day, does not like to take it that often.  She takes a pain pill at bedtime which helps some.    Hyperlipidemia: Her last lipid panel was 3 months ago and was stable, on Crestor 20 mg every evening.    Hypertension: Blood pressure is stable on metoprolol 25 mg twice daily and hydrochlorothiazide 25 mg daily.    She has chronic lymphocytic leukemia, follows with oncology Dr. Nair.  She states that she is tested positive for T p53 so she is now scheduled for a MRI brain scan on 6/19/2023.     Tobacco Use: Medium Risk    Smoking Tobacco Use: Never    Smokeless Tobacco Use: Never    Passive Exposure: Yes      Objective   Vital Signs:   /66 (BP Location: Left arm, Patient Position: Sitting, Cuff Size: Adult)   Pulse 65   Temp 97.8 °F (36.6 °C)   Ht 157.5 cm (62.01\")   Wt 81.4 kg (179 lb 6.4 oz)   SpO2 98%   BMI 32.80 kg/m²       Current Outpatient Medications:     Cholecalciferol (Vitamin D) 50 MCG (2000 UT) capsule, Take 1 capsule by mouth Daily., Disp: 90 capsule, Rfl: 3    colestipol (Colestid) 1 g tablet, Take 2 tablets by mouth 2 (Two) Times a Day., Disp: 360 tablet, Rfl: 1    desvenlafaxine (Pristiq) 100 MG 24 hr tablet, Take 1 tablet by mouth Daily., Disp: 90 tablet, Rfl: 1    gabapentin (NEURONTIN) 300 MG capsule, Take 1 capsule by mouth 3 (Three) Times a Day As Needed (neuropathy)., Disp: 90 capsule, Rfl: 0    hydroCHLOROthiazide (HYDRODIURIL) 25 MG tablet, Take 1 tablet by mouth Daily., " Spoke with patient and advised her of her labs results, also advised her of the following per Dr. Chitra Jenkins: \"It could be related to 1 of her medications.  It could be inherited. \"  Patient verbalized understanding. Disp: 90 tablet, Rfl: 1    HYDROcodone-acetaminophen (NORCO) 5-325 MG per tablet, Take 1 tablet by mouth Daily., Disp: , Rfl:     Lactobacillus Acid-Pectin (Acidophilus/Citrus Pectin) tablet tablet, , Disp: , Rfl:     Lactobacillus Probiotic tablet, Take 1 tablet by mouth 2 (Two) Times a Day., Disp: 180 tablet, Rfl: 3    metoprolol tartrate (LOPRESSOR) 25 MG tablet, Take 1 tablet by mouth 2 (Two) Times a Day., Disp: 180 tablet, Rfl: 1    rosuvastatin (CRESTOR) 20 MG tablet, Take 1 tablet by mouth Every Night., Disp: 90 tablet, Rfl: 1   Past Medical History:   Diagnosis Date    Anxiety     Arthritis     Breast cancer     Cancer     BREAST CANCER    Cataract     Cholelithiases 4/30@1    Clotting disorder     Depression     Diabetes mellitus     Disease of thyroid gland     currently not taking any meds    Diverticulitis     Diverticulitis of colon     Diverticulosis 95    Fibromyalgia     Fibromyalgia, primary     HIV disease     Hyperlipidemia     Hypertension     Irritable bowel syndrome     Kidney stone     Liver disease     Low back pain 2015    Major depressive disorder 05/07/2020    Palpitations     ASYMPTOMATIC- DENIES CP/SOB, SEE PCP- NO CARDIOLOGIST     Post-menopause 09/17/2020    Sinus trouble     Sleep apnea     Substance abuse     Tremor 2018    I was on medicine for tremors    Urinary tract infection     Vitamin D deficiency 09/17/2020      Physical Exam  Vitals reviewed.   Constitutional:       Appearance: Normal appearance. She is well-developed. She is obese.   Neck:      Thyroid: No thyroid mass, thyromegaly or thyroid tenderness.   Cardiovascular:      Rate and Rhythm: Normal rate and regular rhythm.      Heart sounds: No murmur heard.    No friction rub. No gallop.   Pulmonary:      Effort: Pulmonary effort is normal.      Breath sounds: Normal breath sounds. No wheezing or rhonchi.   Lymphadenopathy:      Cervical: No cervical adenopathy.   Skin:     General: Skin is warm and dry.   Neurological:       Mental Status: She is alert and oriented to person, place, and time.      Cranial Nerves: No cranial nerve deficit.   Psychiatric:         Mood and Affect: Mood and affect normal.         Behavior: Behavior normal.         Thought Content: Thought content normal. Thought content does not include homicidal or suicidal ideation.         Judgment: Judgment normal.      Result Review :   {The following data was reviewed by ARA Peck    MRI Abdomen With & Without Contrast    Result Date: 4/10/2023    1. Stable size of right adrenal mass likely adenoma.  Continued serial follow-up would be recommended to document long-term stability.     VIVIEN WHALEY MD       Electronically Signed and Approved By: VIVIEN WHALEY MD on 4/10/2023 at 13:49             CT Abdomen Pelvis With Contrast    Result Date: 4/28/2023    1. Evidence for mild early signs of developing acute diverticulitis involving the descending colon in the left abdomen.  There is no evidence for perforation.  There is no evidence for abscess. 2. Stable right adrenal mass suggesting a complex adenoma. 3. Mildly prominent nonspecific lymph nodes involving the retroperitoneal soft tissues at the level of the mid to upper abdomen.  The findings may be reactive in nature.  Recommend follow-up CT in 6 months to assess for stability/resolution.      FACUNDO OTERO MD       Electronically Signed and Approved By: FACUNDO OTERO MD on 4/28/2023 at 14:17             NM PET/CT Skull Base to Mid Thigh    Result Date: 5/30/2023    1. Stable right adrenal mass likely an adenoma given the findings on prior adrenal protocol CT and lack of hypermetabolic activity on today's PET scan.  2. No hypermetabolic activity seen within the neck, chest, abdomen, or pelvis to suggest metastatic disease.     VIVIEN WAHLEY MD       Electronically Signed and Approved By: VIVIEN WHALEY MD on 5/30/2023 at 15:58             CT Abdomen Adrenals With & Without Contrast    Result  Date: 4/19/2023    1. There is a 3.4 centimeter right adrenal mass which has slowly increased in size compared with more remote CT scans.  On CT scan from 2019 this nodule measured up to about 1.4 centimeters.  Absolute washout measures about 65 percent which is suggestive of lipid poor adenoma however there are several features which are concerning including heterogeneous enhancement.  The posterior portion of the lesion demonstrates venous phase enhancement of about 140 Hounsfield units.  Patient reportedly has history of breast cancer.  A PET-CT scan may be useful to evaluate for any for hypermetabolic activity although given the very slow increase in size, metastatic lesion unlikely.  Correlate clinically and with lab values to exclude pheochromocytoma.  Biopsy may be considered.  Recommend at least continued attention on follow-up. 2. Increased number of retroperitoneal lymph nodes which have increased compared more remote CT scans.  The largest lymph node measures up to about 1.5 x 0.9 centimeters.  Etiology is indeterminate. 3. Other findings as above.      MERY MCDOWELL MD       Electronically Signed and Approved By: MERY MCDOWELL MD on 4/19/2023 at 15:16             CMP   CMP          3/2/2023    07:58 4/19/2023    09:17 4/28/2023    09:54   CMP   Glucose 104   97    BUN 18   10    Creatinine 0.88  0.70  0.76    EGFR 69.9  92.0  83.4    Sodium 143   140    Potassium 4.1   3.5    Chloride 101   101    Calcium 10.4   10.0    Total Protein 6.3   7.2    Albumin 4.8   4.3    Globulin 1.5   2.9    Total Bilirubin 0.5   0.3    Alkaline Phosphatase 55   57    AST (SGOT) 24   19    ALT (SGPT) 24   21    Albumin/Globulin Ratio 3.2   1.5    BUN/Creatinine Ratio 20.5   13.2    Anion Gap 11.8   10.4      LIPID   Lipid Panel          9/27/2022    10:59 3/2/2023    07:58   Lipid Panel   Total Cholesterol 145  148    Triglycerides 244  284    HDL Cholesterol 55  50    VLDL Cholesterol 38  44    LDL Cholesterol  52   54    LDL/HDL Ratio 0.75  0.82      TSH   TSH          3/2/2023    07:58   TSH   TSH 3.940      VITD   Lab Results   Component Value Date    JFBI95AC 47.6 03/02/2023              Assessment and Plan    Diagnoses and all orders for this visit:    1. Current moderate episode of major depressive disorder, unspecified whether recurrent (Primary)  Assessment & Plan:  Patient's depression is recurrent and is moderate without psychosis. Their depression is currently active and the condition is improving with treatment. This will be reassessed in 3 months. F/U as described: Her depression has improved but she is still having fibromyalgia pain so we are increasing Pristiq dose to 100 mg .    Orders:  -     desvenlafaxine (Pristiq) 100 MG 24 hr tablet; Take 1 tablet by mouth Daily.  Dispense: 90 tablet; Refill: 1    2. Mixed hyperlipidemia  Assessment & Plan:  Lipid abnormalities are improving with treatment.  Pharmacotherapy as ordered.  Lipids will be reassessed in 3 months.    Orders:  -     rosuvastatin (CRESTOR) 20 MG tablet; Take 1 tablet by mouth Every Night.  Dispense: 90 tablet; Refill: 1    3. Fibromyalgia  Assessment & Plan:  Fibromyalgia not well controlled.  I will increase Pristiq dose to 100 mg to see if she gets better control of her fibromyalgia.  New prescription sent to pharmacy.    Orders:  -     desvenlafaxine (Pristiq) 100 MG 24 hr tablet; Take 1 tablet by mouth Daily.  Dispense: 90 tablet; Refill: 1    4. Primary hypertension  Assessment & Plan:  Hypertension is improving with treatment.  Continue current treatment regimen.  Continue current medications.  Blood pressure will be reassessed in 3 months.          Follow Up   Return in about 3 months (around 9/9/2023) for Next scheduled follow up depression/fibromyalgia/HLD/HTN.  Patient was given instructions and counseling regarding her condition or for health maintenance advice. Please see specific information pulled into the AVS if appropriate.      Parts of this note are electronic transcriptions/translations of spoken language to printed text using the Dragon Dictation system.      Monserrat Grover, ARA  06/09/2023

## 2023-06-12 DIAGNOSIS — F32.1 CURRENT MODERATE EPISODE OF MAJOR DEPRESSIVE DISORDER, UNSPECIFIED WHETHER RECURRENT: ICD-10-CM

## 2023-06-12 DIAGNOSIS — M79.7 FIBROMYALGIA: ICD-10-CM

## 2023-06-12 RX ORDER — DESVENLAFAXINE 100 MG/1
100 TABLET, EXTENDED RELEASE ORAL DAILY
Qty: 90 TABLET | Refills: 1 | Status: SHIPPED | OUTPATIENT
Start: 2023-06-12

## 2023-06-12 NOTE — TELEPHONE ENCOUNTER
"Caller: Dorothea Cruz \"Taniya\"    Relationship: Self    Best call back number: [phone]     Requested Prescriptions:   Requested Prescriptions     Pending Prescriptions Disp Refills    desvenlafaxine (Pristiq) 100 MG 24 hr tablet 90 tablet 1     Sig: Take 1 tablet by mouth Daily.        Pharmacy where request should be sent: [id number] - [place] - [address]  [facility] [address] - [phone]  - [phone] FX     Last office visit with prescribing clinician: 6/9/2023   Last telemedicine visit with prescribing clinician: Visit date not found   Next office visit with prescribing clinician: 9/11/2023     Additional details provided by patient: PATIENT STATES THAT SHE LOST THIS MEDICATION AND CANNOT FIND IT ANYWHERE. SHE IS HAVING RINGING IN HER EARS, ANXIETY. PLEASE CALL PATIENT IF UNABLE TO FILL.     Does the patient have less than a 3 day supply:  [x] Yes  [] No    Would you like a call back once the refill request has been completed: [] Yes [] No    If the office needs to give you a call back, can they leave a voicemail: [] Yes [] No    Jackelyn Cobos Rep   06/12/23 08:37 EDT       "

## 2023-07-05 ENCOUNTER — OFFICE VISIT (OUTPATIENT)
Dept: ONCOLOGY | Facility: HOSPITAL | Age: 73
End: 2023-07-05
Payer: MEDICARE

## 2023-07-05 VITALS
TEMPERATURE: 97.1 F | DIASTOLIC BLOOD PRESSURE: 56 MMHG | RESPIRATION RATE: 18 BRPM | HEIGHT: 62 IN | SYSTOLIC BLOOD PRESSURE: 108 MMHG | BODY MASS INDEX: 33.43 KG/M2 | OXYGEN SATURATION: 94 % | HEART RATE: 68 BPM | WEIGHT: 181.66 LBS

## 2023-07-05 DIAGNOSIS — C50.211 MALIGNANT NEOPLASM OF UPPER-INNER QUADRANT OF RIGHT FEMALE BREAST, UNSPECIFIED ESTROGEN RECEPTOR STATUS: Primary | ICD-10-CM

## 2023-07-05 DIAGNOSIS — C91.10 CLL (CHRONIC LYMPHOCYTIC LEUKEMIA): ICD-10-CM

## 2023-07-05 PROCEDURE — G0463 HOSPITAL OUTPT CLINIC VISIT: HCPCS | Performed by: INTERNAL MEDICINE

## 2023-07-05 NOTE — PROGRESS NOTES
Patient  Dorothea Cruz    Location  [facility] HEMATOLOGY & ONCOLOGY    Chief Complaint  Breast Cancer    Referring Provider: TYRELL Peck*  PCP: Monserrat Grover APRN    Subjective     {Problem List  Visit Diagnosis   Encounters  Notes  Medications  Labs  Result Review Imaging  Media :23}     Oncology/Hematology History Overview Note     Right Triple Negative Breast Cancer:  - screening mammogram 9/10/21: 4mm asymmetry in the right upper inner breast  -Diagnostic mammogram on 2021: Persistent 5 mm ill-defined nodule in the upper inner mid right breast at 2:00, 7 cm from the nipple.  - core biopsy on 10/8/21: Invasive ductal carcinoma, grade 3, ER negative (less than 1%), DE negative (less than 1%), HER-2 equivocal (2+ HC), HER-2 FISH not amplified  - Right lumpectomy on 21. Pathology showed a 5mm tumor with negative margins, 0/3 lymph nodes involved, pT1aN0, ER-, DE-, HER2-  - chemotherapy was not recommended due to the small size of the tumor.   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  Mildly increased CD4 positive T cell LGL (5.2% of analyzed cells).  - CLL FISH positive for TP53/17p13 deletion in 49.5% of cells.  - IgVH somatic hypermutation was detected (3.7%)    Family History:    - brother with CLL  - father  from some type of leukemia  - sister with breast cancer and adult onset retinoblastoma which was fatal  - niece (sister's daughter) also had retinoblastoma as a child  - pt is considering genetic testing     Breast cancer   10/20/2021 Initial Diagnosis    Breast cancer (HCC)     Malignant neoplasm of upper-inner quadrant of right female breast   2021 Cancer Staged    Staging form: Breast, AJCC 8th Edition  - Pathologic: pT1a, pN0, cM0,  ER-, MI-, HER2- - Signed by Santa Haskins APRN on 5/20/2022 1/13/2022 Initial Diagnosis    Malignant neoplasm of upper-inner quadrant of right female breast (HCC)     1/20/2022 - 2/14/2022 Radiation    Radiation OncologyTreatment Course:  Dorothea Cruz received 4256 cGy in 16 fractions to right breast.          History of Present Illness  ***    Review of Systems   Constitutional:  Positive for fatigue (6/10). Negative for appetite change, diaphoresis, fever, unexpected weight gain and unexpected weight loss.   HENT:  Negative for hearing loss, mouth sores, sore throat, swollen glands, trouble swallowing and voice change.    Eyes:  Negative for blurred vision.   Respiratory:  Negative for cough, shortness of breath and wheezing.    Cardiovascular:  Negative for chest pain and palpitations.   Gastrointestinal:  Negative for abdominal pain, blood in stool, constipation, diarrhea, nausea and vomiting.   Endocrine: Negative for cold intolerance and heat intolerance.   Genitourinary:  Negative for difficulty urinating, dysuria, frequency, hematuria and urinary incontinence.   Musculoskeletal:  Positive for back pain (8/10). Negative for arthralgias and myalgias.   Skin:  Negative for rash, skin lesions and wound.   Neurological:  Negative for dizziness, seizures, weakness, numbness and headache.   Hematological:  Does not bruise/bleed easily.   Psychiatric/Behavioral:  Negative for depressed mood. The patient is not nervous/anxious.    All other systems reviewed and are negative.    Past Medical History:   Diagnosis Date    Anxiety     Arthritis     Breast cancer     Cancer     BREAST CANCER    Cataract     Cholelithiases 4/30@1    Clotting disorder     Depression     Diabetes mellitus     Disease of thyroid gland     currently not taking any meds    Diverticulitis     Diverticulitis of colon     Diverticulosis 95    Fibromyalgia     Fibromyalgia, primary     HIV disease     Hyperlipidemia     Hypertension      Irritable bowel syndrome     Kidney stone     Liver disease     Low back pain 2015    Major depressive disorder 2020    Palpitations     ASYMPTOMATIC- DENIES CP/SOB, SEE PCP- NO CARDIOLOGIST     Post-menopause 2020    Sinus trouble     Sleep apnea     Substance abuse     Tremor     I was on medicine for tremors    Urinary tract infection     Vitamin D deficiency 2020     Past Surgical History:   Procedure Laterality Date    ABDOMINAL SURGERY  1985    APPENDECTOMY      BILATERAL BREAST REDUCTION      BREAST BIOPSY Right 2021    Procedure: RIGHT BREAST NEEDLE LOCALIZED  LUMPECTOMY WITH SENTINEL NODE BIOPSY;  Surgeon: Mirtha Esteves MD;  Location: [facility] OR OSC;  Service: General;  Laterality: Right;    BREAST LUMPECTOMY      CHOLECYSTECTOMY N/A 2021    Procedure: CHOLECYSTECTOMY LAPAROSCOPIC INTRAOPERATIVE CHOLANGIOGRAM;  Surgeon: Louie Medina MD;  Location: [facility] MAIN OR;  Service: General;  Laterality: N/A;    COLONOSCOPY  2019    COLONOSCOPY N/A 2022    Procedure: COLONOSCOPY WITH BIOPSIES;  Surgeon: Butch Garcia MD;  Location: [facility] ENDOSCOPY;  Service: Gastroenterology;  Laterality: N/A;  DIVERTICULOSIS    HYSTERECTOMY      LYMPH NODE BIOPSY      SEPTOPLASTY  2018    SINUS SURGERY  2018    TONSILLECTOMY      US GUIDED CYST ASPIRATION BREAST N/A 10/03/2022    WRIST ARTHROPLASTY Bilateral      Social History     Socioeconomic History    Marital status:    Tobacco Use    Smoking status: Never     Passive exposure: Yes    Smokeless tobacco: Never   Vaping Use    Vaping Use: Never used   Substance and Sexual Activity    Alcohol use: Never    Drug use: Never    Sexual activity: Not Currently     Partners: Male     Birth control/protection: None     Family History   Problem Relation Age of Onset    No Known Problems Mother     Cancer Father         Leukemia      Breast cancer Sister     Cancer Sister         Had breast cancer    Cancer  Brother         Leukemia    Cancer Sister         Had stomach cancer    Cancer Brother         Bladder cancer    Cancer Brother         Bladder cancer    Cancer Sister         Retinal blastoma.    Colon cancer Neg Hx        Objective   Physical Exam  ***    There were no vitals filed for this visit.            PHQ-9 Total Score:         Result Review :{Labs  Result Review  Imaging  Med Tab  Media  Procedures :23}   The following data was reviewed by: Tami Chicas MA on 07/05/2023:  Lab Results   Component Value Date    HGB 15.3 04/28/2023    HCT 45.0 04/28/2023    MCV 94.9 04/28/2023     04/28/2023    WBC 16.02 (H) 04/28/2023    NEUTROABS 3.04 04/28/2023    LYMPHSABS 12.34 (H) 04/20/2023    MONOSABS 1.98 (H) 04/20/2023    EOSABS 0.32 04/28/2023    BASOSABS 0.03 04/20/2023     Lab Results   Component Value Date    GLUCOSE 97 04/28/2023    BUN 10 04/28/2023    CREATININE 0.90 06/19/2023     04/28/2023    K 3.5 04/28/2023     04/28/2023    CO2 28.6 04/28/2023    CALCIUM 10.0 04/28/2023    PROTEINTOT 7.2 04/28/2023    ALBUMIN 4.3 04/28/2023    BILITOT 0.3 04/28/2023    ALKPHOS 57 04/28/2023    AST 19 04/28/2023    ALT 21 04/28/2023     No results found for: IRON, LABIRON, TRANSFERRIN, TIBC  Lab Results   Component Value Date    TVVXSOYO94 317 03/08/2022    FOLATE 14.40 03/08/2022     No results found for: PSA, CEA, AFP, ,        Assessment and Plan {CC Problem List  Visit Diagnosis   ROS  Review (Popup)  Health Maintenance  Quality  BestPractice  Medications  SmartSets  SnapShot Encounters  Media :23}   There are no diagnoses linked to this encounter.      Patient was given instructions and counseling regarding her condition or for health maintenance advice. Please see specific information pulled into the AVS if appropriate.     Tami Chicas MA    7/5/2023         0.12 07/05/2023     Lab Results   Component Value Date    GLUCOSE 97 04/28/2023    BUN 10 04/28/2023    CREATININE 0.90 06/19/2023     04/28/2023    K 3.5 04/28/2023     04/28/2023    CO2 28.6 04/28/2023    CALCIUM 10.0 04/28/2023    PROTEINTOT 7.2 04/28/2023    ALBUMIN 4.3 04/28/2023    BILITOT 0.3 04/28/2023    ALKPHOS 57 04/28/2023    AST 19 04/28/2023    ALT 21 04/28/2023     No results found for: IRON, LABIRON, TRANSFERRIN, TIBC  Lab Results   Component Value Date    [id number] 317 03/08/2022    FOLATE 14.40 03/08/2022     No results found for: PSA, CEA, AFP, ,        Assessment and Plan    Diagnoses and all orders for this visit:    1. Malignant neoplasm of upper-inner quadrant of right female breast, unspecified estrogen receptor status [C50.211 (ICD-10-CM)] (Primary)    2. CLL (chronic lymphocytic leukemia)  -     CBC & Differential; Future  -     CBC & Differential; Future        Li-Fraumeni Syndrome: pathologic germline p53 mutation. The patient has been referred for genetic counseling and the appointment is pending.  Screening Brain MRI on 6/19/23 was negative.        Right Triple Negative Breast Cancer: s/p lumpectomy November 2021. Chemotherapy was not recommended due to the small size of the tumor.  She completed adjuvant radiation in March.  Repeat screening mammogram will be due in September.  DEXA scan will be due in 2025. The patient may benefit from screening with breast MRI but I will defer to the guidance of the genetic counselor regarding the best approach to screening.  For example, some patients undergo whole body MRI which is done at another facility.     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 Right now the patient does not meet criteria for treatment. I will follow up with her in 3 months with repeat CBC and CMP.      Adrenal Nodule: not hypermetabolic on recent PET.  Given her underlying genetic mutation she was referred to an endocrinologist at [facility].  I reviewed records from Dr. Dallas who is continuing to evaluate the patient.      Patient was given instructions and counseling regarding her condition or for health maintenance advice. Please see specific information pulled into the AVS if appropriate.     Bebe Nair MD PhD    8/16/2023

## 2023-07-17 PROBLEM — Z15.01 LI-FRAUMENI SYNDROME: Status: ACTIVE | Noted: 2023-07-17

## 2023-08-03 DIAGNOSIS — E78.2 MIXED HYPERLIPIDEMIA: ICD-10-CM

## 2023-08-03 RX ORDER — ROSUVASTATIN CALCIUM 20 MG/1
20 TABLET, COATED ORAL NIGHTLY
Qty: 90 TABLET | Refills: 1 | Status: SHIPPED | OUTPATIENT
Start: 2023-08-03

## 2023-08-17 NOTE — INTERVAL H&P NOTE
H&P reviewed.  The patient was examined and there are no changes to the H&P  no reported nausea/vomiting/constipation/diarrhea. Last bowel movement per flow sheets 8/15.

## 2023-08-23 ENCOUNTER — TELEPHONE (OUTPATIENT)
Dept: ONCOLOGY | Facility: HOSPITAL | Age: 73
End: 2023-08-23

## 2023-08-23 NOTE — TELEPHONE ENCOUNTER
"  Caller: Dorothea Cruz \"Taniya\"    Relationship: Self    Best call back number: [phone]    What was the call regarding: PT CALLED WANTED TO KNOW WHEN HER GENETIC COUNSELING WOULD BE       "

## 2023-08-24 ENCOUNTER — HOSPITAL ENCOUNTER (OUTPATIENT)
Dept: OCCUPATIONAL THERAPY | Facility: HOSPITAL | Age: 73
Setting detail: THERAPIES SERIES
Discharge: HOME | End: 2023-08-24
Payer: MEDICARE

## 2023-09-01 ENCOUNTER — TELEPHONE (OUTPATIENT)
Dept: GENETICS | Facility: HOSPITAL | Age: 73
End: 2023-09-01
Payer: MEDICARE

## 2023-09-01 NOTE — TELEPHONE ENCOUNTER
Called pt to remind her of her upcoming genetic counseling appt. Reviewed family history with patient. Pt is aware this appt is by phone.

## 2023-09-05 ENCOUNTER — CLINICAL SUPPORT (OUTPATIENT)
Dept: GENETICS | Facility: HOSPITAL | Age: 73
End: 2023-09-05
Payer: MEDICARE

## 2023-09-05 DIAGNOSIS — C91.10 CLL (CHRONIC LYMPHOCYTIC LEUKEMIA): ICD-10-CM

## 2023-09-05 DIAGNOSIS — C50.211 MALIGNANT NEOPLASM OF UPPER-INNER QUADRANT OF RIGHT FEMALE BREAST, UNSPECIFIED ESTROGEN RECEPTOR STATUS: ICD-10-CM

## 2023-09-05 DIAGNOSIS — Z80.8 FAMILY HISTORY OF RETINOBLASTOMA: ICD-10-CM

## 2023-09-05 DIAGNOSIS — Z13.79 GENETIC TESTING: Primary | ICD-10-CM

## 2023-09-05 DIAGNOSIS — Z80.6 FAMILY HISTORY OF CLL (CHRONIC LYMPHOID LEUKEMIA): ICD-10-CM

## 2023-09-05 NOTE — PROGRESS NOTES
Dorothea Cruz, a 73-year-old female, was seen for genetic counseling due to a personal history of cancer and recent genetic testing that identified a mosaic likely pathogenic mutation in the TP53 gene. Genetic counseling was provided via telephone. Ms. Cruz confirmed her name, date of birth, and that she was in [place] at the time of the appointment. She was diagnosed with a right breast cancer at age 71. She had a lumpectomy and radiation therapy. She was recently diagnosed with chronic lymphocytic leukemia. She is currently not receiving any treatment for this but is being monitored. She had a complete hysterectomy at age 36 due to pelvic inflammatory disease. Ms. Cruz also has a nodule on her adrenal gland believed to be an adenoma. Her most recent colonoscopy was in 2022, and she reports a history of no polyps. Given her personal and family history, Dr. Nair ordered genetic testing via the Multi-Cancer panel, which analyzes BRCA1/2 and 82 additional genes associated with increased cancer risk.  Genetic testing identified a likely pathogenic mutation (deletion (entire coding sequences) in the TP53 gene. The testing lab, [facility], notes on the report that possible mosaicism was detected in the sample. This TP53 mutation was only present in a subset of an individual's cells. Additional testing may be needed for accurate interpretation of these results.  This is described in more detail below under Test Results.     PERTINENT FAMILY HISTORY: (See attached pedigree)   Brother 1:  CLL  Brother 2:  Bladder cancer  Brother 3:  Stomach cancer  Sister 1:  Breast cancer  Sister 2:  Stomach cancer  Sister 3:  Retinoblastoma  Niece:   Retinoblastoma  Father:   CLL  Mat Aunt:  Uterine cancer    Records regarding the family history were not provided for review.     GENETIC COUNSELING: We reviewed the family history information in detail. Cases of cancer follow three general patterns: sporadic, familial,  and hereditary.  While most cancer is sporadic, some cases appear to occur in family clusters. These cases are said to be familial and account for 10-20% of cancer cases. Familial cases may be due to a combination of shared genes and environmental factors among family members. In even fewer families (5-10%), the cancer is said to be inherited, and the genes responsible for the cancer are known.      Family histories typical of hereditary cancer syndromes usually include multiple first- and second-degree relatives diagnosed with cancer types that define a syndrome. These cases tend to be diagnosed at younger-than-expected ages and can be bilateral or multifocal. The cancer in these families follows an autosomal dominant inheritance pattern, which indicates the likely presence of a mutation in a cancer susceptibility gene. Children and siblings of an individual believed to carry this mutation have a 50% chance of inheriting that mutation, thereby inheriting the increased risk to develop cancer. These mutations can be passed down from the maternal or the paternal lineage.    Hereditary breast cancer accounts for 5-10% of all cases of breast cancer.  A significant proportion of hereditary breast and ovarian cancer can be attributed to mutations in the BRCA1 and BRCA2 genes.  Mutations in these genes confer an increased risk for breast cancer, ovarian cancer, male breast cancer, prostate cancer and pancreatic cancer; however, there are other genes known to contribute to cancer risk. We discussed the panel testing that was already completed for Ms. Cruz, which involved testing for BRCA1/2, as well as 82 additional genes that are associated with increased cancer risk. The testing was comprehensive and included clinically significant high risk and moderate risk cancer associated genes. A likely pathogenic mutation was identified in the TP53 gene and those results are described in more detail below.     TEST RESULTS:  Genetic testing was positive for a pathogenic mutation (Deletion (entire coding sequence) in the TP53 gene. [facility] includes on the test report that this specific mutation was observed to be possibly mosaic in this sample.      Classic LFS is associated with high lifetime risks of cancer, with the risk of cancer estimated at 50% by age 30 years and 90% by age 60. LFS-related cancers can occur in childhood or young adulthood, and individuals who have had one diagnosis have an increased risk for multiple primary cancers. LFS is characterized by development of the hallmark cancers including; soft tissue sarcoma, osteosarcoma, pre-menopausal breast cancer, brain tumors, adrenocortical carcinoma (ACC), and leukemias. In addition, a variety of other neoplasms may occur.       Literature has shown that some individuals who appear to have a germline TP53 mutation may have an alternative explanation for the genetic finding due to clonal hematopoiesis. In these cases, the finding is a result of a somatic (acquired) mutation rather than a germline mutation; this can be related to advanced age, prior chemotherapy, hematologic malignancy/pre-malignancy, or circulating tumor cells. Therefore, further investigation is typically recommended in order to determine if this is more likely to be a germline finding or a somatic mutation. In some cases, this can be very difficult to clarify. When possible, parental studies and testing other close relatives is the best next step. Ms. Cruz plans to talk to her children about getting testing done. Additionally, if they were to test positive, it would confirm that Ms. Cruz's TP53 result was reflective of a germline (hereditary) mutation. If all of her close relatives test negative, then testing a different tissue for Ms. Cruz (typically skin punch biopsy/fibroblast testing) could be considered.  If the mutation is not found in another tissue type, it does not rule out mosaicism,  but does increase the likelihood that this is a somatic (acquired) finding.     Should the mutation prove to be a germline change, Ms. Cruz's siblings and children would have a 50% chance of inheriting the TP53 mutation. Until each at-risk family member has been proven not to carry this TP53 mutation, he or she should be offered increased surveillance. We would be happy to see family members in our clinic to further discuss this information and testing options. They can request a referral to [facility], and our coordinator will contact the individual to schedule an appointment once the referral is received. They can call [phone] to receive more information on how to place the referral. For family members who live elsewhere, there are genetic counselors at most [facility]. They can find a genetic counselor by visiting the National Society of Genetic Counselors website at www.nsgc.org or they can call our office and we would be happy to give them the contact information of the closest genetic counselor.        We discussed the logistics of performing germline genetic testing for someone with a diagnosis of leukemia. Because Ms. Cruz has an active hematologic disease, the sensitivity and specificity of this genetic test result may be reduced. If a substantial number of circulating tumor cells were present at the time of sample collection, caution should be applied in interpreting this result. It is possible the mosaic pathogenic variant in TP53 is due to her active leukemia. We discussed the possibility that this could be a mutation present in her cancer and not in her germline (every cell in her body) and the potential for confirmatory testing using skin fibroblasts. We discussed the difficult of trying to do testing on skin fibroblasts at this time as the lab [facility] would [facility] to[facility], is not currently accepting these types of samples.    Four variants of uncertain  significance (VUS) were identified in the TP53 gene, the MSH6 gene, the PTCH1 gene, and the RB1 gene, however the majority of VUS's are ultimately reclassified as benign, therefore this result should not be used in making management decisions. If this variant is ever reclassified to a benign variant or a pathogenic mutation, a new report will be issued by the laboratory and released directly to the ordering physician, and our office. This assessment is based on the information provided at the time of the consultation.    MANAGEMENT GUIDELINES:   Ms. Cruz's screening and management should be determined by her oncology team. While we are uncertain as to the cause of Ms. Cruz's mosaic finding for TP53 mutation, current recommendations for classic LFS are outlined below. A large portion of the LFS management guidelines focus on breast cancer risk reduction. Ms. Cruz has had breast cancer and is in the process of making a surgical decision. Per current NCCN guidelines, therapeutic radiation therapy should be avoided when possible and diagnostic radiation should be minimized to the extent feasible without sacrificing accuracy.     The screening and management of LFS is complex, and when possible, it is ideal if individuals with LFS are followed at centers with expertise in this area.      NCCN recommendations for classic LFS include:  Comprehensive physical exam including neurologic exam every 6-12 months  Colonoscopy and upper endoscopy every 2-5 years starting at age 25  Annual dermatologic exam starting at age 18  Annual whole body MRI (category 2B recommendation, but being recommended by centers with LFS screening programs, such as [facility] and [facility])  Annual brain MRI (category 2B recommendation, also being recommended by centers with LFS screening programs)  Due to the risk of childhood cancers, screening including abdominal ultrasounds, and potentially brain and whole body  MRIs are recommended in very early childhood.   For women who have a TP53 mutation, the following breast screening recommendations are made: Semi-annual clinical breast exam starting at age 18, annual breast MRI beginning at age 20, and annual breast MRI and annual mammogram beginning at age 30.  Women may also consider and discuss the option of risk-reducing mastectomy.     PLAN: Genetic counseling remains available to Ms. Cruz and her family. She plans to speak with her children about having testing done to help clarify her results. Ms. Cruz is encouraged to contact us with any questions or concerns at [phone].       Farida Farr MS, [facility] [place], [facility]  Licensed Certified Genetic Counselor       Cc: Dorothea Nair MD

## 2023-09-11 ENCOUNTER — OFFICE VISIT (OUTPATIENT)
Dept: FAMILY MEDICINE CLINIC | Facility: CLINIC | Age: 73
End: 2023-09-11
Payer: MEDICARE

## 2023-09-11 VITALS
WEIGHT: 179 LBS | SYSTOLIC BLOOD PRESSURE: 130 MMHG | HEIGHT: 62 IN | DIASTOLIC BLOOD PRESSURE: 69 MMHG | OXYGEN SATURATION: 94 % | TEMPERATURE: 97.7 F | BODY MASS INDEX: 32.94 KG/M2 | HEART RATE: 68 BPM

## 2023-09-11 DIAGNOSIS — F33.1 MODERATE EPISODE OF RECURRENT MAJOR DEPRESSIVE DISORDER: ICD-10-CM

## 2023-09-11 DIAGNOSIS — C91.10 CLL (CHRONIC LYMPHOCYTIC LEUKEMIA): ICD-10-CM

## 2023-09-11 DIAGNOSIS — Z00.00 MEDICARE ANNUAL WELLNESS VISIT, SUBSEQUENT: Primary | ICD-10-CM

## 2023-09-11 DIAGNOSIS — H10.13 ALLERGIC CONJUNCTIVITIS OF BOTH EYES: ICD-10-CM

## 2023-09-11 DIAGNOSIS — R73.03 PREDIABETES: ICD-10-CM

## 2023-09-11 DIAGNOSIS — E66.9 CLASS 1 OBESITY WITH SERIOUS COMORBIDITY AND BODY MASS INDEX (BMI) OF 32.0 TO 32.9 IN ADULT, UNSPECIFIED OBESITY TYPE: ICD-10-CM

## 2023-09-11 DIAGNOSIS — E78.2 MIXED HYPERLIPIDEMIA: ICD-10-CM

## 2023-09-11 DIAGNOSIS — M79.7 FIBROMYALGIA: ICD-10-CM

## 2023-09-11 DIAGNOSIS — Z23 NEED FOR INFLUENZA VACCINATION: ICD-10-CM

## 2023-09-11 DIAGNOSIS — I10 PRIMARY HYPERTENSION: ICD-10-CM

## 2023-09-11 PROBLEM — Z90.710 ABSENCE OF BOTH CERVIX AND UTERUS, ACQUIRED: Status: ACTIVE | Noted: 2023-08-28

## 2023-09-11 PROBLEM — R39.15 URINARY URGENCY: Status: ACTIVE | Noted: 2023-08-28

## 2023-09-11 PROBLEM — J32.9 SINUSITIS: Status: ACTIVE | Noted: 2023-08-28

## 2023-09-11 RX ORDER — HYDROCHLOROTHIAZIDE 25 MG/1
25 TABLET ORAL DAILY
Qty: 90 TABLET | Refills: 1 | Status: SHIPPED | OUTPATIENT
Start: 2023-09-11

## 2023-09-11 RX ORDER — GABAPENTIN 300 MG/1
300 CAPSULE ORAL 3 TIMES DAILY PRN
Qty: 90 CAPSULE | Refills: 0 | Status: CANCELLED | OUTPATIENT
Start: 2023-09-11

## 2023-09-11 RX ORDER — DESVENLAFAXINE 50 MG/1
50 TABLET, EXTENDED RELEASE ORAL DAILY
COMMUNITY
End: 2023-09-11 | Stop reason: DRUGHIGH

## 2023-09-11 RX ORDER — KETOTIFEN FUMARATE 0.35 MG/ML
1 SOLUTION/ DROPS OPHTHALMIC 2 TIMES DAILY PRN
Qty: 10 ML | Refills: 11 | Status: SHIPPED | OUTPATIENT
Start: 2023-09-11

## 2023-09-11 NOTE — PROGRESS NOTES
The ABCs of the Annual Wellness Visit  Subsequent Medicare Wellness Visit    Subjective    Dorothea Cruz is a 73 y.o. female who presents for a Subsequent Medicare Wellness Visit.    The following portions of the patient's history were reviewed and   updated as appropriate: allergies, current medications, past family history, past medical history, past social history, past surgical history, and problem list.    Compared to one year ago, the patient feels her physical   health is worse.    Compared to one year ago, the patient feels her mental   health is the same.    Recent Hospitalizations:  She was admitted within the past 365 days at [facility] for diverticulitis.     Current Medical Providers:  Patient Care Team:  Monserrat Grover APRN as PCP - General (Nurse Practitioner)  Louie Medina MD as Consulting Physician (General Surgery)  Bebe Nair MD PhD as Consulting Physician (Hematology and Oncology)  Mirtha Esteves MD as Consulting Physician (General Surgery)    Outpatient Medications Prior to Visit   Medication Sig Dispense Refill    Cholecalciferol (Vitamin D) 50 MCG (2000 UT) capsule Take 1 capsule by mouth Daily. 90 capsule 3    colestipol (Colestid) 1 g tablet Take 2 tablets by mouth 2 (Two) Times a Day. 360 tablet 1    desvenlafaxine (Pristiq) 100 MG 24 hr tablet Take 1 tablet by mouth Daily. 90 tablet 1    gabapentin (NEURONTIN) 300 MG capsule Take 1 capsule by mouth 3 (Three) Times a Day As Needed (neuropathy). 90 capsule 0    HYDROcodone-acetaminophen (NORCO) 5-325 MG per tablet Take 1 tablet by mouth Daily.      Lactobacillus Probiotic tablet Take 1 tablet by mouth 2 (Two) Times a Day. 180 tablet 3    metoprolol tartrate (LOPRESSOR) 25 MG tablet Take 1 tablet by mouth 2 (Two) Times a Day. 180 tablet 1    rosuvastatin (CRESTOR) 20 MG tablet Take 1 tablet by mouth Every Night. 90 tablet 1    hydroCHLOROthiazide (HYDRODIURIL) 25 MG tablet Take 1 tablet by  mouth Daily. 90 tablet 1    desvenlafaxine ER (KHEDEZLA) 50 MG tablet sustained-release 24 hour 24 hr tablet Take 1 tablet by mouth Daily. (Patient not taking: Reported on 9/11/2023)      Lactobacillus Acid-Pectin (Acidophilus/Citrus Pectin) tablet tablet        No facility-administered medications prior to visit.       Opioid medication/s are on active medication list.  and I have evaluated her active treatment plan and pain score trends (see table).  There were no vitals filed for this visit.  I have reviewed the chart for potential of high risk medication and harmful drug interactions in the elderly.          Aspirin is not on active medication list.  Aspirin use is not indicated based on review of current medical condition/s. Risk of harm outweighs potential benefits.  .    Patient Active Problem List   Diagnosis    Vitamin D deficiency    Sleep apnea    Postmenopause    Major depressive disorder    Primary hypertension    Mixed hyperlipidemia    Fibromyalgia    Disease of thyroid gland    Depression    Anxiety    Arthritis    ACE-inhibitor cough    Diverticulitis    Breast cancer    History of diverticulitis    History of cholecystectomy    Altered bowel habits    Diarrhea    Malignant neoplasm of upper-inner quadrant of right female breast    Chronic low back pain    Trochanteric bursitis of both hips    Family history of CLL (chronic lymphoid leukemia)    Degeneration of lumbar intervertebral disc    Lumbar spondylosis    Chronic osteoarthritis    Leukocytosis    Prediabetes    Class 1 obesity with serious comorbidity and body mass index (BMI) of 32.0 to 32.9 in adult    Adenoma of right adrenal gland    CLL (chronic lymphocytic leukemia)    Lumbosacral spondylosis without myelopathy    Li-Fraumeni syndrome    Absence of both cervix and uterus, acquired    Sinusitis    Urinary urgency     Advance Care Planning   Advance Care Planning     Advance Directive is not on file.  ACP discussion was held with the  "patient during this visit. Patient does not have an advance directive, information provided.     Objective    Vitals:    23 1348   BP: 130/69   BP Location: Left arm   Patient Position: Sitting   Cuff Size: Adult   Pulse: 68   Temp: 97.7 °F (36.5 °C)   SpO2: 94%   Weight: 81.2 kg (179 lb)   Height: 157.5 cm (62.01\")     Estimated body mass index is 32.73 kg/m² as calculated from the following:    Height as of this encounter: 157.5 cm (62.01\").    Weight as of this encounter: 81.2 kg (179 lb).    BMI is >= 30 and <35. (Class 1 Obesity). The following options were offered after discussion;: weight loss educational material (shared in after visit summary) and exercise counseling/recommendations      Does the patient have evidence of cognitive impairment? No          HEALTH RISK ASSESSMENT    Smoking Status:  Social History     Tobacco Use   Smoking Status Never    Passive exposure: Yes   Smokeless Tobacco Never     Alcohol Consumption:  Social History     Substance and Sexual Activity   Alcohol Use Never     Fall Risk Screen:    KASHIFADI Fall Risk Assessment was completed, and patient is at LOW risk for falls.Assessment completed on:2023    Depression Screenin/11/2023     1:53 PM   PHQ-2/PHQ-9 Depression Screening   Little Interest or Pleasure in Doing Things 0-->not at all   Feeling Down, Depressed or Hopeless 0-->not at all   PHQ-9: Brief Depression Severity Measure Score 0       Health Habits and Functional and Cognitive Screenin/11/2023     1:52 PM   Functional & Cognitive Status   Do you have difficulty preparing food and eating? No   Do you have difficulty bathing yourself, getting dressed or grooming yourself? No   Do you have difficulty using the toilet? No   Do you have difficulty moving around from place to place? No   Do you have trouble with steps or getting out of a bed or a chair? No   Current Diet Well Balanced Diet   Dental Exam Up to date   Eye Exam Up to date   Exercise " (times per week) 3 times per week   Current Exercises Include Walking   Do you need help using the phone?  No   Are you deaf or do you have serious difficulty hearing?  No   Do you need help to go to places out of walking distance? No   Do you need help shopping? No   Do you need help preparing meals?  No   Do you need help with housework?  No   Do you need help with laundry? No   Do you need help taking your medications? No   Do you need help managing money? No   Do you ever drive or ride in a car without wearing a seat belt? No   Have you felt unusual stress, anger or loneliness in the last month? No   Who do you live with? Spouse   If you need help, do you have trouble finding someone available to you? No   Have you been bothered in the last four weeks by sexual problems? No       Age-appropriate Screening Schedule:  Refer to the list below for future screening recommendations based on patient's age, sex and/or medical conditions. Orders for these recommended tests are listed in the plan section. The patient has been provided with a written plan.    Health Maintenance   Topic Date Due    ZOSTER VACCINE (1 of 2) 12/13/2023 (Originally 11/11/2016)    COVID-19 Vaccine (3 - Moderna risk series) 12/25/2023 (Originally 5/20/2021)    MAMMOGRAM  09/14/2023    INFLUENZA VACCINE  10/01/2023    LIPID PANEL  03/02/2024    ANNUAL WELLNESS VISIT  09/11/2024    BMI FOLLOWUP  09/11/2024    DXA SCAN  03/28/2025    TDAP/TD VACCINES (2 - Td or Tdap) 09/16/2026    COLORECTAL CANCER SCREENING  05/09/2027    Pneumococcal Vaccine 65+  Completed    HEPATITIS C SCREENING  Discontinued          CMS Preventative Services Quick Reference  Risk Factors Identified During Encounter  Chronic Pain:  stable, following with pain mgmt.   Immunizations Discussed/Encouraged: Influenza  The above risks/problems have been discussed with the patient.  Pertinent information has been shared with the patient in the After Visit Summary.  An After Visit  "Summary and PPPS were made available to the patient.    Follow Up:   Next Medicare Wellness visit to be scheduled in 1 year.       Additional E&M Note during same encounter follows:  Patient has multiple medical problems which are significant and separately identifiable that require additional work above and beyond the Medicare Wellness Visit.      Chief Complaint  Medicare Wellness-subsequent, Hypertension, and Hyperlipidemia    Subjective        HPI  Dorothea Cruz is also being seen today for a follow up.     Planing of her eyes being itchy.  She has been using eyedrops for dry eyes without any good relief.    Depression and fibromyalgia: We increased Pristiq dose to 100 mg daily which she states seems to be helping.  She only takes gabapentin once or twice per day, does not like to take it that often.  She takes a pain pill at bedtime which helps some.    Hyperlipidemia: Her last lipid panel was 6 months ago and was stable, on Crestor 20 mg every evening.    Hypertension: Blood pressure is stable on metoprolol 25 mg twice daily and hydrochlorothiazide 25 mg daily.    Borderline diabetes, her last A1c was 6.1% 6 months ago.    She has chronic lymphocytic leukemia, follows with oncology Dr. Nair.  She states that she is tested positive for T p53.    Tobacco Use: Medium Risk    Smoking Tobacco Use: Never    Smokeless Tobacco Use: Never    Passive Exposure: Yes       Objective   Vital Signs:  /69 (BP Location: Left arm, Patient Position: Sitting, Cuff Size: Adult)   Pulse 68   Temp 97.7 °F (36.5 °C)   Ht 157.5 cm (62.01\")   Wt 81.2 kg (179 lb)   SpO2 94%   BMI 32.73 kg/m²     Physical Exam  Vitals reviewed.   Constitutional:       Appearance: Normal appearance. She is well-developed. She is obese.   Eyes:      General: Lids are normal.         Right eye: No discharge.         Left eye: No discharge.      Conjunctiva/sclera:      Right eye: Right conjunctiva is not injected.      Left eye: Left " conjunctiva is not injected.   Neck:      Thyroid: No thyroid mass, thyromegaly or thyroid tenderness.   Cardiovascular:      Rate and Rhythm: Normal rate and regular rhythm.      Heart sounds: No murmur heard.    No friction rub. No gallop.   Pulmonary:      Effort: Pulmonary effort is normal.      Breath sounds: Normal breath sounds. No wheezing or rhonchi.   Lymphadenopathy:      Cervical: No cervical adenopathy.   Skin:     General: Skin is warm and dry.   Neurological:      Mental Status: She is alert and oriented to person, place, and time.      Cranial Nerves: No cranial nerve deficit.   Psychiatric:         Mood and Affect: Mood and affect normal.         Behavior: Behavior normal.         Thought Content: Thought content normal. Thought content does not include homicidal or suicidal ideation.         Judgment: Judgment normal.        The following data was reviewed by: ARA Peck on 09/11/2023:  CMP          4/19/2023    09:17 4/28/2023    09:54 6/19/2023    15:58   CMP   Glucose  97     BUN  10     Creatinine 0.70  0.76  0.90    EGFR 92.0  83.4  68.1    Sodium  140     Potassium  3.5     Chloride  101     Calcium  10.0     Total Protein  7.2     Albumin  4.3     Globulin  2.9     Total Bilirubin  0.3     Alkaline Phosphatase  57     AST (SGOT)  19     ALT (SGPT)  21     Albumin/Globulin Ratio  1.5     BUN/Creatinine Ratio  13.2     Anion Gap  10.4       CBC          4/26/2023    09:38 4/28/2023    09:54 7/5/2023    15:51   CBC   WBC 15.87  16.02  22.28    RBC 4.54  4.74  4.76    Hemoglobin 14.8  15.3  15.3    Hematocrit 43.6  45.0  45.6    MCV 96.0  94.9  95.8    MCH 32.6  32.3  32.1    MCHC 33.9  34.0  33.6    RDW 13.6  13.6  13.5    Platelets 158  180  162      Lipid Panel          9/27/2022    10:59 3/2/2023    07:58   Lipid Panel   Total Cholesterol 145  148    Triglycerides 244  284    HDL Cholesterol 55  50    VLDL Cholesterol 38  44    LDL Cholesterol  52  54    LDL/HDL Ratio 0.75   0.82      TSH          3/2/2023    07:58   TSH   TSH 3.940      A1C Last 3 Results          9/27/2022    10:59 3/2/2023    07:58   HGBA1C Last 3 Results   Hemoglobin A1C 5.90  6.10                 Assessment and Plan   Diagnoses and all orders for this visit:    1. Medicare annual wellness visit, subsequent (Primary)    2. Fibromyalgia  Assessment & Plan:  Improving with the increase in Pristiq.  Advised that gabapentin would help when she is having more pain.      3. Primary hypertension  Assessment & Plan:  Hypertension is improving with treatment.  Continue current treatment regimen.  Weight loss.  Continue current medications.  Blood pressure will be reassessed at the next regular appointment.    Orders:  -     hydroCHLOROthiazide (HYDRODIURIL) 25 MG tablet; Take 1 tablet by mouth Daily.  Dispense: 90 tablet; Refill: 1    4. Class 1 obesity with serious comorbidity and body mass index (BMI) of 32.0 to 32.9 in adult, unspecified obesity type  Assessment & Plan:  Patient's (Body mass index is 32.73 kg/m².) indicates that they are obese (BMI >30) with health conditions that include hypertension, dyslipidemias, and prediabetes  . Weight is unchanged. BMI  is above average; BMI management plan is completed. We discussed portion control and increasing exercise.     Orders:  -     Vitamin D,25-Hydroxy; Future    5. Mixed hyperlipidemia  Assessment & Plan:  Lipid abnormalities are improving with treatment.  Pharmacotherapy as ordered. We we will recheck lipid panel when she gets her next labs with oncology.  Lipids will be reassessed in 6 months.    Orders:  -     Lipid Panel; Future    6. CLL (chronic lymphocytic leukemia)  Assessment & Plan:  Currently following with oncology.      7. Moderate episode of recurrent major depressive disorder  Assessment & Plan:  Patient's depression is recurrent and is moderate without psychosis. Their depression is currently active and the condition is improving with treatment. This  will be reassessed at the next regular appointment. F/U as described:patient will continue current medication therapy.      8. Allergic conjunctivitis of both eyes  Comments:  I will prescribe Alaway eyedrops to use twice daily as needed.  Orders:  -     ketotifen (Alaway) 0.025 % ophthalmic solution; Administer 1 drop to both eyes 2 (Two) Times a Day As Needed (itchy eyes).  Dispense: 10 mL; Refill: 11    9. Prediabetes  Assessment & Plan:  Stable, will recheck A1c when she gets her next labs with oncology.     Orders:  -     Hemoglobin A1c; Future    10. Need for influenza vaccination  -     Fluzone High-Dose 65+yrs             Follow Up   Return in about 6 months (around 3/11/2024) for Next scheduled follow up HTN, HLD, Depression, fibromyalgia.  Patient was given instructions and counseling regarding her condition or for health maintenance advice. Please see specific information pulled into the AVS if appropriate.

## 2023-09-12 NOTE — ASSESSMENT & PLAN NOTE
Lipid abnormalities are improving with treatment.  Pharmacotherapy as ordered. We we will recheck lipid panel when she gets her next labs with oncology.  Lipids will be reassessed in 6 months.

## 2023-09-12 NOTE — ASSESSMENT & PLAN NOTE
Improving with the increase in Pristiq.  Advised that gabapentin would help when she is having more pain.

## 2023-09-12 NOTE — ASSESSMENT & PLAN NOTE
Patient's (Body mass index is 32.73 kg/m².) indicates that they are obese (BMI >30) with health conditions that include hypertension, dyslipidemias, and prediabetes  . Weight is unchanged. BMI  is above average; BMI management plan is completed. We discussed portion control and increasing exercise.

## 2023-09-18 ENCOUNTER — HOSPITAL ENCOUNTER (OUTPATIENT)
Dept: MAMMOGRAPHY | Facility: HOSPITAL | Age: 73
Discharge: HOME | End: 2023-09-18
Admitting: INTERNAL MEDICINE
Payer: MEDICARE

## 2023-09-18 DIAGNOSIS — Z12.31 ENCOUNTER FOR SCREENING MAMMOGRAM FOR MALIGNANT NEOPLASM OF BREAST: ICD-10-CM

## 2023-09-18 PROCEDURE — 77063 BREAST TOMOSYNTHESIS BI: CPT

## 2023-09-18 PROCEDURE — 77067 SCR MAMMO BI INCL CAD: CPT

## 2023-09-27 ENCOUNTER — HOSPITAL ENCOUNTER (OUTPATIENT)
Dept: OCCUPATIONAL THERAPY | Facility: HOSPITAL | Age: 73
Setting detail: THERAPIES SERIES
Discharge: HOME | End: 2023-09-27
Payer: MEDICARE

## 2023-09-27 DIAGNOSIS — Z91.89 AT RISK FOR LYMPHEDEMA: Primary | ICD-10-CM

## 2023-09-27 DIAGNOSIS — Z17.0 MALIGNANT NEOPLASM OF UPPER-OUTER QUADRANT OF RIGHT BREAST IN FEMALE, ESTROGEN RECEPTOR POSITIVE: ICD-10-CM

## 2023-09-27 DIAGNOSIS — C50.411 MALIGNANT NEOPLASM OF UPPER-OUTER QUADRANT OF RIGHT BREAST IN FEMALE, ESTROGEN RECEPTOR POSITIVE: ICD-10-CM

## 2023-09-27 PROCEDURE — 93702 BIS XTRACELL FLUID ANALYSIS: CPT | Performed by: OCCUPATIONAL THERAPIST

## 2023-09-27 PROCEDURE — 97535 SELF CARE MNGMENT TRAINING: CPT | Performed by: OCCUPATIONAL THERAPIST

## 2023-09-27 NOTE — THERAPY RE-EVALUATION
Outpatient Occupational Therapy Lymphedema Re-Evaluation  JOSE JUAN Suarez     Patient Name: Dorothea Cruz  : 1950  MRN: [id number]  Today's Date: 2023      Visit Date: 2023    Patient Active Problem List   Diagnosis    Vitamin D deficiency    Sleep apnea    Postmenopause    Major depressive disorder    Primary hypertension    Mixed hyperlipidemia    Fibromyalgia    Disease of thyroid gland    Depression    Anxiety    Arthritis    ACE-inhibitor cough    Diverticulitis    Breast cancer    History of diverticulitis    History of cholecystectomy    Altered bowel habits    Diarrhea    Malignant neoplasm of upper-inner quadrant of right female breast    Chronic low back pain    Trochanteric bursitis of both hips    Family history of CLL (chronic lymphoid leukemia)    Degeneration of lumbar intervertebral disc    Lumbar spondylosis    Chronic osteoarthritis    Leukocytosis    Prediabetes    Class 1 obesity with serious comorbidity and body mass index (BMI) of 32.0 to 32.9 in adult    Adenoma of right adrenal gland    CLL (chronic lymphocytic leukemia)    Lumbosacral spondylosis without myelopathy    Li-Fraumeni syndrome    Absence of both cervix and uterus, acquired    Sinusitis    Urinary urgency        Past Medical History:   Diagnosis Date    Anxiety     Arthritis     Breast cancer     Cancer     BREAST CANCER    Cataract     Cholelithiases @1    Clotting disorder     Depression     Diabetes mellitus     Disease of thyroid gland     currently not taking any meds    Diverticulitis     Diverticulitis of colon     Diverticulosis 95    Fibromyalgia     Fibromyalgia, primary     HIV disease     Hyperlipidemia     Hypertension     Infectious viral hepatitis     Irritable bowel syndrome     Kidney stone     Liver disease     Low back pain     Major depressive disorder 2020    Palpitations     ASYMPTOMATIC- DENIES CP/SOB, SEE PCP- NO CARDIOLOGIST     Post-menopause 2020    Sinus trouble      Sleep apnea     Substance abuse     Tremor 2018    I was on medicine for tremors    Urinary tract infection     Vitamin D deficiency 09/17/2020        Past Surgical History:   Procedure Laterality Date    ABDOMINAL SURGERY  06/14/1985    APPENDECTOMY      BILATERAL BREAST REDUCTION      BREAST BIOPSY Right 11/12/2021    Procedure: RIGHT BREAST NEEDLE LOCALIZED  LUMPECTOMY WITH SENTINEL NODE BIOPSY;  Surgeon: Mirtha Esteves MD;  Location: [facility] OR OSC;  Service: General;  Laterality: Right;    BREAST LUMPECTOMY      CHOLECYSTECTOMY N/A 09/02/2021    Procedure: CHOLECYSTECTOMY LAPAROSCOPIC INTRAOPERATIVE CHOLANGIOGRAM;  Surgeon: Louie Medina MD;  Location: [facility] MAIN OR;  Service: General;  Laterality: N/A;    COLONOSCOPY  2019    COLONOSCOPY N/A 05/09/2022    Procedure: COLONOSCOPY WITH BIOPSIES;  Surgeon: Butch Garcia MD;  Location: [facility] ENDOSCOPY;  Service: Gastroenterology;  Laterality: N/A;  DIVERTICULOSIS    HYSTERECTOMY      LYMPH NODE BIOPSY      SEPTOPLASTY  2018    SINUS SURGERY  2018    TONSILLECTOMY      US GUIDED CYST ASPIRATION BREAST N/A 10/03/2022    WRIST ARTHROPLASTY Bilateral          Visit Dx:     ICD-10-CM ICD-9-CM   1. At risk for lymphedema  Z91.89 V49.89   2. Malignant neoplasm of upper-outer quadrant of right breast in female, estrogen receptor positive  C50.411 174.4    Z17.0 V86.0        Patient History       Row Name 09/27/23 0800             History    Chief Complaint --  Pt reports that her symptoms have greatly decreased since wearing the sleeve  -TD      Brief Description of Current Complaint Mrs. Hager is a 73 year old female who had surgery on 11/12/21 for Lumpectomoy on the Right due to Trip negative Breast CA  -TD         Fall Risk Assessment    Any falls in the past year: No  -TD      Does patient have a fear of falling No  -TD         Services    Prior Rehab/Home Health Experiences No  -TD      Are you currently receiving Home Health services No  -TD       "Do you plan to receive Home Health services in the near future No  -TD         Daily Activities    Primary Language English  -TD      Are you able to read Yes  -TD      Are you able to write Yes  -TD      How does patient learn best? Reading;Demonstration  -TD      Barriers to learning None  -TD      Pt Participated in POC and Goals Yes  -TD         Safety    Are you being hurt, hit, or frightened by anyone at home or in your life? No  -TD      Are you being neglected by a caregiver No  -TD      Have you had any of the following issues with Depression;Anxiety  -TD                User Key  (r) = Recorded By, (t) = Taken By, (c) = Cosigned By      Initials Name Provider Type    TD Aminah Bryant OT Occupational Therapist                     Lymphedema       Row Name 09/27/23 0800             Subjective Pain    Able to rate subjective pain? yes  -TD      Pre-Treatment Pain Level 0  -TD      Post-Treatment Pain Level 0  -TD         Subjective    Subjective Comments Pt has no new reports.  -TD         Lymphedema Assessment    Lymphedema Classification RUE:;at risk/stage 0  -TD      Lymphedema Cancer Related Sx right;lumpectomy;sentinel node biopsy  -TD      Lymphedema Surgery Comments 11/2021  -TD      Lymph Nodes Removed # 3  -TD      Positive Lymph Nodes # 0  -TD      Chemo Received no  -TD      Radiation Therapy Received yes  -TD      Radiation Treatments #/Timeframe 16  -TD         LLIS - Physical Concerns    The amount of pain associated with my lymphedema is: 0  -TD      The amount of limb heaviness associated with my lymphedema is: 0  -TD      The amount of skin tightness associated with my lymphedema is: 0  -TD      The size of my swollen limb(s) seems: 0  -TD      Lymphedema affects the movement of my swollen limb(s): 0  -TD      The strength in my swollen limb(s) is: 0  -TD         LLIS - Psychosocial Concerns    Lymphedema affects my body image (i.e., \"how I think I look\"). 0  -TD      Lymphedema affects my " "socializing with others. 0  -TD      Lymphedema affects my intimate relations with spouse or partner (rate 0 if not applicable 0  -TD      Lymphedema \"gets me down\" (i.e., depression, frustration, or anger) 0  -TD      I must rely on others for help due to my lymphedema. 0  -TD      I know what to do to manage my lymphedema 2  -TD         LLIS - Functional Concerns    Lymphedema affects my ability to perform self-care activities (i.e. eating, dressing, hygiene) 0  -TD      Lymphedema affects my ability to perform routine home or work-related activities. 0  -TD      Lymphedema affects my performance of preferred leisure activities. 0  -TD      Lymphedema affects proper fit of clothing/shoes 0  -TD      Lymphedema affects my sleep 0  -TD         Posture/Observations    Posture- WNL Posture is WNL  -TD         General ROM    GENERAL ROM COMMENTS BUE are WFL  -TD         MMT (Manual Muscle Testing)    General MMT Comments BUE are WFL  -TD         L-Dex Bioimpedence Screening    L-Dex Measurement Extremity RUE  -TD      L-Dex Patient Position Standing  -TD      L-Dex UE Dominate Side Right  -TD      L-Dex UE At Risk Side Right  -TD      L-Dex UE Pre Surgical Value Yes  -TD      L-Dex UE Score 3.2  -TD      L-Dex UE Baseline Score -6.3  -TD      L-Dex UE Value Change 9.5  -TD      $ L-Dex Charge yes  -TD         Lymphedema Life Impact Scale Totals    A.  Total Q1 - Q17 (Do not include Q18) 2  -TD      B.  Total number of questions answered (Q1-Q17) 17  -TD      C. Divide A by B 0.12  -TD      D. Multiple C by 25 3  -TD                User Key  (r) = Recorded By, (t) = Taken By, (c) = Cosigned By      Initials Name Provider Type    TD Aminah Bryant OT Occupational Therapist                            Therapy Education  Education Details: Pt was educated on wear schedule for compression sleeve this date.  Given: HEP, Symptoms/condition management, Edema management  Program: New  How Provided: Verbal  Provided to: " Patient  Level of Understanding: Teach back education performed  53163 - OT Self Care/Mgmt Minutes: 15         OT Goals       Row Name 09/27/23 0849          Time Calculation    OT Goal Re-Cert Due Date 10/27/23  -TD               User Key  (r) = Recorded By, (t) = Taken By, (c) = Cosigned By      Initials Name Provider Type    Aminah Jones OT Occupational Therapist                Goals:  1. Post Breast Surgery Care/at risk for Lymphedema  LTG 1: 90 days:  As an indicator of no exacerbation of lymphedema staging, the patient will present with an L-Dex score less than 7 points from preoperative baseline.              STATUS: Not met: Ongoing   STG 1a: 30 days: Patient will be independent with self-manual lymphatic massage.               STATUS: Met.  Ongoing  STG 1b: 30 days:  Patient will be independent with identification of signs and symptoms of lymphedema exasperation per stoplight to recovery education handout.              STATUS: Met.  Ongoing  STG 1 c: 30 days: Patient will be independent with HEP to prevent advancement in lymphedema staging.              STATUS: Met.  Ongoing  TREATMENT:  Self Care/ewADL retraining, Therapeutic Activity, Neuromuscular Re-education, Therapeutic Exercise, Bioimpedence Fluid Analysis, Orthotic Management and training,  and Manual Therapy.     OT Assessment/Plan       Row Name 09/27/23 0827          OT Assessment    Functional Limitations Performance in self-care ADL  -TD     Impairments Impaired lymphatic circulation  -TD     Assessment Comments Dorothea Cruz l-dex score is demonstarting an increase this date.  She was instructed to wear her sleeve and will be re evaluated upon return to the clinic.  Patient would benefit from continued skilled occupational therapy to prevent increased aging of lymphedema, decreased range of motion, and increased pain  -TD     OT Diagnosis at risk for lymphedema  -TD     OT Rehab Potential Good  -TD     Patient/caregiver participated  in establishment of treatment plan and goals Yes  -TD     Patient would benefit from skilled therapy intervention Yes  -TD        OT Plan    OT Frequency --  see duration  -TD     Predicted Duration of Therapy Intervention (OT) Pt will be seen every 6 months 3-5.  -TD     Planned CPT's? OT EVAL LOW COMPLEXITY: 25761;OT SELF CARE/MGMT/TRAIN 15 MIN: 97545;OT THER PROC EA 15 MIN: 99881VK;OT MANUAL THERAPY EA 15 MIN: 01055;OT BIS XTRACELL FLUID ANALYSIS: 00508;OT ORTHOTIC MGMT/TRAIN EA 15 MIN: 35554;OT WC MGMT EA 15 MIN: 34010  -TD     Planned Therapy Interventions (Optional Details) home exercise program;joint mobilization;manual therapy techniques;orthotic fitting/training;patient/family education;wound care  -TD     OT Plan Comments continue POC  -TD               User Key  (r) = Recorded By, (t) = Taken By, (c) = Cosigned By      Initials Name Provider Type    TD Aminah Bryant, OT Occupational Therapist                              Time Calculation:   Timed Charges  98274 - OT Self Care/Mgmt Minutes: 15  Total Minutes  Timed Charges Total Minutes: 15   Total Minutes: 15     Therapy Charges for Today       Code Description Service Date Service Provider Modifiers Qty    39503227355 HC PT BIS XTRACELL FLUID ANALYSIS 9/27/2023 Aminah Bryant OT  1    54996831522 HC OT SELF CARE/MGMT/TRAIN EA 15 MIN 9/27/2023 Aminah Bryant OT GO 1                      Aminah Bryant OT  9/27/2023

## 2023-10-10 ENCOUNTER — LAB (OUTPATIENT)
Dept: ONCOLOGY | Facility: HOSPITAL | Age: 73
End: 2023-10-10
Payer: MEDICARE

## 2023-10-10 ENCOUNTER — OFFICE VISIT (OUTPATIENT)
Dept: ONCOLOGY | Facility: HOSPITAL | Age: 73
End: 2023-10-10
Payer: MEDICARE

## 2023-10-10 VITALS
DIASTOLIC BLOOD PRESSURE: 68 MMHG | HEART RATE: 78 BPM | TEMPERATURE: 98.7 F | BODY MASS INDEX: 32.81 KG/M2 | WEIGHT: 179.45 LBS | OXYGEN SATURATION: 95 % | RESPIRATION RATE: 18 BRPM | SYSTOLIC BLOOD PRESSURE: 135 MMHG

## 2023-10-10 DIAGNOSIS — C91.10 CLL (CHRONIC LYMPHOCYTIC LEUKEMIA): ICD-10-CM

## 2023-10-10 DIAGNOSIS — R10.2 PELVIC PRESSURE IN FEMALE: ICD-10-CM

## 2023-10-10 DIAGNOSIS — Z17.1 MALIGNANT NEOPLASM OF UPPER-INNER QUADRANT OF BREAST IN FEMALE, ESTROGEN RECEPTOR NEGATIVE, UNSPECIFIED LATERALITY: Primary | ICD-10-CM

## 2023-10-10 DIAGNOSIS — R10.30 LOWER ABDOMINAL PAIN: ICD-10-CM

## 2023-10-10 DIAGNOSIS — Z15.01 LI-FRAUMENI SYNDROME: ICD-10-CM

## 2023-10-10 DIAGNOSIS — C50.219 MALIGNANT NEOPLASM OF UPPER-INNER QUADRANT OF BREAST IN FEMALE, ESTROGEN RECEPTOR NEGATIVE, UNSPECIFIED LATERALITY: Primary | ICD-10-CM

## 2023-10-10 DIAGNOSIS — Z80.52 FAMILY HISTORY OF BLADDER CANCER: ICD-10-CM

## 2023-10-10 DIAGNOSIS — C50.211 MALIGNANT NEOPLASM OF UPPER-INNER QUADRANT OF RIGHT FEMALE BREAST, UNSPECIFIED ESTROGEN RECEPTOR STATUS: ICD-10-CM

## 2023-10-10 LAB
ALBUMIN SERPL-MCNC: 4.6 G/DL (ref 3.5–5.2)
ALBUMIN/GLOB SERPL: 1.8 G/DL
ALP SERPL-CCNC: 58 U/L (ref 39–117)
ALT SERPL W P-5'-P-CCNC: 19 U/L (ref 1–33)
ANION GAP SERPL CALCULATED.3IONS-SCNC: 8.3 MMOL/L (ref 5–15)
AST SERPL-CCNC: 21 U/L (ref 1–32)
BASOPHILS # BLD AUTO: 0.02 10*3/MM3 (ref 0–0.2)
BASOPHILS NFR BLD AUTO: 0.1 % (ref 0–1.5)
BILIRUB SERPL-MCNC: 0.3 MG/DL (ref 0–1.2)
BUN SERPL-MCNC: 12 MG/DL (ref 8–23)
BUN/CREAT SERPL: 15.6 (ref 7–25)
CALCIUM SPEC-SCNC: 10.2 MG/DL (ref 8.6–10.5)
CHLORIDE SERPL-SCNC: 103 MMOL/L (ref 98–107)
CO2 SERPL-SCNC: 29.7 MMOL/L (ref 22–29)
CREAT SERPL-MCNC: 0.77 MG/DL (ref 0.57–1)
DEPRECATED RDW RBC AUTO: 49.9 FL (ref 37–54)
EGFRCR SERPLBLD CKD-EPI 2021: 82.1 ML/MIN/1.73
EOSINOPHIL # BLD AUTO: 0.12 10*3/MM3 (ref 0–0.4)
EOSINOPHIL NFR BLD AUTO: 0.4 % (ref 0.3–6.2)
ERYTHROCYTE [DISTWIDTH] IN BLOOD BY AUTOMATED COUNT: 13.7 % (ref 12.3–15.4)
GLOBULIN UR ELPH-MCNC: 2.6 GM/DL
GLUCOSE SERPL-MCNC: 127 MG/DL (ref 65–99)
HCT VFR BLD AUTO: 44.7 % (ref 34–46.6)
HGB BLD-MCNC: 14.6 G/DL (ref 12–15.9)
IMM GRANULOCYTES # BLD AUTO: 0.03 10*3/MM3 (ref 0–0.05)
IMM GRANULOCYTES NFR BLD AUTO: 0.1 % (ref 0–0.5)
LARGE PLATELETS: ABNORMAL
LYMPHOCYTES # BLD AUTO: 23.9 10*3/MM3 (ref 0.7–3.1)
LYMPHOCYTES # BLD MANUAL: 26.28 10*3/MM3 (ref 0.7–3.1)
LYMPHOCYTES NFR BLD AUTO: 77.3 % (ref 19.6–45.3)
LYMPHOCYTES NFR BLD MANUAL: 7 % (ref 5–12)
MCH RBC QN AUTO: 31.9 PG (ref 26.6–33)
MCHC RBC AUTO-ENTMCNC: 32.7 G/DL (ref 31.5–35.7)
MCV RBC AUTO: 97.6 FL (ref 79–97)
MONOCYTES # BLD AUTO: 3.98 10*3/MM3 (ref 0.1–0.9)
MONOCYTES # BLD: 2.16 10*3/MM3 (ref 0.1–0.9)
MONOCYTES NFR BLD AUTO: 12.9 % (ref 5–12)
NEUTROPHILS # BLD AUTO: 2.47 10*3/MM3 (ref 1.7–7)
NEUTROPHILS NFR BLD AUTO: 2.87 10*3/MM3 (ref 1.7–7)
NEUTROPHILS NFR BLD AUTO: 9.2 % (ref 42.7–76)
NEUTROPHILS NFR BLD MANUAL: 8 % (ref 42.7–76)
NRBC SPEC MANUAL: 1 /100 WBC (ref 0–0.2)
PLATELET # BLD AUTO: 170 10*3/MM3 (ref 140–450)
PMV BLD AUTO: 11.4 FL (ref 6–12)
POTASSIUM SERPL-SCNC: 3.4 MMOL/L (ref 3.5–5.2)
PROT SERPL-MCNC: 7.2 G/DL (ref 6–8.5)
RBC # BLD AUTO: 4.58 10*6/MM3 (ref 3.77–5.28)
RBC MORPH BLD: NORMAL
SMALL PLATELETS BLD QL SMEAR: ADEQUATE
SODIUM SERPL-SCNC: 141 MMOL/L (ref 136–145)
VARIANT LYMPHS NFR BLD MANUAL: 76 % (ref 19.6–45.3)
VARIANT LYMPHS NFR BLD MANUAL: 9 % (ref 0–5)
WBC MORPH BLD: NORMAL
WBC NRBC COR # BLD: 30.92 10*3/MM3 (ref 3.4–10.8)

## 2023-10-10 PROCEDURE — G0463 HOSPITAL OUTPT CLINIC VISIT: HCPCS | Performed by: INTERNAL MEDICINE

## 2023-10-10 PROCEDURE — 85025 COMPLETE CBC W/AUTO DIFF WBC: CPT

## 2023-10-10 PROCEDURE — 85007 BL SMEAR W/DIFF WBC COUNT: CPT

## 2023-10-10 PROCEDURE — 80053 COMPREHEN METABOLIC PANEL: CPT

## 2023-10-10 PROCEDURE — 36415 COLL VENOUS BLD VENIPUNCTURE: CPT

## 2023-10-10 NOTE — PROGRESS NOTES
Patient  Dorothea Cruz    Location  [facility] HEMATOLOGY & ONCOLOGY    Chief Complaint  Breast Cancer    Referring Provider: TYRELL Peck*  PCP: Monserrat Grover APRN    Subjective          Oncology/Hematology History Overview Note     Right Triple Negative Breast Cancer:  - screening mammogram 9/10/21: 4mm asymmetry in the right upper inner breast  -Diagnostic mammogram on 2021: Persistent 5 mm ill-defined nodule in the upper inner mid right breast at 2:00, 7 cm from the nipple.  - core biopsy on 10/8/21: Invasive ductal carcinoma, grade 3, ER negative (less than 1%), CA negative (less than 1%), HER-2 equivocal (2+ HC), HER-2 FISH not amplified  - Right lumpectomy on 21. Pathology showed a 5mm tumor with negative margins, 0/3 lymph nodes involved, pT1aN0, ER-, CA-, HER2-  - chemotherapy was not recommended due to the small size of the tumor.   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  Mildly increased CD4 positive T cell LGL (5.2% of analyzed cells).  - CLL FISH positive for TP53/17p13 deletion in 49.5% of cells.  - IgVH somatic hypermutation was detected (3.7%)    Li Fraumeni Syndrome (inherited p53 mutation):   Family History:    - brother with CLL  - father  from some type of leukemia  - sister with breast cancer and adult onset retinoblastoma which was fatal  - niece (sister's daughter) also had retinoblastoma as a child  - pt is considering genetic testing     Breast cancer   10/20/2021 Initial Diagnosis    Breast cancer (HCC)     Malignant neoplasm of upper-inner quadrant of right female breast   2021 Cancer Staged    Staging form: Breast, AJCC 8th Edition  - Pathologic: pT1a, pN0, cM0, ER-, CA-, HER2- - Signed by Santa Haskins APRN on 2022        1/13/2022 Initial Diagnosis    Malignant neoplasm of upper-inner quadrant of right female breast (HCC)     1/20/2022 - 2/14/2022 Radiation    Radiation OncologyTreatment Course:  Dorothea Cruz received 4256 cGy in 16 fractions to right breast.      CLL (chronic lymphocytic leukemia)   3/10/2023 Initial Diagnosis    CLL (chronic lymphocytic leukemia)         History of Present Illness  Patient comes in today for follow-up. She reports not doing well due to pain in lower abdomen for last 2 weeks. Back pain also present but she has chronic history of back pain. Pain does not have any precipitating factors. Resolves on its own but can last several hours. Occurs daily. No hematuria. No associated consitpation, diarrhea, nausea. She reports low grade temps for several months. No associated chills, night sweats, or weight loss. Breathing stable. WBC up to 30K today. Fatigue present.          Review of Systems   Constitutional:  Positive for fatigue (6/10). Negative for appetite change, diaphoresis, fever, unexpected weight gain and unexpected weight loss.   HENT:  Negative for hearing loss, mouth sores, sore throat, swollen glands, trouble swallowing and voice change.    Eyes:  Negative for blurred vision.   Respiratory:  Negative for cough, shortness of breath and wheezing.    Cardiovascular:  Negative for chest pain and palpitations.   Gastrointestinal:  Positive for abdominal pain. Negative for blood in stool, constipation, diarrhea, nausea and vomiting.   Endocrine: Negative for cold intolerance and heat intolerance.   Genitourinary:  Negative for difficulty urinating, dysuria, frequency, hematuria and urinary incontinence.   Musculoskeletal:  Positive for back pain (8/10). Negative for arthralgias and myalgias.   Skin:  Negative for rash, skin lesions and wound.   Neurological:  Negative for dizziness, seizures, weakness, numbness and headache.   Hematological:  Does not bruise/bleed easily.    Psychiatric/Behavioral:  Negative for depressed mood. The patient is not nervous/anxious.    All other systems reviewed and are negative.      Past Medical History:   Diagnosis Date    Anxiety     Arthritis     Breast cancer     Cancer     BREAST CANCER    Cataract     Cholelithiases 4/30@1    Clotting disorder     Depression     Diabetes mellitus     Disease of thyroid gland     currently not taking any meds    Diverticulitis     Diverticulitis of colon     Diverticulosis 95    Fibromyalgia     Fibromyalgia, primary     HIV disease     Hyperlipidemia     Hypertension     Infectious viral hepatitis     Irritable bowel syndrome     Kidney stone     Liver disease     Low back pain 2015    Major depressive disorder 05/07/2020    Palpitations     ASYMPTOMATIC- DENIES CP/SOB, SEE PCP- NO CARDIOLOGIST     Post-menopause 09/17/2020    Sinus trouble     Sleep apnea     Substance abuse     Tremor 2018    I was on medicine for tremors    Urinary tract infection     Vitamin D deficiency 09/17/2020     Past Surgical History:   Procedure Laterality Date    ABDOMINAL SURGERY  06/14/1985    APPENDECTOMY      BILATERAL BREAST REDUCTION      BREAST BIOPSY Right 11/12/2021    Procedure: RIGHT BREAST NEEDLE LOCALIZED  LUMPECTOMY WITH SENTINEL NODE BIOPSY;  Surgeon: Mirtha Esteves MD;  Location: [facility] OR OSC;  Service: General;  Laterality: Right;    BREAST LUMPECTOMY      CHOLECYSTECTOMY N/A 09/02/2021    Procedure: CHOLECYSTECTOMY LAPAROSCOPIC INTRAOPERATIVE CHOLANGIOGRAM;  Surgeon: Louie Medina MD;  Location: [facility] MAIN OR;  Service: General;  Laterality: N/A;    COLONOSCOPY  2019    COLONOSCOPY N/A 05/09/2022    Procedure: COLONOSCOPY WITH BIOPSIES;  Surgeon: Butch Garcia MD;  Location: [facility] ENDOSCOPY;  Service: Gastroenterology;  Laterality: N/A;  DIVERTICULOSIS    HYSTERECTOMY      LYMPH NODE BIOPSY      SEPTOPLASTY  2018    SINUS SURGERY  2018    TONSILLECTOMY      US GUIDED CYST ASPIRATION BREAST N/A  10/03/2022    WRIST ARTHROPLASTY Bilateral      Social History     Socioeconomic History    Marital status:    Tobacco Use    Smoking status: Never     Passive exposure: Yes    Smokeless tobacco: Never   Vaping Use    Vaping Use: Never used   Substance and Sexual Activity    Alcohol use: Never    Drug use: Never    Sexual activity: Not Currently     Partners: Female     Birth control/protection: Post-menopausal, None, Hysterectomy     Family History   Problem Relation Age of Onset    No Known Problems Mother     Cancer Father         Leukemia      Breast cancer Sister     Cancer Sister         Had breast cancer    Cancer Brother         Leukemia/CLL    Cancer Sister         Had stomach cancer    Cancer Brother         Bladder cancer    Cancer Brother         Bladder cancer    Cancer Sister         Retinal blastoma.    Colon cancer Neg Hx        Objective   Physical Exam  General: Alert, cooperative, no acute distress  Eyes: Anicteric sclera, PERRLA  Respiratory: normal respiratory effort  Cardiovascular: no lower extremity edema  Skin: Normal tone, no rash, no lesions  Psychiatric: Appropriate affect, intact judgment  Neurologic: No focal sensory or motor deficits, normal cognition   Musculoskeletal: Normal muscle strength and tone    Vitals:    10/10/23 1058   BP: 135/68   Pulse: 78   Resp: 18   Temp: 98.7 øF (37.1 øC)   TempSrc: Temporal   SpO2: 95%   Weight: 81.4 kg (179 lb 7.3 oz)   PainSc:   6   PainLoc: Abdomen                 PHQ-9 Total Score:         Result Review :   The following data was reviewed by: Luciano Glez MD on 10/10/23:  Lab Results   Component Value Date    HGB 14.6 10/10/2023    HCT 44.7 10/10/2023    MCV 97.6 (H) 10/10/2023     10/10/2023    WBC 30.92 (C) 10/10/2023    NEUTROABS 2.87 10/10/2023    LYMPHSABS 23.90 (H) 10/10/2023    MONOSABS 3.98 (H) 10/10/2023    EOSABS 0.12 10/10/2023    BASOSABS 0.02 10/10/2023     Lab Results   Component Value Date    GLUCOSE  "97 04/28/2023    BUN 10 04/28/2023    CREATININE 0.90 06/19/2023     04/28/2023    K 3.5 04/28/2023     04/28/2023    CO2 28.6 04/28/2023    CALCIUM 10.0 04/28/2023    PROTEINTOT 7.2 04/28/2023    ALBUMIN 4.3 04/28/2023    BILITOT 0.3 04/28/2023    ALKPHOS 57 04/28/2023    AST 19 04/28/2023    ALT 21 04/28/2023     No results found for: \"IRON\", \"LABIRON\", \"TRANSFERRIN\", \"TIBC\"  Lab Results   Component Value Date    UIRGDXDF62 317 03/08/2022    FOLATE 14.40 03/08/2022     No results found for: \"PSA\", \"CEA\", \"AFP\", \"\", \"\"    Labs personally reviewed and WBC increased, No anemia or thrombocytopenia    Endocrine note personally reviewed         Assessment and Plan    Diagnoses and all orders for this visit:    1. Malignant neoplasm of upper-inner quadrant of breast in female, estrogen receptor negative, unspecified laterality (Primary)    2. CLL (chronic lymphocytic leukemia)  -     CBC & Differential; Future  -     Comprehensive Metabolic Panel; Future    3. Li-Fraumeni syndrome    4. Malignant neoplasm of upper-inner quadrant of right female breast, unspecified estrogen receptor status    5. Lower abdominal pain  -     CT Abdomen Pelvis With & Without Contrast; Future  -     Ambulatory Referral to Urology    6. Pelvic pressure in female  -     Ambulatory Referral to Urology    7. Family history of bladder cancer  -     Ambulatory Referral to Urology          Li-Fraumeni Syndrome  Pathologic germline p53 mutation. The patient has been referred for genetic counseling and the appointment is pending.  Screening Brain MRI on 6/19/23 was negative.        Right Triple Negative Breast Cancer  November 2021 right lumpectomy. Chemotherapy was not recommended due to the small size of the tumor.  She completed adjuvant radiation in March.  Repeat screening mammogram will be due next September, 9/2023 mammogram benign.  DEXA scan will be due in 2025. The patient may benefit from screening with breast MRI but " I will defer to the guidance of the genetic counselor regarding the best approach to screening.  For example, some patients undergo annual whole body MRI which is done at another facility.     Del(17p) CLL  This is a high risk subtype of CLL. Right now the patient does not meet criteria for treatment. Follow up  in 3 months with repeat CBC and CMP.      Adrenal Nodule  Nodule not hypermetabolic on recent PET.  Given her underlying genetic mutation she was referred to an [facility]in[facility].  Will need follow up ultrasounds.     Abdominal Pain  CT abd/pelvis ordered. No hematuria. Not associated with constipation or nausea. Pain resolves on its own. Due to p53 mutation and family history of bladder cancer, will workup for cancer with CT scan and referral to urology.       Patient was given instructions and counseling regarding her condition or for health maintenance advice. Please see specific information pulled into the AVS if appropriate.

## 2023-10-13 ENCOUNTER — TRANSCRIBE ORDERS (OUTPATIENT)
Dept: ADMINISTRATIVE | Facility: HOSPITAL | Age: 73
End: 2023-10-13
Payer: MEDICARE

## 2023-10-13 DIAGNOSIS — M54.16 LUMBAR RADICULOPATHY: Primary | ICD-10-CM

## 2023-10-18 ENCOUNTER — APPOINTMENT (OUTPATIENT)
Dept: CT IMAGING | Facility: HOSPITAL | Age: 73
End: 2023-10-18
Payer: MEDICARE

## 2023-10-18 ENCOUNTER — HOSPITAL ENCOUNTER (EMERGENCY)
Facility: HOSPITAL | Age: 73
Discharge: HOME | End: 2023-10-18
Attending: EMERGENCY MEDICINE | Admitting: EMERGENCY MEDICINE
Payer: MEDICARE

## 2023-10-18 VITALS
SYSTOLIC BLOOD PRESSURE: 135 MMHG | HEIGHT: 62 IN | BODY MASS INDEX: 35.58 KG/M2 | TEMPERATURE: 98.3 F | HEART RATE: 59 BPM | WEIGHT: 193.34 LBS | RESPIRATION RATE: 18 BRPM | DIASTOLIC BLOOD PRESSURE: 75 MMHG | OXYGEN SATURATION: 94 %

## 2023-10-18 DIAGNOSIS — C91.10 CLL (CHRONIC LYMPHOCYTIC LEUKEMIA): ICD-10-CM

## 2023-10-18 DIAGNOSIS — K57.92 DIVERTICULITIS: Primary | ICD-10-CM

## 2023-10-18 LAB
ALBUMIN SERPL-MCNC: 4.5 G/DL (ref 3.5–5.2)
ALBUMIN/GLOB SERPL: 1.8 G/DL
ALP SERPL-CCNC: 61 U/L (ref 39–117)
ALT SERPL W P-5'-P-CCNC: 28 U/L (ref 1–33)
ANION GAP SERPL CALCULATED.3IONS-SCNC: 10.3 MMOL/L (ref 5–15)
AST SERPL-CCNC: 26 U/L (ref 1–32)
BACTERIA UR QL AUTO: ABNORMAL /HPF
BILIRUB SERPL-MCNC: 0.4 MG/DL (ref 0–1.2)
BILIRUB UR QL STRIP: NEGATIVE
BLASTS NFR BLD MANUAL: 8 % (ref 0–0)
BUN SERPL-MCNC: 13 MG/DL (ref 8–23)
BUN/CREAT SERPL: 14.3 (ref 7–25)
CALCIUM SPEC-SCNC: 9.9 MG/DL (ref 8.6–10.5)
CHLORIDE SERPL-SCNC: 103 MMOL/L (ref 98–107)
CLARITY UR: ABNORMAL
CO2 SERPL-SCNC: 28.7 MMOL/L (ref 22–29)
COLOR UR: YELLOW
CREAT SERPL-MCNC: 0.91 MG/DL (ref 0.57–1)
CYTO UR: NORMAL
D-LACTATE SERPL-SCNC: 1.5 MMOL/L (ref 0.5–2)
DEPRECATED RDW RBC AUTO: 49.7 FL (ref 37–54)
EGFRCR SERPLBLD CKD-EPI 2021: 67.2 ML/MIN/1.73
ERYTHROCYTE [DISTWIDTH] IN BLOOD BY AUTOMATED COUNT: 14.3 % (ref 12.3–15.4)
GLOBULIN UR ELPH-MCNC: 2.5 GM/DL
GLUCOSE SERPL-MCNC: 127 MG/DL (ref 65–99)
GLUCOSE UR STRIP-MCNC: NEGATIVE MG/DL
HCT VFR BLD AUTO: 47.4 % (ref 34–46.6)
HGB BLD-MCNC: 15.9 G/DL (ref 12–15.9)
HGB UR QL STRIP.AUTO: NEGATIVE
HOLD SPECIMEN: NORMAL
HOLD SPECIMEN: NORMAL
HYALINE CASTS UR QL AUTO: ABNORMAL /LPF
KETONES UR QL STRIP: NEGATIVE
LAB AP CASE REPORT: NORMAL
LAB AP CLINICAL INFORMATION: NORMAL
LARGE PLATELETS: ABNORMAL
LEUKOCYTE ESTERASE UR QL STRIP.AUTO: ABNORMAL
LIPASE SERPL-CCNC: 40 U/L (ref 13–60)
LYMPHOCYTES # BLD MANUAL: 28.71 10*3/MM3 (ref 0.7–3.1)
LYMPHOCYTES NFR BLD MANUAL: 1 % (ref 5–12)
MCH RBC QN AUTO: 32.3 PG (ref 26.6–33)
MCHC RBC AUTO-ENTMCNC: 33.5 G/DL (ref 31.5–35.7)
MCV RBC AUTO: 96.3 FL (ref 79–97)
MONOCYTES # BLD: 0.37 10*3/MM3 (ref 0.1–0.9)
NEUTROPHILS # BLD AUTO: 3.31 10*3/MM3 (ref 1.7–7)
NEUTROPHILS NFR BLD MANUAL: 9 % (ref 42.7–76)
NITRITE UR QL STRIP: NEGATIVE
PATH REPORT.FINAL DX SPEC: NORMAL
PATH REPORT.GROSS SPEC: NORMAL
PATHOLOGY REVIEW: YES
PH UR STRIP.AUTO: 5.5 [PH] (ref 5–8)
PLATELET # BLD AUTO: 173 10*3/MM3 (ref 140–450)
PMV BLD AUTO: 11.8 FL (ref 6–12)
POTASSIUM SERPL-SCNC: 3.4 MMOL/L (ref 3.5–5.2)
PROLYMPHOCYTES NFR BLD MANUAL: 4 % (ref 0–0)
PROT SERPL-MCNC: 7 G/DL (ref 6–8.5)
PROT UR QL STRIP: ABNORMAL
RBC # BLD AUTO: 4.92 10*6/MM3 (ref 3.77–5.28)
RBC # UR STRIP: ABNORMAL /HPF
RBC MORPH BLD: NORMAL
REF LAB TEST METHOD: ABNORMAL
SCAN SLIDE: NORMAL
SMALL PLATELETS BLD QL SMEAR: ADEQUATE
SODIUM SERPL-SCNC: 142 MMOL/L (ref 136–145)
SP GR UR STRIP: 1.02 (ref 1–1.03)
SQUAMOUS #/AREA URNS HPF: ABNORMAL /HPF
TRANS CELLS #/AREA URNS HPF: ABNORMAL /HPF
UROBILINOGEN UR QL STRIP: ABNORMAL
VARIANT LYMPHS NFR BLD MANUAL: 4 % (ref 0–5)
VARIANT LYMPHS NFR BLD MANUAL: 74 % (ref 19.6–45.3)
WBC # UR STRIP: ABNORMAL /HPF
WBC MORPH BLD: NORMAL
WBC NRBC COR # BLD: 36.81 10*3/MM3 (ref 3.4–10.8)
WHOLE BLOOD HOLD COAG: NORMAL
WHOLE BLOOD HOLD SPECIMEN: NORMAL
YEAST URNS QL MICRO: ABNORMAL /HPF

## 2023-10-18 PROCEDURE — 25010000002 MORPHINE PER 10 MG: Performed by: EMERGENCY MEDICINE

## 2023-10-18 PROCEDURE — 25510000001 IOPAMIDOL PER 1 ML: Performed by: EMERGENCY MEDICINE

## 2023-10-18 PROCEDURE — 25010000002 ONDANSETRON PER 1 MG

## 2023-10-18 PROCEDURE — 74177 CT ABD & PELVIS W/CONTRAST: CPT

## 2023-10-18 PROCEDURE — 85025 COMPLETE CBC W/AUTO DIFF WBC: CPT | Performed by: EMERGENCY MEDICINE

## 2023-10-18 PROCEDURE — 87086 URINE CULTURE/COLONY COUNT: CPT

## 2023-10-18 PROCEDURE — 96375 TX/PRO/DX INJ NEW DRUG ADDON: CPT

## 2023-10-18 PROCEDURE — 99285 EMERGENCY DEPT VISIT HI MDM: CPT

## 2023-10-18 PROCEDURE — 36415 COLL VENOUS BLD VENIPUNCTURE: CPT

## 2023-10-18 PROCEDURE — 87040 BLOOD CULTURE FOR BACTERIA: CPT

## 2023-10-18 PROCEDURE — 80053 COMPREHEN METABOLIC PANEL: CPT | Performed by: INTERNAL MEDICINE

## 2023-10-18 PROCEDURE — 81001 URINALYSIS AUTO W/SCOPE: CPT | Performed by: EMERGENCY MEDICINE

## 2023-10-18 PROCEDURE — 25810000003 SODIUM CHLORIDE 0.9 % SOLUTION

## 2023-10-18 PROCEDURE — 25010000002 CEFTRIAXONE PER 250 MG

## 2023-10-18 PROCEDURE — 96365 THER/PROPH/DIAG IV INF INIT: CPT

## 2023-10-18 PROCEDURE — 85007 BL SMEAR W/DIFF WBC COUNT: CPT | Performed by: EMERGENCY MEDICINE

## 2023-10-18 PROCEDURE — 83605 ASSAY OF LACTIC ACID: CPT | Performed by: EMERGENCY MEDICINE

## 2023-10-18 PROCEDURE — 83690 ASSAY OF LIPASE: CPT | Performed by: EMERGENCY MEDICINE

## 2023-10-18 RX ORDER — ONDANSETRON 2 MG/ML
4 INJECTION INTRAMUSCULAR; INTRAVENOUS ONCE
Status: COMPLETED | OUTPATIENT
Start: 2023-10-18 | End: 2023-10-18

## 2023-10-18 RX ORDER — SODIUM CHLORIDE 0.9 % (FLUSH) 0.9 %
10 SYRINGE (ML) INJECTION AS NEEDED
Status: DISCONTINUED | OUTPATIENT
Start: 2023-10-18 | End: 2023-10-18 | Stop reason: HOSPADM

## 2023-10-18 RX ORDER — AMOXICILLIN AND CLAVULANATE POTASSIUM 875; 125 MG/1; MG/1
1 TABLET, FILM COATED ORAL 2 TIMES DAILY
Qty: 14 TABLET | Refills: 0 | Status: SHIPPED | OUTPATIENT
Start: 2023-10-18 | End: 2023-10-25

## 2023-10-18 RX ADMIN — CEFTRIAXONE SODIUM 2000 MG: 2 INJECTION, POWDER, FOR SOLUTION INTRAMUSCULAR; INTRAVENOUS at 13:08

## 2023-10-18 RX ADMIN — IOPAMIDOL 75 ML: 755 INJECTION, SOLUTION INTRAVENOUS at 12:18

## 2023-10-18 RX ADMIN — MORPHINE SULFATE 4 MG: 4 INJECTION, SOLUTION INTRAMUSCULAR; INTRAVENOUS at 13:11

## 2023-10-18 RX ADMIN — ONDANSETRON 4 MG: 2 INJECTION INTRAMUSCULAR; INTRAVENOUS at 13:03

## 2023-10-18 RX ADMIN — SODIUM CHLORIDE 1000 ML: 9 INJECTION, SOLUTION INTRAVENOUS at 13:06

## 2023-10-18 NOTE — ED PROVIDER NOTES
"Patient is 73 y.o. year old female that presents to the ED for evaluation of abdominal pain.  This patient has a history of breast cancer as well as CLL.  She was recently seen by her oncologist, Dr. Glez, recently and had abdominal pain at that point.  A CT was supposed to be ordered; however still pending.    Physical Exam currently the patient's pain is controlled.    ED Course:    /67   Pulse 57   Temp 98.3 °F (36.8 °C) (Oral)   Resp 20   Ht 157.5 cm (62\")   Wt 87.7 kg (193 lb 5.5 oz)   SpO2 91%   BMI 35.36 kg/m²   Results for orders placed or performed during the hospital encounter of 10/18/23   Lipase    Specimen: Blood   Result Value Ref Range    Lipase 40 13 - 60 U/L   Urinalysis With Microscopic If Indicated (No Culture) - Urine, Clean Catch    Specimen: Urine, Clean Catch   Result Value Ref Range    Color, UA Yellow Yellow, Straw    Appearance, UA Turbid (A) Clear    pH, UA 5.5 5.0 - 8.0    Specific Gravity, UA 1.022 1.005 - 1.030    Glucose, UA Negative Negative    Ketones, UA Negative Negative    Bilirubin, UA Negative Negative    Blood, UA Negative Negative    Protein, UA Trace (A) Negative    Leuk Esterase, UA Large (3+) (A) Negative    Nitrite, UA Negative Negative    Urobilinogen, UA 1.0 E.U./dL 0.2 - 1.0 E.U./dL   Lactic Acid, Plasma    Specimen: Blood   Result Value Ref Range    Lactate 1.5 0.5 - 2.0 mmol/L   CBC Auto Differential    Specimen: Blood   Result Value Ref Range    WBC 36.81 (C) 3.40 - 10.80 10*3/mm3    RBC 4.92 3.77 - 5.28 10*6/mm3    Hemoglobin 15.9 12.0 - 15.9 g/dL    Hematocrit 47.4 (H) 34.0 - 46.6 %    MCV 96.3 79.0 - 97.0 fL    MCH 32.3 26.6 - 33.0 pg    MCHC 33.5 31.5 - 35.7 g/dL    RDW 14.3 12.3 - 15.4 %    RDW-SD 49.7 37.0 - 54.0 fl    MPV 11.8 6.0 - 12.0 fL    Platelets 173 140 - 450 10*3/mm3   Scan Slide    Specimen: Blood   Result Value Ref Range    Scan Slide     Urinalysis, Microscopic Only - Urine, Clean Catch    Specimen: Urine, Clean Catch   Result Value " Ref Range    RBC, UA None Seen None Seen, 0-2 /HPF    WBC, UA 21-50 (A) None Seen, 0-2 /HPF    Bacteria, UA 2+ (A) None Seen /HPF    Squamous Epithelial Cells, UA 13-20 (A) None Seen, 0-2 /HPF    Transitional Epithelial Cells, UA 0-2 0 - 2 /HPF    Yeast, UA Small/1+ Yeast None Seen /HPF    Hyaline Casts, UA None Seen None Seen /LPF    Methodology Manual Light Microscopy    Comprehensive Metabolic Panel    Specimen: Blood   Result Value Ref Range    Glucose 127 (H) 65 - 99 mg/dL    BUN 13 8 - 23 mg/dL    Creatinine 0.91 0.57 - 1.00 mg/dL    Sodium 142 136 - 145 mmol/L    Potassium 3.4 (L) 3.5 - 5.2 mmol/L    Chloride 103 98 - 107 mmol/L    CO2 28.7 22.0 - 29.0 mmol/L    Calcium 9.9 8.6 - 10.5 mg/dL    Total Protein 7.0 6.0 - 8.5 g/dL    Albumin 4.5 3.5 - 5.2 g/dL    ALT (SGPT) 28 1 - 33 U/L    AST (SGOT) 26 1 - 32 U/L    Alkaline Phosphatase 61 39 - 117 U/L    Total Bilirubin 0.4 0.0 - 1.2 mg/dL    Globulin 2.5 gm/dL    A/G Ratio 1.8 g/dL    BUN/Creatinine Ratio 14.3 7.0 - 25.0    Anion Gap 10.3 5.0 - 15.0 mmol/L    eGFR 67.2 >60.0 mL/min/1.73   Manual Differential    Specimen: Blood   Result Value Ref Range    Neutrophil % 9.0 (L) 42.7 - 76.0 %    Lymphocyte % 74.0 (H) 19.6 - 45.3 %    Monocyte % 1.0 (L) 5.0 - 12.0 %    Atypical Lymphocyte % 4.0 0.0 - 5.0 %    Blasts % 8.0 (H) 0.0 - 0.0 %    Prolymphocyte % 4.0 (H) 0.0 - 0.0 %    Neutrophils Absolute 3.31 1.70 - 7.00 10*3/mm3    Lymphocytes Absolute 28.71 (H) 0.70 - 3.10 10*3/mm3    Monocytes Absolute 0.37 0.10 - 0.90 10*3/mm3    RBC Morphology Normal Normal    WBC Morphology Normal Normal    Platelet Estimate Adequate Normal    Large Platelets Slight/1+ None Seen   Path Consult Reflex    Specimen: Blood   Result Value Ref Range    Pathology Review Yes    Pathology Consultation    Specimen: Blood, Venous   Result Value Ref Range    Final Diagnosis       Peripheral blood (CBC and smear):   - WBC:  Chronic lymphocytic leukemia / small lymphocytic lymphoma  (CLL/SLL)   - RBC:  Normocytic normochromic RBCs   - Platelets:  Normal platelet count and morphology        Clinical Information       History of CLL      Gross Description       1. Blood, Venous.  Two (2) Rousseau-stained peripheral blood smears are received for evaluation. The smears are accompanied by a CBC analysis report and marked as a reflex review.            Microscopic Description       Microscopic examination performed.      Case Report       Surgical Pathology Report                         Case: [id number]                                  Authorizing Provider:  Ricci Sue DO             Collected:           10/18/2023 10:35 AM          Ordering Location:     [facility]      Received:            10/18/2023 12:02 PM                                 EMERGENCY ROOM                                                               Pathologist:           Mirella Russell MD                                                     Specimen:    Blood, Venous                                                                             Green Top (Gel)   Result Value Ref Range    Extra Tube Hold for add-ons.    Lavender Top   Result Value Ref Range    Extra Tube hold for add-on    Gold Top - SST   Result Value Ref Range    Extra Tube Hold for add-ons.    Light Blue Top   Result Value Ref Range    Extra Tube Hold for add-ons.      Medications   sodium chloride 0.9 % flush 10 mL (has no administration in time range)   cefTRIAXone (ROCEPHIN) 2000 mg/100 mL 0.9% NS IVPB (MBP) (0 mg Intravenous Stopped 10/18/23 1346)   sodium chloride 0.9 % bolus 1,000 mL (1,000 mL Intravenous New Bag 10/18/23 1306)   ondansetron (ZOFRAN) injection 4 mg (4 mg Intravenous Given 10/18/23 1303)   morphine injection 4 mg (4 mg Intravenous Given 10/18/23 1311)   iopamidol (ISOVUE-370) 76 % injection 100 mL (75 mL Intravenous Given 10/18/23 1218)     CT Abdomen Pelvis With Contrast    Result Date: 10/18/2023  Narrative: PROCEDURE: CT  ABDOMEN PELVIS W CONTRAST  COMPARISON: [facility], PET, NM PET/CT SKULL BASE TO MID THIGH, 5/30/2023, 13:41.  [facility], CT, CT ABDOMEN PELVIS W CONTRAST, 4/28/2023, 13:51.  INDICATIONS: lower abdominal pain  TECHNIQUE: After obtaining the patient's consent, CT images were created with non-ionic intravenous contrast material.   PROTOCOL:   Standard imaging protocol performed    RADIATION:   DLP: 956.9mGy*cm   Automated exposure control was utilized to minimize radiation dose. CONTRAST: 75cc Isovue 370 I.V.  FINDINGS:  The lung bases are clear.  There is a tiny left hepatic cyst.  The gallbladder is surgically absent.  A 2.7 cm right adrenal mass is unchanged, likely an adenoma.  The left adrenal gland, kidneys, spleen, and pancreas are unremarkable.  The stomach appears normal.  The small bowel appears normal in caliber and configuration.  There is sigmoid diverticulosis with minimal surrounding infiltration, which could represent mild diverticulitis.  The appendix is not well visualized.  There is no ascites or loculated collection.  There is retroperitoneal lymphadenopathy, with an index 3.2 x 2.5 cm left periaortic lymph node on image 106.  The rectum and urinary bladder are unremarkable.  The uterus is surgically absent.  No aggressive osseous lesions are identified.      Impression:   1. Sigmoid diverticulosis with minimal surrounding infiltration, which could represent mild diverticulitis. 2. Stable right adrenal mass, likely an adenoma. 3. Retroperitoneal lymphadenopathy, which could be reactive or metastatic.     FACUNDO BLEVINS MD       Electronically Signed and Approved By: FACUNDO BLEVINS MD on 10/18/2023 at 13:02             Mammo Screening Digital Tomosynthesis Bilateral With CAD    Result Date: 9/18/2023  Narrative: PROCEDURE: MAMMO SCREENING DIGITAL TOMOSYNTHESIS BILATERAL W CAD  COMPARISON: [facility], MG, MAMMO SCREENING DIGITAL TOMOSYNTHESIS BILATERAL W  CAD, 9/10/2021, 12:29.  [facility], , DIG SCREENING BILAT MATTHEW W 3D NAHUM, 9/02/2020, 13:33.  [facility], , DIG SCREENING BILAT MATTHEW W 3D NAHUM, 8/02/2019, 7:59.  [facility], MAMMO SCREENING DIGITAL TOMOSYNTHESIS BILATERAL W CAD, 9/14/2022, 11:15.  [facility], , MAMMO DIAGNOSTIC DIGITAL TOMOSYNTHESIS RIGHT W CAD, 10/01/2021, 9:31.  [facility], , US GUIDED CYST ASPIRATION BREAST, 10/03/2022, 12:05.  VIEWS:  BILATERAL CC AND MLO VIEWS WERE OBTAINED UTILIZING 3D TOMOSYNTHESIS AND R2 CAD SOFTWARE  INDICATIONS: breast cancer screening  FINDINGS:  No suspicious mass, area of architectural distortion or suspicious microcalcification is identified.  There are lumpectomy changes in the upper inner right breast.      Impression:  Benign mammogram. Suggest routine mammographic screening.  RECOMMENDATION(S):  ROUTINE MAMMOGRAM AND CLINICAL EVALUATION IN 12 MONTHS.   BIRADS:  DIAGNOSTIC CATEGORY 2--BENIGN FINDING   BREAST COMPOSITION: Scattered areas fibroglandular density.  PLEASE NOTE:  A NORMAL MAMMOGRAM DOES NOT EXCLUDE THE POSSIBILITY OF BREAST CANCER. ANY CLINICALLY SUSPICIOUS PALPABLE LUMP SHOULD BE BIOPSIED.      ANU BOUDREAUX MD       Electronically Signed and Approved By: ANU BOUDREAUX MD on 9/18/2023 at 15:56              MDM:    Procedures      The case was discussed between the MARTHA and myself. Patient  care including, but not limited to ordered imaging, medications, and lab results were reviewed. I then performed the substantive portion of the visit including all aspects of the medical decision making.        Abhijit Davidson DO  14:32 EDT  10/18/23         Abhijit Davidson DO  10/18/23 [id number]

## 2023-10-18 NOTE — DISCHARGE INSTRUCTIONS
Please follow-up with your primary care provider for a repeat urinalysis.  Your urine sample here in the ED appeared to be contaminated so the results are not accurate.  You have been sent a prescription for an antibiotic to your pharmacy for the treatment of your diverticulitis.  Please return to the ED for worsening symptoms or if you should develop fevers.

## 2023-10-18 NOTE — ED PROVIDER NOTES
Time: 10:52 AM EDT  Date of encounter:  10/18/2023  Independent Historian/Clinical History and Information was obtained by:   Patient    History is limited by: N/A    Chief Complaint: Abdominal pain      History of Present Illness:  Patient is a 73 y.o. year old female who presents to the emergency department for evaluation of abdominal pain.  Patient states she has had lower abdominal pain, fevers, nausea for 1 week.  Patient denies dysuria, denies diarrhea or constipation.  Patient reports history of diverticulosis, leukemia, and breast cancer.    HPI    Patient Care Team  Primary Care Provider: Monserrat Grover APRN    Past Medical History:     Allergies   Allergen Reactions    Ace Inhibitors Cough    Sulfamethoxazole-Trimethoprim Hives    Levaquin [Levofloxacin] Hives    Lisinopril Cough and Hives    Nsaids Itching, Nausea Only and Rash    Sulfa Antibiotics Itching, Nausea Only, Rash and Hives     Past Medical History:   Diagnosis Date    Anxiety     Arthritis     Breast cancer     Cancer     BREAST CANCER    Cataract     Cholelithiases 4/30@1    Clotting disorder     Depression     Diabetes mellitus     Disease of thyroid gland     currently not taking any meds    Diverticulitis     Diverticulitis of colon     Diverticulosis 95    Fibromyalgia     Fibromyalgia, primary     HIV disease     Hyperlipidemia     Hypertension     Infectious viral hepatitis     Irritable bowel syndrome     Kidney stone     Liver disease     Low back pain 2015    Major depressive disorder 05/07/2020    Palpitations     ASYMPTOMATIC- DENIES CP/SOB, SEE PCP- NO CARDIOLOGIST     Post-menopause 09/17/2020    Sinus trouble     Sleep apnea     Substance abuse     Tremor 2018    I was on medicine for tremors    Urinary tract infection     Vitamin D deficiency 09/17/2020     Past Surgical History:   Procedure Laterality Date    ABDOMINAL SURGERY  06/14/1985    APPENDECTOMY      BILATERAL BREAST REDUCTION      BREAST BIOPSY Right  2021    Procedure: RIGHT BREAST NEEDLE LOCALIZED  LUMPECTOMY WITH SENTINEL NODE BIOPSY;  Surgeon: Mirtha Esteves MD;  Location: [facility];  Service: General;  Laterality: Right;    BREAST LUMPECTOMY      CHOLECYSTECTOMY N/A 2021    Procedure: CHOLECYSTECTOMY LAPAROSCOPIC INTRAOPERATIVE CHOLANGIOGRAM;  Surgeon: Louie Medina MD;  Location: [facility] MAIN OR;  Service: General;  Laterality: N/A;    COLONOSCOPY  2019    COLONOSCOPY N/A 2022    Procedure: COLONOSCOPY WITH BIOPSIES;  Surgeon: Butch Garcia MD;  Location: [facility] ENDOSCOPY;  Service: Gastroenterology;  Laterality: N/A;  DIVERTICULOSIS    HYSTERECTOMY      LYMPH NODE BIOPSY      SEPTOPLASTY  2018    SINUS SURGERY  2018    TONSILLECTOMY      US GUIDED CYST ASPIRATION BREAST N/A 10/03/2022    WRIST ARTHROPLASTY Bilateral      Family History   Problem Relation Age of Onset    No Known Problems Mother     Cancer Father         Leukemia      Breast cancer Sister     Cancer Sister         Had breast cancer    Cancer Brother         Leukemia/CLL    Cancer Sister         Had stomach cancer    Cancer Brother         Bladder cancer    Cancer Brother         Bladder cancer    Cancer Sister         Retinal blastoma.    Colon cancer Neg Hx        Home Medications:  Prior to Admission medications    Medication Sig Start Date End Date Taking? Authorizing Provider   Cholecalciferol (Vitamin D) 50 MCG ( UT) capsule Take 1 capsule by mouth Daily. 3/10/23   Monserrat Grover APRN   colestipol (Colestid) 1 g tablet Take 2 tablets by mouth 2 (Two) Times a Day. 23   Monserrat Grover APRN   desvenlafaxine (Pristiq) 100 MG 24 hr tablet Take 1 tablet by mouth Daily. 23   Monserrat Grover APRN   gabapentin (NEURONTIN) 300 MG capsule Take 1 capsule by mouth 3 (Three) Times a Day As Needed (neuropathy). 23   Monserrat Grover APRN   hydroCHLOROthiazide (HYDRODIURIL) 25 MG tablet Take 1 tablet by mouth  "Daily. 9/11/23   Monserrat Grover APRN   HYDROcodone-acetaminophen (NORCO) 5-325 MG per tablet Take 1 tablet by mouth Daily. 12/8/22   Provider, MD Larry   Lactobacillus Probiotic tablet Take 1 tablet by mouth 2 (Two) Times a Day. 12/13/22   Monserrat Grover APRN   metoprolol tartrate (LOPRESSOR) 25 MG tablet Take 1 tablet by mouth 2 (Two) Times a Day. 3/10/23   Monserrat Grover APRN   rosuvastatin (CRESTOR) 20 MG tablet Take 1 tablet by mouth Every Night. 8/3/23   Monserrat Grover APRN        Social History:   Social History     Tobacco Use    Smoking status: Never     Passive exposure: Yes    Smokeless tobacco: Never   Vaping Use    Vaping Use: Never used   Substance Use Topics    Alcohol use: Never    Drug use: Never         Review of Systems:  Review of Systems   Constitutional:  Positive for chills and fever.   HENT:  Negative for sore throat.    Eyes: Negative.    Respiratory:  Negative for cough and shortness of breath.    Cardiovascular:  Positive for chest pain.   Gastrointestinal:  Positive for abdominal pain and nausea. Negative for constipation, diarrhea and vomiting.   Genitourinary:  Negative for difficulty urinating, dysuria and flank pain.   Musculoskeletal:  Negative for neck pain.   Skin:  Negative for rash.   Allergic/Immunologic: Negative.    Neurological:  Negative for weakness, numbness and headaches.   Hematological: Negative.    Psychiatric/Behavioral: Negative.     All other systems reviewed and are negative.       Physical Exam:  /72 (BP Location: Right arm, Patient Position: Lying)   Pulse 71   Resp 20   Ht 157.5 cm (62\")   Wt 87.7 kg (193 lb 5.5 oz)   SpO2 95%   BMI 35.36 kg/m²     Physical Exam  Vitals and nursing note reviewed.   Constitutional:       General: She is not in acute distress.     Appearance: Normal appearance. She is not toxic-appearing.   HENT:      Head: Normocephalic and atraumatic.      Jaw: There is normal jaw occlusion.   Eyes: "      General: Lids are normal.      Extraocular Movements: Extraocular movements intact.      Conjunctiva/sclera: Conjunctivae normal.      Pupils: Pupils are equal, round, and reactive to light.   Cardiovascular:      Rate and Rhythm: Normal rate and regular rhythm.      Pulses: Normal pulses.      Heart sounds: Normal heart sounds.   Pulmonary:      Effort: Pulmonary effort is normal. No respiratory distress.      Breath sounds: Normal breath sounds. No wheezing or rhonchi.   Abdominal:      General: Abdomen is flat. Bowel sounds are normal.      Palpations: Abdomen is soft.      Tenderness: There is abdominal tenderness in the suprapubic area. There is no guarding or rebound.   Musculoskeletal:         General: Normal range of motion.      Cervical back: Normal range of motion and neck supple.      Right lower leg: No edema.      Left lower leg: No edema.   Skin:     General: Skin is warm and dry.   Neurological:      Mental Status: She is alert and oriented to person, place, and time. Mental status is at baseline.   Psychiatric:         Mood and Affect: Mood normal.              Procedures:  Procedures      Medical Decision Making:      Comorbidities that affect care:    Cancer, Diabetes, Hypertension, Thyroid Disease    External Notes reviewed:    Previous Clinic Note: Oncology office visit from 10/10/2023, at this visit patient was given an order for outpatient CT of the abdomen pelvis with and without contrast but she did not have this test done.  and Previous Labs: CMP and CBC from 10/10/2023, WBC was 30.92      The following orders were placed and all results were independently analyzed by me:  Orders Placed This Encounter   Procedures    CT Abdomen Pelvis With Contrast    [place] Draw    Comprehensive Metabolic Panel    Lipase    Urinalysis With Microscopic If Indicated (No Culture) - Urine, Clean Catch    Lactic Acid, Plasma    CBC Auto Differential    Scan Slide    Urinalysis, Microscopic Only - Urine,  Clean Catch    Comprehensive Metabolic Panel    Manual Differential    Path Consult Reflex    NPO Diet NPO Type: Strict NPO    Undress & Gown    Insert Peripheral IV    CBC & Differential    Green Top (Gel)    Lavender Top    Gold Top - SST    Light Blue Top       Medications Given in the Emergency Department:  Medications   sodium chloride 0.9 % flush 10 mL (has no administration in time range)        ED Course:    ED Course as of 10/18/23 1145   Wed Oct 18, 2023   1144 WBC(!!): 36.81  Patient's previous CBC from 10/10/2023 showed WBC of 30.92, patient has history of chronic lymphocytic leukemia. [RM]      ED Course User Index  [RM] Erin Lawrence APRN       Labs:    Lab Results (last 24 hours)       Procedure Component Value Units Date/Time    Comprehensive Metabolic Panel [[id number]] Updated: 10/18/23 1106    Specimen: Blood     CBC & Differential [[id number]]  (Abnormal) Collected: 10/18/23 1035    Specimen: Blood Updated: 10/18/23 1141    Narrative:      The following orders were created for panel order CBC & Differential.  Procedure                               Abnormality         Status                     ---------                               -----------         ------                     CBC Auto Differential[[id number]]        Abnormal            Final result               Scan Slide[[id number]]                                       Final result                 Please view results for these tests on the individual orders.    Lipase [[id number]]  (Normal) Collected: 10/18/23 1035    Specimen: Blood Updated: 10/18/23 1059     Lipase 40 U/L     Urinalysis With Microscopic If Indicated (No Culture) - Urine, Clean Catch [[id number]]  (Abnormal) Collected: 10/18/23 1035    Specimen: Urine, Clean Catch Updated: 10/18/23 1100     Color, UA Yellow     Appearance, UA Turbid     pH, UA 5.5     Specific Gravity, UA 1.022     Glucose, UA Negative     Ketones, UA Negative     Bilirubin, UA Negative     Blood, UA  Negative     Protein, UA Trace     Leuk Esterase, UA Large (3+)     Nitrite, UA Negative     Urobilinogen, UA 1.0 E.U./dL    Lactic Acid, Plasma [[id number]]  (Normal) Collected: 10/18/23 1035    Specimen: Blood Updated: 10/18/23 1057     Lactate 1.5 mmol/L     CBC Auto Differential [[id number]]  (Abnormal) Collected: 10/18/23 1035    Specimen: Blood Updated: 10/18/23 1141     WBC 36.81 10*3/mm3      RBC 4.92 10*6/mm3      Hemoglobin 15.9 g/dL      Hematocrit 47.4 %      MCV 96.3 fL      MCH 32.3 pg      MCHC 33.5 g/dL      RDW 14.3 %      RDW-SD 49.7 fl      MPV 11.8 fL      Platelets 173 10*3/mm3     Scan Slide [[id number]] Collected: 10/18/23 1035    Specimen: Blood Updated: 10/18/23 1141     Scan Slide --     Comment: See Manual Differential Results       Urinalysis, Microscopic Only - Urine, Clean Catch [[id number]]  (Abnormal) Collected: 10/18/23 1035    Specimen: Urine, Clean Catch Updated: 10/18/23 1105     RBC, UA None Seen /HPF      WBC, UA 21-50 /HPF      Bacteria, UA 2+ /HPF      Squamous Epithelial Cells, UA 13-20 /HPF      Transitional Epithelial Cells, UA 0-2 /HPF      Yeast, UA Small/1+ Yeast /HPF      Hyaline Casts, UA None Seen /LPF      Methodology Manual Light Microscopy    Comprehensive Metabolic Panel [[id number]]  (Abnormal) Collected: 10/18/23 1035    Specimen: Blood Updated: 10/18/23 1120     Glucose 127 mg/dL      BUN 13 mg/dL      Creatinine 0.91 mg/dL      Sodium 142 mmol/L      Potassium 3.4 mmol/L      Comment: Slight hemolysis detected by analyzer. Results may be affected.        Chloride 103 mmol/L      CO2 28.7 mmol/L      Calcium 9.9 mg/dL      Total Protein 7.0 g/dL      Albumin 4.5 g/dL      ALT (SGPT) 28 U/L      AST (SGOT) 26 U/L      Alkaline Phosphatase 61 U/L      Total Bilirubin 0.4 mg/dL      Globulin 2.5 gm/dL      A/G Ratio 1.8 g/dL      BUN/Creatinine Ratio 14.3     Anion Gap 10.3 mmol/L      eGFR 67.2 mL/min/1.73     Narrative:      GFR Normal >60  Chronic Kidney Disease  <60  Kidney Failure <15    The GFR formula is only valid for adults with stable renal function between ages 18 and 70.    Manual Differential [[id number]]  (Abnormal) Collected: 10/18/23 1035    Specimen: Blood Updated: 10/18/23 1144     Neutrophil % 9.0 %      Lymphocyte % 74.0 %      Monocyte % 1.0 %      Atypical Lymphocyte % 4.0 %      Blasts % 8.0 %      Prolymphocyte % 4.0 %      Neutrophils Absolute 3.31 10*3/mm3      Lymphocytes Absolute 28.71 10*3/mm3      Monocytes Absolute 0.37 10*3/mm3      RBC Morphology Normal     WBC Morphology Normal     Platelet Estimate Adequate     Large Platelets Slight/1+    Path Consult Reflex [[id number]] Collected: 10/18/23 1035    Specimen: Blood Updated: 10/18/23 1144     Pathology Review Yes             Imaging:    No Radiology Exams Resulted Within Past 24 Hours      Differential Diagnosis and Discussion:    Abdominal Pain: Based on the patient's signs and symptoms, I considered abdominal aortic aneurysm, small bowel obstruction, pancreatitis, acute cholecystitis, acute appendecitis, peptic ulcer disease, gastritis, colitis, endocrine disorders, irritable bowel syndrome and other differential diagnosis an etiology of the patient's abdominal pain.    All labs were reviewed and interpreted by me.  CT scan radiology impression was interpreted by me.    MDM     Amount and/or Complexity of Data Reviewed  Clinical lab tests: reviewed    WBC is elevated, however patient has history of chronic lymphocytic leukemia, her last level was 1 week ago which was 30.92.  Patient is not septic, lactic acid is normal, vital signs are normal.  Patient was treated for urinary tract infection in the ED, however her urine sample is likely contaminated.  We will send in about a prescription for diverticulitis.    The patient is resting comfortably and feels better, is alert and in no distress. Repeat examination is unremarkable and benign; in particular, there's no [facility]  and there is no pulsatile mass. The history, exam, diagnostic testing, and current condition does not suggest acute appendicitis, bowel obstruction, acute cholecystitis, bowel perforation, major gastrointestinal bleeding, severe diverticulitis, abdominal aortic aneurysm, mesenteric ischemia, volvulus, sepsis, or other significant pathology that warrants further testing, continued ED treatment, admission, for surgical evaluation at this point. The vital signs have been stable. The patient does not have uncontrollable pain, intractable vomiting, or other significant symptoms. The patient's condition is stable and appropriate for discharge from the emergency department.        Patient Care Considerations:    CONSULT: I considered consulting GI, however no complications      Consultants/Shared Management Plan:    Consultant: I have discussed the case with attending ED physician Dr. Davidson who states patient is appropriate for discharge with antibiotics to treat diverticulitis.    Social Determinants of Health:    Patient is independent, reliable, and has access to care.       Disposition and Care Coordination:    Discharged: The patient is suitable and stable for discharge with no need for consideration of observation or admission.    I have explained the patient´s condition, diagnoses and treatment plan based on the information available to me at this time. I have answered questions and addressed any concerns. The patient has a good  understanding of the patient´s diagnosis, condition, and treatment plan as can be expected at this point. The vital signs have been stable. The patient´s condition is stable and appropriate for discharge from the emergency department.      The patient will pursue further outpatient evaluation with the primary care physician or other designated or consulting physician as outlined in the discharge instructions. They are agreeable to this plan of care and follow-up instructions have been  explained in detail. The patient has received these instructions in written format and have expressed an understanding of the discharge instructions. The patient is aware that any significant change in condition or worsening of symptoms should prompt an immediate return to this or the closest emergency department or call to 911.  I have explained discharge medications and the need for follow up with the patient/caretakers. This was also printed in the discharge instructions. Patient was discharged with the following medications and follow up:      Medication List      No changes were made to your prescriptions during this visit.      No follow-up provider specified.     Final diagnoses:   CLL (chronic lymphocytic leukemia)        ED Disposition       None            This medical record created using voice recognition software.             Erin Lawrence, ARA  10/18/23 [id number]

## 2023-10-19 LAB — BACTERIA SPEC AEROBE CULT: NORMAL

## 2023-10-23 LAB
BACTERIA SPEC AEROBE CULT: NORMAL
BACTERIA SPEC AEROBE CULT: NORMAL

## 2023-10-23 RX ORDER — L. ACIDOPHILUS/L.BULGARICUS 1MM CELL
1 TABLET ORAL 2 TIMES DAILY
Qty: 180 TABLET | Refills: 3 | Status: SHIPPED | OUTPATIENT
Start: 2023-10-23

## 2023-11-09 ENCOUNTER — OFFICE VISIT (OUTPATIENT)
Dept: UROLOGY | Facility: CLINIC | Age: 73
End: 2023-11-09
Payer: MEDICARE

## 2023-11-09 VITALS
BODY MASS INDEX: 32.94 KG/M2 | DIASTOLIC BLOOD PRESSURE: 72 MMHG | HEIGHT: 62 IN | WEIGHT: 179 LBS | SYSTOLIC BLOOD PRESSURE: 141 MMHG

## 2023-11-09 DIAGNOSIS — R31.29 MICROSCOPIC HEMATURIA: Primary | ICD-10-CM

## 2023-11-09 DIAGNOSIS — Z80.52 FAMILY HISTORY OF BLADDER CANCER: ICD-10-CM

## 2023-11-09 RX ORDER — NITROFURANTOIN 25; 75 MG/1; MG/1
100 CAPSULE ORAL 2 TIMES DAILY
Qty: 6 CAPSULE | Refills: 0 | Status: SHIPPED | OUTPATIENT
Start: 2023-11-09

## 2023-11-09 NOTE — PROGRESS NOTES
UROLOGY OFFICE H&P NOTE    Subjective   HPI  Dorothea Cruz is a 73 y.o. female.  History of CLL, referred by oncologist given patient's family history of bladder cancer also noted to have microscopic hematuria.     Denies history of gross hematuria.  Does note some pelvic pressure.  Also notes that she has UTIs.  Denies tobacco history.  Requests urine dip today.;  Was unable to void in office.    Her brothers had bladder cancer.  One of her brothers has passed away due to bladder cancer.    _______  Urine culture 10/18/2023: 25,000 mL urogenital chano    UA with microscopy   10/18/2023: No RBCs seen; 21-50 WBCs; 2+ bacteria; 13-20 squamous epithelial  5/6/2021: 6/10 RBCs on hpf    CT abdomen pelvis with contrast 10/18/2023: Urologic findings) 2.7 cm right adrenal mass, likely adenoma unchanged.  Left adrenal gland, kidneys markable.  Urinary bladder wall.      Medical History:  Past Medical History:   Diagnosis Date    Anxiety     Arthritis     Breast cancer     Cancer     BREAST CANCER    Cataract     Cholelithiases 4/30@1    Clotting disorder     Depression     Diabetes mellitus     Disease of thyroid gland     currently not taking any meds    Diverticulitis     Diverticulitis of colon     Diverticulosis 95    Fibromyalgia     Fibromyalgia, primary     HIV disease     Hyperlipidemia     Hypertension     Infectious viral hepatitis     Irritable bowel syndrome     Kidney stone     Liver disease     Low back pain 2015    Major depressive disorder 05/07/2020    Palpitations     ASYMPTOMATIC- DENIES CP/SOB, SEE PCP- NO CARDIOLOGIST     Post-menopause 09/17/2020    Sinus trouble     Sleep apnea     Substance abuse     Tremor 2018    I was on medicine for tremors    Urinary tract infection     Vitamin D deficiency 09/17/2020        Social History:  Social History     Socioeconomic History    Marital status:    Tobacco Use    Smoking status: Never     Passive exposure: Yes    Smokeless tobacco: Never    Vaping Use    Vaping Use: Never used   Substance and Sexual Activity    Alcohol use: Never    Drug use: Never    Sexual activity: Not Currently     Partners: Female     Birth control/protection: Post-menopausal, None, Hysterectomy        Family History:  Family History   Problem Relation Age of Onset    No Known Problems Mother     Cancer Father         Leukemia      Breast cancer Sister     Cancer Sister         Had breast cancer    Cancer Brother         Leukemia/CLL    Cancer Sister         Had stomach cancer    Cancer Brother         Bladder cancer    Cancer Brother         Bladder cancer    Cancer Sister         Retinal blastoma.    Colon cancer Neg Hx         Surgical History:  Past Surgical History:   Procedure Laterality Date    ABDOMINAL SURGERY  1985    APPENDECTOMY      BILATERAL BREAST REDUCTION      BREAST BIOPSY Right 2021    Procedure: RIGHT BREAST NEEDLE LOCALIZED  LUMPECTOMY WITH SENTINEL NODE BIOPSY;  Surgeon: Mirtha Esteves MD;  Location: [facility] OR [facility];  Service: General;  Laterality: Right;    BREAST LUMPECTOMY      CHOLECYSTECTOMY N/A 2021    Procedure: CHOLECYSTECTOMY LAPAROSCOPIC INTRAOPERATIVE CHOLANGIOGRAM;  Surgeon: Louie Medina MD;  Location: [facility] MAIN OR;  Service: General;  Laterality: N/A;    COLONOSCOPY  2019    COLONOSCOPY N/A 2022    Procedure: COLONOSCOPY WITH BIOPSIES;  Surgeon: Butch Garcia MD;  Location: [facility] ENDOSCOPY;  Service: Gastroenterology;  Laterality: N/A;  DIVERTICULOSIS    HYSTERECTOMY      LYMPH NODE BIOPSY      SEPTOPLASTY  2018    SINUS SURGERY  2018    TONSILLECTOMY      US GUIDED CYST ASPIRATION BREAST N/A 10/03/2022    WRIST ARTHROPLASTY Bilateral         Allergies:  Allergies   Allergen Reactions    Ace Inhibitors Cough    Sulfamethoxazole-Trimethoprim Hives    Levaquin [Levofloxacin] Hives    Lisinopril Cough and Hives    Nsaids Itching, Nausea Only and Rash    Sulfa Antibiotics Itching, Nausea Only,  "Rash and Hives        Current Medications:  Current Outpatient Medications   Medication Sig Dispense Refill    Cholecalciferol (Vitamin D) 50 MCG (2000 UT) capsule Take 1 capsule by mouth Daily. 90 capsule 3    colestipol (Colestid) 1 g tablet Take 2 tablets by mouth 2 (Two) Times a Day. 360 tablet 1    desvenlafaxine (Pristiq) 100 MG 24 hr tablet Take 1 tablet by mouth Daily. 90 tablet 1    gabapentin (NEURONTIN) 300 MG capsule Take 1 capsule by mouth 3 (Three) Times a Day As Needed (neuropathy). 90 capsule 0    hydroCHLOROthiazide (HYDRODIURIL) 25 MG tablet Take 1 tablet by mouth Daily. 90 tablet 1    HYDROcodone-acetaminophen (NORCO) 5-325 MG per tablet Take 1 tablet by mouth Daily.      Lactobacillus Probiotic tablet Take 1 tablet by mouth 2 (Two) Times a Day. 180 tablet 3    metoprolol tartrate (LOPRESSOR) 25 MG tablet Take 1 tablet by mouth 2 (Two) Times a Day. 180 tablet 1    rosuvastatin (CRESTOR) 20 MG tablet Take 1 tablet by mouth Every Night. 90 tablet 1    nitrofurantoin, macrocrystal-monohydrate, (Macrobid) 100 MG capsule Take 1 capsule by mouth 2 (Two) Times a Day. Start day prior to urologic procedure and take until completed. 6 capsule 0     No current facility-administered medications for this visit.       Review of systems  A review of systems was performed, and positive findings are noted in the HPI.    Objective     Vital Signs:   /72   Ht 157.5 cm (62\")   Wt 81.2 kg (179 lb)   BMI 32.74 kg/m²       Physical exam  No acute distress, well-nourished  Awake alert and oriented  Mood normal; affect normal    Results  PROCEDURE:  CT ABDOMEN PELVIS W CONTRAST     COMPARISON: [facility], PET, NM PET/CT SKULL BASE TO MID THIGH, 5/30/2023, 13:41.    [facility], CT, CT ABDOMEN PELVIS W CONTRAST, 4/28/2023, 13:51.     INDICATIONS:  lower abdominal pain     TECHNIQUE:    After obtaining the patient's consent, CT images were created with non-ionic intravenous   contrast " material.       PROTOCOL:     Standard imaging protocol performed                 RADIATION:      DLP: 956.9mGy*cm               Automated exposure control was utilized to minimize radiation dose.   CONTRAST:      75cc Isovue 370 I.V.     FINDINGS:          The lung bases are clear.     There is a tiny left hepatic cyst.  The gallbladder is surgically absent.  A 2.7 cm right adrenal   mass is unchanged, likely an adenoma.  The left adrenal gland, kidneys, spleen, and pancreas are   unremarkable.     The stomach appears normal.  The small bowel appears normal in caliber and configuration.  There is   sigmoid diverticulosis with minimal surrounding infiltration, which could represent mild   diverticulitis.  The appendix is not well visualized.  There is no ascites or loculated collection.     There is retroperitoneal lymphadenopathy, with an index 3.2 x 2.5 cm left periaortic lymph node on   image 106.     The rectum and urinary bladder are unremarkable.  The uterus is surgically absent.     No aggressive osseous lesions are identified.     IMPRESSION:                 1. Sigmoid diverticulosis with minimal surrounding infiltration, which could represent mild   diverticulitis.  2. Stable right adrenal mass, likely an adenoma.  3. Retroperitoneal lymphadenopathy, which could be reactive or metastatic.            FACUNDO BLEVINS MD         Electronically Signed and Approved By: FACUNDO BLEIVNS MD on 10/18/2023 at 13:02         Problem List:  Patient Active Problem List   Diagnosis    Vitamin D deficiency    Sleep apnea    Postmenopause    Major depressive disorder    Primary hypertension    Mixed hyperlipidemia    Fibromyalgia    Disease of thyroid gland    Depression    Anxiety    Arthritis    ACE-inhibitor cough    Diverticulitis    Breast cancer    History of diverticulitis    History of cholecystectomy    Altered bowel habits    Diarrhea    Malignant neoplasm of upper-inner quadrant of right female breast     Chronic low back pain    Trochanteric bursitis of both hips    Family history of CLL (chronic lymphoid leukemia)    Degeneration of lumbar intervertebral disc    Lumbar spondylosis    Chronic osteoarthritis    Leukocytosis    Prediabetes    Class 1 obesity with serious comorbidity and body mass index (BMI) of 32.0 to 32.9 in adult    Adenoma of right adrenal gland    CLL (chronic lymphocytic leukemia)    Lumbosacral spondylosis without myelopathy    Li-Fraumeni syndrome    Absence of both cervix and uterus, acquired    Sinusitis    Urinary urgency       Assessment & Plan   Diagnoses and all orders for this visit:    1. Microscopic hematuria (Primary)  -     nitrofurantoin, macrocrystal-monohydrate, (Macrobid) 100 MG capsule; Take 1 capsule by mouth 2 (Two) Times a Day. Start day prior to urologic procedure and take until completed.  Dispense: 6 capsule; Refill: 0  -     Urine Culture - Urine, Urine, Clean Catch; Future    2. Family history of bladder cancer      Patient requested urine be tested for UTI today in office however was unable to provide a sample.  Provided specimen cup and order placed; may drop off at lab and we will notify her of the result and treat if indicated    History of microscopic hematuria, significant family history of bladder cancer  CT scan imaging personally reviewed by me, demonstrated and discussed with patient; no significant urologic findings no identifiable etiology for patient's microscopic hematuria; provided reassurance from urologic standpoint    Recommend lower urinary tract evaluation in the office cystoscopy; risk, benefits, and alternatives of this procedure were discussed with her.  She is agreeable.  Since prophylactic Macrobid to her pharmacy given her reported history of UTIs.    -Schedule cystoscopy   all questions and concerns have been addressed at this time.      Transcribed from ambient dictation for Melissa Cespedes MD by Dorina Bridges.  11/09/23   17:13  EST    Patient or patient representative verbalized consent to the visit recording.  I have personally performed the services described in this document as transcribed by the above individual, and it is both accurate and complete.

## 2023-11-28 ENCOUNTER — OFFICE VISIT (OUTPATIENT)
Dept: FAMILY MEDICINE CLINIC | Facility: CLINIC | Age: 73
End: 2023-11-28
Payer: MEDICARE

## 2023-11-28 VITALS
OXYGEN SATURATION: 99 % | SYSTOLIC BLOOD PRESSURE: 124 MMHG | DIASTOLIC BLOOD PRESSURE: 74 MMHG | TEMPERATURE: 96.7 F | WEIGHT: 179 LBS | BODY MASS INDEX: 32.94 KG/M2 | HEART RATE: 68 BPM | HEIGHT: 62 IN

## 2023-11-28 DIAGNOSIS — J06.9 UPPER RESPIRATORY TRACT INFECTION, UNSPECIFIED TYPE: Primary | ICD-10-CM

## 2023-11-28 PROCEDURE — 87428 SARSCOV & INF VIR A&B AG IA: CPT | Performed by: NURSE PRACTITIONER

## 2023-11-28 PROCEDURE — 87880 STREP A ASSAY W/OPTIC: CPT | Performed by: NURSE PRACTITIONER

## 2023-11-28 PROCEDURE — 99213 OFFICE O/P EST LOW 20 MIN: CPT | Performed by: NURSE PRACTITIONER

## 2023-11-28 PROCEDURE — 3074F SYST BP LT 130 MM HG: CPT | Performed by: NURSE PRACTITIONER

## 2023-11-28 PROCEDURE — 3078F DIAST BP <80 MM HG: CPT | Performed by: NURSE PRACTITIONER

## 2023-11-28 PROCEDURE — 1160F RVW MEDS BY RX/DR IN RCRD: CPT | Performed by: NURSE PRACTITIONER

## 2023-11-28 PROCEDURE — 1159F MED LIST DOCD IN RCRD: CPT | Performed by: NURSE PRACTITIONER

## 2023-11-28 RX ORDER — BROMPHENIRAMINE MALEATE, PSEUDOEPHEDRINE HYDROCHLORIDE, AND DEXTROMETHORPHAN HYDROBROMIDE 2; 30; 10 MG/5ML; MG/5ML; MG/5ML
5 SYRUP ORAL 4 TIMES DAILY PRN
Qty: 240 ML | Refills: 0 | Status: SHIPPED | OUTPATIENT
Start: 2023-11-28

## 2023-11-28 NOTE — PROGRESS NOTES
"Chief Complaint  Cough, Nasal Congestion, and Sore Throat    Subjective          Dorothea Cruz, 73 y.o. female presents to [facility]  History of Present Illness   Patient presents today for an acute visit. She is complaining of a sore throat, cough and congestion that started 3 days ago.  She denies any sick contacts.  She has been taking Tylenol and NyQuil with some relief.    In office COVID and flu swabs are negative.  In office strep swab is negative.      Tobacco Use: Medium Risk (11/28/2023)    Patient History     Smoking Tobacco Use: Never     Smokeless Tobacco Use: Never     Passive Exposure: Yes      Objective   Vital Signs:   /74 (BP Location: Left arm, Patient Position: Sitting, Cuff Size: Adult)   Pulse 68   Temp 96.7 °F (35.9 °C)   Ht 157.5 cm (62\")   Wt 81.2 kg (179 lb)   SpO2 99%   BMI 32.74 kg/m²       Current Outpatient Medications:     Cholecalciferol (Vitamin D) 50 MCG (2000 UT) capsule, Take 1 capsule by mouth Daily., Disp: 90 capsule, Rfl: 3    colestipol (Colestid) 1 g tablet, Take 2 tablets by mouth 2 (Two) Times a Day., Disp: 360 tablet, Rfl: 1    desvenlafaxine (Pristiq) 100 MG 24 hr tablet, Take 1 tablet by mouth Daily., Disp: 90 tablet, Rfl: 1    gabapentin (NEURONTIN) 300 MG capsule, Take 1 capsule by mouth 3 (Three) Times a Day As Needed (neuropathy)., Disp: 90 capsule, Rfl: 0    hydroCHLOROthiazide (HYDRODIURIL) 25 MG tablet, Take 1 tablet by mouth Daily., Disp: 90 tablet, Rfl: 1    HYDROcodone-acetaminophen (NORCO) 5-325 MG per tablet, Take 1 tablet by mouth Daily., Disp: , Rfl:     Lactobacillus Probiotic tablet, Take 1 tablet by mouth 2 (Two) Times a Day., Disp: 180 tablet, Rfl: 3    metoprolol tartrate (LOPRESSOR) 25 MG tablet, Take 1 tablet by mouth 2 (Two) Times a Day., Disp: 180 tablet, Rfl: 1    nitrofurantoin, macrocrystal-monohydrate, (Macrobid) 100 MG capsule, Take 1 capsule by mouth 2 (Two) Times a Day. Start day prior to " urologic procedure and take until completed., Disp: 6 capsule, Rfl: 0    rosuvastatin (CRESTOR) 20 MG tablet, Take 1 tablet by mouth Every Night., Disp: 90 tablet, Rfl: 1    brompheniramine-pseudoephedrine-DM 30-2-10 MG/5ML syrup, Take 5 mL by mouth 4 (Four) Times a Day As Needed for Congestion or Cough., Disp: 240 mL, Rfl: 0   Past Medical History:   Diagnosis Date    Anxiety     Arthritis     Breast cancer     Cancer     BREAST CANCER    Cataract     Cholelithiases 4/30@1    Clotting disorder     Depression     Diabetes mellitus     Disease of thyroid gland     currently not taking any meds    Diverticulitis     Diverticulitis of colon     Diverticulosis 95    Fibromyalgia     Fibromyalgia, primary     HIV disease     Hyperlipidemia     Hypertension     Infectious viral hepatitis     Irritable bowel syndrome     Kidney stone     Liver disease     Low back pain 2015    Major depressive disorder 05/07/2020    Palpitations     ASYMPTOMATIC- DENIES CP/SOB, SEE PCP- NO CARDIOLOGIST     Post-menopause 09/17/2020    Sinus trouble     Sleep apnea     Substance abuse     Tremor 2018    I was on medicine for tremors    Urinary tract infection     Vitamin D deficiency 09/17/2020      Physical Exam  Vitals reviewed.   Constitutional:       Appearance: Normal appearance. She is well-developed.   HENT:      Right Ear: Tympanic membrane, ear canal and external ear normal.      Left Ear: Tympanic membrane, ear canal and external ear normal.      Mouth/Throat:      Mouth: Mucous membranes are moist.      Pharynx: No pharyngeal swelling, oropharyngeal exudate or posterior oropharyngeal erythema.      Comments: Mild postnasal drainage noted.  Neck:      Thyroid: No thyroid mass, thyromegaly or thyroid tenderness.   Cardiovascular:      Rate and Rhythm: Normal rate and regular rhythm.      Heart sounds: No murmur heard.     No friction rub. No gallop.   Pulmonary:      Effort: Pulmonary effort is normal.      Breath sounds: Normal  breath sounds. No wheezing or rhonchi.   Lymphadenopathy:      Cervical: No cervical adenopathy.   Skin:     General: Skin is warm and dry.   Neurological:      Mental Status: She is alert and oriented to person, place, and time.      Cranial Nerves: No cranial nerve deficit.   Psychiatric:         Mood and Affect: Mood and affect normal.         Behavior: Behavior normal.         Thought Content: Thought content normal. Thought content does not include homicidal or suicidal ideation.         Judgment: Judgment normal.        Result Review :   {The following data was reviewed by ARA Peck      POC COVID/FLU   Lab Results   Component Value Date    SARSANTIGEN Not Detected 11/28/2023    FLUAAG Not Detected 11/28/2023    FLUBAG Not Detected 11/28/2023    INTCT Passed 11/28/2023    LOTNUMBER [id number] 11/28/2023    EXPIRATDTE 10/31/24 11/28/2023      RAPIDSTREP   Strep          11/28/2023    15:33   Common Labsle   POC Strep A, Molecular Negative             Assessment and Plan    Diagnoses and all orders for this visit:    1. Upper respiratory tract infection, unspecified type (Primary)  -     brompheniramine-pseudoephedrine-DM 30-2-10 MG/5ML syrup; Take 5 mL by mouth 4 (Four) Times a Day As Needed for Congestion or Cough.  Dispense: 240 mL; Refill: 0  -     POCT rapid strep A  -     POCT SARS-CoV-2 Antigen HERNANDO + Flu    Discussed with patient upper respiratory infection is viral and antibiotics are not warranted.  Discussed symptom management, take Tylenol or ibuprofen as needed, drink lots of fluids and rest.  Instructed to call back if symptoms do not improve or worsen by day 10.      Follow Up   Return if symptoms worsen or fail to improve.  Patient was given instructions and counseling regarding her condition or for health maintenance advice. Please see specific information pulled into the AVS if appropriate.     Parts of this note are electronic transcriptions/translations of spoken language to  printed text using the Dragon Dictation system.      Monserrat Grover, APRN  11/28/2023

## 2023-11-29 DIAGNOSIS — I10 PRIMARY HYPERTENSION: ICD-10-CM

## 2023-11-29 RX ORDER — HYDROCHLOROTHIAZIDE 25 MG/1
25 TABLET ORAL DAILY
Qty: 90 TABLET | Refills: 1 | Status: SHIPPED | OUTPATIENT
Start: 2023-11-29

## 2023-12-06 ENCOUNTER — PROCEDURE VISIT (OUTPATIENT)
Dept: UROLOGY | Facility: CLINIC | Age: 73
End: 2023-12-06
Payer: MEDICARE

## 2023-12-06 VITALS
SYSTOLIC BLOOD PRESSURE: 132 MMHG | HEART RATE: 110 BPM | BODY MASS INDEX: 30.99 KG/M2 | RESPIRATION RATE: 16 BRPM | WEIGHT: 168.4 LBS | HEIGHT: 62 IN | DIASTOLIC BLOOD PRESSURE: 82 MMHG

## 2023-12-06 DIAGNOSIS — Z80.52 FAMILY HISTORY OF BLADDER CANCER: ICD-10-CM

## 2023-12-06 DIAGNOSIS — R31.29 MICROSCOPIC HEMATURIA: Primary | ICD-10-CM

## 2023-12-06 NOTE — PROGRESS NOTES
Preprocedure diagnosis  Gross Hematuria, family history of bladder cancer    Postprocedure diagnosis  Same as above    Procedure  Flexible Cystourethroscopy    Attending surgeon  Melissa Cespedes MD    Anesthesia  2% lidocaine jelly intraurethrally    Complications  None    Indications  73 y.o. female undergoing a flexible cystoscopy for the above mentioned indications.  Presents for lower urinary tract evaluation.    Informed consent was obtained.      Findings  Mild vaginal atrophy noted, no urethral abnormalities, cystoscopy revealed one right and left ureteral orifice in the normal anatomic position, normal bladder mucosa and no tumors, masses or stones; minimal particulate matter noted within the bladder    Procedure  The patient was placed in supine position and prepped and draped in sterile fashion with lidocaine jelly per urethra for anesthesia.  A timeout was performed.  The 14F flexible cystoscope was lubricated and gently placed through the urethra and into the bladder.  The bladder was completely visualized.  The cystoscope was retroflexed and the bladder neck visualized. Findings were as above. The scope was withdrawn and the procedure terminated.  The patient tolerated the procedure well.        Plan:  Tolerated procedure well.  Instructions provided.  Cystoscopic findings discussed with patient include negative cystoscopic evaluation, no identifiable etiology of patient's hematuria  Recommend continued routine screening of urinalysis with microscopy on an annual basis per PCP with consideration of repeat workup if persistently positive (3-5 RBCs on hpf) after 5 years.    At this point, she is encouraged to follow-up as needed  All questions addressed

## 2023-12-21 ENCOUNTER — OFFICE VISIT (OUTPATIENT)
Dept: RADIATION ONCOLOGY | Facility: HOSPITAL | Age: 73
End: 2023-12-21
Payer: MEDICARE

## 2023-12-21 VITALS
WEIGHT: 168.65 LBS | SYSTOLIC BLOOD PRESSURE: 113 MMHG | OXYGEN SATURATION: 99 % | DIASTOLIC BLOOD PRESSURE: 62 MMHG | HEART RATE: 75 BPM | RESPIRATION RATE: 20 BRPM | TEMPERATURE: 97.8 F | BODY MASS INDEX: 30.85 KG/M2

## 2023-12-21 DIAGNOSIS — C50.211 MALIGNANT NEOPLASM OF UPPER-INNER QUADRANT OF RIGHT BREAST IN FEMALE, ESTROGEN RECEPTOR NEGATIVE: Primary | ICD-10-CM

## 2023-12-21 DIAGNOSIS — C91.10 CLL (CHRONIC LYMPHOCYTIC LEUKEMIA): ICD-10-CM

## 2023-12-21 DIAGNOSIS — Z87.19 HISTORY OF DIVERTICULITIS: ICD-10-CM

## 2023-12-21 DIAGNOSIS — C50.919 TRIPLE NEGATIVE MALIGNANT NEOPLASM OF BREAST: ICD-10-CM

## 2023-12-21 DIAGNOSIS — Z17.1 MALIGNANT NEOPLASM OF UPPER-INNER QUADRANT OF RIGHT BREAST IN FEMALE, ESTROGEN RECEPTOR NEGATIVE: Primary | ICD-10-CM

## 2023-12-21 DIAGNOSIS — M54.50 CHRONIC LOW BACK PAIN, UNSPECIFIED BACK PAIN LATERALITY, UNSPECIFIED WHETHER SCIATICA PRESENT: ICD-10-CM

## 2023-12-21 DIAGNOSIS — Z92.3 PERSONAL HISTORY OF RADIATION THERAPY: ICD-10-CM

## 2023-12-21 DIAGNOSIS — Z91.89 AT RISK FOR LYMPHEDEMA: ICD-10-CM

## 2023-12-21 DIAGNOSIS — Z15.01 LI-FRAUMENI SYNDROME: ICD-10-CM

## 2023-12-21 DIAGNOSIS — G89.29 CHRONIC LOW BACK PAIN, UNSPECIFIED BACK PAIN LATERALITY, UNSPECIFIED WHETHER SCIATICA PRESENT: ICD-10-CM

## 2023-12-21 DIAGNOSIS — D35.01 ADRENAL ADENOMA, RIGHT: ICD-10-CM

## 2023-12-21 PROCEDURE — G0463 HOSPITAL OUTPT CLINIC VISIT: HCPCS | Performed by: NURSE PRACTITIONER

## 2023-12-21 NOTE — PROGRESS NOTES
Follow Up Office Visit      Encounter Date: 2023   Patient Name: Dorothea Curz  YOB: 1950   Medical Record Number: [id number]   Primary Diagnosis: Malignant neoplasm of upper-inner quadrant of right breast in female, estrogen receptor negative [C50.211, Z17.1]     Cancer Staging   Malignant neoplasm of upper-inner quadrant of right female breast  Staging form: Breast, AJCC 8th Edition  - Pathologic: pT1a, pN0, cM0, ER-, NJ-, HER2- - Signed by Santa Haskins APRN on 2022    Radiation Completion Date:  2022     Chief Complaint:    Chief Complaint   Patient presents with    Follow-up    Breast Cancer       Oncology/Hematology History Overview Note     Right Triple Negative Breast Cancer:  - screening mammogram 9/10/21: 4mm asymmetry in the right upper inner breast  -Diagnostic mammogram on 2021: Persistent 5 mm ill-defined nodule in the upper inner mid right breast at 2:00, 7 cm from the nipple.  - core biopsy on 10/8/21: Invasive ductal carcinoma, grade 3, ER negative (less than 1%), NJ negative (less than 1%), HER-2 equivocal (2+ HC), HER-2 FISH not amplified  - Right lumpectomy on 21. Pathology showed a 5mm tumor with negative margins, 0/3 lymph nodes involved, pT1aN0, ER-, NJ-, HER2-  - chemotherapy was not recommended due to the small size of the tumor.   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  Mildly increased CD4 positive T cell LGL (5.2% of analyzed cells).  - CLL FISH positive for TP53/17p13 deletion in 49.5% of cells.  - IgVH somatic hypermutation was detected (3.7%)    Li Fraumeni Syndrome (inherited p53 mutation):   Family History:    - brother with CLL  - father  from some type of leukemia  - sister with breast cancer and adult onset retinoblastoma  which was fatal  - niece (sister's daughter) also had retinoblastoma as a child  - pt is considering genetic testing     Breast cancer   10/20/2021 Initial Diagnosis    Breast cancer (HCC)     Malignant neoplasm of upper-inner quadrant of right female breast   11/12/2021 Cancer Staged    Staging form: Breast, AJCC 8th Edition  - Pathologic: pT1a, pN0, cM0, ER-, AL-, HER2- - Signed by Santa Haskins APRN on 5/20/2022 1/13/2022 Initial Diagnosis    Malignant neoplasm of upper-inner quadrant of right female breast (HCC)     1/20/2022 - 2/14/2022 Radiation    Radiation OncologyTreatment Course:  Dorothea Cruz received 4256 cGy in 16 fractions to right breast.      CLL (chronic lymphocytic leukemia)   3/10/2023 Initial Diagnosis    CLL (chronic lymphocytic leukemia)         History of Present Illness: Dorothea Cruz is a 73 y.o. female who returns to [facility] for routine follow-up of her triple negative breast cancer. She reports feeling well overall with no breast related complaints or concerns. Denies breast pain or tenderness, palpable masses, nipple changes or nipple discharge, limited range of motion in upper extremity. Screening mammogram on 9/18/23 is benign. She followed up with Lymphedema Therapy on 9/27/23; note reviewed. She had visit for genetic counseling on 9/5/23; note reviewed. Genetic testing identified a mosaic likely pathogenic mutation in the TP53 gene. She is a previous patient of Dr. Nair and established care with Dr. Glez on 10/10/23; note reviewed. She has chronic back pain for which she is followed by Pain Management and receives injections. Since prior visit with me she has undergone extensive workup regarding adrenal mass, including CT abdomen adrenal, MRI abdomen, PET/CT scan, CT abdomen/pelvis, and MRI brain. All imaging reviewed. Regarding the adrenal mass, she was referred to [facility]. She established care with Dr. Dallas on 5/12/23; note  reviewed. Workup was completed and plan is now for observation and follow-up annually. She underwent cystoscopy with Dr. Cespedes on 12/9/23 for history of microscopic hematuria, significant family history of bladder cancer. Negative cystoscopic evaluation with no identifiable etiology of hematuria. Recommendation for routine screening with urinalysis with microscopy on annual basis per PCP.     Subjective      Review of Systems: Review of Systems   Constitutional:  Negative for appetite change, fatigue and unexpected weight change.   HENT:  Positive for tinnitus (ongoing). Negative for hearing loss, sore throat, trouble swallowing and voice change.    Eyes: Negative.  Negative for visual disturbance.   Respiratory: Negative.  Negative for cough and shortness of breath.    Cardiovascular: Negative.  Negative for chest pain, palpitations and leg swelling.   Gastrointestinal: Negative.  Negative for blood in stool, constipation, diarrhea, nausea and vomiting.   Genitourinary:  Negative for difficulty urinating, dysuria, frequency, hematuria and urgency.   Musculoskeletal:  Positive for arthralgias (Generalized, has fibromyalgia- ongoing) and back pain (8/10, ongoing, everyday pain.). Negative for gait problem and joint swelling.   Skin: Negative.  Negative for color change and rash.   Neurological: Negative.  Negative for dizziness, weakness and headaches.   Psychiatric/Behavioral: Negative.  Negative for sleep disturbance.        The following portions of the patient's history were reviewed and updated as appropriate: allergies, current medications, past family history, past medical history, past social history, past surgical history and problem list.    Medications:     Current Outpatient Medications:     Cholecalciferol (Vitamin D) 50 MCG (2000 UT) capsule, Take 1 capsule by mouth Daily., Disp: 90 capsule, Rfl: 3    colestipol (Colestid) 1 g tablet, Take 2 tablets by mouth 2 (Two) Times a Day., Disp: 360 tablet,  Rfl: 1    desvenlafaxine (Pristiq) 100 MG 24 hr tablet, Take 1 tablet by mouth Daily., Disp: 90 tablet, Rfl: 1    gabapentin (NEURONTIN) 300 MG capsule, Take 1 capsule by mouth 3 (Three) Times a Day As Needed (neuropathy)., Disp: 90 capsule, Rfl: 0    hydroCHLOROthiazide (HYDRODIURIL) 25 MG tablet, Take 1 tablet by mouth Daily., Disp: 90 tablet, Rfl: 1    HYDROcodone-acetaminophen (NORCO) 5-325 MG per tablet, Take 1 tablet by mouth Daily., Disp: , Rfl:     Lactobacillus Probiotic tablet, Take 1 tablet by mouth 2 (Two) Times a Day., Disp: 180 tablet, Rfl: 3    metoprolol tartrate (LOPRESSOR) 25 MG tablet, Take 1 tablet by mouth 2 (Two) Times a Day., Disp: 180 tablet, Rfl: 1    rosuvastatin (CRESTOR) 20 MG tablet, Take 1 tablet by mouth Every Night., Disp: 90 tablet, Rfl: 1    brompheniramine-pseudoephedrine-DM 30-2-10 MG/5ML syrup, Take 5 mL by mouth 4 (Four) Times a Day As Needed for Congestion or Cough. (Patient not taking: Reported on 12/21/2023), Disp: 240 mL, Rfl: 0    nitrofurantoin, macrocrystal-monohydrate, (Macrobid) 100 MG capsule, Take 1 capsule by mouth 2 (Two) Times a Day. Start day prior to urologic procedure and take until completed., Disp: 6 capsule, Rfl: 0    Allergies:   Allergies   Allergen Reactions    Ace Inhibitors Cough    Sulfamethoxazole-Trimethoprim Hives    Levaquin [Levofloxacin] Hives    Lisinopril Cough and Hives    Nsaids Itching, Nausea Only and Rash    Sulfa Antibiotics Itching, Nausea Only, Rash and Hives       Patient Smoking History:   Social History     Tobacco Use   Smoking Status Never    Passive exposure: Yes   Smokeless Tobacco Never       Measures:  PHQ-9 Total Score: 0   Quality of Life: 100 - Full Activity   ECOG score: 0  ECOG: (0) Fully active, able to carry on all predisease performance without restriction  Pain: (on a scale of 0-10)   Pain Score    12/21/23 1250   PainSc: 0-No pain         Objective     Physical Exam:   Vital Signs:   Vitals:    12/21/23 1250   BP:  113/62   Pulse: 75   Resp: 20   Temp: 97.8 °F (36.6 °C)   TempSrc: Temporal   SpO2: 99%   Weight: 76.5 kg (168 lb 10.4 oz)   PainSc: 0-No pain     Body mass index is 30.85 kg/m².   Wt Readings from Last 3 Encounters:   12/21/23 76.5 kg (168 lb 10.4 oz)   12/06/23 76.4 kg (168 lb 6.4 oz)   11/28/23 81.2 kg (179 lb)       Physical Exam  Vitals reviewed.   Constitutional:       General: She is not in acute distress.     Appearance: Normal appearance. She is normal weight. She is not ill-appearing.   HENT:      Head: Normocephalic and atraumatic.      Mouth/Throat:      Mouth: Mucous membranes are moist.      Pharynx: Oropharynx is clear. No oropharyngeal exudate or posterior oropharyngeal erythema.   Eyes:      Conjunctiva/sclera: Conjunctivae normal.      Pupils: Pupils are equal, round, and reactive to light.   Cardiovascular:      Rate and Rhythm: Normal rate and regular rhythm.      Pulses: Normal pulses.      Heart sounds: Normal heart sounds.   Pulmonary:      Effort: Pulmonary effort is normal. No respiratory distress.      Breath sounds: Normal breath sounds. No wheezing, rhonchi or rales.   Chest:   Breasts:     Right: No swelling, bleeding, inverted nipple, mass, nipple discharge, skin change or tenderness.      Left: No swelling, bleeding, inverted nipple, mass, nipple discharge, skin change or tenderness.       Musculoskeletal:         General: Normal range of motion.      Cervical back: Normal range of motion.      Comments: Full ROM BUE    Lymphadenopathy:      Upper Body:      Right upper body: No supraclavicular or axillary adenopathy.      Left upper body: No supraclavicular or axillary adenopathy.   Skin:     General: Skin is warm and dry.   Neurological:      General: No focal deficit present.      Mental Status: She is alert and oriented to person, place, and time. Mental status is at baseline.   Psychiatric:         Mood and Affect: Mood normal.         Behavior: Behavior normal.       Result  Review: I independently reviewed the following data.   -- Mammo Screening Digital Tomosynthesis Bilateral With CAD (09/18/2023 15:40)   -- MRI Brain With & Without Contrast (06/19/2023 16:16)   -- CT Abdomen Pelvis With Contrast (04/28/2023 13:52)   -- NM PET/CT Skull Base to Mid Thigh (05/30/2023 12:56)   -- MRI Abdomen With & Without Contrast (04/10/2023 12:41)   -- CT Abdomen Adrenals With & Without Contrast (04/19/2023 09:48)   -- Progress Notes by Izabella Farr GC (09/05/2023 10:30) Genetic Counseling   -- SCANNED - LABS (05/09/2023)   -- SCANNED PATHOLOGY (05/09/2023)   -- Progress Notes by Melissa Cespedes MD (12/06/2023 10:00)     Pathology:   Lab Results   Component Value Date    CLININFO  10/18/2023     History of CLL      FINALDX  10/18/2023     Peripheral blood (CBC and smear):   - WBC:  Chronic lymphocytic leukemia / small lymphocytic lymphoma (CLL/SLL)   - RBC:  Normocytic normochromic RBCs   - Platelets:  Normal platelet count and morphology        SYNOPTIC  11/12/2021     INVASIVE CARCINOMA OF THE BREAST: Resection  INVASIVE CARCINOMA OF THE BREAST: COMPLETE EXCISION - All Specimens  8th Edition - Protocol posted: 6/30/2021    SPECIMEN     Procedure:    Excision (less than total mastectomy)      Specimen Laterality:    Right     TUMOR     Histologic Type:    Invasive carcinoma of no special type (ductal)      Histologic Grade ([place] Histologic Score):           Glandular (Acinar) / Tubular Differentiation:    Score 3        Nuclear Pleomorphism:    Score 3        Mitotic Rate:    Score 3        Overall Grade:    Grade 3 (scores of 8 or 9)      Tumor Size:    Greatest dimension of largest invasive focus (Millimeters): 5 mm     Tumor Focality:    Single focus of invasive carcinoma      Ductal Carcinoma In Situ (DCIS):    Not identified      Tumor Extent:         Treatment Effect in the Breast:    No known presurgical therapy     MARGINS     Margin Status for Invasive Carcinoma:    All margins  negative for invasive carcinoma        Distance from Invasive Carcinoma to Closest Margin:    Greater than: 20 mm    REGIONAL LYMPH NODES     Regional Lymph Node Status:           :    All regional lymph nodes negative for tumor        Total Number of Lymph Nodes Examined (sentinel and non-sentinel):    3        Number of [place] Nodes Examined:    3     DISTANT METASTASIS    PATHOLOGIC STAGE CLASSIFICATION (pTNM, AJCC 8th Edition)     Reporting of pT, pN, and (when applicable) pM categories is based on information available to the pathologist at the time the report is issued. As per the AJCC (Chapter 1, 8th Ed.) it is the managing physician’s responsibility to establish the final pathologic stage based upon all pertinent information, including but potentially not limited to this pathology report.     pT Category:    pT1a      Regional Lymph Nodes Modifier:    (sn): [place] node(s) evaluated.      pN Category:    pN0        Breast Biomarker Testing Performed on Previous Biopsy:           Estrogen Receptor (ER) Status:    Negative      Breast Biomarker Testing Performed on Previous Biopsy:           Progesterone Receptor (PgR) Status:    Negative      Breast Biomarker Testing Performed on Previous Biopsy:           HER2 (by in situ hybridization):    Negative (not amplified)        Testing Performed on Case Number:    [id number]        Imaging: CT Abdomen Pelvis With Contrast    Result Date: 10/18/2023    1. Sigmoid diverticulosis with minimal surrounding infiltration, which could represent mild diverticulitis. 2. Stable right adrenal mass, likely an adenoma. 3. Retroperitoneal lymphadenopathy, which could be reactive or metastatic.     FACUNDO BLEVINS MD       Electronically Signed and Approved By: FACUNDO BLEVINS MD on 10/18/2023 at 13:02               Labs:   WBC   Date Value Ref Range Status   10/18/2023 36.81 (C) 3.40 - 10.80 10*3/mm3 Final     Hemoglobin   Date Value Ref Range Status   10/18/2023 15.9 12.0  - 15.9 g/dL Final     Hematocrit   Date Value Ref Range Status   10/18/2023 47.4 (H) 34.0 - 46.6 % Final     Platelets   Date Value Ref Range Status   10/18/2023 173 140 - 450 10*3/mm3 Final     TSH   Date Value Ref Range Status   03/02/2023 3.940 0.270 - 4.200 uIU/mL Final     Assessment / Plan      Impression: Dorothea Cruz is a pleasant 73 y.o. female with pT1a pN0(sn) cM0 triple negative invasive ductal carcinoma of the right breast status post lumpectomy and sentinel lymph node biopsy on 11/12/2021. She was not recommended to receive adjuvant chemotherapy due to the small size of the tumor. She is status post external beam radiotherapy to the right breast, completed on 2/14/2022. She then had interval development of painful seroma in the postsurgical bed of the right breast. She underwent ultrasound guided aspiration of the seroma on 10/3/2022 with complete resolution. She is doing well overall and remains without evidence of disease clinically. Her screening mammogram performed on 9/18/2023 shows no radiographic evidence of disease; BI-RADS 2.      Adrenal mass: followed by Dr. Dallas with [facility]. PET/CT scan on 5/30/23 demonstrates stable right adrenal mass likely an adenoma. No hypermetabolic activity seen within the neck, chest, abdomen or pelvis to suggest metastatic disease. Extensive workup was completed and plan is now for observation and follow-up annually.     Li-Fraumeni Syndrome: Pathologic germline p53 mutation. She was evaluated by Genetic Counseling on 9/5/2023.     Del(17p) CLL: this is a high risk subtype of CLL. Right now the patient does not meet criteria for treatment. Actively followed by Dr. Glez.     History of diverticulitis: hospitalization in October 2022 for sigmoid diverticulitis. Prescribed colestipol per PCP. CT abdomen and pelvis on 10/18/23 demonstrates sigmoid diverticulosis with minimal surrounding infiltration, which could represent diverticulitis.  Radiologist reported retroperitoneal lymphadenopathy which could be reactive or metastatic. I explained that this finding was also present on CT abdomen/pelvis dated 4/28/23 and PET/CT scan on 5/30/23 shows no hypermetabolic activity.     Chronic back pain: followed by Pain Management.     Assessment/Plan:   Diagnoses and all orders for this visit:    1. Malignant neoplasm of upper-inner quadrant of right breast in female, estrogen receptor negative (Primary)    2. Triple negative malignant neoplasm of breast    3. Personal history of radiation therapy    4. At risk for lymphedema    5. Adrenal adenoma, right    6. History of diverticulitis    7. CLL (chronic lymphocytic leukemia)    8. Li-Fraumeni syndrome    9. Chronic low back pain, unspecified back pain laterality, unspecified whether sciatica present       Follow Up:   Return for follow-up in 1 year.   Recommend continuation of annual screening mammogram; next mammography due in September 2024.   Follow-up with Dr. Glez as scheduled.    Follow-up with [facility]ema [facility] as scheduled.    Follow-up with Dr. Dallas as scheduled.    Follow-up with Pain Management  as scheduled.      Return in about 1 year (around 12/21/2024) for Office Visit.  Dorothea Nancy was encouraged to contact me in the interim with any questions or concerns regarding her care.        ARA Hollis  Radiation Oncology  [facility]    This document has been signed by ARA Lowe on December 21, 2023 13:45 EST

## 2024-01-08 ENCOUNTER — LAB (OUTPATIENT)
Dept: ONCOLOGY | Facility: HOSPITAL | Age: 74
End: 2024-01-08
Payer: MEDICARE

## 2024-01-08 ENCOUNTER — OFFICE VISIT (OUTPATIENT)
Dept: ONCOLOGY | Facility: HOSPITAL | Age: 74
End: 2024-01-08
Payer: MEDICARE

## 2024-01-08 VITALS
SYSTOLIC BLOOD PRESSURE: 133 MMHG | RESPIRATION RATE: 18 BRPM | HEART RATE: 107 BPM | WEIGHT: 168.87 LBS | BODY MASS INDEX: 30.89 KG/M2 | DIASTOLIC BLOOD PRESSURE: 58 MMHG | OXYGEN SATURATION: 95 % | TEMPERATURE: 97.6 F

## 2024-01-08 DIAGNOSIS — Z12.39 BREAST CANCER SCREENING, HIGH RISK PATIENT: ICD-10-CM

## 2024-01-08 DIAGNOSIS — C50.211 MALIGNANT NEOPLASM OF UPPER-INNER QUADRANT OF RIGHT FEMALE BREAST, UNSPECIFIED ESTROGEN RECEPTOR STATUS: Primary | ICD-10-CM

## 2024-01-08 DIAGNOSIS — C91.10 CLL (CHRONIC LYMPHOCYTIC LEUKEMIA): ICD-10-CM

## 2024-01-08 LAB
BASOPHILS # BLD AUTO: 0.02 10*3/MM3 (ref 0–0.2)
BASOPHILS NFR BLD AUTO: 0.1 % (ref 0–1.5)
DEPRECATED RDW RBC AUTO: 48.6 FL (ref 37–54)
EOSINOPHIL # BLD AUTO: 0.09 10*3/MM3 (ref 0–0.4)
EOSINOPHIL NFR BLD AUTO: 0.3 % (ref 0.3–6.2)
ERYTHROCYTE [DISTWIDTH] IN BLOOD BY AUTOMATED COUNT: 13.2 % (ref 12.3–15.4)
HBV SURFACE AB SER RIA-ACNC: NORMAL
HBV SURFACE AG SERPL QL IA: NORMAL
HCT VFR BLD AUTO: 41.5 % (ref 34–46.6)
HGB BLD-MCNC: 13.7 G/DL (ref 12–15.9)
IMM GRANULOCYTES # BLD AUTO: 0.03 10*3/MM3 (ref 0–0.05)
IMM GRANULOCYTES NFR BLD AUTO: 0.1 % (ref 0–0.5)
LYMPHOCYTES # BLD AUTO: 24.98 10*3/MM3 (ref 0.7–3.1)
LYMPHOCYTES NFR BLD AUTO: 80.6 % (ref 19.6–45.3)
MCH RBC QN AUTO: 32.2 PG (ref 26.6–33)
MCHC RBC AUTO-ENTMCNC: 33 G/DL (ref 31.5–35.7)
MCV RBC AUTO: 97.6 FL (ref 79–97)
MONOCYTES # BLD AUTO: 2.81 10*3/MM3 (ref 0.1–0.9)
MONOCYTES NFR BLD AUTO: 9.1 % (ref 5–12)
NEUTROPHILS NFR BLD AUTO: 3.07 10*3/MM3 (ref 1.7–7)
NEUTROPHILS NFR BLD AUTO: 9.8 % (ref 42.7–76)
PLAT MORPH BLD: NORMAL
PLATELET # BLD AUTO: 133 10*3/MM3 (ref 140–450)
PMV BLD AUTO: 11.6 FL (ref 6–12)
RBC # BLD AUTO: 4.25 10*6/MM3 (ref 3.77–5.28)
RBC MORPH BLD: NORMAL
SMUDGE CELLS BLD QL SMEAR: NORMAL
WBC NRBC COR # BLD AUTO: 31 10*3/MM3 (ref 3.4–10.8)

## 2024-01-08 PROCEDURE — 3078F DIAST BP <80 MM HG: CPT | Performed by: INTERNAL MEDICINE

## 2024-01-08 PROCEDURE — 85025 COMPLETE CBC W/AUTO DIFF WBC: CPT

## 2024-01-08 PROCEDURE — 36415 COLL VENOUS BLD VENIPUNCTURE: CPT

## 2024-01-08 PROCEDURE — 87340 HEPATITIS B SURFACE AG IA: CPT

## 2024-01-08 PROCEDURE — G0463 HOSPITAL OUTPT CLINIC VISIT: HCPCS | Performed by: INTERNAL MEDICINE

## 2024-01-08 PROCEDURE — 85007 BL SMEAR W/DIFF WBC COUNT: CPT

## 2024-01-08 PROCEDURE — 86706 HEP B SURFACE ANTIBODY: CPT

## 2024-01-08 PROCEDURE — 86704 HEP B CORE ANTIBODY TOTAL: CPT

## 2024-01-08 PROCEDURE — 99214 OFFICE O/P EST MOD 30 MIN: CPT | Performed by: INTERNAL MEDICINE

## 2024-01-08 PROCEDURE — 1126F AMNT PAIN NOTED NONE PRSNT: CPT | Performed by: INTERNAL MEDICINE

## 2024-01-08 PROCEDURE — 3075F SYST BP GE 130 - 139MM HG: CPT | Performed by: INTERNAL MEDICINE

## 2024-01-08 RX ORDER — SUMATRIPTAN 50 MG/1
TABLET, FILM COATED ORAL
COMMUNITY
Start: 2024-01-03

## 2024-01-08 RX ORDER — ONDANSETRON HYDROCHLORIDE 8 MG/1
8 TABLET, FILM COATED ORAL 3 TIMES DAILY PRN
Qty: 30 TABLET | Refills: 5 | Status: SHIPPED | OUTPATIENT
Start: 2024-01-08 | End: 2024-01-12 | Stop reason: SDUPTHER

## 2024-01-08 NOTE — PROGRESS NOTES
Patient  Dorothea Cruz    Location  [facility]    Chief Complaint  Malignant neoplasm of upper-inner quadrant of breast in fem    Referring Provider: TYRELL Peck*  PCP: Monserrat Grover APRN    Subjective          Oncology/Hematology History Overview Note     Right Triple Negative Breast Cancer:  - screening mammogram 9/10/21: 4mm asymmetry in the right upper inner breast  -Diagnostic mammogram on 2021: Persistent 5 mm ill-defined nodule in the upper inner mid right breast at 2:00, 7 cm from the nipple.  - core biopsy on 10/8/21: Invasive ductal carcinoma, grade 3, ER negative (less than 1%), VA negative (less than 1%), HER-2 equivocal (2+ HC), HER-2 FISH not amplified  - Right lumpectomy on 21. Pathology showed a 5mm tumor with negative margins, 0/3 lymph nodes involved, pT1aN0, ER-, VA-, HER2-  - chemotherapy was not recommended due to the small size of the tumor.   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  Mildly increased CD4 positive T cell LGL (5.2% of analyzed cells).  - CLL FISH positive for TP53/17p13 deletion in 49.5% of cells.  - IgVH somatic hypermutation was detected (3.7%)  -24: Start acalabrutinib due to symptoms of weakness, night sweats, tremor. WBC 31K, plt 133, hgb 13.7    Li Fraumeni Syndrome (inherited p53 mutation):   Family History:    - brother with CLL  - father  from some type of leukemia  - sister with breast cancer and adult onset retinoblastoma which was fatal  - niece (sister's daughter) also had retinoblastoma as a child  - pt is considering genetic testing     Breast cancer   10/20/2021 Initial Diagnosis    Breast cancer (HCC)     Malignant neoplasm of upper-inner quadrant of right female breast    11/12/2021 Cancer Staged    Staging form: Breast, AJCC 8th Edition  - Pathologic: pT1a, pN0, cM0, ER-, AR-, HER2- - Signed by Santa Haskins APRN on 5/20/2022 1/13/2022 Initial Diagnosis    Malignant neoplasm of upper-inner quadrant of right female breast (HCC)     1/20/2022 - 2/14/2022 Radiation    Radiation OncologyTreatment Course:  Dorothea Cruz received 4256 cGy in 16 fractions to right breast.      CLL (chronic lymphocytic leukemia)   3/10/2023 Initial Diagnosis    CLL (chronic lymphocytic leukemia)         History of Present Illness  Patient comes in today for follow-up. She reports not doing well. She feels weak and shaky with tremors. Worse on exertion. She has night sweats and feels feverish at times. Abdominal pain has resolved. Denies any bleeding. She is agreeable to starting medication for her CLL.         Review of Systems   Constitutional:  Positive for fatigue. Negative for appetite change, diaphoresis, fever, unexpected weight gain and unexpected weight loss.   HENT:  Negative for hearing loss, mouth sores, sore throat, swollen glands, trouble swallowing and voice change.    Eyes:  Negative for blurred vision.   Respiratory:  Negative for cough, shortness of breath and wheezing.    Cardiovascular:  Negative for chest pain and palpitations.   Gastrointestinal:  Negative for abdominal pain, blood in stool, constipation, diarrhea, nausea and vomiting.   Endocrine: Negative for cold intolerance and heat intolerance.   Genitourinary:  Negative for difficulty urinating, dysuria, frequency, hematuria and urinary incontinence.   Musculoskeletal:  Positive for back pain (8/10). Negative for arthralgias and myalgias.   Skin:  Negative for rash, skin lesions and wound.   Neurological:  Negative for dizziness, seizures, weakness, numbness and headache.   Hematological:  Does not bruise/bleed easily.   Psychiatric/Behavioral:  Negative for depressed mood. The patient is not nervous/anxious.    All  other systems reviewed and are negative.      Past Medical History:   Diagnosis Date    Anxiety     Arthritis     Breast cancer     Cancer     BREAST CANCER    Cataract     Cholelithiases 4/30@1    Clotting disorder     Depression     Diabetes mellitus     Disease of thyroid gland     currently not taking any meds    Diverticulitis     Diverticulitis of colon     Diverticulosis 95    Fibromyalgia     Fibromyalgia, primary     HIV disease     Hyperlipidemia     Hypertension     Infectious viral hepatitis     Irritable bowel syndrome     Kidney stone     Liver disease     Low back pain 2015    Major depressive disorder 05/07/2020    Palpitations     ASYMPTOMATIC- DENIES CP/SOB, SEE PCP- NO CARDIOLOGIST     Post-menopause 09/17/2020    Sinus trouble     Sleep apnea     Substance abuse     Tremor 2018    I was on medicine for tremors    Urinary tract infection     Vitamin D deficiency 09/17/2020     Past Surgical History:   Procedure Laterality Date    ABDOMINAL SURGERY  06/14/1985    APPENDECTOMY      BILATERAL BREAST REDUCTION      BREAST BIOPSY Right 11/12/2021    Procedure: RIGHT BREAST NEEDLE LOCALIZED  LUMPECTOMY WITH SENTINEL NODE BIOPSY;  Surgeon: Mirtha Esteves MD;  Location: [facility] OR OSC;  Service: General;  Laterality: Right;    BREAST LUMPECTOMY      CHOLECYSTECTOMY N/A 09/02/2021    Procedure: CHOLECYSTECTOMY LAPAROSCOPIC INTRAOPERATIVE CHOLANGIOGRAM;  Surgeon: Louie Medina MD;  Location: [facility] MAIN OR;  Service: General;  Laterality: N/A;    COLONOSCOPY  2019    COLONOSCOPY N/A 05/09/2022    Procedure: COLONOSCOPY WITH BIOPSIES;  Surgeon: Butch Garcia MD;  Location: [facility] ENDOSCOPY;  Service: Gastroenterology;  Laterality: N/A;  DIVERTICULOSIS    HYSTERECTOMY      LYMPH NODE BIOPSY      SEPTOPLASTY  2018    SINUS SURGERY  2018    TONSILLECTOMY      US GUIDED CYST ASPIRATION BREAST N/A 10/03/2022    WRIST ARTHROPLASTY Bilateral      Social History     Socioeconomic History    Marital  status:    Tobacco Use    Smoking status: Never     Passive exposure: Yes    Smokeless tobacco: Never   Vaping Use    Vaping Use: Never used   Substance and Sexual Activity    Alcohol use: Never    Drug use: Never    Sexual activity: Not Currently     Partners: Female     Birth control/protection: Post-menopausal, None, Hysterectomy     Family History   Problem Relation Age of Onset    No Known Problems Mother     Cancer Father         Leukemia      Breast cancer Sister     Cancer Sister         Had breast cancer    Cancer Brother         Leukemia/CLL    Cancer Sister         Had stomach cancer    Cancer Brother         Bladder cancer    Cancer Brother         Bladder cancer    Cancer Sister         Retinal blastoma.    Colon cancer Neg Hx        Objective   Physical Exam  General: Alert, cooperative, no acute distress  Eyes: Anicteric sclera, PERRLA  Respiratory: normal respiratory effort  Cardiovascular: no lower extremity edema  Skin: Normal tone, no rash, no lesions  Psychiatric: Appropriate affect, intact judgment  Neurologic: No focal sensory or motor deficits, normal cognition   Musculoskeletal: Normal muscle strength and tone    Vitals:    24   BP: 133/58   Pulse: 107   Resp: 18   Temp: 97.6 °F (36.4 °C)   TempSrc: Temporal   SpO2: 95%   Weight: 76.6 kg (168 lb 14 oz)   PainSc: 0-No pain                   PHQ-9 Total Score:         Result Review :   The following data was reviewed by: Luciano Glez MD on 24:  Lab Results   Component Value Date    HGB 13.7 2024    HCT 41.5 2024    MCV 97.6 (H) 2024     (L) 2024    WBC 31.00 (C) 2024    NEUTROABS 3.07 2024    LYMPHSABS 24.98 (H) 2024    MONOSABS 2.81 (H) 2024    EOSABS 0.09 2024    BASOSABS 0.02 2024     Lab Results   Component Value Date    GLUCOSE 127 (H) 10/18/2023    BUN 13 10/18/2023    CREATININE 0.91 10/18/2023     10/18/2023    K 3.4 (L)  "10/18/2023     10/18/2023    CO2 28.7 10/18/2023    CALCIUM 9.9 10/18/2023    PROTEINTOT 7.0 10/18/2023    ALBUMIN 4.5 10/18/2023    BILITOT 0.4 10/18/2023    ALKPHOS 61 10/18/2023    AST 26 10/18/2023    ALT 28 10/18/2023     No results found for: \"IRON\", \"LABIRON\", \"TRANSFERRIN\", \"TIBC\"  Lab Results   Component Value Date    VOOEMZYR62 317 03/08/2022    FOLATE 14.40 03/08/2022     No results found for: \"PSA\", \"CEA\", \"AFP\", \"\", \"\"    Labs personally reviewed and WBC elevated, mild thrombocytopenia    Rad onc note personally reviewed    Genetic counselor note personally reviewed       Assessment and Plan    Diagnoses and all orders for this visit:    1. Malignant neoplasm of upper-inner quadrant of right female breast, unspecified estrogen receptor status (Primary)    2. Breast cancer screening, high risk patient    3. CLL (chronic lymphocytic leukemia)        Right Triple Negative Breast Cancer  November 2021 right lumpectomy. Chemotherapy was not recommended due to the small size of the tumor.  She completed adjuvant radiation in March.  Repeat screening mammogram will be due next September, 9/2023 mammogram benign.  DEXA scan will be due in 2025. Due to high risk, recommend annual MRI to alternate every 6 months with mammogram. MRI breast due 3/2024.     Del(17p) CLL  This is a high risk subtype of CLL. Patient symptomatic at visit 1/8/24 with night sweats, tremor, fevers, weakness. Recommend to start daily BTKi. Risk, benefit, side effect profile of Acalbrutinib discussed with patient. Patient to start when she receives it. Hepatitis serologies to be obtained prior to starting. Repeat CBC in 3 weeks. RTC in 5 weeks.     Li-Fraumeni Syndrome  Pathologic germline p53 mutation. Has been referred for genetic counseling and evaluated on 9/5/23.  Screening Brain MRI on 6/19/23 was negative. Needs urine cytology screening yearly.  Ovaries have been previously removed.     Adrenal Nodule  Nodule not " hypermetabolic on PET.  Given her underlying genetic mutation she was referred to an endocrinologist at [facility].  Will need follow up ultrasounds on annual basis, due 5/2024.         Patient was given instructions and counseling regarding her condition or for health maintenance advice. Please see specific information pulled into the AVS if appropriate.

## 2024-01-09 LAB — HBV CORE AB SERPL QL IA: NEGATIVE

## 2024-01-10 ENCOUNTER — TELEPHONE (OUTPATIENT)
Dept: ONCOLOGY | Facility: HOSPITAL | Age: 74
End: 2024-01-10
Payer: MEDICARE

## 2024-01-10 ENCOUNTER — SPECIALTY PHARMACY (OUTPATIENT)
Dept: PHARMACY | Facility: HOSPITAL | Age: 74
End: 2024-01-10
Payer: MEDICARE

## 2024-01-10 DIAGNOSIS — C91.10 CLL (CHRONIC LYMPHOCYTIC LEUKEMIA): Primary | ICD-10-CM

## 2024-01-10 NOTE — TELEPHONE ENCOUNTER
"  Caller: Dorothea Cruz \"Taniya\"    Relationship: Self    Best call back number: [phone]     What is the best time to reach you: ASAP    Who are you requesting to speak with (clinical staff, provider,  specific staff member): PRATIK    What was the call regarding: PT WAS JUST ON THE PHONE WITH PRATIK AND THE PT HAS SOME MORE QUESTIONS. PLEASE CALL TO DISCUSS.      "

## 2024-01-10 NOTE — PROGRESS NOTES
Oral Chemotherapy - Double Check    Drug: acalabrutinib  Strength: 100mg tablet  Directions: Take 1 tablet PO BID  QTY: 60  RF:11    Released to pharmacy: [facility]    Completed independent double check on medication order/RX.    Liz Walker, PharmD, [facility]  Clinical Oncology Pharmacy Specialist  Phone: [phone]    1/10/2024  11:41 EST

## 2024-01-10 NOTE — PROGRESS NOTES
Oral Chemotherapy - New Referral    Received a referral from Dr. Glez    Treatment Plan: Calquence (acalabrutinib)  Start date of treatment planned for: As soon as oral specialty medication is available.  Indication: Del(17p) CLL  Relevant past treatments: none  Is the therapy appropriate based on treatment guidelines and FDA labeling?: yes  Therapeutic Goals: Continue treatment until progression or intolerable toxicity  Patient can self-administer oral medications: Yes    Drug-Drug Interactions: The current medication list was reviewed and there are some relevant drug-drug interactions with the oral specialty medication that will be discussed during education, including:  Acalabrutinib may increase the antiplatelet effect of desvenlafaxine  Medication Allergies: The patient has no relevant allergies as it relates to their oral specialty medication  Review of Labs/Dose Adjustments: The patient's most recent labs were reviewed and are appropriate to start treatment at this dose  CBC completed on 1/8, will get CMP on day of education.    A prescription was released to [facility]  specialty pharmacy for   Drug: Calquence (acalabrutinib)  Strength: 100 mg  Directions: Take 1 capsule by mouth twice a day  Quantity: 60  Refills: 11    Pharmacy education is scheduled for 1/12/24., CCA and consent are signed.    Arcelia Goldstein PharmD, [facility]  Oncology Clinical Pharmacist  1/10/2024  09:18 EST    Addendum:  Pharmacy changed to [email or web address].    Arcelia Goldstein PharmD, [facility]  Oncology Clinical Pharmacist  1/10/2024  13:19 EST

## 2024-01-12 ENCOUNTER — LAB (OUTPATIENT)
Dept: ONCOLOGY | Facility: HOSPITAL | Age: 74
End: 2024-01-12
Payer: MEDICARE

## 2024-01-12 ENCOUNTER — SPECIALTY PHARMACY (OUTPATIENT)
Dept: PHARMACY | Facility: HOSPITAL | Age: 74
End: 2024-01-12
Payer: MEDICARE

## 2024-01-12 ENCOUNTER — APPOINTMENT (OUTPATIENT)
Dept: PHARMACY | Facility: HOSPITAL | Age: 74
End: 2024-01-12
Payer: MEDICARE

## 2024-01-12 DIAGNOSIS — C91.10 CLL (CHRONIC LYMPHOCYTIC LEUKEMIA): ICD-10-CM

## 2024-01-12 LAB
ALBUMIN SERPL-MCNC: 3.9 G/DL (ref 3.5–5.2)
ALBUMIN/GLOB SERPL: 1.6 G/DL
ALP SERPL-CCNC: 53 U/L (ref 39–117)
ALT SERPL W P-5'-P-CCNC: 35 U/L (ref 1–33)
ANION GAP SERPL CALCULATED.3IONS-SCNC: 8.3 MMOL/L (ref 5–15)
AST SERPL-CCNC: 20 U/L (ref 1–32)
BILIRUB SERPL-MCNC: 0.4 MG/DL (ref 0–1.2)
BUN SERPL-MCNC: 13 MG/DL (ref 8–23)
BUN/CREAT SERPL: 22.4 (ref 7–25)
CALCIUM SPEC-SCNC: 9.8 MG/DL (ref 8.6–10.5)
CHLORIDE SERPL-SCNC: 106 MMOL/L (ref 98–107)
CO2 SERPL-SCNC: 25.7 MMOL/L (ref 22–29)
CREAT SERPL-MCNC: 0.58 MG/DL (ref 0.57–1)
EGFRCR SERPLBLD CKD-EPI 2021: 96.3 ML/MIN/1.73
GLOBULIN UR ELPH-MCNC: 2.4 GM/DL
GLUCOSE SERPL-MCNC: 139 MG/DL (ref 65–99)
POTASSIUM SERPL-SCNC: 3 MMOL/L (ref 3.5–5.2)
PROT SERPL-MCNC: 6.3 G/DL (ref 6–8.5)
SODIUM SERPL-SCNC: 140 MMOL/L (ref 136–145)

## 2024-01-12 PROCEDURE — 80053 COMPREHEN METABOLIC PANEL: CPT

## 2024-01-12 PROCEDURE — 36415 COLL VENOUS BLD VENIPUNCTURE: CPT

## 2024-01-12 RX ORDER — GABAPENTIN 100 MG/1
100 CAPSULE ORAL DAILY PRN
COMMUNITY

## 2024-01-12 RX ORDER — ACETAMINOPHEN 500 MG
1000 TABLET ORAL DAILY PRN
COMMUNITY

## 2024-01-12 RX ORDER — ONDANSETRON HYDROCHLORIDE 8 MG/1
8 TABLET, FILM COATED ORAL 3 TIMES DAILY PRN
Qty: 30 TABLET | Refills: 5 | Status: SHIPPED | OUTPATIENT
Start: 2024-01-12

## 2024-01-12 NOTE — PROGRESS NOTES
Specialty Pharmacy Patient Management Program  Oncology Initial Assessment       A 73 y.o. female with Del(17p) CLL was seen by an Oncology provider and enrolled in the Oncology Patient Management program offered by [facility].  An initial outreach was conducted in person, face-to-face, including assessment of therapy appropriateness and specialty medication education for Calquence (acalabrutinib). The patient was introduced to services offered by [facility], including: regular assessments, refill coordination, curbside pick-up or mail order delivery options, prior authorization maintenance, and financial assistance programs as applicable. The patient was also provided with contact information for the pharmacy team.     Regimen: Acalabrutinib 100 mg - 1 tablet po twice daily    Start date of oral specialty medication: As soon as oral specialty medication is available.    Relevant Past Medical History, Comorbidities, and Vaccines  Relevant medical history and concomitant health conditions were discussed with the patient. The patient's chart has been reviewed for relevant past medical history and comorbid health conditions and updated as necessary.  Vaccines are coordinated by the patient's oncologist and primary care provider.  Past Medical History:   Diagnosis Date    Anxiety     Arthritis     Breast cancer     Cancer     BREAST CANCER    Cataract     Cholelithiases 4/30@1    Clotting disorder     Depression     Diabetes mellitus     Disease of thyroid gland     currently not taking any meds    Diverticulitis     Diverticulitis of colon     Diverticulosis 95    Fibromyalgia     Fibromyalgia, primary     HIV disease     Hyperlipidemia     Hypertension     Infectious viral hepatitis     Irritable bowel syndrome     Kidney stone     Liver disease     Low back pain 2015    Major depressive disorder 05/07/2020    Palpitations     ASYMPTOMATIC- DENIES CP/SOB, SEE PCP- NO  CARDIOLOGIST     Post-menopause 09/17/2020    Sinus trouble     Sleep apnea     Substance abuse     Tremor 2018    I was on medicine for tremors    Urinary tract infection     Vitamin D deficiency 09/17/2020     Social History     Socioeconomic History    Marital status:    Tobacco Use    Smoking status: Never     Passive exposure: Yes    Smokeless tobacco: Never   Vaping Use    Vaping Use: Never used   Substance and Sexual Activity    Alcohol use: Never    Drug use: Never    Sexual activity: Not Currently     Partners: Female     Birth control/protection: Post-menopausal, None, Hysterectomy       Allergies  Known allergies and reactions were discussed with the patient. The patient's chart has been reviewed for allergy information and updated as necessary.   Allergies   Allergen Reactions    Ace Inhibitors Cough    Sulfamethoxazole-Trimethoprim Hives    Levaquin [Levofloxacin] Hives    Lisinopril Cough and Hives    Nsaids Itching, Nausea Only and Rash    Sulfa Antibiotics Itching, Nausea Only, Rash and Hives        Current Medication List  This medication list has been reviewed with the patient and evaluated for any interactions or necessary modifications/recommendations, and updated to include all prescription medications, OTC medications, and supplements the patient is currently taking.  This list reflects what is contained in the patient's profile, which has also been marked as reviewed to communicate to other providers it is the most up to date version of the patient's current medication therapy.   Prior to Admission medications    Medication Sig Start Date End Date Taking? Authorizing Provider   Acalabrutinib Maleate (CALQUENCE) 100 MG tablet Take 1 tablet by mouth 2 (Two) Times a Day. 1/10/24  Yes Luciano Glez MD   acetaminophen (TYLENOL) 500 MG tablet Take 2 tablets by mouth Daily As Needed for Mild Pain.   Yes Provider, MD Larry   Cholecalciferol (Vitamin D) 50 MCG (2000 UT) capsule  Take 1 capsule by mouth Daily. 3/10/23  Yes Monserrat Grover APRN   colestipol (Colestid) 1 g tablet Take 2 tablets by mouth 2 (Two) Times a Day. 6/26/23  Yes Monserrat Grover APRN   desvenlafaxine (Pristiq) 100 MG 24 hr tablet Take 1 tablet by mouth Daily. 6/12/23  Yes Monserrat Grover APRN   gabapentin (NEURONTIN) 100 MG capsule Take 1 capsule by mouth Daily As Needed.   Yes Larry Murray MD   hydroCHLOROthiazide (HYDRODIURIL) 25 MG tablet Take 1 tablet by mouth Daily. 11/29/23  Yes Monserrat Grover APRN   HYDROcodone-acetaminophen (NORCO) 5-325 MG per tablet Take 2 tablets by mouth Daily. 12/8/22  Yes Larry Murray MD   Lactobacillus Probiotic tablet Take 1 tablet by mouth 2 (Two) Times a Day. 10/23/23  Yes Monserrat Grover APRN   metoprolol tartrate (LOPRESSOR) 25 MG tablet Take 1 tablet by mouth 2 (Two) Times a Day. 3/10/23  Yes Monserrat Grover APRN   rosuvastatin (CRESTOR) 20 MG tablet Take 1 tablet by mouth Every Night. 8/3/23  Yes Monserrat Grover APRN   SUMAtriptan (IMITREX) 50 MG tablet take one tablet by mouth at onset of migraine, may repeat after 2 hours, do not exceed 2 tablets in 24 hour period 1/3/24  Yes Larry Murray MD   ondansetron (ZOFRAN) 8 MG tablet Take 1 tablet by mouth 3 (Three) Times a Day As Needed for Nausea or Vomiting. 1/8/24 1/12/24 Yes Luciano Glez MD   ondansetron (ZOFRAN) 8 MG tablet Take 1 tablet by mouth 3 (Three) Times a Day As Needed for Nausea or Vomiting. 1/12/24   Luciano Glez MD   Acalabrutinib Maleate (CALQUENCE) 100 MG tablet Take 1 tablet by mouth 2 (Two) Times a Day. 1/10/24 1/10/24  Luciano Glez MD   brompheniramine-pseudoephedrine-DM 30-2-10 MG/5ML syrup Take 5 mL by mouth 4 (Four) Times a Day As Needed for Congestion or Cough.  Patient not taking: Reported on 12/21/2023 11/28/23 1/8/24  Monserrat Grover APRN   gabapentin (NEURONTIN) 300 MG capsule Take 1 capsule by mouth  3 (Three) Times a Day As Needed (neuropathy). 5/26/23 1/8/24  Monserrat Grover APRN   nitrofurantoin, macrocrystal-monohydrate, (Macrobid) 100 MG capsule Take 1 capsule by mouth 2 (Two) Times a Day. Start day prior to urologic procedure and take until completed. 11/9/23 12/21/23  Melissa Cespedes MD       Drug Interactions  Reviewed concomitant medications, allergies, labs, comorbidities/medical history, quality of life, and immunization history.   Drug-drug interactions noted and discussed during education:  Acalabrutinib may increase antiplatelet effects of desvenlafaxine  . Reminded the patient to let us know before making any changes or starting any new prescription or OTC medications so we can first assess drug interactions.  Drug-food interactions noted and discussed during education: Patient was instructed to avoid eating grapefruit and drinking grapefruit juice        Relevant Laboratory Values  Lab Results   Component Value Date    GLUCOSE 139 (H) 01/12/2024    CALCIUM 9.8 01/12/2024     01/12/2024    K 3.0 (L) 01/12/2024    CO2 25.7 01/12/2024     01/12/2024    BUN 13 01/12/2024    CREATININE 0.58 01/12/2024    EGFRIFNONA 69 08/23/2021    BCR 22.4 01/12/2024    ANIONGAP 8.3 01/12/2024     Lab Results   Component Value Date    WBC 31.00 (C) 01/08/2024    RBC 4.25 01/08/2024    HGB 13.7 01/08/2024    HCT 41.5 01/08/2024    MCV 97.6 (H) 01/08/2024    MCH 32.2 01/08/2024    MCHC 33.0 01/08/2024    RDW 13.2 01/08/2024    RDWSD 48.6 01/08/2024    MPV 11.6 01/08/2024     (L) 01/08/2024    NEUTRORELPCT 9.8 (L) 01/08/2024    LYMPHORELPCT 80.6 (H) 01/08/2024    MONORELPCT 9.1 01/08/2024    EOSRELPCT 0.3 01/08/2024    BASORELPCT 0.1 01/08/2024    AUTOIGPER 0.1 01/08/2024    NEUTROABS 3.07 01/08/2024    LYMPHSABS 24.98 (H) 01/08/2024    MONOSABS 2.81 (H) 01/08/2024    EOSABS 0.09 01/08/2024    BASOSABS 0.02 01/08/2024    AUTOIGNUM 0.03 01/08/2024    NRBC 1.0 (H) 10/10/2023       The above  labs have been reviewed. The following labs are outside of normal limits: Glucose and WBC are elvated No dose adjustments are needed for the oral specialty medication(s) based on the labs.    Initial Education Provided for Specialty Medication  The patient has been provided with the following education. All questions and concerns have been addressed prior to the patient receiving the medication, and the patient has verbalized understanding of the education and any materials provided.  Additional patient education shall be provided and documented upon request by the patient, provider or payer.      Provided patient with:   Chemo calendar to help improve adherence., Education sheets about the medication, 24-hour clinic phone number and my contact information and instructions to call should additional questions arise.     Medication Education Sheets Provided: (select all that apply)  Oral Specialty Medication: Calquence (acalabrutinib)    Other Education Sheets Provided: (select all that apply)  Diarrhea    TOPICS COMMENTS   Storage and Handling of Oral Specialty Medication Store in the original container, in a dry location out of direct sunlight, and out of reach of children or pets. and Store at room temperature.  Discussed safe handling and what to do with any unused medication.   Administration of Oral Specialty Medication Take with or without food at the same time(s) each day. and Do not crush or chew tablets.   Adherence to Oral Specialty Regimen and Handling Missed Doses Patient is likely to have good treatment adherence; reinforced the importance of adherence. Reviewed how to address missed doses and to let us know of any missed doses.   Anemia: role of RBC, cause, s/s, ways to manage, role of transfusion Reviewed the role of RBC and the use of transfusions if hemoglobin decreases too much.  Patient to notify us if they experience shortness of breath, dizziness, or palpitations.  Also let patient know they  could feel more tired than usual and to try to stay active, but rest if they need to.    Thrombocytopenia: role of platelet, cause, s/s, ways to prevent bleeding, things to avoid, when to seek help Reviewed the role of platelets in blood clotting and when to call clinic (bloody nose that bleeds for 5 mins despite pressure, a cut that won't stop bleeding despite pressure, gums that bleed excessively with brushing or flossing). Recommended using an electric razor, soft bristle toothbrush, and blowing your nose gently.    Neutropenia: role of WBC, cause, infection precautions, s/s of infection, when to call MD Reviewed the role of WBC, good infection prevention practices, and when to call the clinic (temperature 100.4F, sore throat, burning urination, etc)  COVID Vaccines:  2 doses received  Flu Vaccine: 2023 flu vaccine received   Diarrhea: causes, s/s of dehydration, ways to manage, dietary changes, when to call MD Chemotherapy : Discussed the risk of diarrhea. Instructed patient on use OTC loperamide with diarrhea onset, but emphasized the importance of calling the clinic if 4-6 episodes in 24 hours not relieved by OTC loperamide.   Constipation: causes, ways to manage, dietary changes, when to call MD Provided supplementary handout with instructions for use of docusate and other OTC therapies to manage constipation.  Instructed to call us if medications aren't working.   Nausea/Vomiting: cause, use of antiemetics, dietary changes, when to call MD Emetic risk: Low to Minimal  PRN home meds: Ondansetron    Instructed the patient to take a dose of the PRN medication at the first onset of nausea and if it's not working to call us for additional medications.  Also provided non-drug measures to mitigate nausea.   Pain: causes, ways to manage Chemo: Discussed muscle and joint aches/pains with chemotherapy, and recommended the use of OTC pain relief with ibuprofen or acetaminophen if needed.   Organ Toxicities: cause, s/s,  need for diagnostic tests, labs, when to notify MD Discussed potential effects on organ systems, monitoring, diagnostic tests, labs, and when to notify their MD. Discussed the signs/symptoms of the following: cardiotoxicity   Miscellaneous Financial Issues: copay of $43, may be a hardship, discussed potential options     Infertility and Sexuality:  causes, fertility preservation options, sexuality changes, ways to manage, importance of birth control The patient is not of childbearing potential.   Home Care: how to manage bodily fluids Counseled on management of soiled linens and proper flush technique.  Discussed how to manage all the side effects at home and advised when to contact the MD office   Survivorship: distress, distress assessment, secondary malignancies, early/late effects, follow-up, social issues, social support Discussed the rare, but possible risk of secondary malignancies months to years after treatment.     Adherence and Self-Administration  Barriers to Patient Adherence and/or Self-Administration: none at this time  Methods for Supporting Patient Adherence and/or Self-Administration: dosing calendar  Expected duration of therapy: Until disease progression or intolerable toxicity    Goals of Therapy  Patient Goals of Therapy:   Consistently take medications as prescribed  Patient will adhere to medication regimen  Patient will report any medication side effects to healthcare provider  Clinical Goals:    Goals Addressed Today        Specialty Pharmacy General Goal      Clinical goal/therapeutic target: disease control, per the recent oncology clinic notes and labs.  Patient-identified goal of therapy: Patient will adhere to medication regimen by %              Support patient understanding of medication regimen  Ensure patient knows the pharmacy contact information  Schedule regular follow-up to monitor the treatment serious adverse events  Schedule regular follow-up to confirm medication  adherence  Schedule regular follow-up to monitor disease progression or stability      Reassessment Plan & Follow-Up  Pharmacist to follow up in approximately 1 week for toxicity check.  Will follow clinic visits, labs, and scans as ordered by provider to evaluate efficacy.  Care Coordinator will follow until patient receives medication      Additional Plans, Therapy Recommendations or Therapy Problems to Be Addressed: None at this time.     Attestation  I attest the patient was actively involved in and has agreed to the above plan of care. If the prescribed therapy is at any point deemed not appropriate based on the current or future assessments, a consultation will be initiated with the patient's specialty care provider to determine the best course of action. The revised plan of therapy will be documented along with any additional patient education provided.       Arcelia Goldstein PharmD, [facility]  Oncology Clinical Pharmacist  1/12/2024  10:25 EST

## 2024-01-19 ENCOUNTER — TELEPHONE (OUTPATIENT)
Dept: ONCOLOGY | Facility: HOSPITAL | Age: 74
End: 2024-01-19
Payer: MEDICARE

## 2024-01-19 NOTE — TELEPHONE ENCOUNTER
The pt called to let Dr Glez know that she has received Calquence in the mail today. The pt also asked if she should start this evening or tomorrow morning. This nurse informed the pt whichever suites her best but to make for sure that she definitely starts tomorrow morning at the latest. The pt voiced understanding.

## 2024-01-22 ENCOUNTER — TELEPHONE (OUTPATIENT)
Dept: ONCOLOGY | Facility: HOSPITAL | Age: 74
End: 2024-01-22
Payer: MEDICARE

## 2024-01-22 NOTE — TELEPHONE ENCOUNTER
It should not be the cause of her fever. I think it is coincidental. She can continue tylenol as needed for the fever and can go to urgent care if fever is hard to control or if she develops more symptoms of infection such as cough, diarrhea, dysuria, muscle aches, sore throat, etc

## 2024-01-22 NOTE — TELEPHONE ENCOUNTER
"The pt called and states that she started Calquence yesterday. The pt c/o a fever of 101 that started at 0500 today. The pt took Tylenol. The pt's fever decreased and is currently 98. This nurse is unsure if this is a common side effect. This nurse can not find fever as a adverse reaction in \"Up To Date\". The pt is asking if her new med is causing her fever.   "

## 2024-01-24 DIAGNOSIS — E78.2 MIXED HYPERLIPIDEMIA: ICD-10-CM

## 2024-01-24 RX ORDER — ROSUVASTATIN CALCIUM 20 MG/1
20 TABLET, COATED ORAL NIGHTLY
Qty: 30 TABLET | Refills: 0 | Status: SHIPPED | OUTPATIENT
Start: 2024-01-24

## 2024-01-24 NOTE — TELEPHONE ENCOUNTER
"    Caller: Dorothea Cruz \"Taniya\"    Relationship: Self    Best call back number: [phone]    Requested Prescriptions:   Requested Prescriptions     Pending Prescriptions Disp Refills    rosuvastatin (CRESTOR) 20 MG tablet 90 tablet 1     Sig: Take 1 tablet by mouth Every Night.        Pharmacy where request should be sent: [place] [phone] [place] [phone]      Last office visit with prescribing clinician: 11/28/2023   Last telemedicine visit with prescribing clinician: Visit date not found   Next office visit with prescribing clinician: Visit date not found     Additional details provided by patient:     Does the patient have less than a 3 day supply:  [] Yes  [x] No    Would you like a call back once the refill request has been completed: [] Yes [] No    If the office needs to give you a call back, can they leave a voicemail: [] Yes [] No    Jackelyn Braxton Rep   01/24/24 09:16 EST         "

## 2024-01-29 ENCOUNTER — SPECIALTY PHARMACY (OUTPATIENT)
Dept: PHARMACY | Facility: HOSPITAL | Age: 74
End: 2024-01-29
Payer: MEDICARE

## 2024-01-29 ENCOUNTER — LAB (OUTPATIENT)
Dept: ONCOLOGY | Facility: HOSPITAL | Age: 74
End: 2024-01-29
Payer: MEDICARE

## 2024-01-29 DIAGNOSIS — C91.10 CLL (CHRONIC LYMPHOCYTIC LEUKEMIA): ICD-10-CM

## 2024-01-29 LAB
ALBUMIN SERPL-MCNC: 4.1 G/DL (ref 3.5–5.2)
ALBUMIN/GLOB SERPL: 2 G/DL
ALP SERPL-CCNC: 49 U/L (ref 39–117)
ALT SERPL W P-5'-P-CCNC: 16 U/L (ref 1–33)
ANION GAP SERPL CALCULATED.3IONS-SCNC: 5.4 MMOL/L (ref 5–15)
AST SERPL-CCNC: 14 U/L (ref 1–32)
BASOPHILS # BLD AUTO: 0.03 10*3/MM3 (ref 0–0.2)
BASOPHILS NFR BLD AUTO: 0.1 % (ref 0–1.5)
BILIRUB SERPL-MCNC: 0.3 MG/DL (ref 0–1.2)
BUN SERPL-MCNC: 15 MG/DL (ref 8–23)
BUN/CREAT SERPL: 26.8 (ref 7–25)
CALCIUM SPEC-SCNC: 10.9 MG/DL (ref 8.6–10.5)
CHLORIDE SERPL-SCNC: 107 MMOL/L (ref 98–107)
CO2 SERPL-SCNC: 27.6 MMOL/L (ref 22–29)
CREAT SERPL-MCNC: 0.56 MG/DL (ref 0.57–1)
DEPRECATED RDW RBC AUTO: 48 FL (ref 37–54)
EGFRCR SERPLBLD CKD-EPI 2021: 96.5 ML/MIN/1.73
EOSINOPHIL # BLD AUTO: 0.09 10*3/MM3 (ref 0–0.4)
EOSINOPHIL NFR BLD AUTO: 0.2 % (ref 0.3–6.2)
ERYTHROCYTE [DISTWIDTH] IN BLOOD BY AUTOMATED COUNT: 13.2 % (ref 12.3–15.4)
GLOBULIN UR ELPH-MCNC: 2.1 GM/DL
GLUCOSE SERPL-MCNC: 101 MG/DL (ref 65–99)
HCT VFR BLD AUTO: 40.6 % (ref 34–46.6)
HGB BLD-MCNC: 13.2 G/DL (ref 12–15.9)
IMM GRANULOCYTES # BLD AUTO: 0.01 10*3/MM3 (ref 0–0.05)
IMM GRANULOCYTES NFR BLD AUTO: 0 % (ref 0–0.5)
LARGE PLATELETS: NORMAL
LYMPHOCYTES # BLD AUTO: 41.86 10*3/MM3 (ref 0.7–3.1)
LYMPHOCYTES NFR BLD AUTO: 93.5 % (ref 19.6–45.3)
MCH RBC QN AUTO: 31.7 PG (ref 26.6–33)
MCHC RBC AUTO-ENTMCNC: 32.5 G/DL (ref 31.5–35.7)
MCV RBC AUTO: 97.6 FL (ref 79–97)
MONOCYTES # BLD AUTO: 0.41 10*3/MM3 (ref 0.1–0.9)
MONOCYTES NFR BLD AUTO: 0.9 % (ref 5–12)
NEUTROPHILS NFR BLD AUTO: 2.38 10*3/MM3 (ref 1.7–7)
NEUTROPHILS NFR BLD AUTO: 5.3 % (ref 42.7–76)
PLATELET # BLD AUTO: 201 10*3/MM3 (ref 140–450)
PMV BLD AUTO: 12.5 FL (ref 6–12)
POTASSIUM SERPL-SCNC: 3.3 MMOL/L (ref 3.5–5.2)
PROT SERPL-MCNC: 6.2 G/DL (ref 6–8.5)
RBC # BLD AUTO: 4.16 10*6/MM3 (ref 3.77–5.28)
RBC MORPH BLD: NORMAL
SMUDGE CELLS BLD QL SMEAR: NORMAL
SODIUM SERPL-SCNC: 140 MMOL/L (ref 136–145)
WBC NRBC COR # BLD AUTO: 44.78 10*3/MM3 (ref 3.4–10.8)

## 2024-01-29 PROCEDURE — 36415 COLL VENOUS BLD VENIPUNCTURE: CPT

## 2024-01-29 PROCEDURE — 80053 COMPREHEN METABOLIC PANEL: CPT

## 2024-01-29 PROCEDURE — 85025 COMPLETE CBC W/AUTO DIFF WBC: CPT

## 2024-01-29 PROCEDURE — 85007 BL SMEAR W/DIFF WBC COUNT: CPT

## 2024-01-29 NOTE — PROGRESS NOTES
Specialty Pharmacy Note: 1 Week Toxicity Check     Patient reports starting acalabrutinib on 1/21/24. Thus far, patient denies side effects, aside from headache (managed with acetaminophen) and occasional diarrhea (managed with loperamide), which is manageable and not bothersome to patient. Advised patient to alert office if this changes. . Thus far, no missed doses and no medication changes. Advised pt to call office if any changes. Patient expressed understanding and had no additional questions at this time.       Arcelia Goldstein, PharmD, [facility]  Oncology Clinical Pharmacist  1/29/2024  12:57 EST

## 2024-01-30 DIAGNOSIS — R19.7 DIARRHEA, UNSPECIFIED TYPE: ICD-10-CM

## 2024-01-30 RX ORDER — MONTELUKAST SODIUM 4 MG/1
2 TABLET, CHEWABLE ORAL 2 TIMES DAILY
Qty: 360 TABLET | Refills: 1 | Status: SHIPPED | OUTPATIENT
Start: 2024-01-30

## 2024-01-30 NOTE — TELEPHONE ENCOUNTER
"Caller: Dorothea Cruz \"Taniya\"    Relationship: Self    Best call back number: [phone]    Requested Prescriptions:   Requested Prescriptions     Pending Prescriptions Disp Refills    colestipol (Colestid) 1 g tablet 360 tablet 1     Sig: Take 2 tablets by mouth 2 (Two) Times a Day.        Pharmacy where request should be sent: [place] [phone] [place] [phone]      Last office visit with prescribing clinician: 11/28/2023   Last telemedicine visit with prescribing clinician: Visit date not found   Next office visit with prescribing clinician: Visit date not found     Additional details provided by patient:     Does the patient have less than a 3 day supply:  [] Yes  [x] No    Would you like a call back once the refill request has been completed: [] Yes [x] No    If the office needs to give you a call back, can they leave a voicemail: [] Yes [x] No    Jackelyn Singh Rep   01/30/24 10:50 EST         "

## 2024-02-13 ENCOUNTER — OFFICE VISIT (OUTPATIENT)
Dept: ONCOLOGY | Facility: HOSPITAL | Age: 74
End: 2024-02-13
Payer: OTHER GOVERNMENT

## 2024-02-13 ENCOUNTER — LAB (OUTPATIENT)
Dept: ONCOLOGY | Facility: HOSPITAL | Age: 74
End: 2024-02-13
Payer: MEDICARE

## 2024-02-13 ENCOUNTER — TELEPHONE (OUTPATIENT)
Dept: ONCOLOGY | Facility: HOSPITAL | Age: 74
End: 2024-02-13

## 2024-02-13 VITALS
SYSTOLIC BLOOD PRESSURE: 139 MMHG | TEMPERATURE: 98 F | DIASTOLIC BLOOD PRESSURE: 86 MMHG | WEIGHT: 154.32 LBS | HEART RATE: 117 BPM | RESPIRATION RATE: 16 BRPM | BODY MASS INDEX: 28.23 KG/M2 | OXYGEN SATURATION: 96 %

## 2024-02-13 DIAGNOSIS — Z79.899 HIGH RISK MEDICATION USE: ICD-10-CM

## 2024-02-13 DIAGNOSIS — C91.10 CLL (CHRONIC LYMPHOCYTIC LEUKEMIA): ICD-10-CM

## 2024-02-13 DIAGNOSIS — E87.6 HYPOKALEMIA: ICD-10-CM

## 2024-02-13 DIAGNOSIS — R00.2 HEART PALPITATIONS: Primary | ICD-10-CM

## 2024-02-13 LAB
ALBUMIN SERPL-MCNC: 4.3 G/DL (ref 3.5–5.2)
ALBUMIN/GLOB SERPL: 1.7 G/DL
ALP SERPL-CCNC: 55 U/L (ref 39–117)
ALT SERPL W P-5'-P-CCNC: 29 U/L (ref 1–33)
ANION GAP SERPL CALCULATED.3IONS-SCNC: 5.1 MMOL/L (ref 5–15)
ANISOCYTOSIS BLD QL: NORMAL
AST SERPL-CCNC: 31 U/L (ref 1–32)
BASOPHILS # BLD AUTO: 0.03 10*3/MM3 (ref 0–0.2)
BASOPHILS NFR BLD AUTO: 0.1 % (ref 0–1.5)
BILIRUB SERPL-MCNC: 0.5 MG/DL (ref 0–1.2)
BUN SERPL-MCNC: 23 MG/DL (ref 8–23)
BUN/CREAT SERPL: 32.4 (ref 7–25)
CALCIUM SPEC-SCNC: 10.9 MG/DL (ref 8.6–10.5)
CHLORIDE SERPL-SCNC: 101 MMOL/L (ref 98–107)
CO2 SERPL-SCNC: 31.9 MMOL/L (ref 22–29)
CREAT SERPL-MCNC: 0.71 MG/DL (ref 0.57–1)
DEPRECATED RDW RBC AUTO: 48.7 FL (ref 37–54)
EGFRCR SERPLBLD CKD-EPI 2021: 89.9 ML/MIN/1.73
EOSINOPHIL # BLD AUTO: 0.06 10*3/MM3 (ref 0–0.4)
EOSINOPHIL NFR BLD AUTO: 0.1 % (ref 0.3–6.2)
ERYTHROCYTE [DISTWIDTH] IN BLOOD BY AUTOMATED COUNT: 13.6 % (ref 12.3–15.4)
GLOBULIN UR ELPH-MCNC: 2.5 GM/DL
GLUCOSE SERPL-MCNC: 155 MG/DL (ref 65–99)
HCT VFR BLD AUTO: 42.3 % (ref 34–46.6)
HGB BLD-MCNC: 13.8 G/DL (ref 12–15.9)
IMM GRANULOCYTES # BLD AUTO: 0.01 10*3/MM3 (ref 0–0.05)
IMM GRANULOCYTES NFR BLD AUTO: 0 % (ref 0–0.5)
LARGE PLATELETS: NORMAL
LYMPHOCYTES # BLD AUTO: 53.81 10*3/MM3 (ref 0.7–3.1)
LYMPHOCYTES NFR BLD AUTO: 93.2 % (ref 19.6–45.3)
MACROCYTES BLD QL SMEAR: NORMAL
MCH RBC QN AUTO: 31.4 PG (ref 26.6–33)
MCHC RBC AUTO-ENTMCNC: 32.6 G/DL (ref 31.5–35.7)
MCV RBC AUTO: 96.1 FL (ref 79–97)
MONOCYTES # BLD AUTO: 0.36 10*3/MM3 (ref 0.1–0.9)
MONOCYTES NFR BLD AUTO: 0.6 % (ref 5–12)
NEUTROPHILS NFR BLD AUTO: 3.48 10*3/MM3 (ref 1.7–7)
NEUTROPHILS NFR BLD AUTO: 6 % (ref 42.7–76)
PLATELET # BLD AUTO: 161 10*3/MM3 (ref 140–450)
PMV BLD AUTO: 12.8 FL (ref 6–12)
POTASSIUM SERPL-SCNC: 2.4 MMOL/L (ref 3.5–5.2)
PROT SERPL-MCNC: 6.8 G/DL (ref 6–8.5)
RBC # BLD AUTO: 4.4 10*6/MM3 (ref 3.77–5.28)
SMALL PLATELETS BLD QL SMEAR: ADEQUATE
SMUDGE CELLS BLD QL SMEAR: NORMAL
SODIUM SERPL-SCNC: 138 MMOL/L (ref 136–145)
WBC NRBC COR # BLD AUTO: 57.75 10*3/MM3 (ref 3.4–10.8)

## 2024-02-13 PROCEDURE — 36415 COLL VENOUS BLD VENIPUNCTURE: CPT

## 2024-02-13 PROCEDURE — 85007 BL SMEAR W/DIFF WBC COUNT: CPT

## 2024-02-13 PROCEDURE — 85025 COMPLETE CBC W/AUTO DIFF WBC: CPT

## 2024-02-13 PROCEDURE — 80053 COMPREHEN METABOLIC PANEL: CPT

## 2024-02-13 RX ORDER — HYDROXYZINE HYDROCHLORIDE 25 MG/1
25 TABLET, FILM COATED ORAL 3 TIMES DAILY PRN
Qty: 60 TABLET | Refills: 1 | Status: SHIPPED | OUTPATIENT
Start: 2024-02-13

## 2024-02-13 RX ORDER — L. ACIDOPHILUS/PECTIN, CITRUS 25MM-100MG
TABLET ORAL
COMMUNITY
Start: 2024-02-05

## 2024-02-13 RX ORDER — POTASSIUM CHLORIDE 750 MG/1
20 TABLET, EXTENDED RELEASE ORAL 2 TIMES DAILY
Qty: 56 TABLET | Refills: 0 | Status: SHIPPED | OUTPATIENT
Start: 2024-02-13 | End: 2024-02-27

## 2024-02-14 ENCOUNTER — SPECIALTY PHARMACY (OUTPATIENT)
Dept: PHARMACY | Facility: HOSPITAL | Age: 74
End: 2024-02-14
Payer: OTHER GOVERNMENT

## 2024-02-20 ENCOUNTER — TRANSCRIBE ORDERS (OUTPATIENT)
Dept: ADMINISTRATIVE | Facility: HOSPITAL | Age: 74
End: 2024-02-20
Payer: MEDICARE

## 2024-02-20 ENCOUNTER — HOSPITAL ENCOUNTER (OUTPATIENT)
Dept: CARDIOLOGY | Facility: HOSPITAL | Age: 74
Discharge: HOME | End: 2024-02-20
Payer: MEDICARE

## 2024-02-20 DIAGNOSIS — R00.2 HEART PALPITATIONS: ICD-10-CM

## 2024-02-20 DIAGNOSIS — Z79.899 HIGH RISK MEDICATION USE: ICD-10-CM

## 2024-02-20 DIAGNOSIS — R00.2 HEART PALPITATIONS: Primary | ICD-10-CM

## 2024-02-20 LAB
QT INTERVAL: 333 MS
QTC INTERVAL: 388 MS

## 2024-02-20 PROCEDURE — 93005 ELECTROCARDIOGRAM TRACING: CPT | Performed by: INTERNAL MEDICINE

## 2024-02-20 PROCEDURE — 93010 ELECTROCARDIOGRAM REPORT: CPT | Performed by: INTERNAL MEDICINE

## 2024-02-21 DIAGNOSIS — M79.7 FIBROMYALGIA: ICD-10-CM

## 2024-02-21 DIAGNOSIS — E78.2 MIXED HYPERLIPIDEMIA: ICD-10-CM

## 2024-02-21 DIAGNOSIS — I10 PRIMARY HYPERTENSION: ICD-10-CM

## 2024-02-21 DIAGNOSIS — F32.1 CURRENT MODERATE EPISODE OF MAJOR DEPRESSIVE DISORDER, UNSPECIFIED WHETHER RECURRENT: ICD-10-CM

## 2024-02-21 RX ORDER — DESVENLAFAXINE 100 MG/1
100 TABLET, EXTENDED RELEASE ORAL DAILY
Qty: 90 TABLET | Refills: 0 | Status: SHIPPED | OUTPATIENT
Start: 2024-02-21

## 2024-02-21 RX ORDER — ROSUVASTATIN CALCIUM 20 MG/1
20 TABLET, COATED ORAL NIGHTLY
Qty: 90 TABLET | Refills: 0 | Status: SHIPPED | OUTPATIENT
Start: 2024-02-21

## 2024-02-21 NOTE — TELEPHONE ENCOUNTER
"Caller: Dorothea Cruz \"Taniya\"    Relationship: Self    Best call back number: [phone]    Requested Prescriptions:   Requested Prescriptions     Pending Prescriptions Disp Refills    desvenlafaxine (Pristiq) 100 MG 24 hr tablet 90 tablet 1     Sig: Take 1 tablet by mouth Daily.    metoprolol tartrate (LOPRESSOR) 25 MG tablet 180 tablet 1     Sig: Take 1 tablet by mouth 2 (Two) Times a Day.    rosuvastatin (CRESTOR) 20 MG tablet 30 tablet 0     Sig: Take 1 tablet by mouth Every Night.        Pharmacy where request should be sent: [place] [phone] [place], [place] [phone]      Last office visit with prescribing clinician: 11/28/2023   Last telemedicine visit with prescribing clinician: Visit date not found   Next office visit with prescribing clinician: Visit date not found     Additional details provided by patient: PATIENT IS REQUESTING A 90 DAY SUPPLY OF ALL THESE MEDICATIONS     Does the patient have less than a 3 day supply:  [] Yes  [x] No    Would you like a call back once the refill request has been completed: [] Yes [x] No    If the office needs to give you a call back, can they leave a voicemail: [] Yes [x] No    Jackelyn Singh Rep   02/21/24 10:50 EST           "

## 2024-02-23 ENCOUNTER — TELEPHONE (OUTPATIENT)
Dept: FAMILY MEDICINE CLINIC | Facility: CLINIC | Age: 74
End: 2024-02-23
Payer: MEDICARE

## 2024-02-23 ENCOUNTER — TELEPHONE (OUTPATIENT)
Dept: ONCOLOGY | Facility: HOSPITAL | Age: 74
End: 2024-02-23
Payer: MEDICARE

## 2024-02-23 DIAGNOSIS — C91.10 CLL (CHRONIC LYMPHOCYTIC LEUKEMIA): Primary | ICD-10-CM

## 2024-02-23 DIAGNOSIS — R00.2 HEART PALPITATIONS: ICD-10-CM

## 2024-02-23 DIAGNOSIS — Z79.899 HIGH RISK MEDICATION USE: ICD-10-CM

## 2024-02-23 NOTE — TELEPHONE ENCOUNTER
I spoke with oncology and they can drawn these labs but pt must let them know at the time of visit that she has labs that need to also be drawn for another office.       ----- Message from ARA Peck sent at 2/22/2024  3:55 PM EST -----    She was supposed to get those drawn when she went to oncology to get labs.  Can we check with oncology to see if they can draw those when she is in there next time for labs?    ----- Message -----  From: Nevin Davila MA  Sent: 2/22/2024   3:23 PM EST  To: ARA Peck    Are these still needed?     ----- Message -----  From: SYSTEM  Sent: 11/25/2023   1:35 AM EST  To: [facility]

## 2024-02-23 NOTE — TELEPHONE ENCOUNTER
Instructed patient to hold medication for 1 week. She has an appointment to see Dr. Glez next week. We will get labs. She reports she had an EKG on Tuesday.

## 2024-02-27 ENCOUNTER — HOSPITAL ENCOUNTER (OUTPATIENT)
Dept: CARDIOLOGY | Facility: HOSPITAL | Age: 74
Discharge: HOME | End: 2024-02-27
Payer: MEDICARE

## 2024-02-27 ENCOUNTER — LAB (OUTPATIENT)
Dept: ONCOLOGY | Facility: HOSPITAL | Age: 74
End: 2024-02-27
Payer: MEDICARE

## 2024-02-27 DIAGNOSIS — Z79.899 HIGH RISK MEDICATION USE: ICD-10-CM

## 2024-02-27 DIAGNOSIS — E78.2 MIXED HYPERLIPIDEMIA: ICD-10-CM

## 2024-02-27 DIAGNOSIS — R00.2 HEART PALPITATIONS: ICD-10-CM

## 2024-02-27 DIAGNOSIS — R73.03 PREDIABETES: ICD-10-CM

## 2024-02-27 DIAGNOSIS — E66.9 CLASS 1 OBESITY WITH SERIOUS COMORBIDITY AND BODY MASS INDEX (BMI) OF 32.0 TO 32.9 IN ADULT, UNSPECIFIED OBESITY TYPE: ICD-10-CM

## 2024-02-27 DIAGNOSIS — C91.10 CLL (CHRONIC LYMPHOCYTIC LEUKEMIA): ICD-10-CM

## 2024-02-27 LAB
25(OH)D3 SERPL-MCNC: 41 NG/ML (ref 30–100)
ALBUMIN SERPL-MCNC: 4.1 G/DL (ref 3.5–5.2)
ALBUMIN/GLOB SERPL: 1.4 G/DL
ALP SERPL-CCNC: 76 U/L (ref 39–117)
ALT SERPL W P-5'-P-CCNC: 24 U/L (ref 1–33)
ANION GAP SERPL CALCULATED.3IONS-SCNC: 7.9 MMOL/L (ref 5–15)
AST SERPL-CCNC: 23 U/L (ref 1–32)
BASOPHILS # BLD AUTO: 0.04 10*3/MM3 (ref 0–0.2)
BASOPHILS NFR BLD AUTO: 0.1 % (ref 0–1.5)
BILIRUB SERPL-MCNC: 0.3 MG/DL (ref 0–1.2)
BUN SERPL-MCNC: 14 MG/DL (ref 8–23)
BUN/CREAT SERPL: 22.2 (ref 7–25)
CALCIUM SPEC-SCNC: 10.6 MG/DL (ref 8.6–10.5)
CHLORIDE SERPL-SCNC: 105 MMOL/L (ref 98–107)
CHOLEST SERPL-MCNC: 119 MG/DL (ref 0–200)
CO2 SERPL-SCNC: 27.1 MMOL/L (ref 22–29)
CREAT SERPL-MCNC: 0.63 MG/DL (ref 0.57–1)
DEPRECATED RDW RBC AUTO: 46.9 FL (ref 37–54)
EGFRCR SERPLBLD CKD-EPI 2021: 93.8 ML/MIN/1.73
EOSINOPHIL # BLD AUTO: 0.21 10*3/MM3 (ref 0–0.4)
EOSINOPHIL NFR BLD AUTO: 0.5 % (ref 0.3–6.2)
ERYTHROCYTE [DISTWIDTH] IN BLOOD BY AUTOMATED COUNT: 13.2 % (ref 12.3–15.4)
GLOBULIN UR ELPH-MCNC: 2.9 GM/DL
GLUCOSE SERPL-MCNC: 143 MG/DL (ref 65–99)
HBA1C MFR BLD: 5.8 % (ref 4.8–5.6)
HCT VFR BLD AUTO: 43.3 % (ref 34–46.6)
HDLC SERPL-MCNC: 36 MG/DL (ref 40–60)
HGB BLD-MCNC: 14 G/DL (ref 12–15.9)
IMM GRANULOCYTES # BLD AUTO: 0.14 10*3/MM3 (ref 0–0.05)
IMM GRANULOCYTES NFR BLD AUTO: 0.3 % (ref 0–0.5)
LDLC SERPL CALC-MCNC: 51 MG/DL (ref 0–100)
LDLC/HDLC SERPL: 1.24 {RATIO}
LYMPHOCYTES # BLD AUTO: 35.97 10*3/MM3 (ref 0.7–3.1)
LYMPHOCYTES NFR BLD AUTO: 81.4 % (ref 19.6–45.3)
MAGNESIUM SERPL-MCNC: 1.7 MG/DL (ref 1.6–2.4)
MCH RBC QN AUTO: 30.8 PG (ref 26.6–33)
MCHC RBC AUTO-ENTMCNC: 32.3 G/DL (ref 31.5–35.7)
MCV RBC AUTO: 95.4 FL (ref 79–97)
MONOCYTES # BLD AUTO: 1.97 10*3/MM3 (ref 0.1–0.9)
MONOCYTES NFR BLD AUTO: 4.5 % (ref 5–12)
NEUTROPHILS NFR BLD AUTO: 13.2 % (ref 42.7–76)
NEUTROPHILS NFR BLD AUTO: 5.86 10*3/MM3 (ref 1.7–7)
PLAT MORPH BLD: NORMAL
PLATELET # BLD AUTO: 175 10*3/MM3 (ref 140–450)
PMV BLD AUTO: 11.8 FL (ref 6–12)
POTASSIUM SERPL-SCNC: 3.5 MMOL/L (ref 3.5–5.2)
PROT SERPL-MCNC: 7 G/DL (ref 6–8.5)
QT INTERVAL: 304 MS
QTC INTERVAL: 405 MS
RBC # BLD AUTO: 4.54 10*6/MM3 (ref 3.77–5.28)
RBC MORPH BLD: NORMAL
SMUDGE CELLS BLD QL SMEAR: NORMAL
SODIUM SERPL-SCNC: 140 MMOL/L (ref 136–145)
TRIGL SERPL-MCNC: 192 MG/DL (ref 0–150)
VLDLC SERPL-MCNC: 32 MG/DL (ref 5–40)
WBC NRBC COR # BLD AUTO: 44.19 10*3/MM3 (ref 3.4–10.8)

## 2024-02-27 PROCEDURE — 93005 ELECTROCARDIOGRAM TRACING: CPT | Performed by: INTERNAL MEDICINE

## 2024-02-27 PROCEDURE — 83036 HEMOGLOBIN GLYCOSYLATED A1C: CPT

## 2024-02-27 PROCEDURE — 83735 ASSAY OF MAGNESIUM: CPT

## 2024-02-27 PROCEDURE — 80061 LIPID PANEL: CPT

## 2024-02-27 PROCEDURE — 82306 VITAMIN D 25 HYDROXY: CPT

## 2024-02-27 PROCEDURE — 80053 COMPREHEN METABOLIC PANEL: CPT

## 2024-02-27 PROCEDURE — 85007 BL SMEAR W/DIFF WBC COUNT: CPT

## 2024-02-27 PROCEDURE — 85025 COMPLETE CBC W/AUTO DIFF WBC: CPT

## 2024-02-27 PROCEDURE — 93010 ELECTROCARDIOGRAM REPORT: CPT | Performed by: INTERNAL MEDICINE

## 2024-02-27 PROCEDURE — 36415 COLL VENOUS BLD VENIPUNCTURE: CPT

## 2024-02-29 ENCOUNTER — OFFICE VISIT (OUTPATIENT)
Dept: ONCOLOGY | Facility: HOSPITAL | Age: 74
End: 2024-02-29
Payer: MEDICARE

## 2024-02-29 ENCOUNTER — LAB (OUTPATIENT)
Dept: ONCOLOGY | Facility: HOSPITAL | Age: 74
End: 2024-02-29
Payer: MEDICARE

## 2024-02-29 VITALS
DIASTOLIC BLOOD PRESSURE: 65 MMHG | RESPIRATION RATE: 18 BRPM | OXYGEN SATURATION: 99 % | BODY MASS INDEX: 27.84 KG/M2 | HEART RATE: 131 BPM | WEIGHT: 152.2 LBS | TEMPERATURE: 97.5 F | SYSTOLIC BLOOD PRESSURE: 150 MMHG

## 2024-02-29 DIAGNOSIS — C50.219 MALIGNANT NEOPLASM OF UPPER-INNER QUADRANT OF BREAST IN FEMALE, ESTROGEN RECEPTOR NEGATIVE, UNSPECIFIED LATERALITY: ICD-10-CM

## 2024-02-29 DIAGNOSIS — R19.7 DIARRHEA, UNSPECIFIED TYPE: Primary | ICD-10-CM

## 2024-02-29 DIAGNOSIS — E87.6 HYPOKALEMIA: ICD-10-CM

## 2024-02-29 DIAGNOSIS — R00.2 PALPITATIONS: ICD-10-CM

## 2024-02-29 DIAGNOSIS — C91.10 CLL (CHRONIC LYMPHOCYTIC LEUKEMIA): ICD-10-CM

## 2024-02-29 DIAGNOSIS — E86.0 DEHYDRATION: ICD-10-CM

## 2024-02-29 DIAGNOSIS — Z17.1 MALIGNANT NEOPLASM OF UPPER-INNER QUADRANT OF BREAST IN FEMALE, ESTROGEN RECEPTOR NEGATIVE, UNSPECIFIED LATERALITY: ICD-10-CM

## 2024-02-29 PROCEDURE — G0463 HOSPITAL OUTPT CLINIC VISIT: HCPCS | Performed by: INTERNAL MEDICINE

## 2024-02-29 RX ORDER — PROCHLORPERAZINE MALEATE 10 MG
10 TABLET ORAL EVERY 6 HOURS PRN
Qty: 60 TABLET | Refills: 3 | Status: SHIPPED | OUTPATIENT
Start: 2024-02-29

## 2024-02-29 RX ORDER — POTASSIUM CHLORIDE 750 MG/1
20 TABLET, EXTENDED RELEASE ORAL 2 TIMES DAILY
Qty: 56 TABLET | Refills: 0 | Status: SHIPPED | OUTPATIENT
Start: 2024-02-29 | End: 2024-03-14

## 2024-02-29 RX ORDER — PANTOPRAZOLE SODIUM 40 MG/1
40 FOR SUSPENSION ORAL
Qty: 30 EACH | Refills: 3 | Status: SHIPPED | OUTPATIENT
Start: 2024-02-29

## 2024-02-29 RX ORDER — MAGNESIUM OXIDE 400 MG/1
400 TABLET ORAL DAILY
Qty: 30 TABLET | Refills: 1 | Status: SHIPPED | OUTPATIENT
Start: 2024-02-29

## 2024-02-29 RX ORDER — POTASSIUM CHLORIDE 750 MG/1
TABLET, FILM COATED, EXTENDED RELEASE ORAL
COMMUNITY
Start: 2024-02-13

## 2024-02-29 NOTE — PROGRESS NOTES
Patient  Dorothea Cruz    Location  [facility] HEMATOLOGY & ONCOLOGY    Chief Complaint  Malignant neoplasm of upper-inner quadrant of right female     Referring Provider: TYRELL Peck*  PCP: Monserrat Grover APRN    Subjective          Oncology/Hematology History Overview Note     Right Triple Negative Breast Cancer:  - screening mammogram 9/10/21: 4mm asymmetry in the right upper inner breast  -Diagnostic mammogram on 2021: Persistent 5 mm ill-defined nodule in the upper inner mid right breast at 2:00, 7 cm from the nipple.  - core biopsy on 10/8/21: Invasive ductal carcinoma, grade 3, ER negative (less than 1%), AZ negative (less than 1%), HER-2 equivocal (2+ HC), HER-2 FISH not amplified  - Right lumpectomy on 21. Pathology showed a 5mm tumor with negative margins, 0/3 lymph nodes involved, pT1aN0, ER-, AZ-, HER2-  - chemotherapy was not recommended due to the small size of the tumor.   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  Mildly increased CD4 positive T cell LGL (5.2% of analyzed cells).  - CLL FISH positive for TP53/17p13 deletion in 49.5% of cells.  - IgVH somatic hypermutation was detected (3.7%)  -24: Start acalabrutinib due to symptoms of weakness, night sweats, tremor. WBC 31K, plt 133, hgb 13.7    Li Fraumeni Syndrome (inherited p53 mutation):   Family History:    - brother with CLL  - father  from some type of leukemia  - sister with breast cancer and adult onset retinoblastoma which was fatal  - niece (sister's daughter) also had retinoblastoma as a child  - pt is considering genetic testing     Breast cancer   10/20/2021 Initial Diagnosis    Breast cancer (HCC)     Malignant neoplasm of upper-inner quadrant of right female breast    11/12/2021 Cancer Staged    Staging form: Breast, AJCC 8th Edition  - Pathologic: pT1a, pN0, cM0, ER-, LA-, HER2- - Signed by Santa Haskins APRN on 5/20/2022 1/13/2022 Initial Diagnosis    Malignant neoplasm of upper-inner quadrant of right female breast (HCC)     1/20/2022 - 2/14/2022 Radiation    Radiation OncologyTreatment Course:  Dorothea Cruz received 4256 cGy in 16 fractions to right breast.      CLL (chronic lymphocytic leukemia)   3/10/2023 Initial Diagnosis    CLL (chronic lymphocytic leukemia)     1/12/2024 -  Chemotherapy    OP LYMPHOMA (CLL) Acalabrutinib         History of Present Illness  Patient comes in today for follow-up. She has been feeling worse. She reports upper abdominal pain. Reports diarrhea up to 5 times per day. Non-bloody diarrhea. Also has been nauseous, has anti-emetics but not helping much. No fevers or chills. Feeling weak. Drinking well per her report. Has been off acalabrutinib since 2/23. Still having palpitations. Did feel better on K pills. ECG with worsening HR, but sinus tachycardia noted. Plan for IV fluids tomorrow, paitent agreeable.       Review of Systems   Constitutional:  Positive for appetite change, fatigue and unexpected weight loss. Negative for diaphoresis, fever and unexpected weight gain.   HENT:  Negative for hearing loss, mouth sores, sore throat, swollen glands, trouble swallowing and voice change.    Eyes:  Negative for blurred vision.   Respiratory:  Negative for cough, shortness of breath and wheezing.    Cardiovascular:  Negative for chest pain and palpitations.   Gastrointestinal:  Positive for nausea. Negative for abdominal pain, blood in stool, constipation, diarrhea and vomiting.   Endocrine: Negative for cold intolerance and heat intolerance.   Genitourinary:  Negative for difficulty urinating, dysuria, frequency, hematuria and urinary incontinence.   Musculoskeletal:  Positive for back pain (8/10). Negative for arthralgias and myalgias.    Skin:  Negative for rash, skin lesions and wound.   Neurological:  Positive for tremors and weakness. Negative for dizziness, seizures, numbness and headache.   Hematological:  Does not bruise/bleed easily.   Psychiatric/Behavioral:  Negative for depressed mood. The patient is not nervous/anxious.    All other systems reviewed and are negative.      Past Medical History:   Diagnosis Date    Anxiety     Arthritis     Breast cancer     Cancer     BREAST CANCER    Cataract     Cholelithiases 4/30@1    Clotting disorder     Depression     Diabetes mellitus     Disease of thyroid gland     currently not taking any meds    Diverticulitis     Diverticulitis of colon     Diverticulosis 95    Fibromyalgia     Fibromyalgia, primary     HIV disease     Hyperlipidemia     Hypertension     Infectious viral hepatitis     Irritable bowel syndrome     Kidney stone     Liver disease     Low back pain 2015    Major depressive disorder 05/07/2020    Palpitations     ASYMPTOMATIC- DENIES CP/SOB, SEE PCP- NO CARDIOLOGIST     Post-menopause 09/17/2020    Sinus trouble     Sleep apnea     Substance abuse     Tremor 2018    I was on medicine for tremors    Urinary tract infection     Vitamin D deficiency 09/17/2020     Past Surgical History:   Procedure Laterality Date    ABDOMINAL SURGERY  06/14/1985    APPENDECTOMY      BILATERAL BREAST REDUCTION      BREAST BIOPSY Right 11/12/2021    Procedure: RIGHT BREAST NEEDLE LOCALIZED  LUMPECTOMY WITH SENTINEL NODE BIOPSY;  Surgeon: Mirtha Esteves MD;  Location: [facility] OR [facility];  Service: General;  Laterality: Right;    BREAST LUMPECTOMY      CHOLECYSTECTOMY N/A 09/02/2021    Procedure: CHOLECYSTECTOMY LAPAROSCOPIC INTRAOPERATIVE CHOLANGIOGRAM;  Surgeon: Louie Medina MD;  Location: [facility] MAIN OR;  Service: General;  Laterality: N/A;    COLONOSCOPY  2019    COLONOSCOPY N/A 05/09/2022    Procedure: COLONOSCOPY WITH BIOPSIES;  Surgeon: Butch Garcia MD;  Location: [facility]  ENDOSCOPY;  Service: Gastroenterology;  Laterality: N/A;  DIVERTICULOSIS    HYSTERECTOMY      LYMPH NODE BIOPSY      SEPTOPLASTY  2018    SINUS SURGERY  2018    TONSILLECTOMY      US GUIDED CYST ASPIRATION BREAST N/A 10/03/2022    WRIST ARTHROPLASTY Bilateral      Social History     Socioeconomic History    Marital status:    Tobacco Use    Smoking status: Never     Passive exposure: Yes    Smokeless tobacco: Never   Vaping Use    Vaping Use: Never used   Substance and Sexual Activity    Alcohol use: Never    Drug use: Never    Sexual activity: Not Currently     Partners: Female     Birth control/protection: Post-menopausal, None, Hysterectomy     Family History   Problem Relation Age of Onset    No Known Problems Mother     Cancer Father         Leukemia      Breast cancer Sister     Cancer Sister         Had breast cancer    Cancer Brother         Leukemia/CLL    Cancer Sister         Had stomach cancer    Cancer Brother         Bladder cancer    Cancer Brother         Bladder cancer    Cancer Sister         Retinal blastoma.    Colon cancer Neg Hx        Objective   Physical Exam  General: Alert, cooperative, no acute distress  Eyes: Anicteric sclera, PERRLA  Respiratory: normal respiratory effort  Cardiovascular: no lower extremity edema  Skin: Normal tone, no rash, no lesions  Psychiatric: Appropriate affect, intact judgment  Neurologic: No focal sensory or motor deficits, normal cognition   Musculoskeletal: Normal muscle strength and tone    Vitals:    24 1506   BP: 150/65   Pulse: (!) 131   Resp: 18   Temp: 97.5 °F (36.4 °C)   TempSrc: Temporal   SpO2: 99%   Weight: 69 kg (152 lb 3.2 oz)   PainSc: 0-No pain           ECOG score: 0         PHQ-9 Total Score:         Result Review :   The following data was reviewed by: Luciano Glez MD on 24:  Lab Results   Component Value Date    HGB 14.0 2024    HCT 43.3 2024    MCV 95.4 2024     2024    WBC  "44.19 (C) 02/27/2024    NEUTROABS 5.86 02/27/2024    LYMPHSABS 35.97 (H) 02/27/2024    MONOSABS 1.97 (H) 02/27/2024    EOSABS 0.21 02/27/2024    BASOSABS 0.04 02/27/2024     Lab Results   Component Value Date    GLUCOSE 143 (H) 02/27/2024    BUN 14 02/27/2024    CREATININE 0.63 02/27/2024     02/27/2024    K 3.5 02/27/2024     02/27/2024    CO2 27.1 02/27/2024    CALCIUM 10.6 (H) 02/27/2024    PROTEINTOT 7.0 02/27/2024    ALBUMIN 4.1 02/27/2024    BILITOT 0.3 02/27/2024    ALKPHOS 76 02/27/2024    AST 23 02/27/2024    ALT 24 02/27/2024     No results found for: \"IRON\", \"LABIRON\", \"TRANSFERRIN\", \"TIBC\"  Lab Results   Component Value Date    IKMPVEWT40 317 03/08/2022    FOLATE 14.40 03/08/2022     No results found for: \"PSA\", \"CEA\", \"AFP\", \"\", \"\"    Labs personally reviewed and WBC elevated, normal platelet. Creatinine normal. LFTs normal.     ECG personally reviewed and per my independent read with sinus tachycardia         Assessment and Plan    Diagnoses and all orders for this visit:    1. Diarrhea, unspecified type (Primary)  -     Clostridioides difficile Toxin, PCR - Stool, Per Rectum; Future  -     Enteric Bacterial Panel - Stool, Per Rectum; Future    2. Hypokalemia  -     potassium chloride (K-DUR,KLOR-CON,KLOR-CON M10) 10 MEQ CR tablet; Take 2 tablets by mouth 2 (Two) Times a Day for 14 days.  Dispense: 56 tablet; Refill: 0  -     CBC & Differential; Future  -     Comprehensive Metabolic Panel; Future    3. CLL (chronic lymphocytic leukemia)    4. Malignant neoplasm of upper-inner quadrant of breast in female, estrogen receptor negative, unspecified laterality    5. Palpitations    Other orders  -     prochlorperazine (COMPAZINE) 10 MG tablet; Take 1 tablet by mouth Every 6 (Six) Hours As Needed for Nausea.  Dispense: 60 tablet; Refill: 3  -     magnesium oxide (MAG-OX) 400 MG tablet; Take 1 tablet by mouth Daily.  Dispense: 30 tablet; Refill: 1  -     pantoprazole (PROTONIX) 40 MG " pack packet; Take 1 packet by mouth Every Morning Before Breakfast.  Dispense: 30 each; Refill: 3        Del(17p) CLL  This is a high risk subtype of CLL. Patient symptomatic at visit 1/8/24 with night sweats, tremor, fevers, weakness. Acalabrutinib started 1/8/24. WBC increased on check 2/13 but now is back down to 44 as of 2/27/24. Increase in WBC is expected within first month or two of BTK inhibitors. Plts are normal. Acalabrutinib on hold since 2/23/24 due to tachycardia/palipiations/diarrhea. ECG negative for arrythmia.     Right Triple Negative Breast Cancer  November 2021 right lumpectomy. Chemotherapy was not recommended due to the small size of the tumor.  She completed adjuvant radiation in March.  Repeat screening mammogram will be due next September, 9/2023 mammogram benign.  DEXA scan will be due in 2025. Due to high risk, recommend annual MRI to alternate every 6 months with mammogram. MRI breast ordered for 3/8/2024.     Diarrheal illness  Ongoing for 2 weeks. Associated with abdominal pain. Plan IV fluids tomorrow. Start Protonix. Test for GI illness. Once GI illness reuled out can consider imodium. F/u 1 week. Very low suspicion of chemo induced as patient is off acalabrutinib since 2/23 and symptoms are stable to worsening. Plan for compazine to assist with nausea as zofran not helpful.     Tremor  Not listed as common side effect of acalabrutinib    Palpitations  Could be side effect of acala vs hypokalemia. Recommend ECG on 2/20/24 which showed sinus tachycardia with HR in 80s. On 2/27, repeat ECG showed sinus tachycardia again with HR in 100s. Replace K, preferable to level of 4.0. Mag 1.7 on 2/27/24, preferably replace to 2.0.     Hypokalemia  Up to 3.5 from 2.4. Likely from diarrhea. Recommend continued replacement as above to goal of 4.0.    Li-Fraumeni Syndrome  Pathologic germline p53 mutation. Has been referred for genetic counseling and evaluated on 9/5/23.  Screening Brain MRI on  6/19/23 was negative. Needs urine cytology screening yearly.  Ovaries have been previously removed.     Adrenal Nodule  Nodule not hypermetabolic on PET.  Given her underlying genetic mutation she was referred to an endocrinologist at [facility].  Will need follow up ultrasounds on annual basis, due 5/2024.         Patient was given instructions and counseling regarding her condition or for health maintenance advice. Please see specific information pulled into the AVS if appropriate.

## 2024-03-01 ENCOUNTER — SPECIALTY PHARMACY (OUTPATIENT)
Dept: PHARMACY | Facility: HOSPITAL | Age: 74
End: 2024-03-01
Payer: MEDICARE

## 2024-03-01 ENCOUNTER — LAB (OUTPATIENT)
Dept: LAB | Facility: HOSPITAL | Age: 74
End: 2024-03-01
Payer: MEDICARE

## 2024-03-01 LAB
027 TOXIN: ABNORMAL
C DIFF GDH + TOXINS A+B STL QL IA.RAPID: NEGATIVE
C DIFF TOX GENS STL QL NAA+PROBE: POSITIVE

## 2024-03-01 PROCEDURE — 87505 NFCT AGENT DETECTION GI: CPT | Performed by: INTERNAL MEDICINE

## 2024-03-01 PROCEDURE — 87493 C DIFF AMPLIFIED PROBE: CPT | Performed by: INTERNAL MEDICINE

## 2024-03-01 PROCEDURE — 87449 NOS EACH ORGANISM AG IA: CPT | Performed by: INTERNAL MEDICINE

## 2024-03-02 LAB
C COLI+JEJ+UPSA DNA STL QL NAA+NON-PROBE: NOT DETECTED
EC STX1+STX2 GENES STL QL NAA+NON-PROBE: NOT DETECTED
S ENT+BONG DNA STL QL NAA+NON-PROBE: NOT DETECTED
SHIGELLA SP+EIEC IPAH ST NAA+NON-PROBE: NOT DETECTED

## 2024-03-05 ENCOUNTER — TELEPHONE (OUTPATIENT)
Dept: NUTRITION | Facility: HOSPITAL | Age: 74
End: 2024-03-05
Payer: MEDICARE

## 2024-03-05 NOTE — PROGRESS NOTES
Outpatient Nutrition Oncology Assessment    Patient Name: Dorothea Cruz  YOB: 1950  MRN: [id number]  Assessment Date: 3/5/2024    CLINICAL NUTRITION ASSESSMENT    Dx:  CLL      Type of Cancer Treatment Acalabrutinib          Reason for Assessment  Identified at risk by screening criteria, MST score 2+         Current Problems:   Patient Active Problem List   Diagnosis Code    Vitamin D deficiency E55.9    Sleep apnea G47.30    Postmenopause Z78.0    Major depressive disorder F32.9    Primary hypertension I10    Mixed hyperlipidemia E78.2    Fibromyalgia M79.7    Disease of thyroid gland E07.9    Depression F32.A    Anxiety F41.9    Arthritis M19.90    ACE-inhibitor cough R05.8, T46.4X5A    Diverticulitis K57.92    Breast cancer C50.919    History of diverticulitis Z87.19    History of cholecystectomy Z90.49    Altered bowel habits R19.4    Diarrhea R19.7    Malignant neoplasm of upper-inner quadrant of right female breast C50.211    Chronic low back pain M54.50, G89.29    Trochanteric bursitis of both hips M70.61, M70.62    Family history of CLL (chronic lymphoid leukemia) Z80.6    Degeneration of lumbar intervertebral disc M51.36    Lumbar spondylosis M47.816    Chronic osteoarthritis M19.90    Leukocytosis D72.829    Prediabetes R73.03    Class 1 obesity with serious comorbidity and body mass index (BMI) of 32.0 to 32.9 in adult E66.9, Z68.32    Adenoma of right adrenal gland D35.01    CLL (chronic lymphocytic leukemia) C91.10    Lumbosacral spondylosis without myelopathy M47.817    Li-Fraumeni syndrome Z15.01    Absence of both cervix and uterus, acquired Z90.710    Sinusitis J32.9    Urinary urgency R39.15    Dehydration E86.0         Anthropometrics       Row Name 03/05/24 1444          Anthropometrics    Weight for Calculation 69 kg (152 lb 1.9 oz)                         Weight Hx  Wt Readings from Last 30 Encounters:   02/29/24 1506 69 kg (152 lb 3.2 oz)   02/13/24 1102 70 kg (154 lb 5.2  oz)   01/08/24 0825 76.6 kg (168 lb 14 oz)   12/21/23 1250 76.5 kg (168 lb 10.4 oz)   12/06/23 0943 76.4 kg (168 lb 6.4 oz)   11/28/23 1435 81.2 kg (179 lb)   11/09/23 1312 81.2 kg (179 lb)   10/18/23 0959 87.7 kg (193 lb 5.5 oz)   10/10/23 1058 81.4 kg (179 lb 7.3 oz)   09/11/23 1348 81.2 kg (179 lb)   07/05/23 1504 82.4 kg (181 lb 10.5 oz)   06/09/23 1401 81.4 kg (179 lb 6.4 oz)   06/01/23 1413 81.8 kg (180 lb 5.4 oz)   04/28/23 0946 82.3 kg (181 lb 7 oz)   04/20/23 1304 80.7 kg (177 lb 14.6 oz)   03/30/23 1300 81.2 kg (179 lb)   03/10/23 1344 81.5 kg (179 lb 9.6 oz)   02/14/23 0959 81 kg (178 lb 9.6 oz)   01/19/23 1302 80.2 kg (176 lb 12.9 oz)   01/06/23 1053 80.6 kg (177 lb 9.6 oz)   12/22/22 1236 79.4 kg (175 lb 0.7 oz)   12/13/22 1133 78.7 kg (173 lb 6.4 oz)   12/06/22 1635 81.2 kg (179 lb)   09/20/22 1122 79.1 kg (174 lb 4.8 oz)   08/26/22 1504 80.3 kg (177 lb)   07/18/22 1359 81.7 kg (180 lb 3.2 oz)   06/02/22 1035 81.8 kg (180 lb 5.4 oz)   05/20/22 1125 81.7 kg (180 lb 1.9 oz)   05/09/22 1024 81.4 kg (179 lb 7.3 oz)   04/14/22 1035 82.8 kg (182 lb 8 oz)         Estimated/Assessed Needs - Anthropometrics       Row Name 03/05/24 1444          Anthropometrics    Weight for Calculation 69 kg (152 lb 1.9 oz)        Estimated/Assessed Needs    Additional Documentation Fluid Requirements (Group);Protein Requirements (Group);KCAL/KG (Group)        KCAL/KG    KCAL/KG 35 Kcal/Kg (kcal)     35 Kcal/Kg (kcal) 2415        Protein Requirements    Weight Used For Protein Calculations 69 kg (152 lb 1.9 oz)     Est Protein Requirement Amount (gms/kg) 1.5 gm protein     Estimated Protein Requirements (gms/day) 103.5        Fluid Requirements    Fluid Requirements (mL/day) 2415     RDA Method (mL) 2415                 Labs/Medications        Pertinent Labs Reviewed.       Pertinent Medications Acalabrutinib Maleate, Acidophilus/Citrus Pectin, HYDROcodone-acetaminophen, Lactobacillus Probiotic, SUMAtriptan, Vitamin D,  acetaminophen, colestipol, desvenlafaxine, fidaxomicin, gabapentin, hydrOXYzine, hydroCHLOROthiazide, magnesium oxide, metoprolol tartrate, ondansetron, pantoprazole, potassium chloride, prochlorperazine, and rosuvastatin     Physical Findings        Malnutrition Severity Assessment       Row Name 03/05/24 1444          Unintentional Weight Loss     Unintentional Weight Loss Findings Severe  15% loss in 3 months     Unintentional Weight Loss  Weight loss greater than 7.5% in three months            Nutrition Diagnosis        Nutrition Dx Problem 1 Unintentional weight loss related to GI dysfunction as evidenced by physiological causes increasing nutrient needs., hypermetabolic state., patient report., and chronic diarrhea / current C.Diff infection; 15% wt decline in 3 months.       Nutrition Intervention       RD Action Nutrition assessment via review of medical record + pt interview (discussed):  Recent nutrition-related concerns  Recent wt changes    Discussed:  MNT for diarrhea, dehydration  Importance of wt maintenance / hydration at this time  Explained Oncology RD will follow     Monitor/Evaluation       Monitor Per oncology nutrition protocol.     Comments:    Nutrition referral due to MST score of 3.  Pt with severe wt decline over the past 3 months.  She developed diarrhea episodes 1-2 weeks ago, which persisted and was found to have C.Diff infection.      Spoke to pt over the phone.  She reports a decreased appetite related to diarrhea.  The diarrhea occurs soon after eating, regardless of the food she eats.  Reviewed above information to try to identify foods that could make diarrhea worse.  On oral immunotherapy, but on hold until C.Diff is resolved.  Taking Dificid for the infection and reports some improvement in the past few days.  Reminded of importance of trying to eat well, hydrate, eat protein foods and to avoid gastric irritants.  Pt v/u of information reviewed above.    Electronically signed  by:  Bebe Arreguin RD  03/05/24 14:45 EST

## 2024-03-07 ENCOUNTER — OFFICE VISIT (OUTPATIENT)
Dept: ONCOLOGY | Facility: HOSPITAL | Age: 74
End: 2024-03-07
Payer: MEDICARE

## 2024-03-07 ENCOUNTER — LAB (OUTPATIENT)
Dept: ONCOLOGY | Facility: HOSPITAL | Age: 74
End: 2024-03-07
Payer: MEDICARE

## 2024-03-07 VITALS
OXYGEN SATURATION: 98 % | HEART RATE: 75 BPM | WEIGHT: 154.4 LBS | BODY MASS INDEX: 28.41 KG/M2 | DIASTOLIC BLOOD PRESSURE: 50 MMHG | TEMPERATURE: 97.5 F | HEIGHT: 62 IN | RESPIRATION RATE: 18 BRPM | SYSTOLIC BLOOD PRESSURE: 117 MMHG

## 2024-03-07 DIAGNOSIS — E87.6 HYPOKALEMIA: ICD-10-CM

## 2024-03-07 DIAGNOSIS — Z17.1 MALIGNANT NEOPLASM OF UPPER-INNER QUADRANT OF BREAST IN FEMALE, ESTROGEN RECEPTOR NEGATIVE, UNSPECIFIED LATERALITY: ICD-10-CM

## 2024-03-07 DIAGNOSIS — Z15.01 LI-FRAUMENI SYNDROME: ICD-10-CM

## 2024-03-07 DIAGNOSIS — C91.10 CLL (CHRONIC LYMPHOCYTIC LEUKEMIA): Primary | ICD-10-CM

## 2024-03-07 DIAGNOSIS — C50.219 MALIGNANT NEOPLASM OF UPPER-INNER QUADRANT OF BREAST IN FEMALE, ESTROGEN RECEPTOR NEGATIVE, UNSPECIFIED LATERALITY: ICD-10-CM

## 2024-03-07 DIAGNOSIS — A04.72 C. DIFFICILE COLITIS: ICD-10-CM

## 2024-03-07 LAB
ALBUMIN SERPL-MCNC: 3.9 G/DL (ref 3.5–5.2)
ALBUMIN/GLOB SERPL: 1.6 G/DL
ALP SERPL-CCNC: 70 U/L (ref 39–117)
ALT SERPL W P-5'-P-CCNC: 21 U/L (ref 1–33)
ANION GAP SERPL CALCULATED.3IONS-SCNC: 6.6 MMOL/L (ref 5–15)
AST SERPL-CCNC: 19 U/L (ref 1–32)
BASOPHILS # BLD AUTO: 0.03 10*3/MM3 (ref 0–0.2)
BASOPHILS NFR BLD AUTO: 0.1 % (ref 0–1.5)
BILIRUB SERPL-MCNC: 0.4 MG/DL (ref 0–1.2)
BUN SERPL-MCNC: 16 MG/DL (ref 8–23)
BUN/CREAT SERPL: 29.1 (ref 7–25)
CALCIUM SPEC-SCNC: 9.8 MG/DL (ref 8.6–10.5)
CHLORIDE SERPL-SCNC: 108 MMOL/L (ref 98–107)
CO2 SERPL-SCNC: 30.4 MMOL/L (ref 22–29)
CREAT SERPL-MCNC: 0.55 MG/DL (ref 0.57–1)
DEPRECATED RDW RBC AUTO: 49.9 FL (ref 37–54)
EGFRCR SERPLBLD CKD-EPI 2021: 96.9 ML/MIN/1.73
EOSINOPHIL # BLD AUTO: 0.3 10*3/MM3 (ref 0–0.4)
EOSINOPHIL NFR BLD AUTO: 0.9 % (ref 0.3–6.2)
ERYTHROCYTE [DISTWIDTH] IN BLOOD BY AUTOMATED COUNT: 14.4 % (ref 12.3–15.4)
GLOBULIN UR ELPH-MCNC: 2.4 GM/DL
GLUCOSE SERPL-MCNC: 123 MG/DL (ref 65–99)
HCT VFR BLD AUTO: 38.1 % (ref 34–46.6)
HGB BLD-MCNC: 12.7 G/DL (ref 12–15.9)
IMM GRANULOCYTES # BLD AUTO: 0.05 10*3/MM3 (ref 0–0.05)
IMM GRANULOCYTES NFR BLD AUTO: 0.2 % (ref 0–0.5)
LYMPHOCYTES # BLD AUTO: 25.81 10*3/MM3 (ref 0.7–3.1)
LYMPHOCYTES NFR BLD AUTO: 78 % (ref 19.6–45.3)
MCH RBC QN AUTO: 31.8 PG (ref 26.6–33)
MCHC RBC AUTO-ENTMCNC: 33.3 G/DL (ref 31.5–35.7)
MCV RBC AUTO: 95.5 FL (ref 79–97)
MONOCYTES # BLD AUTO: 3.46 10*3/MM3 (ref 0.1–0.9)
MONOCYTES NFR BLD AUTO: 10.5 % (ref 5–12)
NEUTROPHILS NFR BLD AUTO: 10.3 % (ref 42.7–76)
NEUTROPHILS NFR BLD AUTO: 3.42 10*3/MM3 (ref 1.7–7)
PLATELET # BLD AUTO: 198 10*3/MM3 (ref 140–450)
PMV BLD AUTO: 12.4 FL (ref 6–12)
POTASSIUM SERPL-SCNC: 3.6 MMOL/L (ref 3.5–5.2)
PROT SERPL-MCNC: 6.3 G/DL (ref 6–8.5)
RBC # BLD AUTO: 3.99 10*6/MM3 (ref 3.77–5.28)
SODIUM SERPL-SCNC: 145 MMOL/L (ref 136–145)
WBC NRBC COR # BLD AUTO: 33.07 10*3/MM3 (ref 3.4–10.8)

## 2024-03-07 PROCEDURE — 36415 COLL VENOUS BLD VENIPUNCTURE: CPT

## 2024-03-07 PROCEDURE — 80053 COMPREHEN METABOLIC PANEL: CPT

## 2024-03-07 PROCEDURE — G0463 HOSPITAL OUTPT CLINIC VISIT: HCPCS | Performed by: INTERNAL MEDICINE

## 2024-03-07 PROCEDURE — 85025 COMPLETE CBC W/AUTO DIFF WBC: CPT

## 2024-03-07 RX ORDER — PANTOPRAZOLE SODIUM 40 MG/1
40 TABLET, DELAYED RELEASE ORAL DAILY
Qty: 30 TABLET | Refills: 3 | Status: CANCELLED | OUTPATIENT
Start: 2024-03-07

## 2024-03-07 NOTE — PROGRESS NOTES
Patient  Dorothea Cruz    Location  [facility] HEMATOLOGY & ONCOLOGY    Chief Complaint  Malignant neoplasm of upper-inner quadrant of right female     Referring Provider: TYRELL Peck*  PCP: Monserrat Grover APRN    Subjective          Oncology/Hematology History Overview Note     Right Triple Negative Breast Cancer:  - screening mammogram 9/10/21: 4mm asymmetry in the right upper inner breast  -Diagnostic mammogram on 2021: Persistent 5 mm ill-defined nodule in the upper inner mid right breast at 2:00, 7 cm from the nipple.  - core biopsy on 10/8/21: Invasive ductal carcinoma, grade 3, ER negative (less than 1%), NH negative (less than 1%), HER-2 equivocal (2+ HC), HER-2 FISH not amplified  - Right lumpectomy on 21. Pathology showed a 5mm tumor with negative margins, 0/3 lymph nodes involved, pT1aN0, ER-, NH-, HER2-  - chemotherapy was not recommended due to the small size of the tumor.   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  Mildly increased CD4 positive T cell LGL (5.2% of analyzed cells).  - CLL FISH positive for TP53/17p13 deletion in 49.5% of cells.  - IgVH somatic hypermutation was detected (3.7%)  -24: Start acalabrutinib due to symptoms of weakness, night sweats, tremor. WBC 31K, plt 133, hgb 13.7    Li Fraumeni Syndrome (inherited p53 mutation):   Family History:    - brother with CLL  - father  from some type of leukemia  - sister with breast cancer and adult onset retinoblastoma which was fatal  - niece (sister's daughter) also had retinoblastoma as a child  - pt is considering genetic testing     Breast cancer   10/20/2021 Initial Diagnosis    Breast cancer (HCC)     Malignant neoplasm of upper-inner quadrant of right female breast    11/12/2021 Cancer Staged    Staging form: Breast, AJCC 8th Edition  - Pathologic: pT1a, pN0, cM0, ER-, AL-, HER2- - Signed by Santa Haskins APRN on 5/20/2022 1/13/2022 Initial Diagnosis    Malignant neoplasm of upper-inner quadrant of right female breast (HCC)     1/20/2022 - 2/14/2022 Radiation    Radiation OncologyTreatment Course:  Dorothea Cruz received 4256 cGy in 16 fractions to right breast.      CLL (chronic lymphocytic leukemia)   3/10/2023 Initial Diagnosis    CLL (chronic lymphocytic leukemia)     1/12/2024 -  Chemotherapy    OP LYMPHOMA (CLL) Acalabrutinib         History of Present Illness  Patient comes in today for follow-up. She has been feeling better after getting starting on dificid for positive C diff testing. Diarrhea still present but improving. Palpitations improving. Nausea essentially resolved. Abdominal pain essentially resolved. Eating and drinking better. Has breast MRI tomorrow. Ok to resume acalabrutinib today.       Review of Systems   Constitutional:  Positive for appetite change, fatigue and unexpected weight loss. Negative for diaphoresis, fever and unexpected weight gain.   HENT:  Negative for hearing loss, mouth sores, sore throat, swollen glands, trouble swallowing and voice change.    Eyes:  Negative for blurred vision.   Respiratory:  Negative for cough, shortness of breath and wheezing.    Cardiovascular:  Negative for chest pain and palpitations.   Gastrointestinal:  Positive for nausea. Negative for abdominal pain, blood in stool, constipation, diarrhea and vomiting.   Endocrine: Negative for cold intolerance and heat intolerance.   Genitourinary:  Negative for difficulty urinating, dysuria, frequency, hematuria and urinary incontinence.   Musculoskeletal:  Positive for back pain (8/10). Negative for arthralgias and myalgias.   Skin:  Negative for rash, skin lesions and wound.   Neurological:  Positive for tremors and weakness. Negative for dizziness, seizures,  numbness and headache.   Hematological:  Does not bruise/bleed easily.   Psychiatric/Behavioral:  Negative for depressed mood. The patient is not nervous/anxious.    All other systems reviewed and are negative.      Past Medical History:   Diagnosis Date    Anxiety     Arthritis     Breast cancer     Cancer     BREAST CANCER    Cataract     Cholelithiases 4/30@1    Clotting disorder     Depression     Diabetes mellitus     Disease of thyroid gland     currently not taking any meds    Diverticulitis     Diverticulitis of colon     Diverticulosis 95    Fibromyalgia     Fibromyalgia, primary     HIV disease     Hyperlipidemia     Hypertension     Infectious viral hepatitis     Irritable bowel syndrome     Kidney stone     Liver disease     Low back pain 2015    Major depressive disorder 05/07/2020    Palpitations     ASYMPTOMATIC- DENIES CP/SOB, SEE PCP- NO CARDIOLOGIST     Post-menopause 09/17/2020    Sinus trouble     Sleep apnea     Substance abuse     Tremor 2018    I was on medicine for tremors    Urinary tract infection     Vitamin D deficiency 09/17/2020     Past Surgical History:   Procedure Laterality Date    ABDOMINAL SURGERY  06/14/1985    APPENDECTOMY      BILATERAL BREAST REDUCTION      BREAST BIOPSY Right 11/12/2021    Procedure: RIGHT BREAST NEEDLE LOCALIZED  LUMPECTOMY WITH SENTINEL NODE BIOPSY;  Surgeon: Mirtha Esteves MD;  Location: [facility] OR [facility];  Service: General;  Laterality: Right;    BREAST LUMPECTOMY      CHOLECYSTECTOMY N/A 09/02/2021    Procedure: CHOLECYSTECTOMY LAPAROSCOPIC INTRAOPERATIVE CHOLANGIOGRAM;  Surgeon: Louie Medina MD;  Location: [facility] MAIN OR;  Service: General;  Laterality: N/A;    COLONOSCOPY  2019    COLONOSCOPY N/A 05/09/2022    Procedure: COLONOSCOPY WITH BIOPSIES;  Surgeon: Butch Garcia MD;  Location: [facility] ENDOSCOPY;  Service: Gastroenterology;  Laterality: N/A;  DIVERTICULOSIS    HYSTERECTOMY      LYMPH NODE BIOPSY      SEPTOPLASTY  2018    SINUS  "SURGERY  2018    TONSILLECTOMY      US GUIDED CYST ASPIRATION BREAST N/A 10/03/2022    WRIST ARTHROPLASTY Bilateral      Social History     Socioeconomic History    Marital status:    Tobacco Use    Smoking status: Never     Passive exposure: Yes    Smokeless tobacco: Never   Vaping Use    Vaping status: Never Used   Substance and Sexual Activity    Alcohol use: Never    Drug use: Never    Sexual activity: Not Currently     Partners: Female     Birth control/protection: Post-menopausal, None, Hysterectomy     Family History   Problem Relation Age of Onset    No Known Problems Mother     Cancer Father         Leukemia      Breast cancer Sister     Cancer Sister         Had breast cancer    Cancer Brother         Leukemia/CLL    Cancer Sister         Had stomach cancer    Cancer Brother         Bladder cancer    Cancer Brother         Bladder cancer    Cancer Sister         Retinal blastoma.    Colon cancer Neg Hx        Objective   Physical Exam  General: Alert, cooperative, no acute distress  Eyes: Anicteric sclera, PERRLA  Respiratory: normal respiratory effort  Cardiovascular: no lower extremity edema  Skin: Normal tone, no rash, no lesions  Psychiatric: Appropriate affect, intact judgment  Neurologic: No focal sensory or motor deficits, normal cognition   Musculoskeletal: Normal muscle strength and tone    Vitals:    24 0904   BP: 117/50   Pulse: 75   Resp: 18   Temp: 97.5 °F (36.4 °C)   TempSrc: Temporal   SpO2: 98%   Weight: 70 kg (154 lb 6.4 oz)   Height: 157.5 cm (62.01\")   PainSc: 0-No pain                       PHQ-9 Total Score:         Result Review :   The following data was reviewed by: Luciano Glez MD on 24:  Lab Results   Component Value Date    HGB 12.7 2024    HCT 38.1 2024    MCV 95.5 2024     2024    WBC 33.07 (C) 2024    NEUTROABS 3.42 2024    LYMPHSABS 25.81 (H) 2024    MONOSABS 3.46 (H) 2024    EOSABS 0.30 " "03/07/2024    BASOSABS 0.03 03/07/2024     Lab Results   Component Value Date    GLUCOSE 123 (H) 03/07/2024    BUN 16 03/07/2024    CREATININE 0.55 (L) 03/07/2024     03/07/2024    K 3.6 03/07/2024     (H) 03/07/2024    CO2 30.4 (H) 03/07/2024    CALCIUM 9.8 03/07/2024    PROTEINTOT 6.3 03/07/2024    ALBUMIN 3.9 03/07/2024    BILITOT 0.4 03/07/2024    ALKPHOS 70 03/07/2024    AST 19 03/07/2024    ALT 21 03/07/2024     No results found for: \"IRON\", \"LABIRON\", \"TRANSFERRIN\", \"TIBC\"  Lab Results   Component Value Date    PZDGGQSU91 317 03/08/2022    FOLATE 14.40 03/08/2022     No results found for: \"PSA\", \"CEA\", \"AFP\", \"\", \"\"    Labs personally reviewed and WBC up but improved from prior, normal platelet. Creatinine normal. LFTs normal.          Assessment and Plan    Diagnoses and all orders for this visit:    1. CLL (chronic lymphocytic leukemia) (Primary)  -     CBC & Differential; Future  -     Comprehensive Metabolic Panel; Future    2. Malignant neoplasm of upper-inner quadrant of breast in female, estrogen receptor negative, unspecified laterality    3. Li-Fraumeni syndrome    4. Hypokalemia    5. C. difficile colitis          Del(17p) CLL  This is a high risk subtype of CLL. Patient symptomatic at visit 1/8/24 with night sweats, tremor, fevers, weakness. Acalabrutinib started 1/8/24. WBC improved to 33K as of 3/7/24.  Plts are normal. Acalabrutinib on hold since 2/23/24 due to tachycardia/palipiations/diarrhea, which has essentially resolved. ECG negative for arrythmia. Resume acalabrutinib today 3/7/24. RTC 4 weeks.      Right Triple Negative Breast Cancer  November 2021 right lumpectomy. Chemotherapy was not recommended due to the small size of the tumor.  She completed adjuvant radiation in March.  Repeat screening mammogram will be due next September, 9/2023 mammogram benign.  DEXA scan will be due in 2025. Due to high risk, recommend annual MRI to alternate every 6 months with " mammogram. MRI breast ordered for 3/8/2024.     Diarrheal illness 2/2 C diff  C diff positive. Treated with dificid x 2 weeks, has 3 days remaining. Symptoms much improved. Drinking has improved. Hold PPI. Recommend probiotics. Very low suspicion of chemo induced as patient is off acalabrutinib since 2/23 and symptoms worsened.   Palpitations   Recommend ECG on 2/20/24 which showed sinus tachycardia with HR in 80s. On 2/27, repeat ECG showed sinus tachycardia again with HR in 100s. Likely from dehydration from C diff. Symptoms improving. Ensure K is normal, which it is today.     Hypokalemia  Up to 3.6, resolved with treatment of C diff/diarrhea     Li-Fraumeni Syndrome  Pathologic germline p53 mutation. Has been referred for genetic counseling and evaluated on 9/5/23.  Screening Brain MRI on 6/19/23 was negative. Needs urine cytology screening yearly.  Ovaries have been previously removed.     Adrenal Nodule  Nodule not hypermetabolic on PET.  Given her underlying genetic mutation she was referred to an endocrinologist at [facility].  Will need follow up ultrasounds on annual basis, due 5/2024.         Patient was given instructions and counseling regarding her condition or for health maintenance advice. Please see specific information pulled into the AVS if appropriate.

## 2024-03-08 ENCOUNTER — SPECIALTY PHARMACY (OUTPATIENT)
Dept: PHARMACY | Facility: HOSPITAL | Age: 74
End: 2024-03-08
Payer: MEDICARE

## 2024-03-08 ENCOUNTER — HOSPITAL ENCOUNTER (OUTPATIENT)
Dept: MRI IMAGING | Facility: HOSPITAL | Age: 74
Discharge: HOME | End: 2024-03-08
Payer: MEDICARE

## 2024-03-08 DIAGNOSIS — C91.10 CLL (CHRONIC LYMPHOCYTIC LEUKEMIA): ICD-10-CM

## 2024-03-08 DIAGNOSIS — C50.211 MALIGNANT NEOPLASM OF UPPER-INNER QUADRANT OF RIGHT FEMALE BREAST, UNSPECIFIED ESTROGEN RECEPTOR STATUS: ICD-10-CM

## 2024-03-08 DIAGNOSIS — Z12.39 BREAST CANCER SCREENING, HIGH RISK PATIENT: ICD-10-CM

## 2024-03-08 PROCEDURE — A9577 INJ MULTIHANCE: HCPCS | Performed by: INTERNAL MEDICINE

## 2024-03-08 PROCEDURE — C8937 CAD BREAST MRI: HCPCS

## 2024-03-08 PROCEDURE — 0 GADOBENATE DIMEGLUMINE 529 MG/ML SOLUTION: Performed by: INTERNAL MEDICINE

## 2024-03-08 PROCEDURE — C8908 MRI W/O FOL W/CONT, BREAST,: HCPCS

## 2024-03-08 RX ADMIN — GADOBENATE DIMEGLUMINE 12 ML: 529 INJECTION, SOLUTION INTRAVENOUS at 13:37

## 2024-03-14 ENCOUNTER — OFFICE VISIT (OUTPATIENT)
Dept: SLEEP MEDICINE | Facility: HOSPITAL | Age: 74
End: 2024-03-14
Payer: MEDICARE

## 2024-03-14 VITALS
DIASTOLIC BLOOD PRESSURE: 46 MMHG | WEIGHT: 151.2 LBS | BODY MASS INDEX: 27.82 KG/M2 | HEIGHT: 62 IN | HEART RATE: 100 BPM | OXYGEN SATURATION: 99 % | SYSTOLIC BLOOD PRESSURE: 119 MMHG

## 2024-03-14 DIAGNOSIS — G47.33 OSA (OBSTRUCTIVE SLEEP APNEA): Primary | ICD-10-CM

## 2024-03-14 PROCEDURE — G0463 HOSPITAL OUTPT CLINIC VISIT: HCPCS

## 2024-03-14 NOTE — PROGRESS NOTES
"[facility] PULMONARY CARE         Dr Sheridan Fleming  [unfilled]  Patient Care Team:  Monserrat Grover APRN as PCP - General (Nurse Practitioner)  Louie Medina MD as Consulting Physician (General Surgery)  Bebe Nair MD PhD as Consulting Physician (Hematology and Oncology)  Mirtha Esteves MD as Consulting Physician (General Surgery)  Melissa Cespedes MD as Consulting Physician (Urology)    Chief Complaint:HESHAM currently on CPAP 13 cm.     Interval History: Patient of annual compliance visit.  Currently set up on CPAP 13 cm.  Compliance today 100% average daily use 12 hours 33 minutes.  AHI leak within normal limits.  Goes to bed 9 PM gets up 8 AM gets about 9+ hours of sleep more rested with the CPAP.  No tobacco no alcohol no caffeine abuse.  Was diagnosed with CLL last year.  Currently has a full facemask that fits well.  Supplies been adequate.  [place] not completed properly.    REVIEW OF SYSTEMS:   CARDIOVASCULAR: No chest pain, chest pressure or chest discomfort. No palpitations or edema.   RESPIRATORY: No shortness of breath, cough or sputum.   GASTROINTESTINAL: No anorexia, nausea, vomiting or diarrhea. No abdominal pain or blood.   HEMATOLOGIC: No bleeding or bruising.     Ventilator/Non-Invasive Ventilation Settings (From admission, onward)      None              Vital Signs  Heart Rate:  [100] 100  BP: (119)/(46) 119/46  [unfilled]  Flowsheet Rows      Flowsheet Row First Filed Value   Admission Height 157.5 cm (62.01\") Documented at 03/14/2024 0900   Admission Weight 68.6 kg (151 lb 3.2 oz) Documented at 03/14/2024 0900            Physical Exam:  Patient is examined using the personal protective equipment as per guidelines from infection control for this particular patient as enacted.  Hand hygiene was performed before and after patient interaction.   General Appearance:    Alert, cooperative, in no acute distress.  Following simple commands  ENT Mallampati between 3 and 4 no " nasal congestion  Neck midline trachea, no thyromegaly   Lungs:     Clear to auscultation, respirations regular, even and                  unlabored    Heart:    Regular rhythm and normal rate, normal S1 and S2, no            murmur, no gallop, no rub, no click   Chest Wall:    No abnormalities observed   Abdomen:     Normal bowel sounds, no masses, no organomegaly, soft        nontender, nondistended, no guarding, no rebound                tenderness   Extremities:   Moves all extremities well, no edema, no cyanosis, no             redness  CNS no focal neurological deficits normal sensory exam  Skin no rashes no nodules  Musculoskeletal no cyanosis no clubbing normal range of motion     Results Review:                                          I reviewed the patient's new clinical results.  I personally viewed and interpreted the patient's chest x-ray.        Medication Review:       No current facility-administered medications for this visit.      ASSESSMENT:   HESHAM  Hypertension  Obesity    PLAN:  Reviewed compliance download with the patient  Compliance AHI and leak look excellent  Continue current CPAP 13 cm  Sleep hygiene measures  Treatment of underlying comorbidities  Weight loss encouraged  Reviewed cleaning methods and avoidance of ozone   We will follow-up in 1 year      Sheridan Fleming MD  03/14/24  09:19 EDT

## 2024-03-25 ENCOUNTER — TELEPHONE (OUTPATIENT)
Dept: ONCOLOGY | Facility: HOSPITAL | Age: 74
End: 2024-03-25

## 2024-03-25 NOTE — TELEPHONE ENCOUNTER
Triston from [facility] called and states that the pt was prescribed Dificid on 3/11/24. Triston states that the pt had not filled the script until today and is requesting it to be filled. The med is a 10 day course. Triston is asking due to the long delay in filling the med if the pt still needs the med.

## 2024-04-11 ENCOUNTER — OFFICE VISIT (OUTPATIENT)
Dept: ONCOLOGY | Facility: HOSPITAL | Age: 74
End: 2024-04-11
Payer: MEDICARE

## 2024-04-11 ENCOUNTER — LAB (OUTPATIENT)
Dept: ONCOLOGY | Facility: HOSPITAL | Age: 74
End: 2024-04-11
Payer: MEDICARE

## 2024-04-11 VITALS
OXYGEN SATURATION: 97 % | HEART RATE: 79 BPM | SYSTOLIC BLOOD PRESSURE: 122 MMHG | DIASTOLIC BLOOD PRESSURE: 49 MMHG | WEIGHT: 145.94 LBS | BODY MASS INDEX: 26.86 KG/M2 | RESPIRATION RATE: 18 BRPM | TEMPERATURE: 97.5 F | HEIGHT: 62 IN

## 2024-04-11 DIAGNOSIS — Z15.01 LI-FRAUMENI SYNDROME: Primary | ICD-10-CM

## 2024-04-11 DIAGNOSIS — C50.211 MALIGNANT NEOPLASM OF UPPER-INNER QUADRANT OF RIGHT FEMALE BREAST, UNSPECIFIED ESTROGEN RECEPTOR STATUS: ICD-10-CM

## 2024-04-11 DIAGNOSIS — C91.10 CLL (CHRONIC LYMPHOCYTIC LEUKEMIA): ICD-10-CM

## 2024-04-11 DIAGNOSIS — E27.8 RIGHT ADRENAL MASS: ICD-10-CM

## 2024-04-11 LAB
ALBUMIN SERPL-MCNC: 3.9 G/DL (ref 3.5–5.2)
ALBUMIN/GLOB SERPL: 1.8 G/DL
ALP SERPL-CCNC: 56 U/L (ref 39–117)
ALT SERPL W P-5'-P-CCNC: 14 U/L (ref 1–33)
ANION GAP SERPL CALCULATED.3IONS-SCNC: 10.4 MMOL/L (ref 5–15)
AST SERPL-CCNC: 15 U/L (ref 1–32)
BASOPHILS # BLD AUTO: 0.03 10*3/MM3 (ref 0–0.2)
BASOPHILS NFR BLD AUTO: 0.1 % (ref 0–1.5)
BILIRUB SERPL-MCNC: 0.3 MG/DL (ref 0–1.2)
BUN SERPL-MCNC: 14 MG/DL (ref 8–23)
BUN/CREAT SERPL: 26.4 (ref 7–25)
CALCIUM SPEC-SCNC: 10.4 MG/DL (ref 8.6–10.5)
CHLORIDE SERPL-SCNC: 107 MMOL/L (ref 98–107)
CO2 SERPL-SCNC: 25.6 MMOL/L (ref 22–29)
CREAT SERPL-MCNC: 0.53 MG/DL (ref 0.57–1)
DEPRECATED RDW RBC AUTO: 48.7 FL (ref 37–54)
EGFRCR SERPLBLD CKD-EPI 2021: 97.8 ML/MIN/1.73
EOSINOPHIL # BLD AUTO: 0.14 10*3/MM3 (ref 0–0.4)
EOSINOPHIL NFR BLD AUTO: 0.3 % (ref 0.3–6.2)
ERYTHROCYTE [DISTWIDTH] IN BLOOD BY AUTOMATED COUNT: 14.4 % (ref 12.3–15.4)
GLOBULIN UR ELPH-MCNC: 2.2 GM/DL
GLUCOSE SERPL-MCNC: 99 MG/DL (ref 65–99)
HCT VFR BLD AUTO: 38.4 % (ref 34–46.6)
HGB BLD-MCNC: 12.3 G/DL (ref 12–15.9)
IMM GRANULOCYTES # BLD AUTO: 0.05 10*3/MM3 (ref 0–0.05)
IMM GRANULOCYTES NFR BLD AUTO: 0.1 % (ref 0–0.5)
LYMPHOCYTES # BLD AUTO: 48.57 10*3/MM3 (ref 0.7–3.1)
LYMPHOCYTES NFR BLD AUTO: 91.7 % (ref 19.6–45.3)
MCH RBC QN AUTO: 30.1 PG (ref 26.6–33)
MCHC RBC AUTO-ENTMCNC: 32 G/DL (ref 31.5–35.7)
MCV RBC AUTO: 93.9 FL (ref 79–97)
MONOCYTES # BLD AUTO: 0.49 10*3/MM3 (ref 0.1–0.9)
MONOCYTES NFR BLD AUTO: 0.9 % (ref 5–12)
NEUTROPHILS NFR BLD AUTO: 3.7 10*3/MM3 (ref 1.7–7)
NEUTROPHILS NFR BLD AUTO: 6.9 % (ref 42.7–76)
NRBC BLD AUTO-RTO: 0 /100 WBC (ref 0–0.2)
PLATELET # BLD AUTO: 140 10*3/MM3 (ref 140–450)
PMV BLD AUTO: 13.1 FL (ref 6–12)
POTASSIUM SERPL-SCNC: 3.5 MMOL/L (ref 3.5–5.2)
PROT SERPL-MCNC: 6.1 G/DL (ref 6–8.5)
RBC # BLD AUTO: 4.09 10*6/MM3 (ref 3.77–5.28)
SODIUM SERPL-SCNC: 143 MMOL/L (ref 136–145)
WBC NRBC COR # BLD AUTO: 52.98 10*3/MM3 (ref 3.4–10.8)

## 2024-04-11 PROCEDURE — 36415 COLL VENOUS BLD VENIPUNCTURE: CPT

## 2024-04-11 PROCEDURE — G0463 HOSPITAL OUTPT CLINIC VISIT: HCPCS | Performed by: INTERNAL MEDICINE

## 2024-04-11 PROCEDURE — 85025 COMPLETE CBC W/AUTO DIFF WBC: CPT

## 2024-04-11 PROCEDURE — 80053 COMPREHEN METABOLIC PANEL: CPT

## 2024-04-11 NOTE — PROGRESS NOTES
Patient  Dorothea Cruz    [facility] HEMATOLOGY & ONCOLOGY    Chief Complaint  Malignant neoplasm of upper-inner quadrant of right female     Referring Provider: TYRELL Peck*  PCP: Monserrat Grover APRN    Subjective          Oncology/Hematology History Overview Note     Right Triple Negative Breast Cancer:  - screening mammogram 9/10/21: 4mm asymmetry in the right upper inner breast  -Diagnostic mammogram on 2021: Persistent 5 mm ill-defined nodule in the upper inner mid right breast at 2:00, 7 cm from the nipple.  - core biopsy on 10/8/21: Invasive ductal carcinoma, grade 3, ER negative (less than 1%), ID negative (less than 1%), HER-2 equivocal (2+ HC), HER-2 FISH not amplified  - Right lumpectomy on 21. Pathology showed a 5mm tumor with negative margins, 0/3 lymph nodes involved, pT1aN0, ER-, ID-, HER2-  - chemotherapy was not recommended due to the small size of the tumor.   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  Mildly increased CD4 positive T cell LGL (5.2% of analyzed cells).  - CLL FISH positive for TP53/17p13 deletion in 49.5% of cells.  - IgVH somatic hypermutation was detected (3.7%)  -24: Start acalabrutinib due to symptoms of weakness, night sweats, tremor. WBC 31K, plt 133, hgb 13.7  - 3/7/24: Resume acalabrutinib after hold due to palpiations and diarrhea, which was from C diff. WBC 33K. Plts normal.     Li Fraumeni Syndrome (inherited p53 mutation):   Family History:    - brother with CLL  - father  from some type of leukemia  - sister with breast cancer and adult onset retinoblastoma which was fatal  - niece (sister's daughter) also had retinoblastoma as a child  - pt is considering genetic testing     Breast cancer   10/20/2021  Initial Diagnosis    Breast cancer (HCC)     Malignant neoplasm of upper-inner quadrant of right female breast   11/12/2021 Cancer Staged    Staging form: Breast, AJCC 8th Edition  - Pathologic: pT1a, pN0, cM0, ER-, [place]-, HER2- - Signed by Santa Haskins APRN on 5/20/2022 1/13/2022 Initial Diagnosis    Malignant neoplasm of upper-inner quadrant of right female breast (HCC)     1/20/2022 - 2/14/2022 Radiation    Radiation OncologyTreatment Course:  Dorothea Cruz received 4256 cGy in 16 fractions to right breast.      CLL (chronic lymphocytic leukemia)   3/10/2023 Initial Diagnosis    CLL (chronic lymphocytic leukemia)     1/12/2024 -  Chemotherapy    OP LYMPHOMA (CLL) Acalabrutinib         History of Present Illness  Patient comes in today for follow-up. Diarrhea has resolved. She has completed antibiotics. She continues to have tremors in her arms and now her legs. She has nausea often but better than prior. Has anti-emetics that help. Eating and drinking better. Resumed acalabrutinib last month. Tolerating well. No rash.      Review of Systems   Constitutional:  Positive for appetite change, fatigue and unexpected weight loss. Negative for diaphoresis, fever and unexpected weight gain.   HENT:  Negative for hearing loss, mouth sores, sore throat, swollen glands, trouble swallowing and voice change.    Eyes:  Negative for blurred vision.   Respiratory:  Negative for cough, shortness of breath and wheezing.    Cardiovascular:  Negative for chest pain and palpitations.   Gastrointestinal:  Positive for nausea. Negative for abdominal pain, blood in stool, constipation, diarrhea and vomiting.   Endocrine: Negative for cold intolerance and heat intolerance.   Genitourinary:  Negative for difficulty urinating, dysuria, frequency, hematuria and urinary incontinence.   Musculoskeletal:  Positive for back pain. Negative for arthralgias and myalgias.   Skin:  Negative for rash, skin lesions and wound.   Neurological:   Positive for tremors and weakness. Negative for dizziness, seizures, numbness and headache.   Hematological:  Does not bruise/bleed easily.   Psychiatric/Behavioral:  Negative for depressed mood. The patient is not nervous/anxious.    All other systems reviewed and are negative.      Past Medical History:   Diagnosis Date    Anxiety     Arthritis     Breast cancer     Cancer     BREAST CANCER    Cataract     Cholelithiases 4/30@1    CLL (chronic lymphocytic leukemia) 2023    Clotting disorder     Depression     Diabetes mellitus     Disease of thyroid gland     currently not taking any meds    Diverticulitis     Diverticulitis of colon     Diverticulosis 95    Fibromyalgia     Fibromyalgia, primary     HIV disease     Hyperlipidemia     Hypertension     Infectious viral hepatitis     Irritable bowel syndrome     Kidney stone     Liver disease     Low back pain 2015    Major depressive disorder 05/07/2020    Palpitations     ASYMPTOMATIC- DENIES CP/SOB, SEE PCP- NO CARDIOLOGIST     Post-menopause 09/17/2020    Sinus trouble     Sleep apnea     Substance abuse     Tremor 2018    I was on medicine for tremors    Urinary tract infection     Vitamin D deficiency 09/17/2020     Past Surgical History:   Procedure Laterality Date    ABDOMINAL SURGERY  06/14/1985    APPENDECTOMY      BILATERAL BREAST REDUCTION      BREAST BIOPSY Right 11/12/2021    Procedure: RIGHT BREAST NEEDLE LOCALIZED  LUMPECTOMY WITH SENTINEL NODE BIOPSY;  Surgeon: Mirtha Esteves MD;  Location: [facility] OR OSC;  Service: General;  Laterality: Right;    BREAST LUMPECTOMY      CHOLECYSTECTOMY N/A 09/02/2021    Procedure: CHOLECYSTECTOMY LAPAROSCOPIC INTRAOPERATIVE CHOLANGIOGRAM;  Surgeon: Louie Medina MD;  Location: [facility] MAIN OR;  Service: General;  Laterality: N/A;    COLONOSCOPY  2019    COLONOSCOPY N/A 05/09/2022    Procedure: COLONOSCOPY WITH BIOPSIES;  Surgeon: Butch Garcia MD;  Location: [facility] ENDOSCOPY;  Service:  "Gastroenterology;  Laterality: N/A;  DIVERTICULOSIS    HYSTERECTOMY      LYMPH NODE BIOPSY      SEPTOPLASTY  2018    SINUS SURGERY  2018    TONSILLECTOMY      US GUIDED CYST ASPIRATION BREAST N/A 10/03/2022    WRIST ARTHROPLASTY Bilateral      Social History     Socioeconomic History    Marital status:    Tobacco Use    Smoking status: Never     Passive exposure: Yes    Smokeless tobacco: Never   Vaping Use    Vaping status: Never Used   Substance and Sexual Activity    Alcohol use: Never    Drug use: Never    Sexual activity: Not Currently     Partners: Female     Birth control/protection: Post-menopausal, None, Hysterectomy     Family History   Problem Relation Age of Onset    No Known Problems Mother     Cancer Father         Leukemia      Breast cancer Sister     Cancer Sister         Had breast cancer    Cancer Brother         Leukemia/CLL    Cancer Sister         Had stomach cancer    Cancer Brother         Bladder cancer    Cancer Brother         Bladder cancer    Cancer Sister         Retinal blastoma.    Colon cancer Neg Hx        Objective   Physical Exam  General: Alert, cooperative, no acute distress  Eyes: Anicteric sclera, PERRLA  Respiratory: normal respiratory effort  Skin: Normal tone, no rash, no lesions  Psychiatric: Appropriate affect, intact judgment  Neurologic: No focal sensory or motor deficits, normal cognition, bilateral intention tremor  Musculoskeletal: Normal muscle strength and tone    Vitals:    24 1539   BP: 122/49   Pulse: 79   Resp: 18   Temp: 97.5 °F (36.4 °C)   SpO2: 97%   Weight: 66.2 kg (145 lb 15.1 oz)   Height: 157.5 cm (62.01\")   PainSc: 0-No pain                   PHQ-9 Total Score:         Result Review :   The following data was reviewed by: Luciano Glez MD on 24:  Lab Results   Component Value Date    HGB 12.7 2024    HCT 38.1 2024    MCV 95.5 2024     2024    WBC 33.07 (C) 2024    NEUTROABS 3.42 " "03/07/2024    LYMPHSABS 25.81 (H) 03/07/2024    MONOSABS 3.46 (H) 03/07/2024    EOSABS 0.30 03/07/2024    BASOSABS 0.03 03/07/2024     Lab Results   Component Value Date    GLUCOSE 123 (H) 03/07/2024    BUN 16 03/07/2024    CREATININE 0.55 (L) 03/07/2024     03/07/2024    K 3.6 03/07/2024     (H) 03/07/2024    CO2 30.4 (H) 03/07/2024    CALCIUM 9.8 03/07/2024    PROTEINTOT 6.3 03/07/2024    ALBUMIN 3.9 03/07/2024    BILITOT 0.4 03/07/2024    ALKPHOS 70 03/07/2024    AST 19 03/07/2024    ALT 21 03/07/2024     No results found for: \"IRON\", \"LABIRON\", \"TRANSFERRIN\", \"TIBC\"  Lab Results   Component Value Date    XWJYFJOC47 317 03/08/2022    FOLATE 14.40 03/08/2022     No results found for: \"PSA\", \"CEA\", \"AFP\", \"\", \"\"    Labs personally reviewed and WBC up but improved from prior, normal platelet. Creatinine normal. LFTs normal.          Assessment and Plan    Diagnoses and all orders for this visit:    1. Li-Fraumeni syndrome (Primary)  -     US Abdomen Complete; Future    2. Right adrenal mass  -     US Abdomen Complete; Future    3. CLL (chronic lymphocytic leukemia)  -     CBC & Differential; Future  -     Comprehensive Metabolic Panel; Future    4. Malignant neoplasm of upper-inner quadrant of right female breast, unspecified estrogen receptor status        Del(17p) CLL  This is a high risk subtype of CLL. Patient symptomatic at visit 1/8/24 with night sweats, tremor, fevers, weakness. Acalabrutinib started 1/8/24. WBC improved to 33K as of 3/7/24.  Plts are normal. Acalabrutinib on hold since 2/23/24 due to tachycardia/palipiations/diarrhea, which has essentially resolved. ECG negative for arrythmia. Resumed acalabrutinib 3/7/24. Tolerating well. Continue. RTC 6 weeks with labs    Right Triple Negative Breast Cancer  November 2021 right lumpectomy. Chemotherapy was not recommended due to the small size of the tumor.  She completed adjuvant radiation in March.  Repeat screening mammogram will " be due next September, 9/2023 mammogram benign.  DEXA scan will be due in 2025. Due to high risk, recommend annual MRI to alternate every 6 months with mammogram. MRI breast 3/8/2024 benign. Mammogram due 9/2024.     Diarrheal illness 2/2 C diff  C diff positive. Treated with dificid x 2 weeks. Symptoms resolved. Drinking has improved. Hold PPI. Recommend probiotics. Very low suspicion of chemo induced as patient was off acalabrutinib since 2/23 and symptoms worsened.     Tremors  Recommend PCP follow up.     Li-Fraumeni Syndrome  Pathologic germline p53 mutation. Has been referred for genetic counseling and evaluated on 9/5/23.  Screening Brain MRI on 6/19/23 was negative. Needs urine cytology screening yearly.  Ovaries have been previously removed.     Adrenal Nodule  Nodule not hypermetabolic on PET.  Given her underlying genetic mutation she was referred to an endocrinologist at [facility].  Will need follow up ultrasounds on annual basis, due 5/2024; ordered.       Follow up: 6 weeks  Patient was given instructions and counseling regarding her condition or for health maintenance advice. Please see specific information pulled into the AVS if appropriate.

## 2024-04-12 ENCOUNTER — SPECIALTY PHARMACY (OUTPATIENT)
Dept: PHARMACY | Facility: HOSPITAL | Age: 74
End: 2024-04-12
Payer: MEDICARE

## 2024-05-23 ENCOUNTER — HOSPITAL ENCOUNTER (OUTPATIENT)
Dept: ULTRASOUND IMAGING | Facility: HOSPITAL | Age: 74
Discharge: HOME | End: 2024-05-23
Admitting: INTERNAL MEDICINE
Payer: MEDICARE

## 2024-05-23 DIAGNOSIS — Z15.01 LI-FRAUMENI SYNDROME: ICD-10-CM

## 2024-05-23 DIAGNOSIS — E27.8 RIGHT ADRENAL MASS: ICD-10-CM

## 2024-05-23 PROCEDURE — 76700 US EXAM ABDOM COMPLETE: CPT

## 2024-05-28 ENCOUNTER — LAB (OUTPATIENT)
Dept: ONCOLOGY | Facility: HOSPITAL | Age: 74
End: 2024-05-28
Payer: MEDICARE

## 2024-05-28 ENCOUNTER — TELEPHONE (OUTPATIENT)
Dept: ONCOLOGY | Facility: HOSPITAL | Age: 74
End: 2024-05-28
Payer: MEDICARE

## 2024-05-28 ENCOUNTER — OFFICE VISIT (OUTPATIENT)
Dept: ONCOLOGY | Facility: HOSPITAL | Age: 74
End: 2024-05-28
Payer: MEDICARE

## 2024-05-28 VITALS
BODY MASS INDEX: 24.87 KG/M2 | RESPIRATION RATE: 16 BRPM | HEART RATE: 72 BPM | SYSTOLIC BLOOD PRESSURE: 119 MMHG | WEIGHT: 136.02 LBS | OXYGEN SATURATION: 99 % | TEMPERATURE: 98 F | DIASTOLIC BLOOD PRESSURE: 59 MMHG

## 2024-05-28 DIAGNOSIS — C50.211 MALIGNANT NEOPLASM OF UPPER-INNER QUADRANT OF RIGHT FEMALE BREAST, UNSPECIFIED ESTROGEN RECEPTOR STATUS: ICD-10-CM

## 2024-05-28 DIAGNOSIS — C91.10 CLL (CHRONIC LYMPHOCYTIC LEUKEMIA): ICD-10-CM

## 2024-05-28 DIAGNOSIS — R39.9 UTI SYMPTOMS: ICD-10-CM

## 2024-05-28 DIAGNOSIS — E27.8 ADRENAL NODULE: ICD-10-CM

## 2024-05-28 DIAGNOSIS — Z15.01 LI-FRAUMENI SYNDROME: Primary | ICD-10-CM

## 2024-05-28 LAB
ALBUMIN SERPL-MCNC: 4 G/DL (ref 3.5–5.2)
ALBUMIN/GLOB SERPL: 1.5 G/DL
ALP SERPL-CCNC: 82 U/L (ref 39–117)
ALT SERPL W P-5'-P-CCNC: 20 U/L (ref 1–33)
ANION GAP SERPL CALCULATED.3IONS-SCNC: 5.6 MMOL/L (ref 5–15)
ANISOCYTOSIS BLD QL: NORMAL
AST SERPL-CCNC: 19 U/L (ref 1–32)
BACTERIA UR QL AUTO: NORMAL /HPF
BASOPHILS # BLD AUTO: 0.04 10*3/MM3 (ref 0–0.2)
BASOPHILS NFR BLD AUTO: 0.1 % (ref 0–1.5)
BILIRUB SERPL-MCNC: 0.2 MG/DL (ref 0–1.2)
BILIRUB UR QL STRIP: NEGATIVE
BUN SERPL-MCNC: 16 MG/DL (ref 8–23)
BUN/CREAT SERPL: 27.6 (ref 7–25)
CALCIUM SPEC-SCNC: 11.3 MG/DL (ref 8.6–10.5)
CHLORIDE SERPL-SCNC: 108 MMOL/L (ref 98–107)
CLARITY UR: CLEAR
CO2 SERPL-SCNC: 29.4 MMOL/L (ref 22–29)
COD CRY URNS QL: NORMAL /HPF
COLOR UR: YELLOW
CREAT SERPL-MCNC: 0.58 MG/DL (ref 0.57–1)
DEPRECATED RDW RBC AUTO: 49.9 FL (ref 37–54)
EGFRCR SERPLBLD CKD-EPI 2021: 95.7 ML/MIN/1.73
EOSINOPHIL # BLD AUTO: 0.07 10*3/MM3 (ref 0–0.4)
EOSINOPHIL NFR BLD AUTO: 0.2 % (ref 0.3–6.2)
ERYTHROCYTE [DISTWIDTH] IN BLOOD BY AUTOMATED COUNT: 14.3 % (ref 12.3–15.4)
GLOBULIN UR ELPH-MCNC: 2.6 GM/DL
GLUCOSE SERPL-MCNC: 104 MG/DL (ref 65–99)
GLUCOSE UR STRIP-MCNC: NEGATIVE MG/DL
HCT VFR BLD AUTO: 39.4 % (ref 34–46.6)
HGB BLD-MCNC: 12.6 G/DL (ref 12–15.9)
HGB UR QL STRIP.AUTO: NEGATIVE
HYALINE CASTS UR QL AUTO: NORMAL /LPF
IMM GRANULOCYTES # BLD AUTO: 0.1 10*3/MM3 (ref 0–0.05)
IMM GRANULOCYTES NFR BLD AUTO: 0.3 % (ref 0–0.5)
KETONES UR QL STRIP: NEGATIVE
LARGE PLATELETS: NORMAL
LEUKOCYTE ESTERASE UR QL STRIP.AUTO: NEGATIVE
LYMPHOCYTES # BLD AUTO: 29.43 10*3/MM3 (ref 0.7–3.1)
LYMPHOCYTES NFR BLD AUTO: 85.2 % (ref 19.6–45.3)
MACROCYTES BLD QL SMEAR: NORMAL
MCH RBC QN AUTO: 30.4 PG (ref 26.6–33)
MCHC RBC AUTO-ENTMCNC: 32 G/DL (ref 31.5–35.7)
MCV RBC AUTO: 94.9 FL (ref 79–97)
MONOCYTES # BLD AUTO: 0.62 10*3/MM3 (ref 0.1–0.9)
MONOCYTES NFR BLD AUTO: 1.8 % (ref 5–12)
MUCOUS THREADS URNS QL MICRO: NORMAL /HPF
NEUTROPHILS NFR BLD AUTO: 12.4 % (ref 42.7–76)
NEUTROPHILS NFR BLD AUTO: 4.28 10*3/MM3 (ref 1.7–7)
NITRITE UR QL STRIP: NEGATIVE
OVALOCYTES BLD QL SMEAR: NORMAL
PH UR STRIP.AUTO: 5.5 [PH] (ref 5–8)
PLATELET # BLD AUTO: 155 10*3/MM3 (ref 140–450)
PMV BLD AUTO: 13.2 FL (ref 6–12)
POTASSIUM SERPL-SCNC: 3.7 MMOL/L (ref 3.5–5.2)
PROT SERPL-MCNC: 6.6 G/DL (ref 6–8.5)
PROT UR QL STRIP: NEGATIVE
RBC # BLD AUTO: 4.15 10*6/MM3 (ref 3.77–5.28)
RBC # UR STRIP: NORMAL /HPF
REF LAB TEST METHOD: NORMAL
SMALL PLATELETS BLD QL SMEAR: ADEQUATE
SMUDGE CELLS BLD QL SMEAR: NORMAL
SODIUM SERPL-SCNC: 143 MMOL/L (ref 136–145)
SP GR UR STRIP: 1.02 (ref 1–1.03)
SQUAMOUS #/AREA URNS HPF: NORMAL /HPF
UROBILINOGEN UR QL STRIP: NORMAL
WBC # UR STRIP: NORMAL /HPF
WBC NRBC COR # BLD AUTO: 34.54 10*3/MM3 (ref 3.4–10.8)

## 2024-05-28 PROCEDURE — 3078F DIAST BP <80 MM HG: CPT | Performed by: INTERNAL MEDICINE

## 2024-05-28 PROCEDURE — 1126F AMNT PAIN NOTED NONE PRSNT: CPT | Performed by: INTERNAL MEDICINE

## 2024-05-28 PROCEDURE — 80053 COMPREHEN METABOLIC PANEL: CPT

## 2024-05-28 PROCEDURE — 81001 URINALYSIS AUTO W/SCOPE: CPT

## 2024-05-28 PROCEDURE — 36415 COLL VENOUS BLD VENIPUNCTURE: CPT

## 2024-05-28 PROCEDURE — 3074F SYST BP LT 130 MM HG: CPT | Performed by: INTERNAL MEDICINE

## 2024-05-28 PROCEDURE — G0463 HOSPITAL OUTPT CLINIC VISIT: HCPCS | Performed by: INTERNAL MEDICINE

## 2024-05-28 PROCEDURE — 87086 URINE CULTURE/COLONY COUNT: CPT

## 2024-05-28 PROCEDURE — 99214 OFFICE O/P EST MOD 30 MIN: CPT | Performed by: INTERNAL MEDICINE

## 2024-05-28 PROCEDURE — 85007 BL SMEAR W/DIFF WBC COUNT: CPT

## 2024-05-28 PROCEDURE — 85025 COMPLETE CBC W/AUTO DIFF WBC: CPT

## 2024-05-28 NOTE — TELEPHONE ENCOUNTER
LEFT PT DETAILED VM LETTING HER KNOW THAT I HAVE HER MRI AND CT SCANS SCHEDULED AT THE [facility] ON 7/8/24 AT 3:15PM. PT GIVEN ORAL CONTRAST AT CHECKOUT EARLIER TODAY.

## 2024-05-28 NOTE — PROGRESS NOTES
Patient  Dorothea Cruz    [facility] HEMATOLOGY & ONCOLOGY    Chief Complaint  Malignant neoplasm of upper-inner quadrant of right female     Referring Provider: TYRELL Peck*  PCP: Monserrat Grover APRN    Subjective          Oncology/Hematology History Overview Note     Right Triple Negative Breast Cancer:  - screening mammogram 9/10/21: 4mm asymmetry in the right upper inner breast  -Diagnostic mammogram on 2021: Persistent 5 mm ill-defined nodule in the upper inner mid right breast at 2:00, 7 cm from the nipple.  - core biopsy on 10/8/21: Invasive ductal carcinoma, grade 3, ER negative (less than 1%), WV negative (less than 1%), HER-2 equivocal (2+ HC), HER-2 FISH not amplified  - Right lumpectomy on 21. Pathology showed a 5mm tumor with negative margins, 0/3 lymph nodes involved, pT1aN0, ER-, WV-, HER2-  - chemotherapy was not recommended due to the small size of the tumor.   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  Mildly increased CD4 positive T cell LGL (5.2% of analyzed cells).  - CLL FISH positive for TP53/17p13 deletion in 49.5% of cells.  - IgVH somatic hypermutation was detected (3.7%)  -24: Start acalabrutinib due to symptoms of weakness, night sweats, tremor. WBC 31K, plt 133, hgb 13.7  - 3/7/24: Resume acalabrutinib after hold due to palpiations and diarrhea, which was from C diff. WBC 33K. Plts normal.     Li Fraumeni Syndrome (inherited p53 mutation):   Family History:    - brother with CLL  - father  from some type of leukemia  - sister with breast cancer and adult onset retinoblastoma which was fatal  - niece (sister's daughter) also had retinoblastoma as a child  - pt is considering genetic testing     Breast cancer   10/20/2021  Initial Diagnosis    Breast cancer (HCC)     Malignant neoplasm of upper-inner quadrant of right female breast   11/12/2021 Cancer Staged    Staging form: Breast, AJCC 8th Edition  - Pathologic: pT1a, pN0, cM0, ER-, [place]-, HER2- - Signed by aSnta Haskins APRN on 5/20/2022 1/13/2022 Initial Diagnosis    Malignant neoplasm of upper-inner quadrant of right female breast (HCC)     1/20/2022 - 2/14/2022 Radiation    Radiation OncologyTreatment Course:  Dorothea Cruz received 4256 cGy in 16 fractions to right breast.      CLL (chronic lymphocytic leukemia)   3/10/2023 Initial Diagnosis    CLL (chronic lymphocytic leukemia)     1/12/2024 -  Chemotherapy    OP LYMPHOMA (CLL) Acalabrutinib         History of Present Illness  Patient comes in today for follow-up. She did have low grade fever and some diarrhea a few days ago but this has since resolved. She is eating and drinking well. No new pain. No new headache. Recent ultrasound unable to visualize adrenals. She does report some difficulty emptying her bladder in the last few weeks. No burning noted.       Review of Systems   Constitutional:  Positive for appetite change, fatigue and unexpected weight loss. Negative for diaphoresis, fever and unexpected weight gain.   HENT:  Negative for hearing loss, mouth sores, sore throat, swollen glands, trouble swallowing and voice change.    Eyes:  Negative for blurred vision.   Respiratory:  Negative for cough, shortness of breath and wheezing.    Cardiovascular:  Negative for chest pain and palpitations.   Gastrointestinal:  Negative for abdominal pain, blood in stool, constipation, diarrhea and vomiting.   Endocrine: Negative for cold intolerance and heat intolerance.   Genitourinary:  Negative for difficulty urinating, dysuria, frequency, hematuria and urinary incontinence.   Musculoskeletal:  Negative for arthralgias and myalgias.   Skin:  Negative for rash, skin lesions and wound.   Neurological:  Positive for tremors  and weakness. Negative for dizziness, seizures, numbness and headache.   Hematological:  Does not bruise/bleed easily.   Psychiatric/Behavioral:  Negative for depressed mood. The patient is not nervous/anxious.    All other systems reviewed and are negative.      Past Medical History:   Diagnosis Date    Anxiety     Arthritis     Breast cancer     Cancer     BREAST CANCER    Cataract     Cholelithiases 4/30@1    CLL (chronic lymphocytic leukemia) 2023    Clotting disorder     Depression     Diabetes mellitus     Disease of thyroid gland     currently not taking any meds    Diverticulitis     Diverticulitis of colon     Diverticulosis 95    Fibromyalgia     Fibromyalgia, primary     HIV disease     Hyperlipidemia     Hypertension     Infectious viral hepatitis     Irritable bowel syndrome     Kidney stone     Liver disease     Low back pain 2015    Major depressive disorder 05/07/2020    Palpitations     ASYMPTOMATIC- DENIES CP/SOB, SEE PCP- NO CARDIOLOGIST     Post-menopause 09/17/2020    Sinus trouble     Sleep apnea     Substance abuse     Tremor 2018    I was on medicine for tremors    Urinary tract infection     Vitamin D deficiency 09/17/2020     Past Surgical History:   Procedure Laterality Date    ABDOMINAL SURGERY  06/14/1985    APPENDECTOMY      BILATERAL BREAST REDUCTION      BREAST BIOPSY Right 11/12/2021    Procedure: RIGHT BREAST NEEDLE LOCALIZED  LUMPECTOMY WITH SENTINEL NODE BIOPSY;  Surgeon: Mirtha Esteves MD;  Location: [facility] OR OSC;  Service: General;  Laterality: Right;    BREAST LUMPECTOMY      CHOLECYSTECTOMY N/A 09/02/2021    Procedure: CHOLECYSTECTOMY LAPAROSCOPIC INTRAOPERATIVE CHOLANGIOGRAM;  Surgeon: Louie Medina MD;  Location: [facility] MAIN OR;  Service: General;  Laterality: N/A;    COLONOSCOPY  2019    COLONOSCOPY N/A 05/09/2022    Procedure: COLONOSCOPY WITH BIOPSIES;  Surgeon: Butch Garcia MD;  Location: [facility] ENDOSCOPY;  Service: Gastroenterology;  Laterality: N/A;   DIVERTICULOSIS    HYSTERECTOMY      LYMPH NODE BIOPSY      SEPTOPLASTY  2018    SINUS SURGERY  2018    TONSILLECTOMY      US GUIDED CYST ASPIRATION BREAST N/A 10/03/2022    WRIST ARTHROPLASTY Bilateral      Social History     Socioeconomic History    Marital status:    Tobacco Use    Smoking status: Never     Passive exposure: Yes    Smokeless tobacco: Never   Vaping Use    Vaping status: Never Used   Substance and Sexual Activity    Alcohol use: Never    Drug use: Never    Sexual activity: Not Currently     Partners: Female     Birth control/protection: Post-menopausal, None, Hysterectomy     Family History   Problem Relation Age of Onset    No Known Problems Mother     Cancer Father         Leukemia      Breast cancer Sister     Cancer Sister         Had breast cancer    Cancer Brother         Leukemia/CLL    Cancer Sister         Had stomach cancer    Cancer Brother         Bladder cancer    Cancer Brother         Bladder cancer    Cancer Sister         Retinal blastoma.    Colon cancer Neg Hx        Objective   Physical Exam  General: Alert, cooperative, no acute distress  Eyes: Anicteric sclera, PERRLA  Respiratory: normal respiratory effort  Skin: Normal tone, no rash, no lesions  Psychiatric: Appropriate affect, intact judgment  Neurologic: No focal sensory or motor deficits, normal cognition, bilateral intention tremor  Musculoskeletal: Normal muscle strength and tone    Vitals:    24 1055   BP: 119/59   Pulse: 72   Resp: 16   Temp: 98 °F (36.7 °C)   TempSrc: Temporal   SpO2: 99%   Weight: 61.7 kg (136 lb 0.4 oz)   PainSc: 0-No pain         ECOG score: 0         PHQ-9 Total Score:         Result Review :   The following data was reviewed by: Luciano Glez MD on 24:  Lab Results   Component Value Date    HGB 12.6 2024    HCT 39.4 2024    MCV 94.9 2024     2024    WBC 34.54 (C) 2024    NEUTROABS 4.28 2024    LYMPHSABS 29.43 (H)  "05/28/2024    MONOSABS 0.62 05/28/2024    EOSABS 0.07 05/28/2024    BASOSABS 0.04 05/28/2024     Lab Results   Component Value Date    GLUCOSE 99 04/11/2024    BUN 14 04/11/2024    CREATININE 0.53 (L) 04/11/2024     04/11/2024    K 3.5 04/11/2024     04/11/2024    CO2 25.6 04/11/2024    CALCIUM 10.4 04/11/2024    PROTEINTOT 6.1 04/11/2024    ALBUMIN 3.9 04/11/2024    BILITOT 0.3 04/11/2024    ALKPHOS 56 04/11/2024    AST 15 04/11/2024    ALT 14 04/11/2024     No results found for: \"IRON\", \"LABIRON\", \"TRANSFERRIN\", \"TIBC\"  Lab Results   Component Value Date    [id number] 317 03/08/2022    FOLATE 14.40 03/08/2022     No results found for: \"PSA\", \"CEA\", \"AFP\", \"\", \"\"    Labs personally reviewed and WBC still up but better than prior.           Assessment and Plan    Diagnoses and all orders for this visit:    1. Li-Fraumeni syndrome (Primary)  -     CT Abdomen Pelvis With Contrast; Future  -     MRI Brain With & Without Contrast; Future  -     Cytology, Urine; Future    2. Malignant neoplasm of upper-inner quadrant of right female breast, unspecified estrogen receptor status  -     CBC & Differential; Future  -     Comprehensive Metabolic Panel; Future    3. UTI symptoms  -     Urinalysis With Microscopic - Urine, Clean Catch; Future  -     Urine Culture - Urine, Urine, Clean Catch; Future    4. CLL (chronic lymphocytic leukemia)    5. Adrenal nodule          Del(17p) CLL  This is a high risk subtype of CLL. Patient symptomatic at visit 1/8/24 with night sweats, tremor, fevers, weakness. Acalabrutinib started 1/8/24. WBC improved to 33K as of 3/7/24.  Plts are normal. Acalabrutinib on hold since 2/23/24 due to tachycardia/palipiations/diarrhea, which has essentially resolved. ECG negative for arrythmia. Resumed acalabrutinib 3/7/24. Tolerating well. WBC trending down. Continue. RTC 6 weeks with labs    Right Triple Negative Breast Cancer  November 2021 right lumpectomy. Chemotherapy was not " recommended due to the small size of the tumor.  She completed adjuvant radiation in March.  Repeat screening mammogram will be due next September, 9/2023 mammogram benign.  DEXA scan will be due in 2025. Due to high risk, recommend annual MRI to alternate every 6 months with mammogram. MRI breast 3/8/2024 benign. Mammogram due 9/2024.     Diarrheal illness 2/2 C diff  C diff positive. Treated with dificid x 2 weeks. Symptoms resolved. Drinking has improved. Hold PPI. Recommend probiotics. Very low suspicion of chemo induced as patient was off acalabrutinib since 2/23 and symptoms worsened.     Li-Fraumeni Syndrome  Pathologic germline p53 mutation. Has been referred for genetic counseling and evaluated on 9/5/23.  Screening Brain MRI on 6/19/23 was negative, repeat 6/2024 ordered. Needs urine cytology screening yearly.  Ovaries have been previously removed. Colonoscopy due every 2 to 5 years, last 5/2022. Recommend repeat 1 year.      Difficulty urination  UA ordered. Rec to follow up with urology if does not resolve.    Adrenal Nodule  Nodule not hypermetabolic on PET.  Given her underlying genetic mutation she was referred to an endocrinologist at [facility].  Follow up abdominal ultrasound 5/2024 with adrenal not well visualized. CT abdomen/pelvis ordered.        Follow up: 6 weeks  Patient was given instructions and counseling regarding her condition or for health maintenance advice. Please see specific information pulled into the AVS if appropriate.

## 2024-05-29 ENCOUNTER — TELEPHONE (OUTPATIENT)
Dept: ONCOLOGY | Facility: HOSPITAL | Age: 74
End: 2024-05-29

## 2024-05-29 ENCOUNTER — SPECIALTY PHARMACY (OUTPATIENT)
Dept: PHARMACY | Facility: HOSPITAL | Age: 74
End: 2024-05-29
Payer: MEDICARE

## 2024-05-29 LAB — BACTERIA SPEC AEROBE CULT: NORMAL

## 2024-05-29 NOTE — TELEPHONE ENCOUNTER
The pt called and left a vm requesting the result of her U/A.This nurse called the pt and left a vm informing the pt that her U/A was clean/negative.

## 2024-05-31 ENCOUNTER — OFFICE VISIT (OUTPATIENT)
Dept: FAMILY MEDICINE CLINIC | Facility: CLINIC | Age: 74
End: 2024-05-31
Payer: MEDICARE

## 2024-05-31 VITALS
WEIGHT: 137.6 LBS | HEART RATE: 79 BPM | HEIGHT: 62 IN | DIASTOLIC BLOOD PRESSURE: 42 MMHG | BODY MASS INDEX: 25.32 KG/M2 | SYSTOLIC BLOOD PRESSURE: 120 MMHG | OXYGEN SATURATION: 96 % | TEMPERATURE: 97.9 F

## 2024-05-31 DIAGNOSIS — R73.03 PREDIABETES: ICD-10-CM

## 2024-05-31 DIAGNOSIS — I10 PRIMARY HYPERTENSION: ICD-10-CM

## 2024-05-31 DIAGNOSIS — R33.9 URINARY RETENTION: ICD-10-CM

## 2024-05-31 DIAGNOSIS — H91.90 DECREASED HEARING, UNSPECIFIED LATERALITY: ICD-10-CM

## 2024-05-31 DIAGNOSIS — R19.7 DIARRHEA, UNSPECIFIED TYPE: ICD-10-CM

## 2024-05-31 DIAGNOSIS — M79.7 FIBROMYALGIA: ICD-10-CM

## 2024-05-31 DIAGNOSIS — Z00.00 MEDICARE ANNUAL WELLNESS VISIT, SUBSEQUENT: Primary | ICD-10-CM

## 2024-05-31 DIAGNOSIS — F32.1 CURRENT MODERATE EPISODE OF MAJOR DEPRESSIVE DISORDER, UNSPECIFIED WHETHER RECURRENT: ICD-10-CM

## 2024-05-31 DIAGNOSIS — E55.9 VITAMIN D DEFICIENCY: ICD-10-CM

## 2024-05-31 DIAGNOSIS — E78.2 MIXED HYPERLIPIDEMIA: ICD-10-CM

## 2024-05-31 DIAGNOSIS — H61.23 BILATERAL IMPACTED CERUMEN: ICD-10-CM

## 2024-05-31 PROBLEM — M46.1 INFLAMMATION OF SACROILIAC JOINT: Status: ACTIVE | Noted: 2024-05-31

## 2024-05-31 PROBLEM — G89.29 CHRONIC NECK PAIN: Status: ACTIVE | Noted: 2024-03-06

## 2024-05-31 PROBLEM — M54.2 CHRONIC NECK PAIN: Status: ACTIVE | Noted: 2024-03-06

## 2024-05-31 PROBLEM — G43.909 MIGRAINE: Status: ACTIVE | Noted: 2024-01-03

## 2024-05-31 RX ORDER — MULTIVIT-MIN/IRON/FOLIC ACID/K 18-600-40
2000 CAPSULE ORAL DAILY
Qty: 90 CAPSULE | Refills: 3 | Status: SHIPPED | OUTPATIENT
Start: 2024-05-31

## 2024-05-31 RX ORDER — MONTELUKAST SODIUM 4 MG/1
2 TABLET, CHEWABLE ORAL 2 TIMES DAILY
Qty: 360 TABLET | Refills: 1 | Status: SHIPPED | OUTPATIENT
Start: 2024-05-31

## 2024-05-31 RX ORDER — HYDROCHLOROTHIAZIDE 25 MG/1
25 TABLET ORAL DAILY
Qty: 90 TABLET | Refills: 1 | Status: SHIPPED | OUTPATIENT
Start: 2024-05-31

## 2024-05-31 RX ORDER — ROSUVASTATIN CALCIUM 20 MG/1
20 TABLET, COATED ORAL NIGHTLY
Qty: 90 TABLET | Refills: 1 | Status: SHIPPED | OUTPATIENT
Start: 2024-05-31

## 2024-05-31 RX ORDER — DESVENLAFAXINE 100 MG/1
100 TABLET, EXTENDED RELEASE ORAL DAILY
Qty: 90 TABLET | Refills: 1 | Status: SHIPPED | OUTPATIENT
Start: 2024-05-31

## 2024-05-31 NOTE — ASSESSMENT & PLAN NOTE
Patient's depression is a recurrent episode that is moderate without psychosis. Depression is in full remission and stable.    Plan:   Continue current medication therapy     Followup in 6 months.

## 2024-05-31 NOTE — PROGRESS NOTES
The ABCs of the Annual Wellness Visit  Subsequent Medicare Wellness Visit    Subjective    Dorothea Cruz is a 74 y.o. female who presents for a Subsequent Medicare Wellness Visit.    The following portions of the patient's history were reviewed and   updated as appropriate: allergies, current medications, past family history, past medical history, past social history, past surgical history, and problem list.    Compared to one year ago, the patient feels her physical   health is better.    Compared to one year ago, the patient feels her mental   health is the same.    Recent Hospitalizations:  She was not admitted to the hospital during the last year.       Current Medical Providers:  Patient Care Team:  Monserrat Grover APRN as PCP - General (Nurse Practitioner)  Louie Medina MD as Consulting Physician (General Surgery)  Bebe Nair MD PhD as Consulting Physician (Hematology and Oncology)  Mirtha Esteves MD as Consulting Physician (General Surgery)  Melissa Cespedes MD as Consulting Physician (Urology)    Outpatient Medications Prior to Visit   Medication Sig Dispense Refill    Acalabrutinib Maleate (CALQUENCE) 100 MG tablet Take 1 tablet by mouth 2 (Two) Times a Day. 60 tablet 11    acetaminophen (TYLENOL) 500 MG tablet Take 2 tablets by mouth Daily As Needed for Mild Pain.      gabapentin (NEURONTIN) 100 MG capsule Take 1 capsule by mouth Daily As Needed.      hydrOXYzine (ATARAX) 25 MG tablet Take 1 tablet by mouth 3 (Three) Times a Day As Needed for Itching. 60 tablet 1    ondansetron (ZOFRAN) 8 MG tablet Take 1 tablet by mouth 3 (Three) Times a Day As Needed for Nausea or Vomiting. 30 tablet 5    prochlorperazine (COMPAZINE) 10 MG tablet Take 1 tablet by mouth Every 6 (Six) Hours As Needed for Nausea. 60 tablet 3    SUMAtriptan (IMITREX) 50 MG tablet take one tablet by mouth at onset of migraine, may repeat after 2 hours, do not exceed 2 tablets in 24 hour period       Cholecalciferol (Vitamin D) 50 MCG (2000 UT) capsule Take 1 capsule by mouth Daily. 90 capsule 3    colestipol (Colestid) 1 g tablet Take 2 tablets by mouth 2 (Two) Times a Day. 360 tablet 1    desvenlafaxine (Pristiq) 100 MG 24 hr tablet Take 1 tablet by mouth Daily. 90 tablet 0    hydroCHLOROthiazide (HYDRODIURIL) 25 MG tablet Take 1 tablet by mouth Daily. 90 tablet 1    metoprolol tartrate (LOPRESSOR) 25 MG tablet Take 1 tablet by mouth 2 (Two) Times a Day. 180 tablet 0    rosuvastatin (CRESTOR) 20 MG tablet Take 1 tablet by mouth Every Night. 90 tablet 0    magnesium oxide (MAG-OX) 400 MG tablet Take 1 tablet by mouth Daily. 30 tablet 1    potassium chloride 10 MEQ CR tablet        No facility-administered medications prior to visit.       No opioid medication identified on active medication list. I have reviewed chart for other potential  high risk medication/s and harmful drug interactions in the elderly.        Aspirin is not on active medication list.  Aspirin use is not indicated based on review of current medical condition/s. Risk of harm outweighs potential benefits.  .    Patient Active Problem List   Diagnosis    Vitamin D deficiency    Sleep apnea    Postmenopause    Major depressive disorder    Primary hypertension    Mixed hyperlipidemia    Fibromyalgia    Disease of thyroid gland    Depression    Anxiety    Arthritis    ACE-inhibitor cough    Diverticulitis    Breast cancer    History of diverticulitis    History of cholecystectomy    Altered bowel habits    Diarrhea    Malignant neoplasm of upper-inner quadrant of right female breast    Chronic low back pain    Trochanteric bursitis of both hips    Family history of CLL (chronic lymphoid leukemia)    Degeneration of lumbar intervertebral disc    Lumbar spondylosis    Chronic osteoarthritis    Leukocytosis    Prediabetes    Class 1 obesity with serious comorbidity and body mass index (BMI) of 32.0 to 32.9 in adult    Adenoma of right adrenal gland  "   CLL (chronic lymphocytic leukemia)    Lumbosacral spondylosis without myelopathy    Li-Fraumeni syndrome    Absence of both cervix and uterus, acquired    Sinusitis    Urinary urgency    Dehydration    Chronic neck pain    Inflammation of sacroiliac joint    Migraine     Advance Care Planning   Advance Care Planning     Advance Directive is not on file.  ACP discussion was held with the patient during this visit. Patient does not have an advance directive, information provided.     Objective    Vitals:    24 1342   BP: 120/42   BP Location: Left arm   Patient Position: Sitting   Cuff Size: Adult   Pulse: 79   Temp: 97.9 °F (36.6 °C)   SpO2: 96%   Weight: 62.4 kg (137 lb 9.6 oz)   Height: 157.5 cm (62.01\")   PainSc: 0-No pain     Estimated body mass index is 25.16 kg/m² as calculated from the following:    Height as of this encounter: 157.5 cm (62.01\").    Weight as of this encounter: 62.4 kg (137 lb 9.6 oz).    BMI is within normal parameters. No other follow-up for BMI required.      Does the patient have evidence of cognitive impairment? No          HEALTH RISK ASSESSMENT    Smoking Status:  Social History     Tobacco Use   Smoking Status Never    Passive exposure: Yes   Smokeless Tobacco Never     Alcohol Consumption:  Social History     Substance and Sexual Activity   Alcohol Use Never     Fall Risk Screen:    STEADI Fall Risk Assessment was completed, and patient is at LOW risk for falls.Assessment completed on:2024    Depression Screenin/31/2024     1:50 PM   PHQ-2/PHQ-9 Depression Screening   Little Interest or Pleasure in Doing Things 0-->not at all   Feeling Down, Depressed or Hopeless 0-->not at all   PHQ-9: Brief Depression Severity Measure Score 0       Health Habits and Functional and Cognitive Screenin/31/2024     1:49 PM   Functional & Cognitive Status   Do you have difficulty preparing food and eating? No   Do you have difficulty bathing yourself, getting dressed or " grooming yourself? No   Do you have difficulty using the toilet? No   Do you have difficulty moving around from place to place? No   Do you have trouble with steps or getting out of a bed or a chair? No   Current Diet Well Balanced Diet   Dental Exam Up to date   Eye Exam Up to date   Exercise (times per week) 2 times per week   Current Exercises Include Yard Work;Gardening   Do you need help using the phone?  No   Are you deaf or do you have serious difficulty hearing?  Yes   Do you need help to go to places out of walking distance? No   Do you need help shopping? No   Do you need help preparing meals?  No   Do you need help with housework?  No   Do you need help with laundry? No   Do you need help taking your medications? No   Do you need help managing money? No   Do you ever drive or ride in a car without wearing a seat belt? No   Have you felt unusual stress, anger or loneliness in the last month? No   Who do you live with? Spouse   If you need help, do you have trouble finding someone available to you? No   Have you been bothered in the last four weeks by sexual problems? No   Do you have difficulty concentrating, remembering or making decisions? No       Age-appropriate Screening Schedule:  Refer to the list below for future screening recommendations based on patient's age, sex and/or medical conditions. Orders for these recommended tests are listed in the plan section. The patient has been provided with a written plan.    Health Maintenance   Topic Date Due    ZOSTER VACCINE (1 of 2) 05/31/2024 (Originally 11/11/2016)    RSV Vaccine - Adults (1 - 1-dose 60+ series) 06/30/2024 (Originally 1/18/2011)    COVID-19 Vaccine (3 - Moderna risk series) 05/31/2025 (Originally 5/20/2021)    INFLUENZA VACCINE  08/01/2024    BMI FOLLOWUP  09/11/2024    MAMMOGRAM  09/18/2024    LIPID PANEL  02/27/2025    DXA SCAN  03/28/2025    ANNUAL WELLNESS VISIT  05/31/2025    TDAP/TD VACCINES (2 - Td or Tdap) 09/16/2026    COLORECTAL  CANCER SCREENING  05/09/2027    Pneumococcal Vaccine 65+  Completed    HEPATITIS C SCREENING  Discontinued          CMS Preventative Services Quick Reference  Risk Factors Identified During Encounter  Hearing Problem: Referral to Audiologist ordered  Dental Screening Recommended  Vision Screening Recommended  The above risks/problems have been discussed with the patient.  Pertinent information has been shared with the patient in the After Visit Summary.  An After Visit Summary and PPPS were made available to the patient.    Follow Up:   Next Medicare Wellness visit to be scheduled in 1 year.       Additional E&M Note during same encounter follows:  Patient has multiple medical problems which are significant and separately identifiable that require additional work above and beyond the Medicare Wellness Visit.      Chief Complaint  Hypertension, Anxiety, and Medicare Wellness-subsequent    Subjective        HPI  Dorothea Cruz is also being seen today for a follow up.     Depression and fibromyalgia: she is on Pristiq 100 mg daily which she states seems to be helping.  She only takes gabapentin once or twice per day, does not like to take it that often.      Hyperlipidemia: Her last lipid panel was 6 months ago and was stable, on Crestor 20 mg every evening.    Hypertension: Blood pressure is stable on metoprolol 25 mg twice daily and hydrochlorothiazide 25 mg daily.    Borderline diabetes, her last A1c was 5.8% 3 months ago.    She has chronic lymphocytic leukemia, follows with oncology Dr. Glez.      She does complain of decreased hearing.  She would like a referral to have her hearing checked.    She has chronic diarrhea and takes colestipol daily for prevention.  She states she did have C. difficile a few months back and was treated.  She has not had any other issues.    Tobacco Use: Medium Risk (5/31/2024)    Patient History     Smoking Tobacco Use: Never     Smokeless Tobacco Use: Never     Passive  "Exposure: Yes      E-cigarette/Vaping    E-cigarette/Vaping Use Never User     Passive Exposure No     Counseling Given No      E-cigarette/Vaping Substances    Nicotine No     THC No     CBD No     Flavoring No      E-cigarette/Vaping Devices    Disposable No     Pre-filled or Refillable Cartridge No     Refillable Tank No     Pre-filled Pod No        Alcohol Use: Not At Risk (7/6/2021)    AUDIT-C     Frequency of Alcohol Consumption: Never     Average Number of Drinks: Not on file     Frequency of Binge Drinking: Not on file      Review of Systems   Constitutional:  Negative for chills, fatigue and fever.   HENT:  Positive for hearing loss. Negative for ear pain.    Respiratory:  Negative for cough, shortness of breath and wheezing.    Cardiovascular:  Negative for chest pain, palpitations and leg swelling.   Gastrointestinal:  Negative for constipation, diarrhea, nausea and vomiting.   Genitourinary:  Positive for decreased urine volume. Negative for dysuria, frequency and urgency.        Complains of urinary retention   Musculoskeletal:  Positive for arthralgias.   Neurological:  Negative for dizziness and confusion.   Psychiatric/Behavioral:  Negative for dysphoric mood, self-injury and suicidal ideas.        Objective   Vital Signs:  /42 (BP Location: Left arm, Patient Position: Sitting, Cuff Size: Adult)   Pulse 79   Temp 97.9 °F (36.6 °C)   Ht 157.5 cm (62.01\")   Wt 62.4 kg (137 lb 9.6 oz)   SpO2 96%   BMI 25.16 kg/m²     Physical Exam  Vitals reviewed.   Constitutional:       Appearance: Normal appearance. She is well-developed.   HENT:      Right Ear: Tympanic membrane and external ear normal. There is impacted cerumen.      Left Ear: Tympanic membrane and external ear normal. There is impacted cerumen.      Mouth/Throat:      Mouth: Mucous membranes are moist.      Pharynx: No pharyngeal swelling, oropharyngeal exudate or posterior oropharyngeal erythema.   Neck:      Thyroid: No thyroid mass, " thyromegaly or thyroid tenderness.   Cardiovascular:      Rate and Rhythm: Normal rate and regular rhythm.      Heart sounds: No murmur heard.     No friction rub. No gallop.   Pulmonary:      Effort: Pulmonary effort is normal.      Breath sounds: Normal breath sounds. No wheezing or rhonchi.   Lymphadenopathy:      Cervical: No cervical adenopathy.   Skin:     General: Skin is warm and dry.   Neurological:      Mental Status: She is alert and oriented to person, place, and time.      Cranial Nerves: No cranial nerve deficit.   Psychiatric:         Mood and Affect: Mood and affect normal.         Behavior: Behavior normal.         Thought Content: Thought content normal. Thought content does not include homicidal or suicidal ideation.         Judgment: Judgment normal.          The following data was reviewed by: ARA ePck on 05/31/2024:  CMP   CMP          3/7/2024    08:11 4/11/2024    16:15 5/28/2024    10:21   CMP   Glucose 123  99  104    BUN 16  14  16    Creatinine 0.55  0.53  0.58    EGFR 96.9  97.8  95.7    Sodium 145  143  143    Potassium 3.6  3.5  3.7    Chloride 108  107  108    Calcium 9.8  10.4  11.3    Total Protein 6.3  6.1  6.6    Albumin 3.9  3.9  4.0    Globulin 2.4  2.2  2.6    Total Bilirubin 0.4  0.3  0.2    Alkaline Phosphatase 70  56  82    AST (SGOT) 19  15  19    ALT (SGPT) 21  14  20    Albumin/Globulin Ratio 1.6  1.8  1.5    BUN/Creatinine Ratio 29.1  26.4  27.6    Anion Gap 6.6  10.4  5.6      CBC   CBC          3/7/2024    08:11 4/11/2024    16:15 5/28/2024    10:21   CBC   WBC 33.07  52.98  34.54    RBC 3.99  4.09  4.15    Hemoglobin 12.7  12.3  12.6    Hematocrit 38.1  38.4  39.4    MCV 95.5  93.9  94.9    MCH 31.8  30.1  30.4    MCHC 33.3  32.0  32.0    RDW 14.4  14.4  14.3    Platelets 198  140  155      LIPID   Lipid Panel          2/27/2024    10:39   Lipid Panel   Total Cholesterol 119    Triglycerides 192    HDL Cholesterol 36    VLDL Cholesterol 32    LDL  Cholesterol  51    LDL/HDL Ratio 1.24      [id number]   A1C Last 3 Results          2/27/2024    10:39   HGBA1C Last 3 Results   Hemoglobin A1C 5.80      VITD   Lab Results   Component Value Date    IPON05QZ 41.0 02/27/2024     VITB12   Lab Results   Component Value Date    KBIXMCQJ21 317 03/08/2022       Ear Cerumen Removal    Date/Time: 5/31/2024 2:18 PM    Performed by: Nevin Davila MA  Authorized by: Monserrat Grover APRN  Location details: left ear and right ear  Patient tolerance: patient tolerated the procedure well with no immediate complications  Comments: Unsuccessful irrigation, no cerumen removed.  Procedure type: irrigation             Assessment and Plan   Diagnoses and all orders for this visit:    1. Medicare annual wellness visit, subsequent (Primary)  Comments:  Health maintenance and prevention discussed, handouts provided, see AVS.    2. Fibromyalgia  Assessment & Plan:  Fibromyalgia is stable on Pristiq 100 mg daily and she takes gabapentin 100 mg only as needed.    Orders:  -     desvenlafaxine (Pristiq) 100 MG 24 hr tablet; Take 1 tablet by mouth Daily. Indications: Major Depressive Disorder  Dispense: 90 tablet; Refill: 1    3. Current moderate episode of major depressive disorder, unspecified whether recurrent  Assessment & Plan:  Patient's depression is a recurrent episode that is moderate without psychosis. Depression is in full remission and stable.    Plan:   Continue current medication therapy     Followup in 6 months.     Orders:  -     desvenlafaxine (Pristiq) 100 MG 24 hr tablet; Take 1 tablet by mouth Daily. Indications: Major Depressive Disorder  Dispense: 90 tablet; Refill: 1    4. Diarrhea, unspecified type  Assessment & Plan:  Chronic diarrhea is controlled on colestipol.    Orders:  -     colestipol (Colestid) 1 g tablet; Take 2 tablets by mouth 2 (Two) Times a Day. Indications: chronic diarrhea  Dispense: 360 tablet; Refill: 1    5. Mixed hyperlipidemia  Assessment &  Plan:   Lipid abnormalities are stable    Plan:  Continue same medication/s without change.      Discussed medication dosage, use, side effects, and goals of treatment in detail.    Counseled patient on lifestyle modifications to help control hyperlipidemia.     Patient Treatment Goals:   LDL goal is less than 70    Followup in 6 months.    Orders:  -     rosuvastatin (CRESTOR) 20 MG tablet; Take 1 tablet by mouth Every Night. Indications: Elevation of Both Cholesterol and Triglycerides in Blood  Dispense: 90 tablet; Refill: 1    6. Primary hypertension  Assessment & Plan:  Hypertension is stable and controlled  Continue current treatment regimen.  Blood pressure will be reassessed in 6 months.    Orders:  -     hydroCHLOROthiazide 25 MG tablet; Take 1 tablet by mouth Daily. Indications: High Blood Pressure Disorder  Dispense: 90 tablet; Refill: 1  -     metoprolol tartrate (LOPRESSOR) 25 MG tablet; Take 1 tablet by mouth 2 (Two) Times a Day. Indications: High Blood Pressure Disorder  Dispense: 180 tablet; Refill: 1    7. Vitamin D deficiency  Assessment & Plan:  Vitamin D is stable on vitamin D 2000 units daily, will continue current dose.    Orders:  -     Cholecalciferol (Vitamin D) 50 MCG (2000 UT) capsule; Take 1 capsule by mouth Daily. Indications: Vitamin D Deficiency  Dispense: 90 capsule; Refill: 3  -     Vitamin D,25-Hydroxy; Future    8. Urinary retention  Comments:  She would like referral to urology for further evaluation, order placed.  Orders:  -     Ambulatory Referral to Urology    9. Decreased hearing, unspecified laterality  -     Ambulatory Referral to Audiology    10. Prediabetes  Assessment & Plan:  She is stable/diet controlled.  Will plan to check A1c in the next 3 months.    Orders:  -     Hemoglobin A1c; Future    11. Bilateral impacted cerumen  Comments:  Ear irrigation in office today was unsuccessful. Prescribed Debrox eardrops. Advised that she can call to schedule nurse visit for ear  irrigation if needed.  Orders:  -     Ear Cerumen Removal  -     carbamide peroxide (Debrox) 6.5 % otic solution; Administer 5 drops into both ears 2 (Two) Times a Day for 4 days. Indications: Removal of Wax From the Ear  Dispense: 1 each; Refill: 2             Follow Up   Return in about 6 months (around 11/30/2024) for Next scheduled follow up.  Patient was given instructions and counseling regarding her condition or for health maintenance advice. Please see specific information pulled into the AVS if appropriate.     Tobacco Use: Medium Risk (5/31/2024)    Patient History     Smoking Tobacco Use: Never     Smokeless Tobacco Use: Never     Passive Exposure: Yes      E-cigarette/Vaping    E-cigarette/Vaping Use Never User     Passive Exposure No     Counseling Given No      E-cigarette/Vaping Substances    Nicotine No     THC No     CBD No     Flavoring No      E-cigarette/Vaping Devices    Disposable No     Pre-filled or Refillable Cartridge No     Refillable Tank No     Pre-filled Pod No        Alcohol Use: Not At Risk (7/6/2021)    AUDIT-C     Frequency of Alcohol Consumption: Never     Average Number of Drinks: Not on file     Frequency of Binge Drinking: Not on file      Parts of this note are electronic transcriptions/translations of spoken language to printed text using the Dragon Dictation system.    Monserrat Grover, APRN    05/31/2024

## 2024-06-03 DIAGNOSIS — H91.90 HEARING DIFFICULTY, UNSPECIFIED LATERALITY: Primary | ICD-10-CM

## 2024-06-14 ENCOUNTER — PATIENT ROUNDING (BHMG ONLY) (OUTPATIENT)
Dept: FAMILY MEDICINE CLINIC | Facility: CLINIC | Age: 74
End: 2024-06-14
Payer: MEDICARE

## 2024-06-14 NOTE — PROGRESS NOTES
A My-Chart message has been sent to the patient for PATIENT ROUNDING with [facility].

## 2024-06-21 ENCOUNTER — PROCEDURE VISIT (OUTPATIENT)
Dept: OTOLARYNGOLOGY | Facility: CLINIC | Age: 74
End: 2024-06-21
Payer: MEDICARE

## 2024-06-21 DIAGNOSIS — H91.90 DECREASED HEARING, UNSPECIFIED LATERALITY: ICD-10-CM

## 2024-06-21 DIAGNOSIS — H90.A22 SENSORINEURAL HEARING LOSS (SNHL) OF LEFT EAR WITH RESTRICTED HEARING OF RIGHT EAR: ICD-10-CM

## 2024-06-21 DIAGNOSIS — H90.A21 SENSORINEURAL HEARING LOSS (SNHL) OF RIGHT EAR WITH RESTRICTED HEARING OF LEFT EAR: Primary | ICD-10-CM

## 2024-06-21 NOTE — PROGRESS NOTES
AUDIOMETRIC EVALUATION      Name:  Dorothea Cruz  :  1950  Age:  74 y.o.  Date of Evaluation:  2024       History:  Mrs. Cruz is seen today for a hearing evaluation due to hearing loss.    Audiologic Information:  Concerns for Hearing: Mild  PETs: None  Other otologic surgical history: No  Aural Pressure/Fullness: None  Otalgia: No  Otorrhea: None noted  Tinnitus: Periodically  Dizziness: No  Noise Exposure: None  Family history of hearing loss: No  Head trauma requiring hospital stay: None  Chemotherapy: No  Other significant history: None      EVALUATION:    See audiogram    RESULTS:    Otoscopic Evaluation:  Right: Unremarkable  Left: Unremarkable    Tympanometry (226 Hz):  Right: Type A  Left: Type A    IMPRESSIONS:  Pure tone thresholds for the right ear indicates a mild sensorineural hearing loss at point to 5K, 2K, 4K.  A moderate loss at 6K hertz.  A severe loss at 8K hertz.  Pure tone thresholds for the left ear indicates a mild sensorineural hearing loss at 2K hertz.  4K hertz.  A moderate loss at 6K hertz.  Severe loss at 8K hertz.  Patient was counseled with regard to the findings.    Amplification needs:  Patient could benefit from amplification if they feel increased communication difficulties.    Diagnosis:  No diagnosis found.     RECOMMENDATIONS/PLAN:  Hearing aid evaluation when ready  Practice good communication strategies to assist with everyday listening (eye contact with speakers, reduce background noise, encourage others to communicate clearly and slowly).  Discussed results and recommendations with patient. Questions were addressed and the patient was encouraged to contact our department should concerns arise.          Jamaal Astorga M.S, [facility]  Licensed Audiologist

## 2024-07-08 ENCOUNTER — HOSPITAL ENCOUNTER (OUTPATIENT)
Dept: MRI IMAGING | Facility: HOSPITAL | Age: 74
Discharge: HOME | End: 2024-07-08
Payer: MEDICARE

## 2024-07-08 ENCOUNTER — TELEPHONE (OUTPATIENT)
Dept: FAMILY MEDICINE CLINIC | Facility: CLINIC | Age: 74
End: 2024-07-08
Payer: MEDICARE

## 2024-07-08 ENCOUNTER — HOSPITAL ENCOUNTER (OUTPATIENT)
Dept: CT IMAGING | Facility: HOSPITAL | Age: 74
Discharge: HOME | End: 2024-07-08
Payer: MEDICARE

## 2024-07-08 DIAGNOSIS — Z15.01 LI-FRAUMENI SYNDROME: ICD-10-CM

## 2024-07-08 LAB
CREAT BLDA-MCNC: 0.6 MG/DL (ref 0.6–1.3)
EGFRCR SERPLBLD CKD-EPI 2021: 94.9 ML/MIN/1.73

## 2024-07-08 PROCEDURE — 70553 MRI BRAIN STEM W/O & W/DYE: CPT

## 2024-07-08 PROCEDURE — 25510000001 IOPAMIDOL PER 1 ML: Performed by: INTERNAL MEDICINE

## 2024-07-08 PROCEDURE — 82565 ASSAY OF CREATININE: CPT

## 2024-07-08 PROCEDURE — A9577 INJ MULTIHANCE: HCPCS | Performed by: INTERNAL MEDICINE

## 2024-07-08 PROCEDURE — 74177 CT ABD & PELVIS W/CONTRAST: CPT

## 2024-07-08 PROCEDURE — 0 GADOBENATE DIMEGLUMINE 529 MG/ML SOLUTION: Performed by: INTERNAL MEDICINE

## 2024-07-08 RX ADMIN — GADOBENATE DIMEGLUMINE 11 ML: 529 INJECTION, SOLUTION INTRAVENOUS at 16:54

## 2024-07-08 RX ADMIN — IOPAMIDOL 100 ML: 755 INJECTION, SOLUTION INTRAVENOUS at 16:07

## 2024-07-09 ENCOUNTER — HOSPITAL ENCOUNTER (OUTPATIENT)
Dept: OCCUPATIONAL THERAPY | Facility: HOSPITAL | Age: 74
Setting detail: THERAPIES SERIES
Discharge: HOME | End: 2024-07-09
Payer: MEDICARE

## 2024-07-09 DIAGNOSIS — Z91.89 AT RISK FOR LYMPHEDEMA: Primary | ICD-10-CM

## 2024-07-09 DIAGNOSIS — C50.211 MALIGNANT NEOPLASM OF UPPER-INNER QUADRANT OF RIGHT FEMALE BREAST, UNSPECIFIED ESTROGEN RECEPTOR STATUS: ICD-10-CM

## 2024-07-09 PROCEDURE — 97535 SELF CARE MNGMENT TRAINING: CPT

## 2024-07-09 PROCEDURE — 93702 BIS XTRACELL FLUID ANALYSIS: CPT

## 2024-07-09 NOTE — THERAPY RE-EVALUATION
Outpatient Occupational Therapy Lymphedema Re-Evaluation  JOSE JUAN Suarez     Patient Name: Dorothea Cruz  : 1950  MRN: [id number]  Today's Date: 2024      Visit Date: 2024    Patient Active Problem List   Diagnosis    Vitamin D deficiency    Sleep apnea    Postmenopause    Major depressive disorder    Primary hypertension    Mixed hyperlipidemia    Fibromyalgia    Disease of thyroid gland    Depression    Anxiety    Arthritis    ACE-inhibitor cough    Diverticulitis    Breast cancer    History of diverticulitis    History of cholecystectomy    Altered bowel habits    Diarrhea    Malignant neoplasm of upper-inner quadrant of right female breast    Chronic low back pain    Trochanteric bursitis of both hips    Family history of CLL (chronic lymphoid leukemia)    Degeneration of lumbar intervertebral disc    Lumbar spondylosis    Chronic osteoarthritis    Leukocytosis    Prediabetes    Class 1 obesity with serious comorbidity and body mass index (BMI) of 32.0 to 32.9 in adult    Adenoma of right adrenal gland    CLL (chronic lymphocytic leukemia)    Lumbosacral spondylosis without myelopathy    Li-Fraumeni syndrome    Absence of both cervix and uterus, acquired    Sinusitis    Urinary urgency    Dehydration    Chronic neck pain    Inflammation of sacroiliac joint    Migraine        Past Medical History:   Diagnosis Date    Anxiety     Arthritis     Breast cancer     Cancer     BREAST CANCER    Cataract     Cholelithiases @1    CLL (chronic lymphocytic leukemia)     Clotting disorder     Depression     Diabetes mellitus     Disease of thyroid gland     currently not taking any meds    Diverticulitis     Diverticulitis of colon     Diverticulosis 95    Fibromyalgia     Fibromyalgia, primary     HIV disease     Hyperlipidemia     Hypertension     Infectious viral hepatitis     Irritable bowel syndrome     Kidney stone     Liver disease     Low back pain 2015    Major depressive disorder  05/07/2020    Palpitations     ASYMPTOMATIC- DENIES CP/SOB, SEE PCP- NO CARDIOLOGIST     Post-menopause 09/17/2020    Sinus trouble     Sleep apnea     Substance abuse     Tremor 2018    I was on medicine for tremors    Urinary tract infection     Vitamin D deficiency 09/17/2020        Past Surgical History:   Procedure Laterality Date    ABDOMINAL SURGERY  06/14/1985    APPENDECTOMY      BILATERAL BREAST REDUCTION      BREAST BIOPSY Right 11/12/2021    Procedure: RIGHT BREAST NEEDLE LOCALIZED  LUMPECTOMY WITH SENTINEL NODE BIOPSY;  Surgeon: Mirtha Esteves MD;  Location: [facility] OR [facility];  Service: General;  Laterality: Right;    BREAST LUMPECTOMY      CHOLECYSTECTOMY N/A 09/02/2021    Procedure: CHOLECYSTECTOMY LAPAROSCOPIC INTRAOPERATIVE CHOLANGIOGRAM;  Surgeon: Louie Medina MD;  Location: [facility] MAIN OR;  Service: General;  Laterality: N/A;    COLONOSCOPY  2019    COLONOSCOPY N/A 05/09/2022    Procedure: COLONOSCOPY WITH BIOPSIES;  Surgeon: Butch Garcia MD;  Location: [facility] ENDOSCOPY;  Service: Gastroenterology;  Laterality: N/A;  DIVERTICULOSIS    HYSTERECTOMY      LYMPH NODE BIOPSY      SEPTOPLASTY  2018    SINUS SURGERY  2018    TONSILLECTOMY      US GUIDED CYST ASPIRATION BREAST N/A 10/03/2022    WRIST ARTHROPLASTY Bilateral          Visit Dx:     ICD-10-CM ICD-9-CM   1. At risk for lymphedema  Z91.89 V49.89   2. Malignant neoplasm of upper-inner quadrant of right female breast, unspecified estrogen receptor status  C50.211 174.2        Patient History       Row Name 07/09/24 0900             History    Chief Complaint Other 1 (comment)  Lymphedema surveillance program  -SF      Brief Description of Current Complaint Mrs. Hager is a 73 year old female who had surgery on 11/12/21 for Lumpectomoy on the Right due to Trip negative Breast CA  -SF         Fall Risk Assessment    Any falls in the past year: No  -SF      Does patient have a fear of falling No  -SF         Services    Prior  Rehab/Home Health Experiences No  -SF      Are you currently receiving Home Health services No  -SF      Do you plan to receive Home Health services in the near future No  -SF         Daily Activities    Primary Language English  -SF      Are you able to read Yes  -SF      Are you able to write Yes  -SF      How does patient learn best? Reading;Demonstration  -SF      Barriers to learning None  -SF      Pt Participated in POC and Goals Yes  -SF         Safety    Are you being hurt, hit, or frightened by anyone at home or in your life? No  -SF      Are you being neglected by a caregiver No  -SF      Have you had any of the following issues with Depression;Anxiety  -SF                User Key  (r) = Recorded By, (t) = Taken By, (c) = Cosigned By      Initials Name Provider Type    Kelin Taylor OT Occupational Therapist                     Lymphedema       Row Name 07/09/24 0900             Subjective Pain    Able to rate subjective pain? yes  -SF      Pre-Treatment Pain Level 0  -SF      Post-Treatment Pain Level 0  -SF         Subjective    Subjective Comments Patient reports no concerns. Patient denies any difficulty with ROM or strength. Patient also denies pain.  -SF         Lymphedema Assessment    Lymphedema Classification RUE:;at risk/stage 0  -SF      Lymphedema Cancer Related Sx right;lumpectomy;sentinel node biopsy  -SF      Lymphedema Surgery Comments 11/2021  -SF      Lymph Nodes Removed # 3  -SF      Positive Lymph Nodes # 0  -SF      Chemo Received no  -SF      Radiation Therapy Received yes  -SF      Radiation Treatments #/Timeframe 16  -SF         LLIS - Physical Concerns    The amount of pain associated with my lymphedema is: 0  -SF      The amount of limb heaviness associated with my lymphedema is: 0  -SF      The amount of skin tightness associated with my lymphedema is: 0  -SF      The size of my swollen limb(s) seems: 0  -SF      Lymphedema affects the movement of my swollen limb(s): 0  -SF    "   The strength in my swollen limb(s) is: 0  -SF         LLIS - Psychosocial Concerns    Lymphedema affects my body image (i.e., \"how I think I look\"). 0  -SF      Lymphedema affects my socializing with others. 0  -SF      Lymphedema affects my intimate relations with spouse or partner (rate 0 if not applicable 0  -SF      Lymphedema \"gets me down\" (i.e., depression, frustration, or anger) 0  -SF      I must rely on others for help due to my lymphedema. 0  -SF      I know what to do to manage my lymphedema 1  -SF         LLIS - Functional Concerns    Lymphedema affects my ability to perform self-care activities (i.e. eating, dressing, hygiene) 0  -SF      Lymphedema affects my ability to perform routine home or work-related activities. 0  -SF      Lymphedema affects my performance of preferred leisure activities. 0  -SF      Lymphedema affects proper fit of clothing/shoes 0  -SF      Lymphedema affects my sleep 0  -SF         Posture/Observations    Posture- WNL Posture is WNL  -SF         General ROM    GENERAL ROM COMMENTS BUE WFL  -SF         MMT (Manual Muscle Testing)    General MMT Comments BUE WFL  -SF         L-Dex Bioimpedence Screening    L-Dex Measurement Extremity RUE  -SF      L-Dex Patient Position Standing  -SF      L-Dex UE Dominate Side Right  -SF      L-Dex UE At Risk Side Right  -SF      L-Dex UE Pre Surgical Value Yes  -SF      L-Dex UE Score -10.8  -SF      L-Dex UE Baseline Score -6.3  -SF      L-Dex UE Value Change -4.5  -SF      $ L-Dex Charge yes  -SF         Lymphedema Life Impact Scale Totals    A.  Total Q1 - Q17 (Do not include Q18) 1  -SF      B.  Total number of questions answered (Q1-Q17) 17  -SF      C. Divide A by B 0.06  -SF      D. Multiple C by 25 1.5  -SF                User Key  (r) = Recorded By, (t) = Taken By, (c) = Cosigned By      Initials Name Provider Type    SF Kelin Hope OT Occupational Therapist                            Therapy Education  Education Details: Review " of stoplight program.  Given: HEP, Symptoms/condition management, Edema management  Program: Reinforced  How Provided: Verbal  Provided to: Patient  Level of Understanding: Verbalized  93642 - OT Self Care/Mgmt Minutes: 15         OT Goals       Row Name 07/09/24 1025          Time Calculation    OT Goal Re-Cert Due Date 08/08/24  -SF               User Key  (r) = Recorded By, (t) = Taken By, (c) = Cosigned By      Initials Name Provider Type    Kelin Taylor OT Occupational Therapist                  Goals:  1. Post Breast Surgery Care/at risk for Lymphedema  LTG 1: 90 days:  As an indicator of no exacerbation of lymphedema staging, the patient will present with an L-Dex score less than 7 points from preoperative baseline.              STATUS: met: Ongoing   STG 1a: 30 days: Patient will be independent with self-manual lymphatic massage.               STATUS: Met.  Ongoing  STG 1b: 30 days:  Patient will be independent with identification of signs and symptoms of lymphedema exasperation per stoplight to recovery education handout.              STATUS: Met.  Ongoing  STG 1 c: 30 days: Patient will be independent with HEP to prevent advancement in lymphedema staging.              STATUS: Met.  Ongoing  TREATMENT:  Self Care/ewADL retraining, Therapeutic Activity, Neuromuscular Re-education, Therapeutic Exercise, Bioimpedence Fluid Analysis, Orthotic Management and training,  and Manual Therapy.     OT Assessment/Plan       Row Name 07/09/24 1022          OT Assessment    Functional Limitations Other (comment)  -SF     Impairments Impaired lymphatic circulation  -SF     Assessment Comments Patient is demonstrating normalized lymphatic functioning and subjectively reports no concerns. Patient will continue to benefit from skilled occupational therapy to further evaluate ongoing lymphatic functioning to prevent advancement in lymphedema staging, decreased ROM and increased pain  -SF     OT Diagnosis At risk for  lymphedema  -SF     OT Rehab Potential Good  -SF     Patient/caregiver participated in establishment of treatment plan and goals Yes  -SF     Patient would benefit from skilled therapy intervention Yes  -SF        OT Plan    OT Frequency Other (comment)  -SF     Predicted Duration of Therapy Intervention (OT) Patient will be re-evaluated every 6 months years 3-5  -SF     Planned CPT's? OT EVAL LOW COMPLEXITY: 56542;OT SELF CARE/MGMT/TRAIN 15 MIN: 52090;OT THER PROC EA 15 MIN: 03169FY;OT MANUAL THERAPY EA 15 MIN: 91512;OT BIS XTRACELL FLUID ANALYSIS: 79181;OT ORTHOTIC MGMT/TRAIN EA 15 MIN: 81284;OT WC MGMT EA 15 MIN: 00883  -SF     Planned Therapy Interventions (Optional Details) home exercise program;joint mobilization;manual therapy techniques;orthotic fitting/training;patient/family education;wound care  -SF     OT Plan Comments Continue POC  -SF               User Key  (r) = Recorded By, (t) = Taken By, (c) = Cosigned By      Initials Name Provider Type    SF Kelin Hope OT Occupational Therapist                              Time Calculation:   Timed Charges  51444 - OT Self Care/Mgmt Minutes: 15  Total Minutes  Timed Charges Total Minutes: 15   Total Minutes: 15     Therapy Charges for Today       Code Description Service Date Service Provider Modifiers Qty    87804812941 HC PT BIS XTRACELL FLUID ANALYSIS 7/9/2024 Kelin Hope OT  1    81471881736 HC OT SELF CARE/MGMT/TRAIN EA 15 MIN 7/9/2024 Kelin Hope OT GO 1                      Kelin Hope OT  7/9/2024

## 2024-07-11 ENCOUNTER — LAB (OUTPATIENT)
Dept: ONCOLOGY | Facility: HOSPITAL | Age: 74
End: 2024-07-11
Payer: MEDICARE

## 2024-07-11 ENCOUNTER — OFFICE VISIT (OUTPATIENT)
Dept: ONCOLOGY | Facility: HOSPITAL | Age: 74
End: 2024-07-11
Payer: MEDICARE

## 2024-07-11 VITALS
WEIGHT: 131 LBS | RESPIRATION RATE: 18 BRPM | HEIGHT: 62 IN | OXYGEN SATURATION: 97 % | HEART RATE: 69 BPM | BODY MASS INDEX: 24.11 KG/M2 | TEMPERATURE: 96.4 F | SYSTOLIC BLOOD PRESSURE: 135 MMHG | DIASTOLIC BLOOD PRESSURE: 52 MMHG

## 2024-07-11 DIAGNOSIS — R93.5 ABNORMAL CT OF THE ABDOMEN: ICD-10-CM

## 2024-07-11 DIAGNOSIS — C50.211 MALIGNANT NEOPLASM OF UPPER-INNER QUADRANT OF RIGHT FEMALE BREAST, UNSPECIFIED ESTROGEN RECEPTOR STATUS: Primary | ICD-10-CM

## 2024-07-11 DIAGNOSIS — Z15.01 LI-FRAUMENI SYNDROME: ICD-10-CM

## 2024-07-11 DIAGNOSIS — C50.211 MALIGNANT NEOPLASM OF UPPER-INNER QUADRANT OF RIGHT FEMALE BREAST, UNSPECIFIED ESTROGEN RECEPTOR STATUS: ICD-10-CM

## 2024-07-11 DIAGNOSIS — E27.8 ADRENAL NODULE: ICD-10-CM

## 2024-07-11 DIAGNOSIS — C91.10 CLL (CHRONIC LYMPHOCYTIC LEUKEMIA): ICD-10-CM

## 2024-07-11 LAB
ALBUMIN SERPL-MCNC: 3.9 G/DL (ref 3.5–5.2)
ALBUMIN/GLOB SERPL: 1.6 G/DL
ALP SERPL-CCNC: 91 U/L (ref 39–117)
ALT SERPL W P-5'-P-CCNC: 12 U/L (ref 1–33)
ANION GAP SERPL CALCULATED.3IONS-SCNC: 10.3 MMOL/L (ref 5–15)
AST SERPL-CCNC: 17 U/L (ref 1–32)
BASOPHILS # BLD AUTO: 0.07 10*3/MM3 (ref 0–0.2)
BASOPHILS NFR BLD AUTO: 0.4 % (ref 0–1.5)
BILIRUB SERPL-MCNC: 0.3 MG/DL (ref 0–1.2)
BUN SERPL-MCNC: 13 MG/DL (ref 8–23)
BUN/CREAT SERPL: 22 (ref 7–25)
CALCIUM SPEC-SCNC: 9.9 MG/DL (ref 8.6–10.5)
CHLORIDE SERPL-SCNC: 107 MMOL/L (ref 98–107)
CO2 SERPL-SCNC: 27.7 MMOL/L (ref 22–29)
CREAT SERPL-MCNC: 0.59 MG/DL (ref 0.57–1)
DEPRECATED RDW RBC AUTO: 49 FL (ref 37–54)
EGFRCR SERPLBLD CKD-EPI 2021: 95.3 ML/MIN/1.73
ELLIPTOCYTES BLD QL SMEAR: NORMAL
EOSINOPHIL # BLD AUTO: 0.1 10*3/MM3 (ref 0–0.4)
EOSINOPHIL NFR BLD AUTO: 0.5 % (ref 0.3–6.2)
ERYTHROCYTE [DISTWIDTH] IN BLOOD BY AUTOMATED COUNT: 14.4 % (ref 12.3–15.4)
GLOBULIN UR ELPH-MCNC: 2.4 GM/DL
GLUCOSE SERPL-MCNC: 114 MG/DL (ref 65–99)
HCT VFR BLD AUTO: 39.2 % (ref 34–46.6)
HGB BLD-MCNC: 12.5 G/DL (ref 12–15.9)
HYPOCHROMIA BLD QL: NORMAL
IMM GRANULOCYTES # BLD AUTO: 0.06 10*3/MM3 (ref 0–0.05)
IMM GRANULOCYTES NFR BLD AUTO: 0.3 % (ref 0–0.5)
LYMPHOCYTES # BLD AUTO: 13.81 10*3/MM3 (ref 0.7–3.1)
LYMPHOCYTES NFR BLD AUTO: 69.1 % (ref 19.6–45.3)
MCH RBC QN AUTO: 29.6 PG (ref 26.6–33)
MCHC RBC AUTO-ENTMCNC: 31.9 G/DL (ref 31.5–35.7)
MCV RBC AUTO: 92.9 FL (ref 79–97)
MONOCYTES # BLD AUTO: 0.64 10*3/MM3 (ref 0.1–0.9)
MONOCYTES NFR BLD AUTO: 3.2 % (ref 5–12)
NEUTROPHILS NFR BLD AUTO: 26.5 % (ref 42.7–76)
NEUTROPHILS NFR BLD AUTO: 5.3 10*3/MM3 (ref 1.7–7)
NRBC BLD AUTO-RTO: 0 /100 WBC (ref 0–0.2)
PLAT MORPH BLD: NORMAL
PLATELET # BLD AUTO: 151 10*3/MM3 (ref 140–450)
PMV BLD AUTO: 13.9 FL (ref 6–12)
POIKILOCYTOSIS BLD QL SMEAR: NORMAL
POLYCHROMASIA BLD QL SMEAR: NORMAL
POTASSIUM SERPL-SCNC: 3.6 MMOL/L (ref 3.5–5.2)
PROT SERPL-MCNC: 6.3 G/DL (ref 6–8.5)
RBC # BLD AUTO: 4.22 10*6/MM3 (ref 3.77–5.28)
SODIUM SERPL-SCNC: 145 MMOL/L (ref 136–145)
WBC MORPH BLD: NORMAL
WBC NRBC COR # BLD AUTO: 19.98 10*3/MM3 (ref 3.4–10.8)

## 2024-07-11 PROCEDURE — 36415 COLL VENOUS BLD VENIPUNCTURE: CPT

## 2024-07-11 PROCEDURE — 80053 COMPREHEN METABOLIC PANEL: CPT

## 2024-07-11 PROCEDURE — 85007 BL SMEAR W/DIFF WBC COUNT: CPT

## 2024-07-11 PROCEDURE — 85025 COMPLETE CBC W/AUTO DIFF WBC: CPT

## 2024-07-11 PROCEDURE — G0463 HOSPITAL OUTPT CLINIC VISIT: HCPCS | Performed by: INTERNAL MEDICINE

## 2024-07-11 NOTE — PROGRESS NOTES
Patient  Dorothea Cruz    [facility] HEMATOLOGY & ONCOLOGY    Chief Complaint  Follow-up    Referring Provider: TYRELL Peck*  PCP: Monserrat Grover APRN    Subjective          Oncology/Hematology History Overview Note     Right Triple Negative Breast Cancer:  - screening mammogram 9/10/21: 4mm asymmetry in the right upper inner breast  -Diagnostic mammogram on 2021: Persistent 5 mm ill-defined nodule in the upper inner mid right breast at 2:00, 7 cm from the nipple.  - core biopsy on 10/8/21: Invasive ductal carcinoma, grade 3, ER negative (less than 1%), DC negative (less than 1%), HER-2 equivocal (2+ HC), HER-2 FISH not amplified  - Right lumpectomy on 21. Pathology showed a 5mm tumor with negative margins, 0/3 lymph nodes involved, pT1aN0, ER-, DC-, HER2-  - chemotherapy was not recommended due to the small size of the tumor.   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  Mildly increased CD4 positive T cell LGL (5.2% of analyzed cells).  - CLL FISH positive for TP53/17p13 deletion in 49.5% of cells.  - IgVH somatic hypermutation was detected (3.7%)  -24: Start acalabrutinib due to symptoms of weakness, night sweats, tremor. WBC 31K, plt 133, hgb 13.7  - 3/7/24: Resume acalabrutinib after hold due to palpiations and diarrhea, which was from C diff. WBC 33K. Plts normal.     Li Fraumeni Syndrome (inherited p53 mutation):   Family History:    - brother with CLL  - father  from some type of leukemia  - sister with breast cancer and adult onset retinoblastoma which was fatal  - niece (sister's daughter) also had retinoblastoma as a child  - pt is considering genetic testing     Breast cancer   10/20/2021 Initial Diagnosis    Breast cancer (HCC)      Malignant neoplasm of upper-inner quadrant of right female breast   11/12/2021 Cancer Staged    Staging form: Breast, AJCC 8th Edition  - Pathologic: pT1a, pN0, cM0, ER-, WV-, HER2- - Signed by Santa Haskins APRN on 5/20/2022 1/13/2022 Initial Diagnosis    Malignant neoplasm of upper-inner quadrant of right female breast (HCC)     1/20/2022 - 2/14/2022 Radiation    Radiation OncologyTreatment Course:  Dorothea Cruz received 4256 cGy in 16 fractions to right breast.      CLL (chronic lymphocytic leukemia)   3/10/2023 Initial Diagnosis    CLL (chronic lymphocytic leukemia)     1/12/2024 -  Chemotherapy    OP LYMPHOMA (CLL) Acalabrutinib         History of Present Illness  Patient comes in today for follow-up. She remains on acalabrutinib. Tolerating well. Reports diarrhea that occurs about every 4 to 5 days. Resolves on its own. No fevers, chills. She is eating and drinking well. No new pain. No nausea or vomiting. Urination has improved from prior.  CT abdomen showed increase in size of right adrenal nodule as well as 1.7 cm lytic lesion in L1. Results discussed.       Review of Systems   Constitutional:  Positive for appetite change, fatigue and unexpected weight loss. Negative for diaphoresis, fever and unexpected weight gain.   HENT:  Negative for hearing loss, mouth sores, sore throat, swollen glands, trouble swallowing and voice change.    Eyes:  Negative for blurred vision.   Respiratory:  Negative for cough, shortness of breath and wheezing.    Cardiovascular:  Negative for chest pain and palpitations.   Gastrointestinal:  Negative for abdominal pain, blood in stool, constipation, diarrhea and vomiting.   Endocrine: Negative for cold intolerance and heat intolerance.   Genitourinary:  Negative for difficulty urinating, dysuria, frequency, hematuria and urinary incontinence.   Musculoskeletal:  Negative for arthralgias and myalgias.   Skin:  Negative for rash, skin lesions and wound.    Neurological:  Positive for tremors and weakness. Negative for dizziness, seizures, numbness and headache.   Hematological:  Does not bruise/bleed easily.   Psychiatric/Behavioral:  Negative for depressed mood. The patient is not nervous/anxious.    All other systems reviewed and are negative.      Past Medical History:   Diagnosis Date    Anxiety     Arthritis     Breast cancer     Cancer     BREAST CANCER    Cataract     Cholelithiases 4/30@1    CLL (chronic lymphocytic leukemia) 2023    Clotting disorder     Depression     Diabetes mellitus     Disease of thyroid gland     currently not taking any meds    Diverticulitis     Diverticulitis of colon     Diverticulosis 95    Fibromyalgia     Fibromyalgia, primary     HIV disease     Hyperlipidemia     Hypertension     Infectious viral hepatitis     Irritable bowel syndrome     Kidney stone     Liver disease     Low back pain 2015    Major depressive disorder 05/07/2020    Palpitations     ASYMPTOMATIC- DENIES CP/SOB, SEE PCP- NO CARDIOLOGIST     Post-menopause 09/17/2020    Sinus trouble     Sleep apnea     Substance abuse     Tremor 2018    I was on medicine for tremors    Urinary tract infection     Vitamin D deficiency 09/17/2020     Past Surgical History:   Procedure Laterality Date    ABDOMINAL SURGERY  06/14/1985    APPENDECTOMY      BILATERAL BREAST REDUCTION      BREAST BIOPSY Right 11/12/2021    Procedure: RIGHT BREAST NEEDLE LOCALIZED  LUMPECTOMY WITH SENTINEL NODE BIOPSY;  Surgeon: Mirtha Esteves MD;  [place] [facility] [place] [facility] [place]  Service: General;  Laterality: Right;    BREAST LUMPECTOMY      CHOLECYSTECTOMY N/A 09/02/2021    Procedure: CHOLECYSTECTOMY LAPAROSCOPIC INTRAOPERATIVE CHOLANGIOGRAM;  Surgeon: Louie Medina MD;  Location: [facility] MAIN OR;  Service: General;  Laterality: N/A;    COLONOSCOPY  2019    COLONOSCOPY N/A 05/09/2022    Procedure: COLONOSCOPY WITH BIOPSIES;  Surgeon: Butch Garcia MD;  Location: [facility] ENDOSCOPY;   "Service: Gastroenterology;  Laterality: N/A;  DIVERTICULOSIS    HYSTERECTOMY      LYMPH NODE BIOPSY      SEPTOPLASTY  2018    SINUS SURGERY  2018    TONSILLECTOMY      US GUIDED CYST ASPIRATION BREAST N/A 10/03/2022    WRIST ARTHROPLASTY Bilateral      Social History     Socioeconomic History    Marital status:    Tobacco Use    Smoking status: Never     Passive exposure: Yes    Smokeless tobacco: Never   Vaping Use    Vaping status: Never Used   Substance and Sexual Activity    Alcohol use: Never    Drug use: Never    Sexual activity: Not Currently     Partners: Female     Birth control/protection: Post-menopausal, None, Hysterectomy     Family History   Problem Relation Age of Onset    No Known Problems Mother     Cancer Father         Leukemia      Breast cancer Sister     Cancer Sister         Had breast cancer    Cancer Brother         Leukemia/CLL    Cancer Sister         Had stomach cancer    Cancer Brother         Bladder cancer    Cancer Brother         Bladder cancer    Cancer Sister         Retinal blastoma.    Colon cancer Neg Hx        Objective   Physical Exam  General: Alert, cooperative, no acute distress  Eyes: Anicteric sclera, PERRLA  Respiratory: normal respiratory effort  Skin: Normal tone, no rash, no lesions  Psychiatric: Appropriate affect, intact judgment  Neurologic: No focal sensory or motor deficits, normal cognition, bilateral intention tremor  Musculoskeletal: Normal muscle strength and tone    Vitals:    24 1447   BP: 135/52   Pulse: 69   Resp: 18   Temp: 96.4 °F (35.8 °C)   TempSrc: Temporal   SpO2: 97%   Weight: 59.4 kg (131 lb)   Height: 157.5 cm (62.01\")   PainSc: 0-No pain         ECOG score: 0         PHQ-9 Total Score:         Result Review :   The following data was reviewed by: Luciano Glez MD on 24:  Lab Results   Component Value Date    HGB 12.6 2024    HCT 39.4 2024    MCV 94.9 2024     2024    WBC 34.54 (C) " "05/28/2024    NEUTROABS 4.28 05/28/2024    LYMPHSABS 29.43 (H) 05/28/2024    MONOSABS 0.62 05/28/2024    EOSABS 0.07 05/28/2024    BASOSABS 0.04 05/28/2024     Lab Results   Component Value Date    GLUCOSE 104 (H) 05/28/2024    BUN 16 05/28/2024    CREATININE 0.60 07/08/2024     05/28/2024    K 3.7 05/28/2024     (H) 05/28/2024    CO2 29.4 (H) 05/28/2024    CALCIUM 11.3 (H) 05/28/2024    PROTEINTOT 6.6 05/28/2024    ALBUMIN 4.0 05/28/2024    BILITOT 0.2 05/28/2024    ALKPHOS 82 05/28/2024    AST 19 05/28/2024    ALT 20 05/28/2024     No results found for: \"IRON\", \"LABIRON\", \"TRANSFERRIN\", \"TIBC\"  Lab Results   Component Value Date    ZUDQHJSG82 317 03/08/2022    FOLATE 14.40 03/08/2022     No results found for: \"PSA\", \"CEA\", \"AFP\", \"\", \"\"    Labs personally reviewed and WBC still up but better than prior.      CT abdomen personally reviewed and per my independent read with right adrenal nodule. Lytic lesion difficult to see    CT Abdomen Pelvis With Contrast    Result Date: 7/10/2024  Impression: 1.The right adrenal nodule has increased in size from 10/18/2023 as detailed above. On today's exam it is 2.7 x 3.0 x 4.3 cm. Differential considerations include metastatic disease adrenocortical carcinoma or possibly pheochromocytoma. 2.There is a new 1.7 cm lytic lesion in the left side of the L1 vertebral body. No pathologic fracture. No other osseous lesions identified on CT, given her history consider further evaluation with whole-body bone scan and possibly MRI with and without contrast. 3.Retroperitoneal adenopathy has decreased in size and is no longer technically enlarged by CT size criteria. 4.There is a new area of wall thickening and fat stranding around a large diverticula in the distal descending colon. This is likely acute diverticulitis. Correlation with clinical signs and symptoms of diverticulitis is needed. Electronically Signed: Warren Eaton DO  7/10/2024 9:24 AM EDT  Workstation " ID: [id number]    MRI Brain With & Without Contrast    Result Date: 7/9/2024  Impression: Stable mild typical chronic and age-related changes. Otherwise essentially normal contrast-enhanced MRI of the brain. Electronically Signed: Cristofer Maddox MD  7/9/2024 9:56 AM EDT  Workstation ID: [id number]          Assessment and Plan    Diagnoses and all orders for this visit:    1. Malignant neoplasm of upper-inner quadrant of right female breast, unspecified estrogen receptor status (Primary)  -     NM PET/CT Skull Base to Mid Thigh; Future    2. Abnormal CT of the abdomen  -     NM PET/CT Skull Base to Mid Thigh; Future    3. CLL (chronic lymphocytic leukemia)    4. Adrenal nodule            Del(17p) CLL  This is a high risk subtype of CLL. Patient symptomatic at visit 1/8/24 with night sweats, tremor, fevers, weakness. Acalabrutinib started 1/8/24. WBC improved to 33K as of 3/7/24.  Plts are normal. Acalabrutinib on hold since 2/23/24 due to tachycardia/palipiations/diarrhea, which has essentially resolved. ECG negative for arrythmia. Resumed acalabrutinib 3/7/24. Tolerating well. WBC trending down. Continue. Labs pending today.     Right Triple Negative Breast Cancer  November 2021 right lumpectomy. Chemotherapy was not recommended due to the small size of the tumor.  She completed adjuvant radiation in March.  Repeat screening mammogram will be due next September, 9/2023 mammogram benign.  DEXA scan will be due in 2025. Due to high risk, recommend annual MRI to alternate every 6 months with mammogram. MRI breast 3/8/2024 benign. Mammogram due 9/2024.     Li-Fraumeni Syndrome  Pathologic germline p53 mutation. Has been referred for genetic counseling and evaluated on 9/5/23.  Screening Brain MRI on 6/19/23 was negative, repeat 6/2024 ordered. Needs urine cytology screening yearly. Ovaries have been previously removed. Colonoscopy due every 2 to 5 years, last 5/2022. Recommend repeat anytime.      Adrenal Nodule  Right  sided. Nodule not hypermetabolic on PET 5/2023.  Given her underlying genetic mutation she was referred to an endocrin[facility] at [facility].  Follow up abdominal ultrasound 5/2024 with adrenal not well visualized. CT abdomen/pelvis done and showed it increased to 4.3 cm. Repeat PET scan ordered.      Lytic lesion  L1 vertebral body. Concern for metastatic cancer. Will get PET scan and if positive, will get biopsy.       Follow up: after PET  Patient was given instructions and counseling regarding her condition or for health maintenance advice. Please see specific information pulled into the AVS if appropriate.

## 2024-07-12 ENCOUNTER — SPECIALTY PHARMACY (OUTPATIENT)
Facility: HOSPITAL | Age: 74
End: 2024-07-12
Payer: MEDICARE

## 2024-07-22 ENCOUNTER — HOSPITAL ENCOUNTER (OUTPATIENT)
Dept: PET IMAGING | Facility: HOSPITAL | Age: 74
Discharge: HOME | End: 2024-07-22
Payer: MEDICARE

## 2024-07-22 DIAGNOSIS — C50.211 MALIGNANT NEOPLASM OF UPPER-INNER QUADRANT OF RIGHT FEMALE BREAST, UNSPECIFIED ESTROGEN RECEPTOR STATUS: ICD-10-CM

## 2024-07-22 DIAGNOSIS — R93.5 ABNORMAL CT OF THE ABDOMEN: ICD-10-CM

## 2024-07-22 PROCEDURE — 0 FLUDEOXYGLUCOSE F18 SOLUTION: Performed by: INTERNAL MEDICINE

## 2024-07-22 PROCEDURE — 78815 PET IMAGE W/CT SKULL-THIGH: CPT

## 2024-07-22 PROCEDURE — A9552 F18 FDG: HCPCS | Performed by: INTERNAL MEDICINE

## 2024-07-22 RX ADMIN — FLUDEOXYGLUCOSE F 18 1 DOSE: 200 INJECTION, SOLUTION INTRAVENOUS at 11:10

## 2024-07-24 ENCOUNTER — TELEPHONE (OUTPATIENT)
Dept: ONCOLOGY | Facility: HOSPITAL | Age: 74
End: 2024-07-24
Payer: MEDICARE

## 2024-07-24 DIAGNOSIS — E27.8 RIGHT ADRENAL MASS: Primary | ICD-10-CM

## 2024-07-24 DIAGNOSIS — R94.8 ABNORMAL POSITRON EMISSION TOMOGRAPHY (PET) SCAN: ICD-10-CM

## 2024-07-24 NOTE — TELEPHONE ENCOUNTER
The pt called and states that she got her PET Scan done on Monday. The pt states that she received her results through her MY Chart. The pt is requesting to speak to Cira RN to review the scan with her so that she gets a good understanding of the results.

## 2024-07-25 DIAGNOSIS — E27.8 ADRENAL MASS: Primary | ICD-10-CM

## 2024-07-25 RX ORDER — DEXAMETHASONE 1 MG
TABLET ORAL
Qty: 1 TABLET | Refills: 0 | Status: CANCELLED | OUTPATIENT
Start: 2024-07-25

## 2024-07-25 NOTE — TELEPHONE ENCOUNTER
Advised patient that Dr. Glez is out of the office, but Dr. Kim reviewed the PET scan. She is recommending an MRI of L-spine and referral to urology to discuss removing the adrenal nodule. Patient agrees with this plan. We will try to get the MRI done prior to her follow up with Dr. Glez.

## 2024-07-31 ENCOUNTER — HOSPITAL ENCOUNTER (OUTPATIENT)
Dept: MRI IMAGING | Facility: HOSPITAL | Age: 74
Discharge: HOME | End: 2024-07-31
Admitting: INTERNAL MEDICINE
Payer: MEDICARE

## 2024-07-31 DIAGNOSIS — R94.8 ABNORMAL POSITRON EMISSION TOMOGRAPHY (PET) SCAN: ICD-10-CM

## 2024-07-31 PROCEDURE — A9577 INJ MULTIHANCE: HCPCS | Performed by: INTERNAL MEDICINE

## 2024-07-31 PROCEDURE — 72158 MRI LUMBAR SPINE W/O & W/DYE: CPT

## 2024-07-31 PROCEDURE — 0 GADOBENATE DIMEGLUMINE 529 MG/ML SOLUTION: Performed by: INTERNAL MEDICINE

## 2024-07-31 RX ADMIN — GADOBENATE DIMEGLUMINE 10 ML: 529 INJECTION, SOLUTION INTRAVENOUS at 10:21

## 2024-07-31 NOTE — PROGRESS NOTES
"Answers submitted by the patient for this visit:  Other (Submitted on 7/29/2024)  Please describe your symptoms.: Sinus congestion headaches.  Have you had these symptoms before?: Yes  How long have you been having these symptoms?: 5-7 days  Primary Reason for Visit (Submitted on 7/29/2024)  What is the primary reason for your visit?: Other  Chief Complaint  Sinus Problem and Headache    Subjective            Dorothea Cruz is a 74 y.o. female who presents to [facility]   History of Present Illness  Acute visit.  She is complaining of sinus pressure for 2 weeks.  She is also been having headaches.  She states her symptoms have been ongoing for about 2 weeks but they have gotten worse.    In office COVID test was negative.  In office flu test was positive for influenza B.    She is a breast cancer and chronic lymphocytic leukemia patient.    Tobacco Use: Medium Risk (8/1/2024)    Patient History     Smoking Tobacco Use: Never     Smokeless Tobacco Use: Never     Passive Exposure: Yes      E-cigarette/Vaping    E-cigarette/Vaping Use Never User     Passive Exposure No     Counseling Given No      E-cigarette/Vaping Substances    Nicotine No     THC No     CBD No     Flavoring No      E-cigarette/Vaping Devices    Disposable No     Pre-filled or Refillable Cartridge No     Refillable Tank No     Pre-filled Pod No        Alcohol Use: Not At Risk (7/6/2021)    AUDIT-C     Frequency of Alcohol Consumption: Never     Average Number of Drinks: Not on file     Frequency of Binge Drinking: Not on file         Objective   Vital Signs:   Vitals:    08/01/24 1355   BP: 145/56   BP Location: Left arm   Patient Position: Sitting   Pulse: 70   Temp: 97.4 °F (36.3 °C)   TempSrc: Temporal   SpO2: 98%   Weight: 57.4 kg (126 lb 9.6 oz)   Height: 157.5 cm (62.01\")     Body mass index is 23.15 kg/m².    Wt Readings from Last 3 Encounters:   08/01/24 57.4 kg (126 lb 9.6 oz)   07/11/24 59.4 kg (131 lb) " "  05/31/24 62.4 kg (137 lb 9.6 oz)     BP Readings from Last 3 Encounters:   08/01/24 145/56   07/11/24 135/52   05/31/24 120/42       Health Maintenance   Topic Date Due    ZOSTER VACCINE (1 of 2) 11/11/2016    INFLUENZA VACCINE  08/01/2024    MAMMOGRAM  09/18/2024    COVID-19 Vaccine (3 - Moderna risk series) 05/31/2025 (Originally 5/20/2021)    LIPID PANEL  02/27/2025    DXA SCAN  03/28/2025    ANNUAL WELLNESS VISIT  05/31/2025    TDAP/TD VACCINES (2 - Td or Tdap) 09/16/2026    COLORECTAL CANCER SCREENING  05/09/2027    Pneumococcal Vaccine 65+  Completed    HEPATITIS C SCREENING  Discontinued       /56 (BP Location: Left arm, Patient Position: Sitting)   Pulse 70   Temp 97.4 °F (36.3 °C) (Temporal)   Ht 157.5 cm (62.01\")   Wt 57.4 kg (126 lb 9.6 oz)   SpO2 98%   BMI 23.15 kg/m²       Current Outpatient Medications:     Acalabrutinib Maleate (CALQUENCE) 100 MG tablet, Take 1 tablet by mouth 2 (Two) Times a Day., Disp: 60 tablet, Rfl: 11    acetaminophen (TYLENOL) 500 MG tablet, Take 2 tablets by mouth Daily As Needed for Mild Pain., Disp: , Rfl:     Cholecalciferol (Vitamin D) 50 MCG (2000 UT) capsule, Take 1 capsule by mouth Daily. Indications: Vitamin D Deficiency, Disp: 90 capsule, Rfl: 3    colestipol (Colestid) 1 g tablet, Take 2 tablets by mouth 2 (Two) Times a Day. Indications: chronic diarrhea, Disp: 360 tablet, Rfl: 1    desvenlafaxine (Pristiq) 100 MG 24 hr tablet, Take 1 tablet by mouth Daily. Indications: Major Depressive Disorder, Disp: 90 tablet, Rfl: 1    gabapentin (NEURONTIN) 100 MG capsule, Take 1 capsule by mouth Daily As Needed., Disp: , Rfl:     hydroCHLOROthiazide 25 MG tablet, Take 1 tablet by mouth Daily. Indications: High Blood Pressure Disorder, Disp: 90 tablet, Rfl: 1    hydrOXYzine (ATARAX) 25 MG tablet, Take 1 tablet by mouth 3 (Three) Times a Day As Needed for Itching., Disp: 60 tablet, Rfl: 1    metoprolol tartrate (LOPRESSOR) 25 MG tablet, Take 1 tablet by mouth 2 (Two) " Times a Day. Indications: High Blood Pressure Disorder, Disp: 180 tablet, Rfl: 1    prochlorperazine (COMPAZINE) 10 MG tablet, Take 1 tablet by mouth Every 6 (Six) Hours As Needed for Nausea., Disp: 60 tablet, Rfl: 3    rosuvastatin (CRESTOR) 20 MG tablet, Take 1 tablet by mouth Every Night. Indications: Elevation of Both Cholesterol and Triglycerides in Blood, Disp: 90 tablet, Rfl: 1    SUMAtriptan (IMITREX) 50 MG tablet, take one tablet by mouth at onset of migraine, may repeat after 2 hours, do not exceed 2 tablets in 24 hour period, Disp: , Rfl:     oseltamivir (Tamiflu) 75 MG capsule, Take 1 capsule by mouth 2 (Two) Times a Day for 5 days. Indications: Influenza B, Disp: 10 capsule, Rfl: 0   Past Medical History:   Diagnosis Date    Allergic     Anxiety     Arthritis     Breast cancer     Cancer     BREAST CANCER    Cataract     Cholelithiases 4/30@1    CLL (chronic lymphocytic leukemia) 2023    Clotting disorder     Depression     Diabetes mellitus     Disease of thyroid gland     currently not taking any meds    Diverticulitis     Diverticulitis of colon     Diverticulosis 95    Fibromyalgia     Fibromyalgia, primary     HIV disease     Hyperlipidemia     Hypertension     Infectious viral hepatitis     Irritable bowel syndrome     Kidney stone     Liver disease     Low back pain 2015    Major depressive disorder 05/07/2020    Palpitations     ASYMPTOMATIC- DENIES CP/SOB, SEE PCP- NO CARDIOLOGIST     Post-menopause 09/17/2020    Sinus trouble     Sleep apnea     Substance abuse     Tremor 2018    I was on medicine for tremors    Urinary tract infection     Vitamin D deficiency 09/17/2020        Physical Exam  Vitals reviewed.   Constitutional:       Appearance: Normal appearance. She is well-developed.   HENT:      Right Ear: Tympanic membrane, ear canal and external ear normal.      Left Ear: Tympanic membrane, ear canal and external ear normal.      Mouth/Throat:      Mouth: Mucous membranes are moist.       Pharynx: No pharyngeal swelling, oropharyngeal exudate or posterior oropharyngeal erythema.   Neck:      Thyroid: No thyroid mass, thyromegaly or thyroid tenderness.   Cardiovascular:      Rate and Rhythm: Normal rate and regular rhythm.      Heart sounds: No murmur heard.     No friction rub. No gallop.   Pulmonary:      Effort: Pulmonary effort is normal.      Breath sounds: Normal breath sounds. No wheezing or rhonchi.   Lymphadenopathy:      Cervical: No cervical adenopathy.   Skin:     General: Skin is warm and dry.   Neurological:      Mental Status: She is alert and oriented to person, place, and time.      Cranial Nerves: No cranial nerve deficit.   Psychiatric:         Mood and Affect: Mood and affect normal.         Behavior: Behavior normal.         Thought Content: Thought content normal. Thought content does not include homicidal or suicidal ideation.         Judgment: Judgment normal.          Result Review :    The following data was reviewed by: ARA Peck on 07/30/2024:  POC COVID   Lab Results   Component Value Date    SARSANTIGEN Not Detected 08/01/2024      POC FLU    Lab Results   Component Value Date    RAPFLUA Negative 08/01/2024    RAPFLUB Positive (A) 08/01/2024             NM PET/CT Skull Base to Mid Thigh    Result Date: 7/23/2024  4.3 cm (craniocaudal) right adrenal mass appears larger than prior PET scan 5/30/2023 previously measuring up to about 3.2 cm. The nodule is more metabolically active than liver background with a max SUV about 3.4. This nodule has slowly increased in size compared to more remote exams including 2019. Neoplasm in the differential. Collision tumor possible. Consider resection or biopsy. 2 cm hypodense L1 vertebral body lesion is hypermetabolic max SUV 4.4 most concerning for neoplasm. MRI may be useful. Postoperative changes of the right breast. 1 cm left lower lobe lung nodule appears similar to previous chest CT 9/6/2022 best seen on image 89. It  is slightly more metabolically active than lung background but given the stability benign process more likely. A few other tiny right lung nodules too small too characterize; recommend continued follow-up. Prominent retroperitoneal lymph nodes are not clearly hypermetabolic. Electronically Signed: Mery Patrick MD  7/23/2024 4:20 PM EDT  Workstation ID: [id number]    CT Abdomen Pelvis With Contrast    Result Date: 7/10/2024  Impression: 1.The right adrenal nodule has increased in size from 10/18/2023 as detailed above. On today's exam it is 2.7 x 3.0 x 4.3 cm. Differential considerations include metastatic disease adrenocortical carcinoma or possibly pheochromocytoma. 2.There is a new 1.7 cm lytic lesion in the left side of the L1 vertebral body. No pathologic fracture. No other osseous lesions identified on CT, given her history consider further evaluation with whole-body bone scan and possibly MRI with and without contrast. 3.Retroperitoneal adenopathy has decreased in size and is no longer technically enlarged by CT size criteria. 4.There is a new area of wall thickening and fat stranding around a large diverticula in the distal descending colon. This is likely acute diverticulitis. Correlation with clinical signs and symptoms of diverticulitis is needed. Electronically Signed: Warren Eaton DO  7/10/2024 9:24 AM EDT  Workstation ID: [id number]    MRI Brain With & Without Contrast    Result Date: 7/9/2024  Impression: Stable mild typical chronic and age-related changes. Otherwise essentially normal contrast-enhanced MRI of the brain. Electronically Signed: Cristofer Maddox MD  7/9/2024 9:56 AM EDT  Workstation ID: [id number]    US Abdomen Complete    Result Date: 5/23/2024  Impression: Cholecystectomy.  Right adrenal nodule seen on CT scan 10/18/2023 difficult to visualize on ultrasound. Previously seen retroperitoneal adenopathy is not visualized.  Other findings as above.    Electronically Signed By-MERY  MD JEREMIAS On:5/23/2024 1:58 PM      Assessment & Plan  Influenza B  Discussed influenza is self-limiting, requires symptomatic treatment, rest, push fluids, small/frequent meals.  Take Tylenol as needed for fever and body aches.  Monitor for signs of dehydration and seek care immediately if unable to keep fluids down.     I will start her on Tamiflu due to being immunocompromised.  Nonintractable headache, unspecified chronicity pattern, unspecified headache type  Recommend that she take Tylenol as needed, rest and drink plenty fluids.    Orders Placed This Encounter   Procedures    POCT AMINATA SARS-CoV-2 Antigen HERNANDO    POC Influenza A / B     New Medications Ordered This Visit   Medications    oseltamivir (Tamiflu) 75 MG capsule     Sig: Take 1 capsule by mouth 2 (Two) Times a Day for 5 days. Indications: Influenza B     Dispense:  10 capsule     Refill:  0          BMI is within normal parameters. No other follow-up for BMI required.        Diagnosis Plan   1. Influenza B  oseltamivir (Tamiflu) 75 MG capsule      2. Nonintractable headache, unspecified chronicity pattern, unspecified headache type  POCT AMINATA SARS-CoV-2 Antigen HERNANDO    POC Influenza A / B            FOLLOW UP  Return if symptoms worsen or fail to improve.  Patient was given instructions and counseling regarding her condition or for health maintenance advice. Please see specific information pulled into the AVS if appropriate.       CURRENT & DISCONTINUED MEDICATIONS  Current Outpatient Medications   Medication Instructions    Acalabrutinib Maleate (CALQUENCE) 100 mg, Oral, 2 Times Daily    acetaminophen (TYLENOL) 1,000 mg, Oral, Daily PRN    colestipol (COLESTID) 2 g, Oral, 2 Times Daily    desvenlafaxine (PRISTIQ) 100 mg, Oral, Daily    gabapentin (NEURONTIN) 100 mg, Oral, Daily PRN    hydroCHLOROthiazide 25 mg, Oral, Daily    hydrOXYzine (ATARAX) 25 mg, Oral, 3 Times Daily PRN    metoprolol tartrate (LOPRESSOR) 25 mg, Oral, 2 Times Daily     oseltamivir (TAMIFLU) 75 mg, Oral, 2 Times Daily    prochlorperazine (COMPAZINE) 10 mg, Oral, Every 6 Hours PRN    rosuvastatin (CRESTOR) 20 mg, Oral, Nightly    SUMAtriptan (IMITREX) 50 MG tablet take one tablet by mouth at onset of migraine, may repeat after 2 hours, do not exceed 2 tablets in 24 hour period    Vitamin D 2,000 Units, Oral, Daily       There are no discontinued medications.     Parts of this note are electronic transcriptions/translations of spoken language to printed text using the Dragon Dictation system.    Monserrat Grover, APRN  08/01/24  15:41 EDT

## 2024-08-01 ENCOUNTER — OFFICE VISIT (OUTPATIENT)
Dept: FAMILY MEDICINE CLINIC | Facility: CLINIC | Age: 74
End: 2024-08-01
Payer: MEDICARE

## 2024-08-01 VITALS
HEIGHT: 62 IN | WEIGHT: 126.6 LBS | SYSTOLIC BLOOD PRESSURE: 145 MMHG | TEMPERATURE: 97.4 F | HEART RATE: 70 BPM | BODY MASS INDEX: 23.3 KG/M2 | OXYGEN SATURATION: 98 % | DIASTOLIC BLOOD PRESSURE: 56 MMHG

## 2024-08-01 DIAGNOSIS — J10.1 INFLUENZA B: Primary | ICD-10-CM

## 2024-08-01 DIAGNOSIS — R51.9 NONINTRACTABLE HEADACHE, UNSPECIFIED CHRONICITY PATTERN, UNSPECIFIED HEADACHE TYPE: ICD-10-CM

## 2024-08-01 LAB
EXPIRATION DATE: ABNORMAL
EXPIRATION DATE: NORMAL
FLUAV AG NPH QL: NEGATIVE
FLUBV AG NPH QL: POSITIVE
INTERNAL CONTROL: ABNORMAL
INTERNAL CONTROL: NORMAL
Lab: 7892
Lab: NORMAL
SARS-COV-2 AG UPPER RESP QL IA.RAPID: NOT DETECTED

## 2024-08-01 PROCEDURE — 1160F RVW MEDS BY RX/DR IN RCRD: CPT | Performed by: NURSE PRACTITIONER

## 2024-08-01 PROCEDURE — 1126F AMNT PAIN NOTED NONE PRSNT: CPT | Performed by: NURSE PRACTITIONER

## 2024-08-01 PROCEDURE — 99213 OFFICE O/P EST LOW 20 MIN: CPT | Performed by: NURSE PRACTITIONER

## 2024-08-01 PROCEDURE — 1159F MED LIST DOCD IN RCRD: CPT | Performed by: NURSE PRACTITIONER

## 2024-08-01 PROCEDURE — 3078F DIAST BP <80 MM HG: CPT | Performed by: NURSE PRACTITIONER

## 2024-08-01 PROCEDURE — 87426 SARSCOV CORONAVIRUS AG IA: CPT | Performed by: NURSE PRACTITIONER

## 2024-08-01 PROCEDURE — 3077F SYST BP >= 140 MM HG: CPT | Performed by: NURSE PRACTITIONER

## 2024-08-01 PROCEDURE — 87804 INFLUENZA ASSAY W/OPTIC: CPT | Performed by: NURSE PRACTITIONER

## 2024-08-01 RX ORDER — OSELTAMIVIR PHOSPHATE 75 MG/1
75 CAPSULE ORAL 2 TIMES DAILY
Qty: 10 CAPSULE | Refills: 0 | Status: SHIPPED | OUTPATIENT
Start: 2024-08-01 | End: 2024-08-06

## 2024-08-03 ENCOUNTER — HOSPITAL ENCOUNTER (EMERGENCY)
Facility: HOSPITAL | Age: 74
Discharge: HOME | End: 2024-08-03
Attending: EMERGENCY MEDICINE
Payer: MEDICARE

## 2024-08-03 ENCOUNTER — APPOINTMENT (OUTPATIENT)
Dept: CT IMAGING | Facility: HOSPITAL | Age: 74
End: 2024-08-03
Payer: MEDICARE

## 2024-08-03 VITALS
HEIGHT: 62 IN | RESPIRATION RATE: 16 BRPM | SYSTOLIC BLOOD PRESSURE: 140 MMHG | BODY MASS INDEX: 22.92 KG/M2 | DIASTOLIC BLOOD PRESSURE: 76 MMHG | HEART RATE: 86 BPM | WEIGHT: 124.56 LBS | TEMPERATURE: 98.7 F | OXYGEN SATURATION: 97 %

## 2024-08-03 DIAGNOSIS — R51.9 NONINTRACTABLE HEADACHE, UNSPECIFIED CHRONICITY PATTERN, UNSPECIFIED HEADACHE TYPE: Primary | ICD-10-CM

## 2024-08-03 DIAGNOSIS — R11.2 NAUSEA AND VOMITING, UNSPECIFIED VOMITING TYPE: ICD-10-CM

## 2024-08-03 LAB
FLUAV SUBTYP SPEC NAA+PROBE: NOT DETECTED
FLUBV RNA ISLT QL NAA+PROBE: NOT DETECTED
RSV RNA NPH QL NAA+NON-PROBE: NOT DETECTED
SARS-COV-2 RNA RESP QL NAA+PROBE: NOT DETECTED

## 2024-08-03 PROCEDURE — 99284 EMERGENCY DEPT VISIT MOD MDM: CPT

## 2024-08-03 PROCEDURE — 87637 SARSCOV2&INF A&B&RSV AMP PRB: CPT

## 2024-08-03 PROCEDURE — 25010000002 DEXAMETHASONE SODIUM PHOSPHATE 10 MG/ML SOLUTION

## 2024-08-03 PROCEDURE — 96374 THER/PROPH/DIAG INJ IV PUSH: CPT

## 2024-08-03 PROCEDURE — 25010000002 KETOROLAC TROMETHAMINE PER 15 MG

## 2024-08-03 PROCEDURE — 25010000002 METOCLOPRAMIDE PER 10 MG

## 2024-08-03 PROCEDURE — 25810000003 SODIUM CHLORIDE 0.9 % SOLUTION

## 2024-08-03 PROCEDURE — 96375 TX/PRO/DX INJ NEW DRUG ADDON: CPT

## 2024-08-03 PROCEDURE — 70450 CT HEAD/BRAIN W/O DYE: CPT

## 2024-08-03 PROCEDURE — 25010000002 DIPHENHYDRAMINE PER 50 MG

## 2024-08-03 RX ORDER — ONDANSETRON 4 MG/1
4 TABLET, ORALLY DISINTEGRATING ORAL 4 TIMES DAILY PRN
Qty: 20 TABLET | Refills: 0 | Status: SHIPPED | OUTPATIENT
Start: 2024-08-03

## 2024-08-03 RX ORDER — DIPHENHYDRAMINE HYDROCHLORIDE 50 MG/ML
12.5 INJECTION INTRAMUSCULAR; INTRAVENOUS ONCE
Status: COMPLETED | OUTPATIENT
Start: 2024-08-03 | End: 2024-08-03

## 2024-08-03 RX ORDER — DEXAMETHASONE SODIUM PHOSPHATE 10 MG/ML
6 INJECTION, SOLUTION INTRAMUSCULAR; INTRAVENOUS ONCE
Status: COMPLETED | OUTPATIENT
Start: 2024-08-03 | End: 2024-08-03

## 2024-08-03 RX ORDER — KETOROLAC TROMETHAMINE 15 MG/ML
15 INJECTION, SOLUTION INTRAMUSCULAR; INTRAVENOUS ONCE
Status: COMPLETED | OUTPATIENT
Start: 2024-08-03 | End: 2024-08-03

## 2024-08-03 RX ORDER — METOCLOPRAMIDE HYDROCHLORIDE 5 MG/ML
10 INJECTION INTRAMUSCULAR; INTRAVENOUS ONCE
Status: COMPLETED | OUTPATIENT
Start: 2024-08-03 | End: 2024-08-03

## 2024-08-03 RX ADMIN — DEXAMETHASONE SODIUM PHOSPHATE 6 MG: 10 INJECTION INTRAMUSCULAR; INTRAVENOUS at 11:12

## 2024-08-03 RX ADMIN — DIPHENHYDRAMINE HYDROCHLORIDE 12.5 MG: 50 INJECTION, SOLUTION INTRAMUSCULAR; INTRAVENOUS at 11:14

## 2024-08-03 RX ADMIN — KETOROLAC TROMETHAMINE 15 MG: 15 INJECTION, SOLUTION INTRAMUSCULAR; INTRAVENOUS at 11:11

## 2024-08-03 RX ADMIN — METOCLOPRAMIDE HYDROCHLORIDE 10 MG: 5 INJECTION INTRAMUSCULAR; INTRAVENOUS at 11:13

## 2024-08-03 RX ADMIN — SODIUM CHLORIDE 1000 ML: 9 INJECTION, SOLUTION INTRAVENOUS at 11:10

## 2024-08-03 NOTE — DISCHARGE INSTRUCTIONS
Please know that your head CT was within normal limits.  Also your flu and COVID test were negative.  It appears that the Tamiflu you recently took did help shorten the duration of the flu that you recently tested positive for.  If you develop the worst headache of your life, a fever that you cannot control with Tylenol or Motrin, or severe nausea, vomiting, or diarrhea please return to the emergency department.  Otherwise follow-up with your primary care provider in 2 to 3 days.

## 2024-08-03 NOTE — ED PROVIDER NOTES
Time: 10:36 AM EDT  Date of encounter:  8/3/2024  Room number: 56/56  Independent Historian/Clinical History and Information was obtained by:   Patient    History is limited by: N/A    Chief Complaint: Headache      History of Present Illness:  Patient is a 74 y.o. year old female who presents to the emergency department for evaluation of headache.  Patient states she has had a headache for 3 to 4 days.  She only describes getting a bad headache usually once a year.  She is also currently positive for flu, testing positive at her PCP on Thursday.  She does report the headache started prior to that.  She has had nausea, vomiting, but denies any cough, diarrhea, fevers or chills.  Light and noise is reported to make patient's headache worse.  She denies being on blood thinners or taking an aspirin daily.  She does have a history of cancer.    HPI    Patient Care Team  Primary Care Provider: Monserrat Grover APRN    Past Medical History:     Allergies   Allergen Reactions    Ace Inhibitors Cough    Sulfamethoxazole-Trimethoprim Hives    Levaquin [Levofloxacin] Hives    Lisinopril Cough and Hives    Nsaids Itching, Nausea Only and Rash    Sulfa Antibiotics Itching, Nausea Only, Rash and Hives     Past Medical History:   Diagnosis Date    Allergic     Anxiety     Arthritis     Breast cancer     Cancer     BREAST CANCER    Cataract     Cholelithiases 4/30@1    CLL (chronic lymphocytic leukemia) 2023    Clotting disorder     Disease of thyroid gland     currently not taking any meds    Diverticulitis     Diverticulitis of colon     Diverticulosis 95    Fibromyalgia     Fibromyalgia, primary     HIV disease     Hyperlipidemia     Hypertension     Infectious viral hepatitis     Irritable bowel syndrome     Kidney stone     Liver disease     Low back pain 2015    Major depressive disorder 05/07/2020    Palpitations     ASYMPTOMATIC- DENIES CP/SOB, SEE PCP- NO CARDIOLOGIST     Post-menopause 09/17/2020    Sinus trouble      Sleep apnea     Substance abuse     Tremor     I was on medicine for tremors    Urinary tract infection     Vitamin D deficiency 2020     Past Surgical History:   Procedure Laterality Date    ABDOMINAL SURGERY  1985    APPENDECTOMY      BILATERAL BREAST REDUCTION      BREAST BIOPSY Right 2021    Procedure: RIGHT BREAST NEEDLE LOCALIZED  LUMPECTOMY WITH SENTINEL NODE BIOPSY;  Surgeon: Mirtha Esteves MD;  Location: [facility] OR [facility];  Service: General;  Laterality: Right;    BREAST LUMPECTOMY      CHOLECYSTECTOMY N/A 2021    Procedure: CHOLECYSTECTOMY LAPAROSCOPIC INTRAOPERATIVE CHOLANGIOGRAM;  Surgeon: Louie Medina MD;  Location: [facility] MAIN OR;  Service: General;  Laterality: N/A;    COLONOSCOPY  2019    COLONOSCOPY N/A 2022    Procedure: COLONOSCOPY WITH BIOPSIES;  Surgeon: Butch Garcia MD;  Location: [facility] ENDOSCOPY;  Service: Gastroenterology;  Laterality: N/A;  DIVERTICULOSIS    HYSTERECTOMY      LYMPH NODE BIOPSY      SEPTOPLASTY  2018    SINUS SURGERY  2018    TONSILLECTOMY      US GUIDED CYST ASPIRATION BREAST N/A 10/03/2022    WRIST ARTHROPLASTY Bilateral      Family History   Problem Relation Age of Onset    No Known Problems Mother     Cancer Father         Leukemia      Breast cancer Sister     Cancer Sister         Had breast cancer    Cancer Brother         Leukemia/CLL    Cancer Sister         Had stomach cancer    Cancer Brother         Bladder cancer    Cancer Brother         Bladder cancer    Cancer Sister         Retinal blastoma.    Colon cancer Neg Hx        Home Medications:  Prior to Admission medications    Medication Sig Start Date End Date Taking? Authorizing Provider   Acalabrutinib Maleate (CALQUENCE) 100 MG tablet Take 1 tablet by mouth 2 (Two) Times a Day. 1/10/24   Luciano Glez MD   acetaminophen (TYLENOL) 500 MG tablet Take 2 tablets by mouth Daily As Needed for Mild Pain.    Provider, MD Larry    Cholecalciferol (Vitamin D) 50 MCG (2000 UT) capsule Take 1 capsule by mouth Daily. Indications: Vitamin D Deficiency 5/31/24   Monserrat Grover APRN   colestipol (Colestid) 1 g tablet Take 2 tablets by mouth 2 (Two) Times a Day. Indications: chronic diarrhea 5/31/24   Monserrat Grover APRN   desvenlafaxine (Pristiq) 100 MG 24 hr tablet Take 1 tablet by mouth Daily. Indications: Major Depressive Disorder 5/31/24   Monserrat Grover APRN   gabapentin (NEURONTIN) 100 MG capsule Take 1 capsule by mouth Daily As Needed.    Provider, MD Larry   hydroCHLOROthiazide 25 MG tablet Take 1 tablet by mouth Daily. Indications: High Blood Pressure Disorder 5/31/24   Monserrat Grover APRN   hydrOXYzine (ATARAX) 25 MG tablet Take 1 tablet by mouth 3 (Three) Times a Day As Needed for Itching. 2/13/24   Luciano Glez MD   metoprolol tartrate (LOPRESSOR) 25 MG tablet Take 1 tablet by mouth 2 (Two) Times a Day. Indications: High Blood Pressure Disorder 5/31/24   Monserrat Grover APRN   oseltamivir (Tamiflu) 75 MG capsule Take 1 capsule by mouth 2 (Two) Times a Day for 5 days. Indications: Influenza B 8/1/24 8/6/24  Monserrat Grover APRN   prochlorperazine (COMPAZINE) 10 MG tablet Take 1 tablet by mouth Every 6 (Six) Hours As Needed for Nausea. 2/29/24   Luciano Glez MD   rosuvastatin (CRESTOR) 20 MG tablet Take 1 tablet by mouth Every Night. Indications: Elevation of Both Cholesterol and Triglycerides in Blood 5/31/24   Monserrat Grover APRN   SUMAtriptan (IMITREX) 50 MG tablet take one tablet by mouth at onset of migraine, may repeat after 2 hours, do not exceed 2 tablets in 24 hour period 1/3/24   Provider, MD Larry        Social History:   Social History     Tobacco Use    Smoking status: Never     Passive exposure: Yes    Smokeless tobacco: Never   Vaping Use    Vaping status: Never Used   Substance Use Topics    Alcohol use: Never    Drug use: Never  "        Review of Systems:  Review of Systems   Constitutional:  Negative for chills and fever.   HENT:  Negative for congestion, ear pain and sore throat.    Eyes:  Positive for photophobia. Negative for pain.   Respiratory:  Negative for cough, chest tightness and shortness of breath.    Cardiovascular:  Negative for chest pain.   Gastrointestinal:  Positive for nausea and vomiting. Negative for abdominal pain and diarrhea.   Genitourinary:  Negative for flank pain and hematuria.   Musculoskeletal:  Negative for joint swelling.   Skin:  Negative for pallor.   Neurological:  Positive for headaches. Negative for seizures.   All other systems reviewed and are negative.       Physical Exam:  /76 (BP Location: Left arm, Patient Position: Sitting)   Pulse 86   Temp 98.7 °F (37.1 °C) (Oral)   Resp 16   Ht 157.5 cm (62\")   Wt 56.5 kg (124 lb 9 oz)   SpO2 97%   BMI 22.78 kg/m²     Physical Exam  Vitals and nursing note reviewed.   Constitutional:       General: She is not in acute distress.     Appearance: Normal appearance. She is not ill-appearing (Patient does not present toxic or septic however she does appear as if she does not feel well), toxic-appearing or diaphoretic.   HENT:      Head: Normocephalic and atraumatic.      Mouth/Throat:      Mouth: Mucous membranes are moist.   Eyes:      General: No scleral icterus.     Pupils: Pupils are equal, round, and reactive to light.   Cardiovascular:      Rate and Rhythm: Normal rate and regular rhythm.      Pulses: Normal pulses.      Heart sounds: Normal heart sounds.   Pulmonary:      Effort: Pulmonary effort is normal. No respiratory distress.      Breath sounds: Normal breath sounds. No stridor. No wheezing, rhonchi or rales.   Chest:      Chest wall: No tenderness.   Abdominal:      General: Abdomen is flat. There is no distension.      Palpations: Abdomen is soft.      Tenderness: There is no abdominal tenderness. There is no guarding.   Musculoskeletal: "         General: Normal range of motion.      Cervical back: Normal range of motion and neck supple.   Skin:     General: Skin is warm and dry.      Coloration: Skin is not jaundiced or pale.      Findings: No bruising, erythema, lesion or rash.   Neurological:      General: No focal deficit present.      Mental Status: She is alert and oriented to person, place, and time. Mental status is at baseline.      Sensory: No sensory deficit.   Psychiatric:         Mood and Affect: Mood normal.         Behavior: Behavior normal.         Thought Content: Thought content normal.         Judgment: Judgment normal.                  Procedures:  Procedures      Medical Decision Making:      Comorbidities that affect care:    Breast cancer, leukemia, hypertension, depression, anxiety, diverticulitis, thyroid disease, palpitations, HIV, kidney stones, cataracts, fibromyalgia, clotting disorder, liver disease    External Notes reviewed:    Previous Labs: I reviewed patient's labs from 8/1/2024 where she was positive for influenza B      The following orders were placed and all results were independently analyzed by me:  Orders Placed This Encounter   Procedures    COVID PRE-OP / PRE-PROCEDURE SCREENING ORDER (NO ISOLATION) - Swab, Nasopharynx    COVID-19, FLU A/B, RSV PCR 1 HR TAT - Swab, Nasopharynx    CT Head Without Contrast       Medications Given in the Emergency Department:  Medications   sodium chloride 0.9 % bolus 1,000 mL (1,000 mL Intravenous New Bag 8/3/24 1110)   ketorolac (TORADOL) injection 15 mg (15 mg Intravenous Given 8/3/24 1111)   metoclopramide (REGLAN) injection 10 mg (10 mg Intravenous Given 8/3/24 1113)   diphenhydrAMINE (BENADRYL) injection 12.5 mg (12.5 mg Intravenous Given 8/3/24 1114)   dexAMETHasone sodium phosphate injection 6 mg (6 mg Intravenous Given 8/3/24 1112)        ED Course:    ED Course as of 08/03/24 1223   Sat Aug 03, 2024   1216 Patient reports her headache has improved and now rates it as  a 4.  Additionally she has had no vomiting since arriving in the emergency department and vital signs have remained stable and she has been afebrile. [MS]      ED Course User Index  [MS] Izabella Uriarte, ARA       Labs:    Lab Results (last 24 hours)       Procedure Component Value Units Date/Time    COVID PRE-OP / PRE-PROCEDURE SCREENING ORDER (NO ISOLATION) - Swab, Nasopharynx [[id number]]  (Normal) Collected: 08/03/24 1108    Specimen: Swab from Nasopharynx Updated: 08/03/24 1156    Narrative:      The following orders were created for panel order COVID PRE-OP / PRE-PROCEDURE SCREENING ORDER (NO ISOLATION) - Swab, Nasopharynx.  Procedure                               Abnormality         Status                     ---------                               -----------         ------                     COVID-19, FLU A/B, RSV P...[[id number]]  Normal              Final result                 Please view results for these tests on the individual orders.    COVID-19, FLU A/B, RSV PCR 1 HR TAT - Swab, Nasopharynx [[id number]]  (Normal) Collected: 08/03/24 1108    Specimen: Swab from Nasopharynx Updated: 08/03/24 1156     COVID19 Not Detected     Influenza A PCR Not Detected     Influenza B PCR Not Detected     RSV, PCR Not Detected    Narrative:      Fact sheet for providers: https://www.fda.gov/media/460333/download    Fact sheet for patients: https://www.fda.gov/media/600862/download    Test performed by PCR.             Imaging:    CT Head Without Contrast    Result Date: 8/3/2024  CT HEAD WO CONTRAST Date of Exam: 8/3/2024 11:34 AM EDT Indication: headache with hx cancer. Comparison: MRI head 7/8/2024 Technique: Axial CT images were obtained of the head without contrast administration.  Reconstructed coronal and sagittal images were also obtained. Automated exposure control and iterative construction methods were used. FINDINGS: There is no evidence for acute intracranial hemorrhage. No definitive acute focal  ischemia is identified. Nonspecific white matter changes are noted likely related to chronic small vessel ischemic changes and age-related changes. Associated diffuse volume loss is observed. A stable low-density focus is noted in the region of the right basal ganglia. There is no evidence for abnormal cerebral edema. There is no mass effect or midline shift. The ventricular system is nondilated. The basal cisterns are patent. The skull is intact. The paranasal sinuses and mastoid air cells are clear.     1.No evidence for acute intracranial abnormality. 2.Nonspecific white matter changes are noted with associated diffuse volume loss. These findings are likely related to chronic small vessel ischemic changes and/or age-related changes. Electronically Signed: Jefferson Valera MD  8/3/2024 11:44 AM EDT  Workstation ID: [id number]       Differential Diagnosis and Discussion:    Headache: Differential diagnosis includes but is not limited to migraine, cluster headache, hypertension, tumor, subarachnoid bleeding, pseudotumor cerebri, temporal arteritis, infections, tension headache, and TMJ syndrome.    All labs were reviewed and interpreted by me.    MDM               Patient Care Considerations:    ANTIVIRAL: I considered prescribing antiviral medication as an outpatient, however patient recently finished a course of Tamiflu.      Consultants/Shared Management Plan:    None    Social Determinants of Health:    Patient is independent, reliable, and has access to care.       Disposition and Care Coordination:    Discharged: The patient is suitable and stable for discharge with no need for consideration of admission.    I have explained the patient´s condition, diagnoses and treatment plan based on the information available to me at this time. I have answered questions and addressed any concerns. The patient has a good  understanding of the patient´s diagnosis, condition, and treatment plan as can be expected at this point.  The vital signs have been stable. The patient´s condition is stable and appropriate for discharge from the emergency department.      The patient will pursue further outpatient evaluation with the primary care physician or other designated or consulting physician as outlined in the discharge instructions. They are agreeable to this plan of care and follow-up instructions have been explained in detail. The patient has received these instructions in written format and has expressed an understanding of the discharge instructions. The patient is aware that any significant change in condition or worsening of symptoms should prompt an immediate return to this or the closest emergency department or call to 911.    Final diagnoses:   Nonintractable headache, unspecified chronicity pattern, unspecified headache type   Nausea and vomiting, unspecified vomiting type        ED Disposition       ED Disposition   Discharge    Condition   Stable    Comment   --               This medical record created using voice recognition software.       Izabella Uriarte, ARA  08/03/24 [id number]

## 2024-08-05 ENCOUNTER — TELEPHONE (OUTPATIENT)
Dept: ONCOLOGY | Facility: HOSPITAL | Age: 74
End: 2024-08-05
Payer: MEDICARE

## 2024-08-05 NOTE — TELEPHONE ENCOUNTER
The pt called and c/o Migraines x2 weeks. When questioned the pt states that she has Migraines daily. The pt states that each Migraine last at least a couple hours.The pt states that her migraines are so bad that she had to go to the ER on 8/3. The pt has a apt on 8/13 and is requesting to have her apt moved up to a closer apt.

## 2024-08-06 NOTE — TELEPHONE ENCOUNTER
"Patient reports she is taking Imitrex for headaches. She states 1 pill doesn't help, 2 pills helps \"a little\".  She has not been checking her BP, but reports it was high at ER.  Advised patient to monitor her BP 1-2 x/day.  Patient to hod her Acalabrutinib x 1 week to see if this helps her headaches.  Patient to follow up with us in 1 week. We will reassess at that time.  "

## 2024-08-07 ENCOUNTER — TELEPHONE (OUTPATIENT)
Dept: UROLOGY | Facility: CLINIC | Age: 74
End: 2024-08-07

## 2024-08-07 RX ORDER — SUMATRIPTAN 100 MG/1
100 TABLET, FILM COATED ORAL TAKE AS DIRECTED
Qty: 20 TABLET | Refills: 3 | Status: SHIPPED | OUTPATIENT
Start: 2024-08-07

## 2024-08-13 ENCOUNTER — SPECIALTY PHARMACY (OUTPATIENT)
Dept: PHARMACY | Facility: HOSPITAL | Age: 74
End: 2024-08-13
Payer: MEDICARE

## 2024-08-13 ENCOUNTER — OFFICE VISIT (OUTPATIENT)
Dept: ONCOLOGY | Facility: HOSPITAL | Age: 74
End: 2024-08-13
Payer: MEDICARE

## 2024-08-13 ENCOUNTER — LAB (OUTPATIENT)
Dept: ONCOLOGY | Facility: HOSPITAL | Age: 74
End: 2024-08-13
Payer: MEDICARE

## 2024-08-13 VITALS
HEIGHT: 62 IN | HEART RATE: 64 BPM | TEMPERATURE: 97.3 F | OXYGEN SATURATION: 96 % | RESPIRATION RATE: 18 BRPM | BODY MASS INDEX: 23.04 KG/M2 | WEIGHT: 125.22 LBS | DIASTOLIC BLOOD PRESSURE: 66 MMHG | SYSTOLIC BLOOD PRESSURE: 159 MMHG

## 2024-08-13 DIAGNOSIS — R94.8 ABNORMAL POSITRON EMISSION TOMOGRAPHY (PET) SCAN: Primary | ICD-10-CM

## 2024-08-13 DIAGNOSIS — I10 PRIMARY HYPERTENSION: ICD-10-CM

## 2024-08-13 DIAGNOSIS — E27.8 RIGHT ADRENAL MASS: ICD-10-CM

## 2024-08-13 DIAGNOSIS — Z15.01 LI-FRAUMENI SYNDROME: ICD-10-CM

## 2024-08-13 DIAGNOSIS — G43.819 OTHER MIGRAINE WITHOUT STATUS MIGRAINOSUS, INTRACTABLE: ICD-10-CM

## 2024-08-13 PROCEDURE — 99214 OFFICE O/P EST MOD 30 MIN: CPT | Performed by: INTERNAL MEDICINE

## 2024-08-13 PROCEDURE — 3077F SYST BP >= 140 MM HG: CPT | Performed by: INTERNAL MEDICINE

## 2024-08-13 PROCEDURE — 1126F AMNT PAIN NOTED NONE PRSNT: CPT | Performed by: INTERNAL MEDICINE

## 2024-08-13 PROCEDURE — 3078F DIAST BP <80 MM HG: CPT | Performed by: INTERNAL MEDICINE

## 2024-08-13 PROCEDURE — G0463 HOSPITAL OUTPT CLINIC VISIT: HCPCS | Performed by: INTERNAL MEDICINE

## 2024-08-13 PROCEDURE — G2211 COMPLEX E/M VISIT ADD ON: HCPCS | Performed by: INTERNAL MEDICINE

## 2024-08-13 RX ORDER — LOSARTAN POTASSIUM 25 MG/1
25 TABLET ORAL DAILY
Qty: 30 TABLET | Refills: 5 | Status: SHIPPED | OUTPATIENT
Start: 2024-08-13

## 2024-08-13 NOTE — PROGRESS NOTES
Patient  Dorothea Cruz    [facility] HEMATOLOGY & ONCOLOGY    Chief Complaint  BREAST CA-- 6 WK PET SCAN    Referring Provider: TYRELL Peck*  PCP: Monserrat Grover APRN    Subjective          Oncology/Hematology History Overview Note     Right Triple Negative Breast Cancer:  - screening mammogram 9/10/21: 4mm asymmetry in the right upper inner breast  -Diagnostic mammogram on 2021: Persistent 5 mm ill-defined nodule in the upper inner mid right breast at 2:00, 7 cm from the nipple.  - core biopsy on 10/8/21: Invasive ductal carcinoma, grade 3, ER negative (less than 1%), SC negative (less than 1%), HER-2 equivocal (2+ HC), HER-2 FISH not amplified  - Right lumpectomy on 21. Pathology showed a 5mm tumor with negative margins, 0/3 lymph nodes involved, pT1aN0, ER-, SC-, HER2-  - chemotherapy was not recommended due to the small size of the tumor.   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  Mildly increased CD4 positive T cell LGL (5.2% of analyzed cells).  - CLL FISH positive for TP53/17p13 deletion in 49.5% of cells.  - IgVH somatic hypermutation was detected (3.7%)  -24: Start acalabrutinib due to symptoms of weakness, night sweats, tremor. WBC 31K, plt 133, hgb 13.7  - 3/7/24: Resume acalabrutinib after hold due to palpiations and diarrhea, which was from C diff. WBC 33K. Plts normal.     Li Fraumeni Syndrome (inherited p53 mutation):   Family History:    - brother with CLL  - father  from some type of leukemia  - sister with breast cancer and adult onset retinoblastoma which was fatal  - niece (sister's daughter) also had retinoblastoma as a child  - pt is considering genetic testing     Breast cancer   10/20/2021 Initial Diagnosis    Breast cancer  (HCC)     Malignant neoplasm of upper-inner quadrant of right female breast   11/12/2021 Cancer Staged    Staging form: Breast, AJCC 8th Edition  - Pathologic: pT1a, pN0, cM0, ER-, NE-, HER2- - Signed by Santa Haskins APRN on 5/20/2022 1/13/2022 Initial Diagnosis    Malignant neoplasm of upper-inner quadrant of right female breast (HCC)     1/20/2022 - 2/14/2022 Radiation    Radiation OncologyTreatment Course:  Dorothea Cruz received 4256 cGy in 16 fractions to right breast.      CLL (chronic lymphocytic leukemia)   3/10/2023 Initial Diagnosis    CLL (chronic lymphocytic leukemia)     1/12/2024 -  Chemotherapy    OP LYMPHOMA (CLL) Acalabrutinib         History of Present Illness  Patient comes in today for follow-up. She remains on acalabrutinib.  Patient had recent PET scan with small level activity in right adrenal mass as well as small activity in L1 vertebral body.  Abnormal focus of enhancement shown on MRI of L-spine at L2, which correlates with findings on PET scan.  Patient reports 2 to 3 weeks of daily persistent headaches.  This been associated with high blood pressures.  Systolic up into the 180s on occasion.  She is only on metoprolol for HTN.  She takes Tylenol for the headaches.  She is also on sumatriptan and this helps a little bit.  Reports that headaches are bilateral and a lot of pressure.  Pressure today can be behind her eyes.  She is sensitive to light.  She was having some nausea but that has since improved.  She was off acalabrutinib for a week and this did not help with the headaches.      Review of Systems   Constitutional:  Positive for appetite change, fatigue and unexpected weight loss. Negative for diaphoresis, fever and unexpected weight gain.   HENT:  Negative for hearing loss, mouth sores, sore throat, swollen glands, trouble swallowing and voice change.    Eyes:  Negative for blurred vision.   Respiratory:  Negative for cough, shortness of breath and wheezing.     Cardiovascular:  Negative for chest pain and palpitations.   Gastrointestinal:  Negative for abdominal pain, blood in stool, constipation, diarrhea and vomiting.   Endocrine: Negative for cold intolerance and heat intolerance.   Genitourinary:  Negative for difficulty urinating, dysuria, frequency, hematuria and urinary incontinence.   Musculoskeletal:  Negative for arthralgias and myalgias.   Skin:  Negative for rash, skin lesions and wound.   Neurological:  Positive for tremors and weakness. Negative for dizziness, seizures, numbness and headache.   Hematological:  Does not bruise/bleed easily.   Psychiatric/Behavioral:  Negative for depressed mood. The patient is not nervous/anxious.    All other systems reviewed and are negative.      Past Medical History:   Diagnosis Date    Allergic     Anxiety     Arthritis     Breast cancer     Cancer     BREAST CANCER    Cataract     Cholelithiases 4/30@1    CLL (chronic lymphocytic leukemia) 2023    Clotting disorder     Disease of thyroid gland     currently not taking any meds    Diverticulitis     Diverticulitis of colon     Diverticulosis 95    Fibromyalgia     Fibromyalgia, primary     HIV disease     Hyperlipidemia     Hypertension     Infectious viral hepatitis     Irritable bowel syndrome     Kidney stone     Liver disease     Low back pain 2015    Major depressive disorder 05/07/2020    Palpitations     ASYMPTOMATIC- DENIES CP/SOB, SEE PCP- NO CARDIOLOGIST     Post-menopause 09/17/2020    Sinus trouble     Sleep apnea     Substance abuse     Tremor 2018    I was on medicine for tremors    Urinary tract infection     Vitamin D deficiency 09/17/2020     Past Surgical History:   Procedure Laterality Date    ABDOMINAL SURGERY  06/14/1985    APPENDECTOMY      BILATERAL BREAST REDUCTION      BREAST BIOPSY Right 11/12/2021    Procedure: RIGHT BREAST NEEDLE LOCALIZED  LUMPECTOMY WITH SENTINEL NODE BIOPSY;  Surgeon: Mirtha Esteves MD;  [place] [facility] [place] [facility] [place]   Service: General;  Laterality: Right;    BREAST LUMPECTOMY      CHOLECYSTECTOMY N/A 2021    Procedure: CHOLECYSTECTOMY LAPAROSCOPIC INTRAOPERATIVE CHOLANGIOGRAM;  Surgeon: Louie Medina MD;  Location: [facility] MAIN OR;  Service: General;  Laterality: N/A;    COLONOSCOPY  2019    COLONOSCOPY N/A 2022    Procedure: COLONOSCOPY WITH BIOPSIES;  Surgeon: Butch Garcia MD;  Location: [facility] ENDOSCOPY;  Service: Gastroenterology;  Laterality: N/A;  DIVERTICULOSIS    HYSTERECTOMY      LYMPH NODE BIOPSY      SEPTOPLASTY  2018    SINUS SURGERY  2018    TONSILLECTOMY      US GUIDED CYST ASPIRATION BREAST N/A 10/03/2022    WRIST ARTHROPLASTY Bilateral      Social History     Socioeconomic History    Marital status:    Tobacco Use    Smoking status: Never     Passive exposure: Yes    Smokeless tobacco: Never   Vaping Use    Vaping status: Never Used   Substance and Sexual Activity    Alcohol use: Never    Drug use: Never    Sexual activity: Not Currently     Partners: Female     Birth control/protection: Post-menopausal, Hysterectomy     Family History   Problem Relation Age of Onset    No Known Problems Mother     Cancer Father         Leukemia      Breast cancer Sister     Cancer Sister         Had breast cancer    Cancer Brother         Leukemia/CLL    Cancer Sister         Had stomach cancer    Cancer Brother         Bladder cancer    Cancer Brother         Bladder cancer    Cancer Sister         Retinal blastoma.    Colon cancer Neg Hx        Objective   Physical Exam  General: Alert, cooperative, no acute distress  Eyes: Anicteric sclera, PERRLA  Respiratory: normal respiratory effort  Skin: Normal tone, no rash, no lesions  Psychiatric: Appropriate affect, intact judgment  Neurologic: No focal sensory or motor deficits, normal cognition, bilateral intention tremor  Musculoskeletal: Normal muscle strength and tone    Vitals:    24 0838   BP: 159/66   Pulse: 64   Resp: 18   Temp:  "97.3 °F (36.3 °C)   TempSrc: Temporal   SpO2: 96%   Weight: 56.8 kg (125 lb 3.5 oz)   Height: 157.5 cm (62.01\")   PainSc: 0-No pain                   PHQ-9 Total Score:         Result Review :   The following data was reviewed by: Luciano Glez MD on 08/13/24:  Lab Results   Component Value Date    HGB 12.5 07/11/2024    HCT 39.2 07/11/2024    MCV 92.9 07/11/2024     07/11/2024    WBC 19.98 (H) 07/11/2024    NEUTROABS 5.30 07/11/2024    LYMPHSABS 13.81 (H) 07/11/2024    MONOSABS 0.64 07/11/2024    EOSABS 0.10 07/11/2024    BASOSABS 0.07 07/11/2024     Lab Results   Component Value Date    GLUCOSE 114 (H) 07/11/2024    BUN 13 07/11/2024    CREATININE 0.59 07/11/2024     07/11/2024    K 3.6 07/11/2024     07/11/2024    CO2 27.7 07/11/2024    CALCIUM 9.9 07/11/2024    PROTEINTOT 6.3 07/11/2024    ALBUMIN 3.9 07/11/2024    BILITOT 0.3 07/11/2024    ALKPHOS 91 07/11/2024    AST 17 07/11/2024    ALT 12 07/11/2024     No results found for: \"IRON\", \"LABIRON\", \"TRANSFERRIN\", \"TIBC\"  Lab Results   Component Value Date    QPGVIDGO27 317 03/08/2022    FOLATE 14.40 03/08/2022     No results found for: \"PSA\", \"CEA\", \"AFP\", \"\", \"\"    Labs personally reviewed and WBC still up but better than prior.      ED note personally reviewed      CT Head Without Contrast    Result Date: 8/3/2024  1.No evidence for acute intracranial abnormality. 2.Nonspecific white matter changes are noted with associated diffuse volume loss. These findings are likely related to chronic small vessel ischemic changes and/or age-related changes. Electronically Signed: Jefferson Valera MD  8/3/2024 11:44 AM EDT  Workstation ID: [id number]    MRI Lumbar Spine With & Without Contrast    Result Date: 8/1/2024     1. There is abnormal signal and enhancement involving the left aspect of the L2 vertebral body, which may represent an intraosseous metastasis. A primary bone malignancy cannot be excluded but is thought to be less likely. " No other such findings are appreciated. No pathologic fracture is associated with the finding.  2. No acute vertebral compression fractures are seen.  3. Degenerative changes are present at multiple levels, as detailed above.  4. Chronic retrolisthesis is seen at L5-S1, estimated at 6 mm.  5. No cord signal or enhancement abnormality is suggested.  6. No cauda equina masses are suspected.  7. Please see above comments for further detail.    Please note that portions of this note were completed with a voice recognition program.     Electronically Signed By-Hiram Garza MD On:8/1/2024 5:39 AM      NM PET/CT Skull Base to Mid Thigh    Result Date: 7/23/2024  4.3 cm (craniocaudal) right adrenal mass appears larger than prior PET scan 5/30/2023 previously measuring up to about 3.2 cm. The nodule is more metabolically active than liver background with a max SUV about 3.4. This nodule has slowly increased in size compared to more remote exams including 2019. Neoplasm in the differential. Collision tumor possible. Consider resection or biopsy. 2 cm hypodense L1 vertebral body lesion is hypermetabolic max SUV 4.4 most concerning for neoplasm. MRI may be useful. Postoperative changes of the right breast. 1 cm left lower lobe lung nodule appears similar to previous chest CT 9/6/2022 best seen on image 89. It is slightly more metabolically active than lung background but given the stability benign process more likely. A few other tiny right lung nodules too small too characterize; recommend continued follow-up. Prominent retroperitoneal lymph nodes are not clearly hypermetabolic. Electronically Signed: Anderson Patrick MD  7/23/2024 4:20 PM EDT  Workstation ID: [id number]          Assessment and Plan    Diagnoses and all orders for this visit:    1. Abnormal positron emission tomography (PET) scan (Primary)  -     CT Needle Biopsy Bone Deep; Future  -     Tissue Pathology Exam; Standing  -     CT Needle Biopsy Abdomen;  Future  -     Tissue Pathology Exam; Standing  -     Tissue Pathology Exam; Standing  -     CT Needle Biopsy Adrenal Glands Right; Future    2. Li-Fraumeni syndrome  -     CT Needle Biopsy Bone Deep; Future  -     Tissue Pathology Exam; Standing  -     CT Needle Biopsy Abdomen; Future  -     Tissue Pathology Exam; Standing  -     Tissue Pathology Exam; Standing  -     CT Needle Biopsy Adrenal Glands Right; Future    3. Right adrenal mass  -     CT Needle Biopsy Abdomen; Future  -     Tissue Pathology Exam; Standing  -     Tissue Pathology Exam; Standing  -     CT Needle Biopsy Adrenal Glands Right; Future  -     DHEA-Sulfate; Future  -     Aldosterone / Renin Ratio; Future  -     Metanephrines, Frac. Free, Plasma; Future    4. Other migraine without status migrainosus, intractable    5. Primary hypertension    Other orders  -     losartan (Cozaar) 25 MG tablet; Take 1 tablet by mouth Daily.  Dispense: 30 tablet; Refill: 5          Del(17p) CLL  This is a high risk subtype of CLL. Patient symptomatic at visit 1/8/24 with night sweats, tremor, fevers, weakness. Acalabrutinib started 1/8/24. WBC improved to 33K as of 3/7/24.  Plts are normal. Acalabrutinib on hold since 2/23/24 due to tachycardia/palipiations/diarrhea, which has essentially resolved. ECG negative for arrythmia. Resumed acalabrutinib 3/7/24. Tolerating well. WBC trending down. Continue. Labs      Right Triple Negative Breast Cancer  November 2021 right lumpectomy. Chemotherapy was not recommended due to the small size of the tumor.  She completed adjuvant radiation in March.  Repeat screening mammogram will be due next September, 9/2023 mammogram benign.  DEXA scan will be due in 2025. Due to high risk, recommend annual MRI to alternate every 6 months with mammogram. MRI breast 3/8/2024 benign. Mammogram due 9/2024.     Bony lesion  2 cm in L2 with SUV of 4.4 per recent PET. MRI 7/31/24 with 2.4 cm area of abnormal signal that may represent malignancy.  Refer for biopsy as patient with hx of triple negative breast cancer.     Li-Fraumeni Syndrome  Pathologic germline p53 mutation. Has been referred for genetic counseling and evaluated on 9/5/23.  Screening Brain MRI on 6/19/23 was negative, repeat 6/2024 ordered. Needs urine cytology screening yearly. Ovaries have been previously removed. Colonoscopy due every 2 to 5 years, last 5/2022. Recommend repeat anytime.      Adrenal Nodule  Right sided. Nodule not hypermetabolic on PET 5/2023.  Given her underlying genetic mutation she was referred to an endocrinologist at [facility].  Follow up abdominal ultrasound 5/2024 with adrenal not well visualized. CT abdomen/pelvis done and showed it increased to 4.3 cm. Repeat PET scan with SUV of 3.4, so mild. Refer for biopsy as patient with history of malignancy.  Will workup for pheochromocytoma and hyperaldosteronism and subclinical Cushing's.     Headaches  Not related to acalabrutinib as did not improve with drug holiday.  Headaches occur daily.  Associate with high blood pressure. CT head negative. Could be related right adrenal nodule.  Will workup for for pheochromocytoma and hyperaldosteronism.  Will refer to neurology.  Hold on Topamax as starting blood pressure medicine as below.    HTN  Not controlled. Start losartan 25 mg daily. Already on metoprolol          Follow up: after PET  Patient was given instructions and counseling regarding her condition or for health maintenance advice. Please see specific information pulled into the AVS if appropriate.

## 2024-08-14 NOTE — TELEPHONE ENCOUNTER
Hi there, just wanted you aware of our attempts to have patient seen for referrals.     For the adrenal lesion, I would recommend getting functional w/u labs; occasionally endocrinology referral is obtained for this.    Let me know if you need anything. Thanks   electronic

## 2024-08-14 NOTE — TELEPHONE ENCOUNTER
CALLED PT TO RS HER APPT AND SHE STATED THAT SHE DID NOT WISH TO RS AT THIS TIME, ANYTHING ELSE TO DO?

## 2024-08-15 ENCOUNTER — LAB (OUTPATIENT)
Dept: LAB | Facility: HOSPITAL | Age: 74
End: 2024-08-15
Payer: MEDICARE

## 2024-08-15 ENCOUNTER — TELEPHONE (OUTPATIENT)
Dept: ONCOLOGY | Facility: HOSPITAL | Age: 74
End: 2024-08-15
Payer: MEDICARE

## 2024-08-15 DIAGNOSIS — E27.8 RIGHT ADRENAL MASS: ICD-10-CM

## 2024-08-15 DIAGNOSIS — E55.9 VITAMIN D DEFICIENCY: ICD-10-CM

## 2024-08-15 DIAGNOSIS — R73.03 PREDIABETES: ICD-10-CM

## 2024-08-15 LAB
25(OH)D3 SERPL-MCNC: 40 NG/ML (ref 30–100)
ANISOCYTOSIS BLD QL: ABNORMAL
APTT PPP: 26.5 SECONDS (ref 24.2–34.2)
BASO STIPL COARSE BLD QL SMEAR: ABNORMAL
BLASTS NFR BLD MANUAL: 5 % (ref 0–0)
CYTO UR: NORMAL
DEPRECATED RDW RBC AUTO: 49.3 FL (ref 37–54)
EOSINOPHIL # BLD MANUAL: 0.25 10*3/MM3 (ref 0–0.4)
EOSINOPHIL NFR BLD MANUAL: 1 % (ref 0.3–6.2)
ERYTHROCYTE [DISTWIDTH] IN BLOOD BY AUTOMATED COUNT: 15.6 % (ref 12.3–15.4)
HBA1C MFR BLD: 5.2 % (ref 4.8–5.6)
HCT VFR BLD AUTO: 37.1 % (ref 34–46.6)
HGB BLD-MCNC: 12.2 G/DL (ref 12–15.9)
INR PPP: 0.96 (ref 0.86–1.15)
LAB AP CASE REPORT: NORMAL
LAB AP CLINICAL INFORMATION: NORMAL
LYMPHOCYTES # BLD MANUAL: 16.85 10*3/MM3 (ref 0.7–3.1)
LYMPHOCYTES NFR BLD MANUAL: 2 % (ref 5–12)
MCH RBC QN AUTO: 29 PG (ref 26.6–33)
MCHC RBC AUTO-ENTMCNC: 32.9 G/DL (ref 31.5–35.7)
MCV RBC AUTO: 88.1 FL (ref 79–97)
MONOCYTES # BLD: 0.5 10*3/MM3 (ref 0.1–0.9)
NEUTROPHILS # BLD AUTO: 5.95 10*3/MM3 (ref 1.7–7)
NEUTROPHILS NFR BLD MANUAL: 24 % (ref 42.7–76)
PATH REPORT.FINAL DX SPEC: NORMAL
PATH REPORT.GROSS SPEC: NORMAL
PATHOLOGY REVIEW: YES
PLATELET # BLD AUTO: 102 10*3/MM3 (ref 140–450)
PMV BLD AUTO: 11.5 FL (ref 6–12)
POIKILOCYTOSIS BLD QL SMEAR: ABNORMAL
PROTHROMBIN TIME: 13 SECONDS (ref 11.8–14.9)
RBC # BLD AUTO: 4.21 10*6/MM3 (ref 3.77–5.28)
SMALL PLATELETS BLD QL SMEAR: ABNORMAL
SMUDGE CELLS BLD QL SMEAR: ABNORMAL
VARIANT LYMPHS NFR BLD MANUAL: 68 % (ref 19.6–45.3)
WBC NRBC COR # BLD AUTO: 24.78 10*3/MM3 (ref 3.4–10.8)

## 2024-08-15 PROCEDURE — 82306 VITAMIN D 25 HYDROXY: CPT

## 2024-08-15 PROCEDURE — 85025 COMPLETE CBC W/AUTO DIFF WBC: CPT | Performed by: RADIOLOGY

## 2024-08-15 PROCEDURE — 82088 ASSAY OF ALDOSTERONE: CPT

## 2024-08-15 PROCEDURE — 85730 THROMBOPLASTIN TIME PARTIAL: CPT | Performed by: RADIOLOGY

## 2024-08-15 PROCEDURE — 82627 DEHYDROEPIANDROSTERONE: CPT

## 2024-08-15 PROCEDURE — 83835 ASSAY OF METANEPHRINES: CPT

## 2024-08-15 PROCEDURE — 83036 HEMOGLOBIN GLYCOSYLATED A1C: CPT

## 2024-08-15 PROCEDURE — 85007 BL SMEAR W/DIFF WBC COUNT: CPT | Performed by: RADIOLOGY

## 2024-08-15 PROCEDURE — 84244 ASSAY OF RENIN: CPT

## 2024-08-15 PROCEDURE — 36415 COLL VENOUS BLD VENIPUNCTURE: CPT

## 2024-08-15 PROCEDURE — 85610 PROTHROMBIN TIME: CPT | Performed by: RADIOLOGY

## 2024-08-15 NOTE — TELEPHONE ENCOUNTER
"  Caller: Dorothea Cruz \"Taniya\"    Relationship: Self    Best call back number: [phone]      What was the call regarding: PATIENT STATES ON HER LAST VISIT THAT DR HIDALGO WAS GOING TO CHANGE SOME MEDICATIONS AND CALL THEM IN BUT PATIENT HAS NOT SEEN WHERE THEY HAVE BEEN CALLED IN YET  PATIENT DOESN'T KNOW THE NAME OF THE MEDICATIONS     "

## 2024-08-15 NOTE — TELEPHONE ENCOUNTER
This nurse called the pt and informed her that her Losartan script was sent to [facility] 2 days ago and should be ready to be picked up. The pt voiced understanding.

## 2024-08-16 LAB — DHEA-S SERPL-MCNC: 3.4 UG/DL (ref 20.4–186.6)

## 2024-08-19 ENCOUNTER — TELEPHONE (OUTPATIENT)
Dept: ONCOLOGY | Facility: HOSPITAL | Age: 74
End: 2024-08-19
Payer: MEDICARE

## 2024-08-19 DIAGNOSIS — G43.819 OTHER MIGRAINE WITHOUT STATUS MIGRAINOSUS, INTRACTABLE: Primary | ICD-10-CM

## 2024-08-19 RX ORDER — BUTALBITAL, ACETAMINOPHEN AND CAFFEINE 50; 325; 40 MG/1; MG/1; MG/1
1 TABLET ORAL EVERY 4 HOURS PRN
Qty: 30 TABLET | Refills: 1 | Status: SHIPPED | OUTPATIENT
Start: 2024-08-19

## 2024-08-19 NOTE — TELEPHONE ENCOUNTER
The pt called and states that Dr Newton had increased her Imitrex for her HA/Migraines but it has not helped. When questioned the pt voice pain 9-10/10. When questioned about duration the pt states that she might have 2 hours in the morning w/o HA but once the HA/Migraine starts, it last all day long.

## 2024-08-21 LAB
METANEPH FREE SERPL-MCNC: <25 PG/ML (ref 0–88)
NORMETANEPHRINE SERPL-MCNC: 42.2 PG/ML (ref 0–285.2)

## 2024-08-23 ENCOUNTER — HOSPITAL ENCOUNTER (OUTPATIENT)
Dept: CT IMAGING | Facility: HOSPITAL | Age: 74
Discharge: HOME | End: 2024-08-23
Payer: MEDICARE

## 2024-08-23 VITALS
DIASTOLIC BLOOD PRESSURE: 68 MMHG | HEART RATE: 72 BPM | SYSTOLIC BLOOD PRESSURE: 141 MMHG | OXYGEN SATURATION: 97 % | RESPIRATION RATE: 24 BRPM

## 2024-08-23 VITALS
DIASTOLIC BLOOD PRESSURE: 80 MMHG | SYSTOLIC BLOOD PRESSURE: 180 MMHG | WEIGHT: 125 LBS | HEIGHT: 62 IN | HEART RATE: 93 BPM | OXYGEN SATURATION: 99 % | RESPIRATION RATE: 17 BRPM | BODY MASS INDEX: 23 KG/M2

## 2024-08-23 DIAGNOSIS — E27.8 RIGHT ADRENAL MASS: ICD-10-CM

## 2024-08-23 DIAGNOSIS — R94.8 ABNORMAL POSITRON EMISSION TOMOGRAPHY (PET) SCAN: ICD-10-CM

## 2024-08-23 DIAGNOSIS — Z15.01 LI-FRAUMENI SYNDROME: ICD-10-CM

## 2024-08-23 PROCEDURE — 25010000002 MIDAZOLAM PER 1MG: Performed by: RADIOLOGY

## 2024-08-23 PROCEDURE — 25010000002 FENTANYL CITRATE (PF) 50 MCG/ML SOLUTION: Performed by: RADIOLOGY

## 2024-08-23 PROCEDURE — 63710000001 OXYCODONE-ACETAMINOPHEN 5-325 MG TABLET: Performed by: RADIOLOGY

## 2024-08-23 PROCEDURE — A9270 NON-COVERED ITEM OR SERVICE: HCPCS | Performed by: RADIOLOGY

## 2024-08-23 PROCEDURE — 77012 CT SCAN FOR NEEDLE BIOPSY: CPT

## 2024-08-23 RX ORDER — LIDOCAINE HYDROCHLORIDE 20 MG/ML
INJECTION, SOLUTION INFILTRATION; PERINEURAL
Status: DISPENSED
Start: 2024-08-23 | End: 2024-08-23

## 2024-08-23 RX ORDER — FENTANYL CITRATE 50 UG/ML
INJECTION, SOLUTION INTRAMUSCULAR; INTRAVENOUS AS NEEDED
Status: COMPLETED | OUTPATIENT
Start: 2024-08-23 | End: 2024-08-23

## 2024-08-23 RX ORDER — MIDAZOLAM HYDROCHLORIDE 2 MG/2ML
1 INJECTION, SOLUTION INTRAMUSCULAR; INTRAVENOUS ONCE
Status: COMPLETED | OUTPATIENT
Start: 2024-08-23 | End: 2024-08-23

## 2024-08-23 RX ORDER — MIDAZOLAM HYDROCHLORIDE 2 MG/2ML
INJECTION, SOLUTION INTRAMUSCULAR; INTRAVENOUS AS NEEDED
Status: COMPLETED | OUTPATIENT
Start: 2024-08-23 | End: 2024-08-23

## 2024-08-23 RX ORDER — FENTANYL CITRATE 50 UG/ML
50 INJECTION, SOLUTION INTRAMUSCULAR; INTRAVENOUS ONCE
Status: COMPLETED | OUTPATIENT
Start: 2024-08-23 | End: 2024-08-23

## 2024-08-23 RX ORDER — OXYCODONE HYDROCHLORIDE AND ACETAMINOPHEN 5; 325 MG/1; MG/1
1 TABLET ORAL ONCE
Status: COMPLETED | OUTPATIENT
Start: 2024-08-23 | End: 2024-08-23

## 2024-08-23 RX ADMIN — FENTANYL CITRATE 50 MCG: 50 INJECTION, SOLUTION INTRAMUSCULAR; INTRAVENOUS at 09:28

## 2024-08-23 RX ADMIN — MIDAZOLAM HYDROCHLORIDE 1 MG: 1 INJECTION, SOLUTION INTRAMUSCULAR; INTRAVENOUS at 09:31

## 2024-08-23 RX ADMIN — MIDAZOLAM HYDROCHLORIDE 0.5 MG: 1 INJECTION, SOLUTION INTRAMUSCULAR; INTRAVENOUS at 10:05

## 2024-08-23 RX ADMIN — OXYCODONE AND ACETAMINOPHEN 1 TABLET: 5; 325 TABLET ORAL at 10:40

## 2024-08-23 RX ADMIN — FENTANYL CITRATE 25 MCG: 50 INJECTION, SOLUTION INTRAMUSCULAR; INTRAVENOUS at 10:10

## 2024-08-23 NOTE — H&P
[facility]   Interventional Radiology H&P    Patient Name: Dorothea Cruz  : 1950  MRN: [id number]  Primary Care Physician:  Monserrat Grover APRN  Referring Physician: Luciano Glez, *  Date of admission: 2024    Subjective   Subjective     HPI:  Dorothea Cruz is a 74 y.o. female Right adrenal mass, L1 veterbral lesion    Review of Systems:   Constitutional no fever,  no weight loss       Otolaryngeal no difficulty swallowing   Cardiovascular no chest pain   Pulmonary no cough, no sputum production   Gastrointestinal no constipation, no diarrhea                         Personal History       Past Medical/Surgical History:   Past Medical History:   Diagnosis Date    Allergic     Anxiety     Arthritis     Breast cancer     Cancer     BREAST CANCER    Cataract     Cholelithiases @1    CLL (chronic lymphocytic leukemia)     Diverticulitis     Diverticulitis of colon     Diverticulosis 95    Fibromyalgia     Fibromyalgia, primary     Hyperlipidemia     Hypertension     Irritable bowel syndrome     Liver disease     Low back pain     Major depressive disorder 2020    Palpitations     ASYMPTOMATIC- DENIES CP/SOB, SEE PCP- NO CARDIOLOGIST     Post-menopause 2020    Sinus trouble     Sleep apnea     Tremor     I was on medicine for tremors    Urinary tract infection     Vitamin D deficiency 2020     Past Surgical History:   Procedure Laterality Date    APPENDECTOMY      BILATERAL BREAST REDUCTION      BREAST BIOPSY Right 2021    Procedure: RIGHT BREAST NEEDLE LOCALIZED  LUMPECTOMY WITH SENTINEL NODE BIOPSY;  Surgeon: Mirtha Esteves MD;  Location: [facility];  Service: General;  Laterality: Right;    BREAST LUMPECTOMY      CHOLECYSTECTOMY N/A 2021    Procedure: CHOLECYSTECTOMY LAPAROSCOPIC INTRAOPERATIVE CHOLANGIOGRAM;  Surgeon: Louie Medina MD;  Location: [facility] MAIN OR;  Service: General;  Laterality: N/A;    COLONOSCOPY   "2019    COLONOSCOPY N/A 05/09/2022    Procedure: COLONOSCOPY WITH BIOPSIES;  Surgeon: Butch Garcia MD;  Location: [facility] ENDOSCOPY;  Service: Gastroenterology;  Laterality: N/A;  DIVERTICULOSIS    HYSTERECTOMY      LYMPH NODE BIOPSY      SEPTOPLASTY  2018    SINUS SURGERY  2018    TONSILLECTOMY      US GUIDED CYST ASPIRATION BREAST N/A 10/03/2022    WRIST ARTHROPLASTY Bilateral        Social History:  reports that she has never smoked. She has been exposed to tobacco smoke. She has never used smokeless tobacco. She reports that she does not drink alcohol and does not use drugs.    Medications:  (Not in a hospital admission)    Current medications:  lidocaine, , ,       Current IV drips:       Allergies:  Allergies   Allergen Reactions    Ace Inhibitors Cough    Sulfamethoxazole-Trimethoprim Hives    Levaquin [Levofloxacin] Hives    Lisinopril Cough and Hives    Nsaids Itching, Nausea Only and Rash    Sulfa Antibiotics Itching, Nausea Only, Rash and Hives       Objective    Objective     Vitals:   Heart Rate:  [69] 69  Resp:  [29] 29  BP: (169)/(72) 169/72      Physical Exam:   Constitutional: Awake, alert, No acute distress    Respiratory: Clear to auscultation bilaterally, nonlabored respirations    Cardiovascular: RRR, no murmurs, rubs, or gallops, palpable pedal pulses bilaterally           ASA SCALE ASSESSMENT:  3-Severe systemic disease that results in functional limitation    MALLAMPATI CLASSIFICATION:  2-Able to visualize the soft palate, fauces, uvula. The anterior & posterior tonsilar pillars are hidden by the tongue.       Result Review        Result Review:     No results found for: \"NA\"    No results found for: \"K\"    No results found for: \"CL\"    No results found for: \"PLASMABICARB\"    No results found for: \"BUN\"    No results found for: \"CREATININE\"    No results found for: \"CALCIUM\"        No components found for: \"GLUCOSE.*\"                 Assessment / Plan     Assesment:   L1 vertebral " lesion, right adrenal mass      Plan:   L1 vetebral lesion CT guided biopsy, possible biopsy of right adrenal mass depending on preliminary results of L1 biopsy    The risks and benefits of the procedure were discussed with the patient.    Electronically signed by Dillon Aiken MD, 08/23/24, 9:35 AM EDT.

## 2024-08-23 NOTE — DISCHARGE INSTRUCTIONS
-you may resume your regular diet and home medications today.  -you may shower and remove bandage tomorrow.  -do not submerge biopsy site in water for at least 1 week (bathtub, hot tub, swimming pool, lake, etc.)  -do not drive or stay by yourself for 24 hours  -monitor biopsy site everyday for at least 1 week for signs of infection, including redness, drainage, swelling, heat, fever, nausea, vomiting, chills, or rash. If any of these occur, contact your doctor who ordered this procedure.  -If you experience severe pain, numbness/tingling in your extremities, loss of bowel/bladder control, lightheadedness or feeling faint, or the biopsy site is bleeding and you are unable to stop it, go the nearest emergency room immediately.  -Keep all follow up appointments with the doctor who ordered this procedure to ensure you receive the results of this test.

## 2024-08-26 ENCOUNTER — TELEPHONE (OUTPATIENT)
Dept: ONCOLOGY | Facility: HOSPITAL | Age: 74
End: 2024-08-26

## 2024-08-26 LAB
ALDOST SERPL-MCNC: 7.8 NG/DL (ref 0–30)
ALDOST/RENIN PLAS-RTO: 32.2 {RATIO} (ref 0–30)
Lab: NORMAL
RENIN PLAS-CCNC: 0.24 NG/ML/HR (ref 0.17–5.38)

## 2024-08-27 LAB
CYTO UR: NORMAL
LAB AP CASE REPORT: NORMAL
LAB AP CLINICAL INFORMATION: NORMAL
LAB AP SPECIAL STAINS: NORMAL
PATH REPORT.FINAL DX SPEC: NORMAL
PATH REPORT.GROSS SPEC: NORMAL

## 2024-08-28 ENCOUNTER — TELEPHONE (OUTPATIENT)
Dept: ONCOLOGY | Facility: HOSPITAL | Age: 74
End: 2024-08-28
Payer: MEDICARE

## 2024-08-28 NOTE — TELEPHONE ENCOUNTER
This nurse called the pt back and informed her that her biopsy results are not back yet. This nurse informed the pt that scheduling will call her back with a different apt date and time. The pt voiced understanding.

## 2024-08-28 NOTE — TELEPHONE ENCOUNTER
The pt has a apt on Friday for her Biopsy results. The pt is asking if they are back yet. If not does she need to change her f/u apt.

## 2024-09-03 ENCOUNTER — HOSPITAL ENCOUNTER (EMERGENCY)
Facility: HOSPITAL | Age: 74
Discharge: HOME | End: 2024-09-04
Attending: EMERGENCY MEDICINE
Payer: MEDICARE

## 2024-09-03 ENCOUNTER — APPOINTMENT (OUTPATIENT)
Dept: CT IMAGING | Facility: HOSPITAL | Age: 74
End: 2024-09-03
Payer: MEDICARE

## 2024-09-03 DIAGNOSIS — G43.819 OTHER MIGRAINE WITHOUT STATUS MIGRAINOSUS, INTRACTABLE: ICD-10-CM

## 2024-09-03 DIAGNOSIS — G43.909 ACUTE MIGRAINE: Primary | ICD-10-CM

## 2024-09-03 PROCEDURE — 96374 THER/PROPH/DIAG INJ IV PUSH: CPT

## 2024-09-03 PROCEDURE — 70450 CT HEAD/BRAIN W/O DYE: CPT

## 2024-09-03 PROCEDURE — 25810000003 SODIUM CHLORIDE 0.9 % SOLUTION: Performed by: EMERGENCY MEDICINE

## 2024-09-03 PROCEDURE — 96375 TX/PRO/DX INJ NEW DRUG ADDON: CPT

## 2024-09-03 PROCEDURE — 25010000002 DIPHENHYDRAMINE PER 50 MG: Performed by: EMERGENCY MEDICINE

## 2024-09-03 PROCEDURE — 25010000002 METOCLOPRAMIDE PER 10 MG: Performed by: EMERGENCY MEDICINE

## 2024-09-03 PROCEDURE — 99284 EMERGENCY DEPT VISIT MOD MDM: CPT

## 2024-09-03 PROCEDURE — 25010000002 DEXAMETHASONE PER 1 MG: Performed by: EMERGENCY MEDICINE

## 2024-09-03 RX ORDER — SODIUM CHLORIDE 0.9 % (FLUSH) 0.9 %
10 SYRINGE (ML) INJECTION AS NEEDED
Status: DISCONTINUED | OUTPATIENT
Start: 2024-09-03 | End: 2024-09-04 | Stop reason: HOSPADM

## 2024-09-03 RX ORDER — DIPHENHYDRAMINE HYDROCHLORIDE 50 MG/ML
12.5 INJECTION INTRAMUSCULAR; INTRAVENOUS ONCE
Status: COMPLETED | OUTPATIENT
Start: 2024-09-04 | End: 2024-09-04

## 2024-09-03 RX ORDER — PROCHLORPERAZINE EDISYLATE 5 MG/ML
10 INJECTION INTRAMUSCULAR; INTRAVENOUS ONCE
Status: COMPLETED | OUTPATIENT
Start: 2024-09-04 | End: 2024-09-04

## 2024-09-03 RX ORDER — METOCLOPRAMIDE HYDROCHLORIDE 5 MG/ML
10 INJECTION INTRAMUSCULAR; INTRAVENOUS ONCE
Status: COMPLETED | OUTPATIENT
Start: 2024-09-03 | End: 2024-09-03

## 2024-09-03 RX ORDER — DIPHENHYDRAMINE HYDROCHLORIDE 50 MG/ML
12.5 INJECTION INTRAMUSCULAR; INTRAVENOUS ONCE
Status: COMPLETED | OUTPATIENT
Start: 2024-09-03 | End: 2024-09-03

## 2024-09-03 RX ORDER — BUTALBITAL, ACETAMINOPHEN AND CAFFEINE 50; 325; 40 MG/1; MG/1; MG/1
1 TABLET ORAL EVERY 4 HOURS PRN
Qty: 30 TABLET | Refills: 1 | Status: SHIPPED | OUTPATIENT
Start: 2024-09-03

## 2024-09-03 RX ADMIN — DIPHENHYDRAMINE HYDROCHLORIDE 12.5 MG: 50 INJECTION, SOLUTION INTRAMUSCULAR; INTRAVENOUS at 22:11

## 2024-09-03 RX ADMIN — SODIUM CHLORIDE 500 ML: 9 INJECTION, SOLUTION INTRAVENOUS at 22:09

## 2024-09-03 RX ADMIN — DEXAMETHASONE SODIUM PHOSPHATE 4 MG: 10 INJECTION INTRAMUSCULAR; INTRAVENOUS at 22:13

## 2024-09-03 RX ADMIN — METOCLOPRAMIDE HYDROCHLORIDE 10 MG: 5 INJECTION INTRAMUSCULAR; INTRAVENOUS at 22:16

## 2024-09-04 VITALS
TEMPERATURE: 98.2 F | HEART RATE: 96 BPM | HEIGHT: 62 IN | RESPIRATION RATE: 22 BRPM | BODY MASS INDEX: 22.15 KG/M2 | OXYGEN SATURATION: 100 % | DIASTOLIC BLOOD PRESSURE: 79 MMHG | SYSTOLIC BLOOD PRESSURE: 178 MMHG | WEIGHT: 120.37 LBS

## 2024-09-04 PROCEDURE — 96375 TX/PRO/DX INJ NEW DRUG ADDON: CPT

## 2024-09-04 PROCEDURE — 25010000002 DIPHENHYDRAMINE PER 50 MG: Performed by: EMERGENCY MEDICINE

## 2024-09-04 PROCEDURE — 96376 TX/PRO/DX INJ SAME DRUG ADON: CPT

## 2024-09-04 PROCEDURE — 25010000002 PROCHLORPERAZINE 10 MG/2ML SOLUTION: Performed by: EMERGENCY MEDICINE

## 2024-09-04 RX ADMIN — DIPHENHYDRAMINE HYDROCHLORIDE 12.5 MG: 50 INJECTION, SOLUTION INTRAMUSCULAR; INTRAVENOUS at 00:05

## 2024-09-04 RX ADMIN — PROCHLORPERAZINE EDISYLATE 10 MG: 5 INJECTION INTRAMUSCULAR; INTRAVENOUS at 00:08

## 2024-09-04 NOTE — ED TRIAGE NOTES
Pt to ED from home with reports of Leukemia and recent dx of lesion on spine that she had biopsied.      Pt has had severe headaches since last week.      Pt also c/o vomiting with headaches, light sensitivity, noise sensitivity, difficulty walking, and intermittent slurred speech x1.5 weeks.

## 2024-09-04 NOTE — ED PROVIDER NOTES
Time: 9:07 PM EDT  Date of encounter:  9/3/2024  Independent Historian/Clinical History and Information was obtained by:   Patient and Family    History is limited by: N/A    Chief Complaint: Headache      History of Present Illness:  Patient is a 74 y.o. year old female who presents to the emergency department for evaluation of headache    Patient states she had a severe migraine headache since this morning.  It is complicated by nausea and vomiting and sensitivity to light.  She denies any numbness or weakness.  No trauma.  No recent fevers or chills.  She reports that she has had headaches frequently more recently and has followed with her oncologist regarding this.  She denies any abnormal features to this headache other than its increased severity.  She attempted treatment at home without relief.  She and family are concerned as the patient underwent CT needle biopsy of lumbar lesion on 8/23, approximately a week ago.  She denies any positional nature to her headache.      Patient Care Team  Primary Care Provider: Monserrat Grover APRN    Past Medical History:     Allergies   Allergen Reactions    Ace Inhibitors Cough    Sulfamethoxazole-Trimethoprim Hives    Levaquin [Levofloxacin] Hives    Lisinopril Cough and Hives    Nsaids Itching, Nausea Only and Rash    Sulfa Antibiotics Itching, Nausea Only, Rash and Hives     Past Medical History:   Diagnosis Date    Allergic     Anxiety     Arthritis     Breast cancer     Cancer     BREAST CANCER    Cataract     Cholelithiases 4/30@1    CLL (chronic lymphocytic leukemia) 2023    Diverticulitis     Diverticulitis of colon     Diverticulosis 95    Fibromyalgia     Fibromyalgia, primary     Hyperlipidemia     Hypertension     Irritable bowel syndrome     Liver disease     Low back pain 2015    Major depressive disorder 05/07/2020    Palpitations     ASYMPTOMATIC- DENIES CP/SOB, SEE PCP- NO CARDIOLOGIST     Post-menopause 09/17/2020    Sinus trouble     Sleep apnea      Tremor     I was on medicine for tremors    Urinary tract infection     Vitamin D deficiency 2020     Past Surgical History:   Procedure Laterality Date    APPENDECTOMY      BILATERAL BREAST REDUCTION      BREAST BIOPSY Right 2021    Procedure: RIGHT BREAST NEEDLE LOCALIZED  LUMPECTOMY WITH SENTINEL NODE BIOPSY;  Surgeon: Mirtha Esteves MD;  Location: [facility] OR [facility];  Service: General;  Laterality: Right;    BREAST LUMPECTOMY      CHOLECYSTECTOMY N/A 2021    Procedure: CHOLECYSTECTOMY LAPAROSCOPIC INTRAOPERATIVE CHOLANGIOGRAM;  Surgeon: Louie Medina MD;  Location: [facility] MAIN OR;  Service: General;  Laterality: N/A;    COLONOSCOPY  2019    COLONOSCOPY N/A 2022    Procedure: COLONOSCOPY WITH BIOPSIES;  Surgeon: Butch Garcia MD;  Location: [facility] ENDOSCOPY;  Service: Gastroenterology;  Laterality: N/A;  DIVERTICULOSIS    HYSTERECTOMY      LYMPH NODE BIOPSY      SEPTOPLASTY  2018    SINUS SURGERY  2018    TONSILLECTOMY      US GUIDED CYST ASPIRATION BREAST N/A 10/03/2022    WRIST ARTHROPLASTY Bilateral      Family History   Problem Relation Age of Onset    No Known Problems Mother     Cancer Father         Leukemia      Breast cancer Sister     Cancer Sister         Had breast cancer    Cancer Brother         Leukemia/CLL    Cancer Sister         Had stomach cancer    Cancer Brother         Bladder cancer    Cancer Brother         Bladder cancer    Cancer Sister         Retinal blastoma.    Colon cancer Neg Hx        Home Medications:  Prior to Admission medications    Medication Sig Start Date End Date Taking? Authorizing Provider   Acalabrutinib Maleate (CALQUENCE) 100 MG tablet Take 1 tablet by mouth 2 (Two) Times a Day. 1/10/24   Luciano Glez MD   acetaminophen (TYLENOL) 500 MG tablet Take 2 tablets by mouth Daily As Needed for Mild Pain.    Provider, MD Larry   butalbital-acetaminophen-caffeine (Esgic) -40 MG per tablet Take 1 tablet  by mouth Every 4 (Four) Hours As Needed for Headache. 9/3/24   Luciano Glez MD   Cholecalciferol (Vitamin D) 50 MCG (2000 UT) capsule Take 1 capsule by mouth Daily. Indications: Vitamin D Deficiency 5/31/24   Monserrat Grover APRN   colestipol (Colestid) 1 g tablet Take 2 tablets by mouth 2 (Two) Times a Day. Indications: chronic diarrhea 5/31/24   Monserrat Grover APRN   desvenlafaxine (Pristiq) 100 MG 24 hr tablet Take 1 tablet by mouth Daily. Indications: Major Depressive Disorder 5/31/24   Monserrat Grover APRN   gabapentin (NEURONTIN) 100 MG capsule Take 1 capsule by mouth Daily As Needed.    Larry Murray MD   hydroCHLOROthiazide 25 MG tablet Take 1 tablet by mouth Daily. Indications: High Blood Pressure Disorder 5/31/24   Monserrat Grover APRN   hydrOXYzine (ATARAX) 25 MG tablet Take 1 tablet by mouth 3 (Three) Times a Day As Needed for Itching. 2/13/24   Luciano Glez MD   losartan (Cozaar) 25 MG tablet Take 1 tablet by mouth Daily. 8/13/24   Luciano Glez MD   metoprolol tartrate (LOPRESSOR) 25 MG tablet Take 1 tablet by mouth 2 (Two) Times a Day. Indications: High Blood Pressure Disorder 5/31/24   Monserrat Grover APRN   ondansetron ODT (ZOFRAN-ODT) 4 MG disintegrating tablet Place 1 tablet on the tongue 4 (Four) Times a Day As Needed for Nausea or Vomiting. 8/3/24   Izabella Uriarte APRN   prochlorperazine (COMPAZINE) 10 MG tablet Take 1 tablet by mouth Every 6 (Six) Hours As Needed for Nausea. 2/29/24   Luciano Glez MD   rosuvastatin (CRESTOR) 20 MG tablet Take 1 tablet by mouth Every Night. Indications: Elevation of Both Cholesterol and Triglycerides in Blood 5/31/24   Monserrat Grover APRN   SUMAtriptan (IMITREX) 100 MG tablet Take 1 tablet by mouth Take As Directed. Take one tablet at onset of headache. May repeat dose one time in 2 hours if headache not relieved. Do not take more than 2 pills in 24 hrs 8/7/24    "Luciano Glez MD   butalbital-acetaminophen-caffeine (Esgic) -40 MG per tablet Take 1 tablet by mouth Every 4 (Four) Hours As Needed for Headache. 8/19/24 9/3/24  Luciano Glez MD        Social History:   Social History     Tobacco Use    Smoking status: Never     Passive exposure: Yes    Smokeless tobacco: Never   Vaping Use    Vaping status: Never Used   Substance Use Topics    Alcohol use: Never    Drug use: Never         Review of Systems:  Review of Systems   Constitutional:  Negative for chills and fever.   HENT:  Negative for congestion, ear pain and sore throat.    Eyes:  Negative for pain.   Respiratory:  Negative for cough, chest tightness and shortness of breath.    Cardiovascular:  Negative for chest pain.   Gastrointestinal:  Negative for abdominal pain, diarrhea, nausea and vomiting.   Genitourinary:  Negative for flank pain and hematuria.   Musculoskeletal:  Negative for joint swelling.   Skin:  Negative for pallor.   Neurological:  Positive for headaches. Negative for seizures.   All other systems reviewed and are negative.       Physical Exam:  /79   Pulse 96   Temp 98.2 °F (36.8 °C) (Oral)   Resp 22   Ht 157.5 cm (62\")   Wt 54.6 kg (120 lb 5.9 oz)   SpO2 100%   BMI 22.02 kg/m²     Physical Exam  Vitals and nursing note reviewed.   Constitutional:       General: She is not in acute distress.     Appearance: Normal appearance. She is not toxic-appearing.      Comments: Patient appears uncomfortable and in pain in darkened exam room.   HENT:      Head: Normocephalic and atraumatic.      Jaw: There is normal jaw occlusion.   Eyes:      General: Lids are normal.      Extraocular Movements: Extraocular movements intact.      Conjunctiva/sclera: Conjunctivae normal.      Pupils: Pupils are equal, round, and reactive to light.   Cardiovascular:      Rate and Rhythm: Normal rate and regular rhythm.      Pulses: Normal pulses.      Heart sounds: Normal heart sounds. "   Pulmonary:      Effort: Pulmonary effort is normal. No respiratory distress.      Breath sounds: Normal breath sounds. No wheezing or rhonchi.   Abdominal:      General: Abdomen is flat.      Palpations: Abdomen is soft.      Tenderness: There is no abdominal tenderness. There is no guarding or rebound.   Musculoskeletal:         General: Normal range of motion.      Cervical back: Normal range of motion and neck supple.      Right lower leg: No edema.      Left lower leg: No edema.   Skin:     General: Skin is warm and dry.   Neurological:      Mental Status: She is alert and oriented to person, place, and time. Mental status is at baseline.      Comments: NIHSS = 0   Psychiatric:         Mood and Affect: Mood normal.           Procedures:  Procedures      Medical Decision Making:      Comorbidities that affect care:    Chronic lymphocytic leukemia, fibromyalgia, hyperlipidemia, hypertension, depression, history of breast cancer    External Notes reviewed:    Previous Clinic Note: Outpatient oncology visit 8/13/2024      The following orders were placed and all results were independently analyzed by me:  Orders Placed This Encounter   Procedures    CT Head Without Contrast       Medications Given in the Emergency Department:  Medications   metoclopramide (REGLAN) injection 10 mg (10 mg Intravenous Given 9/3/24 2216)   diphenhydrAMINE (BENADRYL) injection 12.5 mg (12.5 mg Intravenous Given 9/3/24 2211)   sodium chloride 0.9 % bolus 500 mL (0 mL Intravenous Stopped 9/4/24 0005)   dexAMETHasone (DECADRON) 10 MG/ML oral solution 4 mg (4 mg Oral Given 9/3/24 2213)   prochlorperazine (COMPAZINE) injection 10 mg (10 mg Intravenous Given 9/4/24 0008)   diphenhydrAMINE (BENADRYL) injection 12.5 mg (12.5 mg Intravenous Given 9/4/24 0005)        ED Course:    ED Course as of 09/04/24 0614   Tue Sep 03, 2024   6664 On reevaluation patient continues to have headache although less severe than earlier but requests an additional  dose of medication. [JS]      ED Course User Index  [JS] Anderson Montano MD       Labs:    Lab Results (last 24 hours)       ** No results found for the last 24 hours. **             Imaging:    CT Head Without Contrast    Result Date: 9/3/2024  CT HEAD WO CONTRAST HISTORY: Headache. History of CLL. TECHNIQUE: Axial unenhanced head CT with multiplanar reformats. Radiation dose reduction techniques included automated exposure control or exposure modulation based on body size. COMPARISON: 8/3/2024 FINDINGS: Ventricular size and configuration are normal. No acute infarct or hemorrhage is identified. There are no masses. There is no skull fracture. Chronic small vessel ischemic changes are present in the white matter. There is an old lacunar infarct in the anterior limb of the right internal capsule. There are old lacunar infarcts versus dilated perivascular spaces in the right subinsular region. Atherosclerotic calcifications are present in the carotid siphons.     Senescent changes without acute abnormality. Electronically Signed: Poncho Erazo MD  9/3/2024 11:43 PM EDT  Workstation ID: [id number]       Differential Diagnosis and Discussion:    Headache: Differential diagnosis includes but is not limited to migraine, cluster headache, hypertension, tumor, subarachnoid bleeding, pseudotumor cerebri, temporal arteritis, infections, tension headache, and TMJ syndrome.    CT scan radiology impression was interpreted by me.    MDM               Patient Care Considerations:    NARCOTICS: I considered prescribing opiate pain medication as an outpatient, however no narcotic pain control required in the ED.      Consultants/Shared Management Plan:    None    Social Determinants of Health:    Patient has presented with family members who are responsible, reliable and will ensure follow up care.      Disposition and Care Coordination:    Discharged: I considered escalation of care by admitting this patient to the hospital,  however patient had a normal neurologic exam and resolution of symptoms prior to discharge.    I have explained the patient´s condition, diagnoses and treatment plan based on the information available to me at this time. I have answered questions and addressed any concerns. The patient has a good  understanding of the patient´s diagnosis, condition, and treatment plan as can be expected at this point. The vital signs have been stable. The patient´s condition is stable and appropriate for discharge from the emergency department.      The patient will pursue further outpatient evaluation with the primary care physician or other designated or consulting physician as outlined in the discharge instructions. They are agreeable to this plan of care and follow-up instructions have been explained in detail. The patient has received these instructions in written format and has expressed an understanding of the discharge instructions. The patient is aware that any significant change in condition or worsening of symptoms should prompt an immediate return to this or the closest emergency department or call to 911.    Final diagnoses:   Acute migraine        ED Disposition       ED Disposition   Discharge    Condition   Stable    Comment   --               This medical record created using voice recognition software.             Anderson Montano MD  09/04/24 0614

## 2024-09-05 ENCOUNTER — TELEPHONE (OUTPATIENT)
Dept: ONCOLOGY | Facility: HOSPITAL | Age: 74
End: 2024-09-05
Payer: MEDICARE

## 2024-09-05 RX ORDER — TOPIRAMATE 25 MG/1
25 TABLET, FILM COATED ORAL 2 TIMES DAILY
Qty: 60 TABLET | Refills: 3 | Status: SHIPPED | OUTPATIENT
Start: 2024-09-05

## 2024-09-05 NOTE — TELEPHONE ENCOUNTER
The pt called and states that her HA meds are not keeping her HA's away. When questioned the pt states that her HA is currently 8/10. The pt is asking if her HA Med could be increased.

## 2024-09-06 NOTE — TELEPHONE ENCOUNTER
This nurse called the pt and informed her of the Topamax sent in to her pharm. The pt voiced understanding.

## 2024-09-10 ENCOUNTER — OFFICE VISIT (OUTPATIENT)
Dept: ONCOLOGY | Facility: HOSPITAL | Age: 74
End: 2024-09-10
Payer: MEDICARE

## 2024-09-10 VITALS
TEMPERATURE: 99.2 F | WEIGHT: 117.06 LBS | HEART RATE: 81 BPM | RESPIRATION RATE: 18 BRPM | BODY MASS INDEX: 21.54 KG/M2 | SYSTOLIC BLOOD PRESSURE: 143 MMHG | DIASTOLIC BLOOD PRESSURE: 60 MMHG | OXYGEN SATURATION: 99 % | HEIGHT: 62 IN

## 2024-09-10 DIAGNOSIS — Z15.01 LI-FRAUMENI SYNDROME: ICD-10-CM

## 2024-09-10 DIAGNOSIS — C50.211 MALIGNANT NEOPLASM OF UPPER-INNER QUADRANT OF RIGHT FEMALE BREAST, UNSPECIFIED ESTROGEN RECEPTOR STATUS: ICD-10-CM

## 2024-09-10 DIAGNOSIS — R51.9 BILATERAL HEADACHES: ICD-10-CM

## 2024-09-10 DIAGNOSIS — C91.10 CLL (CHRONIC LYMPHOCYTIC LEUKEMIA): ICD-10-CM

## 2024-09-10 DIAGNOSIS — E27.8 RIGHT ADRENAL MASS: Primary | ICD-10-CM

## 2024-09-10 PROCEDURE — G0463 HOSPITAL OUTPT CLINIC VISIT: HCPCS | Performed by: INTERNAL MEDICINE

## 2024-09-10 PROCEDURE — 1125F AMNT PAIN NOTED PAIN PRSNT: CPT | Performed by: INTERNAL MEDICINE

## 2024-09-10 PROCEDURE — 99215 OFFICE O/P EST HI 40 MIN: CPT | Performed by: INTERNAL MEDICINE

## 2024-09-10 PROCEDURE — 3077F SYST BP >= 140 MM HG: CPT | Performed by: INTERNAL MEDICINE

## 2024-09-10 PROCEDURE — 3078F DIAST BP <80 MM HG: CPT | Performed by: INTERNAL MEDICINE

## 2024-09-10 NOTE — PROGRESS NOTES
Patient  Dorothea Cruz    [facility] HEMATOLOGY & ONCOLOGY    Chief Complaint  FOLLOW UP 2    Referring Provider: TYRELL Peck*  PCP: Monserrat Grover APRN    Subjective          Oncology/Hematology History Overview Note     Right Triple Negative Breast Cancer:  - screening mammogram 9/10/21: 4mm asymmetry in the right upper inner breast  -Diagnostic mammogram on 2021: Persistent 5 mm ill-defined nodule in the upper inner mid right breast at 2:00, 7 cm from the nipple.  - core biopsy on 10/8/21: Invasive ductal carcinoma, grade 3, ER negative (less than 1%), WA negative (less than 1%), HER-2 equivocal (2+ HC), HER-2 FISH not amplified  - Right lumpectomy on 21. Pathology showed a 5mm tumor with negative margins, 0/3 lymph nodes involved, pT1aN0, ER-, WA-, HER2-  - chemotherapy was not recommended due to the small size of the tumor.   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  Mildly increased CD4 positive T cell LGL (5.2% of analyzed cells).  - CLL FISH positive for TP53/17p13 deletion in 49.5% of cells.  - IgVH somatic hypermutation was detected (3.7%)  -24: Start acalabrutinib due to symptoms of weakness, night sweats, tremor. WBC 31K, plt 133, hgb 13.7  - 3/7/24: Resume acalabrutinib after hold due to palpiations and diarrhea, which was from C diff. WBC 33K. Plts normal.     Li Fraumeni Syndrome (inherited p53 mutation):   Family History:    - brother with CLL  - father  from some type of leukemia  - sister with breast cancer and adult onset retinoblastoma which was fatal  - niece (sister's daughter) also had retinoblastoma as a child  - pt is considering genetic testing     Breast cancer   10/20/2021 Initial Diagnosis    Breast cancer (HCC)      Malignant neoplasm of upper-inner quadrant of right female breast   11/12/2021 Cancer Staged    Staging form: Breast, AJCC 8th Edition  - Pathologic: pT1a, pN0, cM0, ER-, CT-, HER2- - Signed by Santa Haskins APRN on 5/20/2022 1/13/2022 Initial Diagnosis    Malignant neoplasm of upper-inner quadrant of right female breast (HCC)     1/20/2022 - 2/14/2022 Radiation    Radiation OncologyTreatment Course:  Dorothea Cruz received 4256 cGy in 16 fractions to right breast.      CLL (chronic lymphocytic leukemia)   3/10/2023 Initial Diagnosis    CLL (chronic lymphocytic leukemia)     1/12/2024 -  Chemotherapy    OP LYMPHOMA (CLL) Acalabrutinib         History of Present Illness  Patient comes in today for follow-up. She remains on acalabrutinib.  Patient had recent PET scan with small level activity in right adrenal mass as well as small activity in L1 vertebral body.  Abnormal focus of enhancement shown on MRI of L-spine at L2, which correlates with findings on PET scan.  Patient had biopsy of this via IR and pathology is pending expert consultation at [facility] however per flow cytometry 40% was consistent with a diagnosis of SLL.  Patient continues to have daily severe headaches.  Headaches are located in the front as well as the back and into her neck.  This is caused her to be nauseous and to not eat.  She has lost weight because of this.  She reports the Fioricet helps slightly.  She has started on Topamax as of last week.  May have noticed slight improvement in her headaches since then.  She has been seen by endocrine who is hoping to get adrenal gland biopsy.      Review of Systems   Constitutional:  Positive for appetite change, fatigue and unexpected weight loss. Negative for diaphoresis, fever and unexpected weight gain.   HENT:  Negative for hearing loss, mouth sores, sore throat, swollen glands, trouble swallowing and voice change.    Eyes:  Negative for blurred vision.   Respiratory:   Negative for cough, shortness of breath and wheezing.    Cardiovascular:  Negative for chest pain and palpitations.   Gastrointestinal:  Negative for abdominal pain, blood in stool, constipation, diarrhea and vomiting.   Endocrine: Negative for cold intolerance and heat intolerance.   Genitourinary:  Negative for difficulty urinating, dysuria, frequency, hematuria and urinary incontinence.   Musculoskeletal:  Positive for back pain and neck pain. Negative for arthralgias and myalgias.   Skin:  Negative for rash, skin lesions and wound.   Neurological:  Positive for tremors and weakness. Negative for dizziness, seizures, numbness and headache.   Hematological:  Does not bruise/bleed easily.   Psychiatric/Behavioral:  Negative for depressed mood. The patient is not nervous/anxious.    All other systems reviewed and are negative.      Past Medical History:   Diagnosis Date    Allergic     Anxiety     Arthritis     Breast cancer     Cancer     BREAST CANCER    Cataract     Cholelithiases 4/30@1    CLL (chronic lymphocytic leukemia) 2023    Diverticulitis     Diverticulitis of colon     Diverticulosis 95    Fibromyalgia     Fibromyalgia, primary     Hyperlipidemia     Hypertension     Irritable bowel syndrome     Liver disease     Low back pain 2015    Major depressive disorder 05/07/2020    Palpitations     ASYMPTOMATIC- DENIES CP/SOB, SEE PCP- NO CARDIOLOGIST     Post-menopause 09/17/2020    Sinus trouble     Sleep apnea     Tremor 2018    I was on medicine for tremors    Urinary tract infection     Vitamin D deficiency 09/17/2020     Past Surgical History:   Procedure Laterality Date    APPENDECTOMY      BILATERAL BREAST REDUCTION      BREAST BIOPSY Right 11/12/2021    Procedure: RIGHT BREAST NEEDLE LOCALIZED  LUMPECTOMY WITH SENTINEL NODE BIOPSY;  Surgeon: Mirtha Esteves MD;  Location: [facility] [place] [facility] [place];  Service: General;  Laterality: Right;    BREAST LUMPECTOMY      CHOLECYSTECTOMY N/A 09/02/2021    Procedure:  "CHOLECYSTECTOMY LAPAROSCOPIC INTRAOPERATIVE CHOLANGIOGRAM;  Surgeon: Louie Medina MD;  Location: [facility] OR;  Service: General;  Laterality: N/A;    COLONOSCOPY  2019    COLONOSCOPY N/A 2022    Procedure: COLONOSCOPY WITH BIOPSIES;  Surgeon: Butch Garcia MD;  Location: [facility] ENDOSCOPY;  Service: Gastroenterology;  Laterality: N/A;  DIVERTICULOSIS    HYSTERECTOMY      LYMPH NODE BIOPSY      SEPTOPLASTY  2018    SINUS SURGERY  2018    TONSILLECTOMY      US GUIDED CYST ASPIRATION BREAST N/A 10/03/2022    WRIST ARTHROPLASTY Bilateral      Social History     Socioeconomic History    Marital status:    Tobacco Use    Smoking status: Never     Passive exposure: Yes    Smokeless tobacco: Never   Vaping Use    Vaping status: Never Used   Substance and Sexual Activity    Alcohol use: Never    Drug use: Never    Sexual activity: Not Currently     Partners: Female     Birth control/protection: Post-menopausal, Hysterectomy     Family History   Problem Relation Age of Onset    No Known Problems Mother     Cancer Father         Leukemia      Breast cancer Sister     Cancer Sister         Had breast cancer    Cancer Brother         Leukemia/CLL    Cancer Sister         Had stomach cancer    Cancer Brother         Bladder cancer    Cancer Brother         Bladder cancer    Cancer Sister         Retinal blastoma.    Colon cancer Neg Hx        Objective   Physical Exam  General: Alert, cooperative, mild distress from headache  Eyes: Anicteric sclera, PERRLA  Respiratory: normal respiratory effort  Skin: Normal tone, no rash, no lesions  Psychiatric: Appropriate affect, intact judgment  Neurologic: No focal sensory or motor deficits, normal cognition  Musculoskeletal: Normal muscle strength and tone    Vitals:    09/10/24 1345   BP: 143/60   Pulse: 81   Resp: 18   Temp: 99.2 °F (37.3 °C)   TempSrc: Temporal   SpO2: 99%   Weight: 53.1 kg (117 lb 1 oz)   Height: 157.5 cm (62.01\")   PainSc:   8 " "  PainLoc: Back  Comment: pt is also complaining of neck pain and headache                     PHQ-9 Total Score:         Result Review :   The following data was reviewed by: Luciano Glez MD on 09/10/24:  Lab Results   Component Value Date    HGB 12.2 08/15/2024    HCT 37.1 08/15/2024    MCV 88.1 08/15/2024     (L) 08/15/2024    WBC 24.78 (H) 08/15/2024    NEUTROABS 5.95 08/15/2024    LYMPHSABS 13.81 (H) 07/11/2024    MONOSABS 0.64 07/11/2024    EOSABS 0.25 08/15/2024    BASOSABS 0.07 07/11/2024     Lab Results   Component Value Date    GLUCOSE 114 (H) 07/11/2024    BUN 13 07/11/2024    CREATININE 0.59 07/11/2024     07/11/2024    K 3.6 07/11/2024     07/11/2024    CO2 27.7 07/11/2024    CALCIUM 9.9 07/11/2024    PROTEINTOT 6.3 07/11/2024    ALBUMIN 3.9 07/11/2024    BILITOT 0.3 07/11/2024    ALKPHOS 91 07/11/2024    AST 17 07/11/2024    ALT 12 07/11/2024     No results found for: \"IRON\", \"LABIRON\", \"TRANSFERRIN\", \"TIBC\"  Lab Results   Component Value Date    SOLAGADG49 317 03/08/2022    FOLATE 14.40 03/08/2022     No results found for: \"PSA\", \"CEA\", \"AFP\", \"\", \"\"    Labs personally reviewed and WBC elevated.       CT Head Without Contrast    Result Date: 9/3/2024  Senescent changes without acute abnormality. Electronically Signed: Poncho Erazo MD  9/3/2024 11:43 PM EDT  Workstation ID: [id number]    CT Needle Biopsy Bone Deep    Result Date: 8/23/2024  Impression: Technically successful percutaneous biopsy of an L1 lytic lesion without evidence of complication Electronically Signed: Dillon Aiken MD  8/23/2024 11:26 AM EDT  Workstation ID: [id number]          Assessment and Plan    Diagnoses and all orders for this visit:    1. Right adrenal mass (Primary)  -     CT Needle Biopsy Adrenal Glands Right; Future  -     Tissue Pathology Exam; Standing    2. Malignant neoplasm of upper-inner quadrant of right female breast, unspecified estrogen receptor status    3. Bilateral " headaches    4. CLL (chronic lymphocytic leukemia)    5. Li-Fraumeni syndrome        Del(17p) CLL  This is a high risk subtype of CLL. Patient symptomatic at visit 1/8/24 with night sweats, tremor, fevers, weakness. Acalabrutinib started 1/8/24. WBC improved to 33K as of 3/7/24.  Plts are normal. Acalabrutinib on hold since 2/23/24 due to tachycardia/palipiations/diarrhea, which has essentially resolved. ECG negative for arrythmia. Resumed acalabrutinib 3/7/24. Tolerating well. WBC trending down. Continue. 9/10/24: WBC still remains elevated.     Right Triple Negative Breast Cancer  November 2021 right lumpectomy. Chemotherapy was not recommended due to the small size of the tumor.  She completed adjuvant radiation in March.  Repeat screening mammogram will be due next September, 9/2023 mammogram benign.  DEXA scan will be due in 2025. Due to high risk, recommend annual MRI to alternate every 6 months with mammogram. MRI breast 3/8/2024 benign. Mammogram due now but will wait pending workup as below.     Bony lesion of Lumbar spine  2 cm in L2 with SUV of 4.4 per recent PET. MRI 7/31/24 with 2.4 cm area of abnormal signal that may represent malignancy. Referred for biopsy as patient with hx of triple negative breast cancer. Bx 8/23/24 with findings consistent with both SLL and possibly another process. Expert consultation at [facility] pending, will follow up.     Adrenal Nodule  Right sided. Nodule not hypermetabolic on PET 5/2023.  Given her underlying genetic mutation she was referred to an endocrinologist at [facility].  Follow up abdominal ultrasound 5/2024 with adrenal not well visualized. CT abdomen/pelvis done and showed it increased to 4.3 cm. Repeat PET scan with SUV of 3.4, so mild.  Pheochromocytoma workup negative. She is following with endocrinology.  Strongly recommend right adrenal biopsy as patient is having headaches and this could be related.  IR guided biopsy ordered.    Headaches  Not  related to acalabrutinib as did not improve with drug holiday.  Headaches occur daily. CT head negative.  7/8/24 MRI brain negative. Could be related right adrenal nodule.  Referred to neurology for headache. IR guided biopsy of adrenal nodule ordered. Topimax started around 9/3/24 for daily pervention, rec to continue. Also on Fioricet.    HTN  Continue losartan 25 mg daily. Already on metoprolol.  Not well-controlled but unlikely explaining her headaches.    Li-Fraumeni Syndrome  Pathologic germline p53 mutation. Has been referred for genetic counseling and evaluated on 9/5/23.  Screening Brain MRI on 6/19/23 was negative, repeat 6/2024 ordered. Needs urine cytology screening yearly. Ovaries have been previously removed. Colonoscopy due every 2 to 5 years, last 5/2022. Recommend repeat anytime.      Total time spent: 45 minutes  Follow up: after biopsy  Patient was given instructions and counseling regarding her condition or for health maintenance advice. Please see specific information pulled into the AVS if appropriate.

## 2024-09-11 ENCOUNTER — TELEPHONE (OUTPATIENT)
Dept: ONCOLOGY | Facility: HOSPITAL | Age: 74
End: 2024-09-11
Payer: MEDICARE

## 2024-09-11 ENCOUNTER — TELEPHONE (OUTPATIENT)
Dept: NUTRITION | Facility: HOSPITAL | Age: 74
End: 2024-09-11
Payer: MEDICARE

## 2024-09-11 NOTE — PROGRESS NOTES
Outpatient Nutrition Oncology Assessment    Patient Name: Dorothea Cruz  YOB: 1950  MRN: [id number]  Assessment Date: 9/11/2024    CLINICAL NUTRITION ASSESSMENT    Dx:  CLL      Type of Cancer Treatment Acalabrutinib          Reason for Assessment  MST score 2+, Malnutrition Severity Assessment, Reduced oral intake         Current Problems:   Patient Active Problem List   Diagnosis Code    Vitamin D deficiency E55.9    Sleep apnea G47.30    Postmenopause Z78.0    Major depressive disorder F32.9    Primary hypertension I10    Mixed hyperlipidemia E78.2    Fibromyalgia M79.7    Disease of thyroid gland E07.9    Depression F32.A    Anxiety F41.9    Arthritis M19.90    ACE-inhibitor cough R05.8, T46.4X5A    Diverticulitis K57.92    Breast cancer C50.919    History of diverticulitis Z87.19    History of cholecystectomy Z90.49    Altered bowel habits R19.4    Diarrhea R19.7    Malignant neoplasm of upper-inner quadrant of right female breast C50.211    Chronic low back pain M54.50, G89.29    Trochanteric bursitis of both hips M70.61, M70.62    Family history of CLL (chronic lymphoid leukemia) Z80.6    Degeneration of lumbar intervertebral disc M51.36    Lumbar spondylosis M47.816    Chronic osteoarthritis M19.90    Leukocytosis D72.829    Prediabetes R73.03    Class 1 obesity with serious comorbidity and body mass index (BMI) of 32.0 to 32.9 in adult E66.9, Z68.32    Adenoma of right adrenal gland D35.01    CLL (chronic lymphocytic leukemia) C91.10    Lumbosacral spondylosis without myelopathy M47.817    Li-Fraumeni syndrome Z15.01    Absence of both cervix and uterus, acquired Z90.710    Sinusitis J32.9    Urinary urgency R39.15    Dehydration E86.0    Chronic neck pain M54.2, G89.29    Inflammation of sacroiliac joint M46.1    Migraine G43.909         Anthropometrics       Row Name 09/11/24 1335          Anthropometrics    Weight for Calculation 53.1 kg (117 lb 1 oz)                         Weight Hx   Wt Readings from Last 30 Encounters:   09/10/24 1345 53.1 kg (117 lb 1 oz)   09/03/24 2046 54.6 kg (120 lb 5.9 oz)   08/23/24 0757 56.7 kg (125 lb)   08/13/24 0838 56.8 kg (125 lb 3.5 oz)   08/03/24 1015 56.5 kg (124 lb 9 oz)   08/01/24 1355 57.4 kg (126 lb 9.6 oz)   07/11/24 1447 59.4 kg (131 lb)   05/31/24 1342 62.4 kg (137 lb 9.6 oz)   05/28/24 1055 61.7 kg (136 lb 0.4 oz)   04/11/24 1539 66.2 kg (145 lb 15.1 oz)   03/14/24 0900 68.6 kg (151 lb 3.2 oz)   03/07/24 0904 70 kg (154 lb 6.4 oz)   02/29/24 1506 69 kg (152 lb 3.2 oz)   02/13/24 1102 70 kg (154 lb 5.2 oz)   01/08/24 0825 76.6 kg (168 lb 14 oz)   12/21/23 1250 76.5 kg (168 lb 10.4 oz)   12/06/23 0943 76.4 kg (168 lb 6.4 oz)   11/28/23 1435 81.2 kg (179 lb)   11/09/23 1312 81.2 kg (179 lb)   10/18/23 0959 87.7 kg (193 lb 5.5 oz)   10/10/23 1058 81.4 kg (179 lb 7.3 oz)   09/11/23 1348 81.2 kg (179 lb)   07/05/23 1504 82.4 kg (181 lb 10.5 oz)   06/09/23 1401 81.4 kg (179 lb 6.4 oz)   06/01/23 1413 81.8 kg (180 lb 5.4 oz)   04/28/23 0946 82.3 kg (181 lb 7 oz)   04/20/23 1304 80.7 kg (177 lb 14.6 oz)   03/30/23 1300 81.2 kg (179 lb)   03/10/23 1344 81.5 kg (179 lb 9.6 oz)   02/14/23 0959 81 kg (178 lb 9.6 oz)         Estimated/Assessed Needs - Anthropometrics       Row Name 09/11/24 1335          Anthropometrics    Weight for Calculation 53.1 kg (117 lb 1 oz)        Estimated/Assessed Needs    Additional Documentation Fluid Requirements (Group);Protein Requirements (Group);KCAL/KG (Group)        KCAL/KG    KCAL/KG 35 Kcal/Kg (kcal)     35 Kcal/Kg (kcal) 1858.5        Protein Requirements    Weight Used For Protein Calculations 53.1 kg (117 lb 1 oz)     Est Protein Requirement Amount (gms/kg) 1.3 gm protein     Estimated Protein Requirements (gms/day) 69.03        Fluid Requirements    Fluid Requirements (mL/day) 1859     RDA Method (mL) 1859                      Labs/Medications        Pertinent Labs Reviewed.       Pertinent Medications Acalabrutinib  Maleate, SUMAtriptan, Vitamin D, acetaminophen, butalbital-acetaminophen-caffeine, colestipol, desvenlafaxine, gabapentin, hydrOXYzine, hydroCHLOROthiazide, losartan, metoprolol tartrate, ondansetron ODT, prochlorperazine, rosuvastatin, and topiramate     Physical Findings        Malnutrition Severity Assessment       Row Name 09/11/24 8410          Malnutrition Severity Assessment    Malnutrition Type Chronic Disease - Related Malnutrition        Insufficient Energy Intake     Insufficient Energy Intake Findings Severe     Insufficient Energy Intake  <50% of est. energy requirement for >or equal to 1 month        Unintentional Weight Loss     Unintentional Weight Loss Findings Severe  6.5% wt decline in 1 month; 23% total wt decline in 6 months     Unintentional Weight Loss  Weight loss greater than 10% in six months        Criteria Met (Must meet criteria for severity in at least 2 of these categories: M Wasting, Fat Loss, Fluid, Secondary Signs, Wt. Status, Intake)    Patient meets criteria for  Severe Malnutrition                      Nutrition Diagnosis        Nutrition Dx Problem 1 Severe malnutrition related to Inability to consume sufficient energy as evidenced by physiological causes increasing nutrient needs., hypermetabolic state., inadequate energy intake., decreased appetite., patient report., and <50% EEE X 1 month; 23% wt decline in 6 months.       Nutrition Intervention       RD Action Nutrition assessment by review of pt's medical record + pt interview (including discussion on):  Recent nutrition-related concerns and hx of wt changes  Current diet:  PO intake / schedule / food tolerances / ONS    Reviewed the following:  Importance of wt maintenance by consuming adequate nutrition (kcals & protein)  Briefly discussed MNT for nausea & poor appetite     Written materials to be mailed to pt's address:  Nausea & Vomiting  Homemade Milkshake Recipes  Recipes to Help with Nausea  Ensure and Boost coupons      Monitor/Evaluation       Monitor Per oncology nutrition protocol.     Comments:    Nutrition referral due to MST score of 3 and reduced oral intake.  Oncology RD also spoke to pt in March 2024 due to significant wt decline.  Per records, pt has lost significant wt over the past year:  6.5% wt decline in 1 month; 23%wt decline in 6 months; and total 35% wt loss in 1 year.    Spoke to pt over the phone.  She reports not having an appetite due to chronic headaches and these cause nausea.  Pt has not eaten yet today (1:45 pm).  Has Compazine for nausea, but reports it does not help very much.  She was given a new Rx for migraines 9/5/24:  Topamax.  Pt reports she can't eat when nauseated.  She does like milk and creamy beverages. Encouraged small / frequent drinks of Ensure Plus, Boost Plus or [place] Breakfast Essentials (non-chocolate flavors) mixed with whole milk to provide maximum nutrition.  Suggested pt speak to a provider about possible appetite stimulant, if nausea / headaches / appetite loss continue.  Pt was agreeable for Ensure and Boost coupons, as well as written information, to be mailed to her.    *May consider Marinol as an appetite stimulant, if medically feasible.      Electronically signed by:  Bebe Arreguin RD  09/11/24 13:41 EDT

## 2024-09-11 NOTE — TELEPHONE ENCOUNTER
Spoke w/pt and provided dates and times of bx and follow up visit, also reviewed prep for bx and to arrive by 8am. Pt is aware

## 2024-09-13 ENCOUNTER — OFFICE VISIT (OUTPATIENT)
Dept: ONCOLOGY | Facility: HOSPITAL | Age: 74
End: 2024-09-13
Payer: MEDICARE

## 2024-09-13 VITALS
RESPIRATION RATE: 18 BRPM | DIASTOLIC BLOOD PRESSURE: 82 MMHG | WEIGHT: 113.54 LBS | HEIGHT: 62 IN | BODY MASS INDEX: 20.89 KG/M2 | TEMPERATURE: 97.7 F | SYSTOLIC BLOOD PRESSURE: 142 MMHG | HEART RATE: 116 BPM | OXYGEN SATURATION: 98 %

## 2024-09-13 DIAGNOSIS — C74.91 CARCINOMA OF RIGHT ADRENAL GLAND: ICD-10-CM

## 2024-09-13 DIAGNOSIS — C91.10 CLL (CHRONIC LYMPHOCYTIC LEUKEMIA): ICD-10-CM

## 2024-09-13 DIAGNOSIS — C74.91 ADRENAL CANCER, RIGHT: Primary | ICD-10-CM

## 2024-09-13 DIAGNOSIS — C50.211 MALIGNANT NEOPLASM OF UPPER-INNER QUADRANT OF RIGHT FEMALE BREAST, UNSPECIFIED ESTROGEN RECEPTOR STATUS: ICD-10-CM

## 2024-09-13 DIAGNOSIS — R51.9 BILATERAL HEADACHES: ICD-10-CM

## 2024-09-13 PROCEDURE — G0463 HOSPITAL OUTPT CLINIC VISIT: HCPCS | Performed by: INTERNAL MEDICINE

## 2024-09-13 NOTE — PROGRESS NOTES
Patient  Dorothea Cruz    [facility] HEMATOLOGY & ONCOLOGY    Chief Complaint   FOLLOW UP 2    Referring Provider: TYRELL Peck*  PCP: Monserrat Grover APRN    Subjective          Oncology/Hematology History Overview Note     Right Triple Negative Breast Cancer:  - screening mammogram 9/10/21: 4mm asymmetry in the right upper inner breast  -Diagnostic mammogram on 2021: Persistent 5 mm ill-defined nodule in the upper inner mid right breast at 2:00, 7 cm from the nipple.  - core biopsy on 10/8/21: Invasive ductal carcinoma, grade 3, ER negative (less than 1%), MI negative (less than 1%), HER-2 equivocal (2+ HC), HER-2 FISH not amplified  - Right lumpectomy on 21. Pathology showed a 5mm tumor with negative margins, 0/3 lymph nodes involved, pT1aN0, ER-, MI-, HER2-  - chemotherapy was not recommended due to the small size of the tumor.   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  Mildly increased CD4 positive T cell LGL (5.2% of analyzed cells).  - CLL FISH positive for TP53/17p13 deletion in 49.5% of cells.  - IgVH somatic hypermutation was detected (3.7%)  -24: Start acalabrutinib due to symptoms of weakness, night sweats, tremor. WBC 31K, plt 133, hgb 13.7  - 3/7/24: Resume acalabrutinib after hold due to palpiations and diarrhea, which was from C diff. WBC 33K. Plts normal.     Li Fraumeni Syndrome (inherited p53 mutation):   Family History:    - brother with CLL  - father  from some type of leukemia  - sister with breast cancer and adult onset retinoblastoma which was fatal  - niece (sister's daughter) also had retinoblastoma as a child  - pt is considering genetic testing     Breast cancer   10/20/2021 Initial Diagnosis    Breast cancer (HCC)      Malignant neoplasm of upper-inner quadrant of right female breast   11/12/2021 Cancer Staged    Staging form: Breast, AJCC 8th Edition  - Pathologic: pT1a, pN0, cM0, ER-, IN-, HER2- - Signed by Santa Haskins APRN on 5/20/2022 1/13/2022 Initial Diagnosis    Malignant neoplasm of upper-inner quadrant of right female breast (HCC)     1/20/2022 - 2/14/2022 Radiation    Radiation OncologyTreatment Course:  Dorothea Cruz received 4256 cGy in 16 fractions to right breast.      CLL (chronic lymphocytic leukemia)   3/10/2023 Initial Diagnosis    CLL (chronic lymphocytic leukemia)     1/12/2024 -  Chemotherapy    OP LYMPHOMA (CLL) Acalabrutinib         History of Present Illness  Patient comes in today for follow-up. She remains on acalabrutinib.  Patient had recent PET scan with small level activity in right adrenal mass as well as small activity in L1 vertebral body.  Abnormal focus of enhancement shown on MRI of L-spine at L2, which correlates with findings on PET scan.  Patient had biopsy of this via IR and pathology is pending expert consultation at [facility] however per flow cytometry 40% was consistent with a diagnosis of SLL.  Patient continues to have daily severe headaches.  Headaches are located in the front as well as the back and into her neck.  This is caused her to be nauseous and to not eat.  She has lost weight because of this.  She reports the Fioricet helps slightly.  She has started on Topamax as of last week.  May have noticed slight improvement in her headaches since then.  She has been seen by endocrine who is hoping to get adrenal gland biopsy.      Review of Systems   Constitutional:  Positive for appetite change, fatigue and unexpected weight loss. Negative for diaphoresis, fever and unexpected weight gain.   HENT:  Negative for hearing loss, mouth sores, sore throat, swollen glands, trouble swallowing and voice change.    Eyes:  Negative for blurred vision.   Respiratory:   Negative for cough, shortness of breath and wheezing.    Cardiovascular:  Negative for chest pain and palpitations.   Gastrointestinal:  Negative for abdominal pain, blood in stool, constipation, diarrhea, nausea and vomiting.   Endocrine: Negative for cold intolerance and heat intolerance.   Genitourinary:  Negative for difficulty urinating, dysuria, frequency, hematuria and urinary incontinence.   Musculoskeletal:  Positive for back pain and neck pain. Negative for arthralgias and myalgias.   Skin:  Negative for rash, skin lesions and wound.   Neurological:  Positive for tremors and weakness. Negative for dizziness, seizures, numbness and headache.   Hematological:  Does not bruise/bleed easily.   Psychiatric/Behavioral:  Negative for depressed mood. The patient is not nervous/anxious.    All other systems reviewed and are negative.      Past Medical History:   Diagnosis Date    Allergic     Anxiety     Arthritis     Breast cancer     Cancer     BREAST CANCER    Cataract     Cholelithiases 4/30@1    CLL (chronic lymphocytic leukemia) 2023    Diverticulitis     Diverticulitis of colon     Diverticulosis 95    Fibromyalgia     Fibromyalgia, primary     Hyperlipidemia     Hypertension     Irritable bowel syndrome     Liver disease     Low back pain 2015    Major depressive disorder 05/07/2020    Palpitations     ASYMPTOMATIC- DENIES CP/SOB, SEE PCP- NO CARDIOLOGIST     Post-menopause 09/17/2020    Sinus trouble     Sleep apnea     Tremor 2018    I was on medicine for tremors    Urinary tract infection     Vitamin D deficiency 09/17/2020     Past Surgical History:   Procedure Laterality Date    APPENDECTOMY      BILATERAL BREAST REDUCTION      BREAST BIOPSY Right 11/12/2021    Procedure: RIGHT BREAST NEEDLE LOCALIZED  LUMPECTOMY WITH SENTINEL NODE BIOPSY;  Surgeon: Mirtha Esteves MD;  Location: [facility] OR [facility];  Service: General;  Laterality: Right;    BREAST LUMPECTOMY      CHOLECYSTECTOMY N/A 09/02/2021     "Procedure: CHOLECYSTECTOMY LAPAROSCOPIC INTRAOPERATIVE CHOLANGIOGRAM;  Surgeon: Louie Medina MD;  Location: [facility] MAIN OR;  Service: General;  Laterality: N/A;    COLONOSCOPY  2019    COLONOSCOPY N/A 2022    Procedure: COLONOSCOPY WITH BIOPSIES;  Surgeon: Butch Garcia MD;  Location: [facility] ENDOSCOPY;  Service: Gastroenterology;  Laterality: N/A;  DIVERTICULOSIS    HYSTERECTOMY      LYMPH NODE BIOPSY      SEPTOPLASTY  2018    SINUS SURGERY  2018    TONSILLECTOMY      US GUIDED CYST ASPIRATION BREAST N/A 10/03/2022    WRIST ARTHROPLASTY Bilateral      Social History     Socioeconomic History    Marital status:    Tobacco Use    Smoking status: Never     Passive exposure: Yes    Smokeless tobacco: Never   Vaping Use    Vaping status: Never Used   Substance and Sexual Activity    Alcohol use: Never    Drug use: Never    Sexual activity: Not Currently     Partners: Female     Birth control/protection: Post-menopausal, Hysterectomy     Family History   Problem Relation Age of Onset    No Known Problems Mother     Cancer Father         Leukemia      Breast cancer Sister     Cancer Sister         Had breast cancer    Cancer Brother         Leukemia/CLL    Cancer Sister         Had stomach cancer    Cancer Brother         Bladder cancer    Cancer Brother         Bladder cancer    Cancer Sister         Retinal blastoma.    Colon cancer Neg Hx        Objective   Physical Exam  General: Alert, cooperative, mild distress from headache  Eyes: Anicteric sclera, PERRLA  Respiratory: normal respiratory effort  Skin: Normal tone, no rash, no lesions  Psychiatric: Appropriate affect, intact judgment  Neurologic: No focal sensory or motor deficits, normal cognition  Musculoskeletal: Normal muscle strength and tone    Vitals:    24 1420   BP: 142/82   Pulse: 116   Resp: 18   Temp: 97.7 °F (36.5 °C)   TempSrc: Temporal   SpO2: 98%   Weight: 51.5 kg (113 lb 8.6 oz)   Height: 157.5 cm (62.01\") " "  PainSc:   9                       PHQ-9 Total Score:         Result Review :   The following data was reviewed by: Luciano Glez MD on 09/13/24:  Lab Results   Component Value Date    HGB 12.2 08/15/2024    HCT 37.1 08/15/2024    MCV 88.1 08/15/2024     (L) 08/15/2024    WBC 24.78 (H) 08/15/2024    NEUTROABS 5.95 08/15/2024    LYMPHSABS 13.81 (H) 07/11/2024    MONOSABS 0.64 07/11/2024    EOSABS 0.25 08/15/2024    BASOSABS 0.07 07/11/2024     Lab Results   Component Value Date    GLUCOSE 114 (H) 07/11/2024    BUN 13 07/11/2024    CREATININE 0.59 07/11/2024     07/11/2024    K 3.6 07/11/2024     07/11/2024    CO2 27.7 07/11/2024    CALCIUM 9.9 07/11/2024    PROTEINTOT 6.3 07/11/2024    ALBUMIN 3.9 07/11/2024    BILITOT 0.3 07/11/2024    ALKPHOS 91 07/11/2024    AST 17 07/11/2024    ALT 12 07/11/2024     No results found for: \"IRON\", \"LABIRON\", \"TRANSFERRIN\", \"TIBC\"  Lab Results   Component Value Date    MHPXYSHP49 317 03/08/2022    FOLATE 14.40 03/08/2022     No results found for: \"PSA\", \"CEA\", \"AFP\", \"\", \"\"    Labs personally reviewed and WBC elevated.       CT Head Without Contrast    Result Date: 9/3/2024  Senescent changes without acute abnormality. Electronically Signed: Poncho Erazo MD  9/3/2024 11:43 PM EDT  Workstation ID: [id number]    CT Needle Biopsy Bone Deep    Result Date: 8/23/2024  Impression: Technically successful percutaneous biopsy of an L1 lytic lesion without evidence of complication Electronically Signed: Dillon Aiken MD  8/23/2024 11:26 AM EDT  Workstation ID: [id number]          Assessment and Plan    Diagnoses and all orders for this visit:    1. Adrenal cancer, right (Primary)  -     Ambulatory Referral to Endocrinology  -     Ambulatory Referral to Oncology  -     Ambulatory Referral to Surgical Oncology    2. Bilateral headaches  -     Ambulatory Referral to Endocrinology    3. Carcinoma of right adrenal gland    4. CLL (chronic lymphocytic " leukemia)    5. Malignant neoplasm of upper-inner quadrant of right female breast, unspecified estrogen receptor status        Adrenal Carcinoma  Bony lesion of Lumbar spine  2 cm in L2 with SUV of 4.4 per recent PET. MRI 7/31/24 with 2.4 cm area of abnormal signal that may represent malignancy. Bx 8/23/24 with findings consistent with both SLL and adrenal carcinoma (after expert consultation at [facility]). Patient already following with endocrinology.  Discussed that this represents metastatic or stage IV adrenal carcinoma.  Typically this is not treated with surgery.  Fortunately she has low-volume disease as it is only located in 1 bone.  Could consider local therapy with surgery of primary and radiation of bony lesion.  As indolent, could also consider systemic treatment with mitotane alone versus chemotherapy plus mitotane.  She is followed by [facility] and will follow-up with them.  Would also recommend expert consultation/second opinion with surgical oncologist as well as medical oncologist that has experience in treatment of this. Have referred her to [facility]. Can also consider referral to [facility].    Del(17p) CLL  This is a high risk subtype of CLL. In setting of Li-Fraumeni. Patient symptomatic at visit 1/8/24 with night sweats, tremor, fevers, weakness. Acalabrutinib started 1/8/24. WBC improved to 33K as of 3/7/24.  Plts are normal. Acalabrutinib on hold since 2/23/24 due to tachycardia/palipiations/diarrhea, which has essentially resolved. ECG negative for arrythmia. Resumed acalabrutinib 3/7/24. Tolerating well. WBC trending down. Continue. 9/10/24: WBC still remains elevated.     Right Triple Negative Breast Cancer  November 2021 right lumpectomy. Chemotherapy was not recommended due to the small size of the tumor.  She completed adjuvant radiation in March.  Repeat screening mammogram will be due next September, 9/2023 mammogram benign.  DEXA scan will be due in  2025. Due to high risk, recommend annual MRI to alternate every 6 months with mammogram. MRI breast 3/8/2024 benign. Mammogram due now but will wait pending workup as below.     Adrenal Nodule  Right sided. Nodule not hypermetabolic on PET 5/2023.  Given her underlying genetic mutation she was referred to an endocrinologist at [facility].  Follow up abdominal ultrasound 5/2024 with adrenal not well visualized. CT abdomen/pelvis done and showed it increased to 4.3 cm. Repeat PET scan with SUV of 3.4, so mild.  Pheochromocytoma workup negative. DHEA-S low. Aldosterone/renin ratio slightly high but aldosterone not elevated. Recommend right adrenal biopsy as patient is having headaches and this could be related.  IR guided biopsy ordered. Recommend proceeding with this biopsy as it could be compared to L3 lesion to confirm adrenal carcinoma involvement.     Headaches  Not related to acalabrutinib as did not improve with drug holiday.  Headaches occur daily. CT head negative.  7/8/24 MRI brain negative. Could be related right adrenal nodule.  Referred to neurology for headache. Topimax started around 9/3/24 for daily pervention, rec to continue. Also on Fioricet. Possibly related to adrenal carcinoma.    HTN  Continue losartan 25 mg daily. Already on metoprolol.  Not well-controlled but unlikely explaining her headaches.    Li-Fraumeni Syndrome  Pathologic germline p53 mutation. Has been referred for genetic counseling and evaluated on 9/5/23.  Screening Brain MRI on 6/19/23 was negative, repeat 7/2024 negative. Needs urine cytology screening yearly. Ovaries have been previously removed. Colonoscopy due every 2 to 5 years, last 5/2022. Recommend repeat anytime.        Total time spent: 30 minutes  Follow up: 3 weeks  Patient was given instructions and counseling regarding her condition or for health maintenance advice. Please see specific information pulled into the AVS if appropriate.

## 2024-09-14 ENCOUNTER — SPECIALTY PHARMACY (OUTPATIENT)
Dept: PHARMACY | Facility: HOSPITAL | Age: 74
End: 2024-09-14
Payer: MEDICARE

## 2024-09-18 ENCOUNTER — TELEPHONE (OUTPATIENT)
Dept: ONCOLOGY | Facility: HOSPITAL | Age: 74
End: 2024-09-18
Payer: MEDICARE

## 2024-09-18 RX ORDER — LOSARTAN POTASSIUM 50 MG/1
50 TABLET ORAL DAILY
Qty: 30 TABLET | Refills: 3 | Status: SHIPPED | OUTPATIENT
Start: 2024-09-18

## 2024-09-18 RX ORDER — HYDRALAZINE HYDROCHLORIDE 10 MG/1
10 TABLET, FILM COATED ORAL 3 TIMES DAILY
Qty: 90 TABLET | Refills: 1 | Status: SHIPPED | OUTPATIENT
Start: 2024-09-18

## 2024-09-25 ENCOUNTER — TELEPHONE (OUTPATIENT)
Dept: FAMILY MEDICINE CLINIC | Facility: CLINIC | Age: 74
End: 2024-09-25

## 2024-09-25 NOTE — TELEPHONE ENCOUNTER
"  Caller: Dorothea Cruz \"Taniya\"    Relationship to patient: Self    Best call back number: [phone]     New or established patient?  [] New  [x] Established    Date of discharge: 9/23    Facility discharged from: [facility]     Diagnosis/Symptoms: ADRENAL GLAND CANCER ISSUES - PLACED ON PROPANALOL FOR HEADACHES AND TREMORS    Length of stay (If applicable): 4 DAYS    "

## 2024-10-01 ENCOUNTER — TRANSCRIBE ORDERS (OUTPATIENT)
Dept: ADMINISTRATIVE | Facility: HOSPITAL | Age: 74
End: 2024-10-01
Payer: MEDICARE

## 2024-10-01 ENCOUNTER — OFFICE VISIT (OUTPATIENT)
Dept: FAMILY MEDICINE CLINIC | Facility: CLINIC | Age: 74
End: 2024-10-01
Payer: MEDICARE

## 2024-10-01 VITALS
DIASTOLIC BLOOD PRESSURE: 64 MMHG | SYSTOLIC BLOOD PRESSURE: 120 MMHG | HEART RATE: 79 BPM | HEIGHT: 62 IN | OXYGEN SATURATION: 99 % | BODY MASS INDEX: 20.8 KG/M2 | WEIGHT: 113 LBS

## 2024-10-01 DIAGNOSIS — Z09 HOSPITAL DISCHARGE FOLLOW-UP: Primary | ICD-10-CM

## 2024-10-01 DIAGNOSIS — C74.91 MALIGNANT NEOPLASM OF RIGHT ADRENAL GLAND, UNSPECIFIED PART: ICD-10-CM

## 2024-10-01 DIAGNOSIS — I10 PRIMARY HYPERTENSION: ICD-10-CM

## 2024-10-01 DIAGNOSIS — R53.83 CHEMOTHERAPY-INDUCED FATIGUE: ICD-10-CM

## 2024-10-01 DIAGNOSIS — T45.1X5A CHEMOTHERAPY-INDUCED FATIGUE: ICD-10-CM

## 2024-10-01 DIAGNOSIS — E27.8 ADRENAL MASS: Primary | ICD-10-CM

## 2024-10-01 PROBLEM — E87.6 HYPOKALEMIA: Status: RESOLVED | Noted: 2024-09-19 | Resolved: 2024-10-01

## 2024-10-01 PROBLEM — I26.99 ACUTE PULMONARY EMBOLISM: Status: ACTIVE | Noted: 2024-09-19

## 2024-10-01 PROBLEM — R51.9 INTRACTABLE HEADACHE: Status: RESOLVED | Noted: 2024-09-19 | Resolved: 2024-10-01

## 2024-10-01 PROBLEM — C74.90: Status: ACTIVE | Noted: 2024-09-19

## 2024-10-01 PROBLEM — R11.2 INTRACTABLE NAUSEA AND VOMITING: Status: RESOLVED | Noted: 2024-09-19 | Resolved: 2024-10-01

## 2024-10-01 PROBLEM — I16.0 HYPERTENSIVE URGENCY: Status: RESOLVED | Noted: 2024-09-19 | Resolved: 2024-10-01

## 2024-10-01 PROCEDURE — 1160F RVW MEDS BY RX/DR IN RCRD: CPT | Performed by: NURSE PRACTITIONER

## 2024-10-01 PROCEDURE — 1125F AMNT PAIN NOTED PAIN PRSNT: CPT | Performed by: NURSE PRACTITIONER

## 2024-10-01 PROCEDURE — 3078F DIAST BP <80 MM HG: CPT | Performed by: NURSE PRACTITIONER

## 2024-10-01 PROCEDURE — 99215 OFFICE O/P EST HI 40 MIN: CPT | Performed by: NURSE PRACTITIONER

## 2024-10-01 PROCEDURE — 1159F MED LIST DOCD IN RCRD: CPT | Performed by: NURSE PRACTITIONER

## 2024-10-01 PROCEDURE — 3074F SYST BP LT 130 MM HG: CPT | Performed by: NURSE PRACTITIONER

## 2024-10-01 RX ORDER — PROPRANOLOL HCL 10 MG
10 TABLET ORAL 3 TIMES DAILY
COMMUNITY
Start: 2024-09-23 | End: 2024-12-22

## 2024-10-01 RX ORDER — SCOLOPAMINE TRANSDERMAL SYSTEM 1 MG/1
1 PATCH, EXTENDED RELEASE TRANSDERMAL
COMMUNITY
Start: 2024-09-23

## 2024-10-01 RX ORDER — POTASSIUM CHLORIDE 1500 MG/1
40 TABLET, EXTENDED RELEASE ORAL DAILY
COMMUNITY
Start: 2024-09-24 | End: 2024-12-23

## 2024-10-01 NOTE — ASSESSMENT & PLAN NOTE
Hypertension is stable and controlled.  Continue current treatment regimen.  Ambulatory blood pressure monitoring.  I did advise her to monitor her blood pressure and to notify me if she has low blood pressures and especially if she is symptomatic.  Blood pressure will be reassessed  at next routine follow-up .

## 2024-10-01 NOTE — PROGRESS NOTES
Answers submitted by the patient for this visit:  Other (Submitted on 9/25/2024)  Please describe your symptoms.: Follow up  Have you had these symptoms before?: Yes  How long have you been having these symptoms?: 1-4 days  Please list any medications you are currently taking for this condition.: Ill bring paper work  Please describe any probable cause for these symptoms. : I’ll bring paperwork  Primary Reason for Visit (Submitted on 9/25/2024)  What is the primary reason for your visit?: Problem Not Listed  Transitional Care Follow Up Visit  Subjective     Dorothea Cruz is a 74 y.o. female who presents for a transitional care management visit.    The TCM call was not done within the 48 hours.    I reviewed and discussed hospital problems, inpatient lab results, inpatient diagnostic studies, and consultation reports with Dorothea.     Current outpatient and discharge medications have been reconciled for the patient.  Reviewed by: ARA Peck           No data to display              Risk for Readmission (LACE) No data recorded    History of Present Illness   Course During Hospital Stay: She is accompanied by her  today.  She was admitted at [facility] on 9/19/2024 for pulmonary embolism.  She was discharged on 9/24/2024.  She has lost weight.  They did some workup and they are worried that she may have an adrenal gland cancer versus metastatic breast cancer.  They have a plan for her to go to [facility], they are leaving on 10/13/2024 to get chemotherapy.  She has to have some lab workup, a 24-hour urine and an echocardiogram done prior to leaving.  The echocardiogram has been ordered by a [facility] physician and they have the order with them.  They also have the orders for the outpatient labs. She states she is eating better now.  She also states she is doing daily supplemental nutritional shakes.  They did change some medications from the hospital, those changes are listed in  "the discharge summary below.  We discussed all these changes.  She also is no longer taking colestipol.  Her blood pressure was noted to be low on arrival.  She denies any dizziness or falls.  I did recheck her blood pressure and it was better at 120/64.    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     Acute PE  - likely due to cancer   - CT chest clot to LLL superior segmental pulmonary artery compatible with PE--> cont treatment lovenox, transition to Eliquis at discharge.  - Oncology following during admission, F/U OP w/ Dr. Phoenix provided at discharge.     Intractable HA/ Metastatic Adrenal carcinoma/ CLL/ Breast cancer in remission   - CT head without acute findings   - MRI B w/wo contrast w/o evidence of metastasis.  - MRI C Spine w/o metastatic disease.  - CT C/A/P: Right adrenal mass was 4.4 x 3.5 x 4.8 cm. Left adrenal was unremarkable. There are scattered prominent retroperitoneal LNs with index LN 1.1 x 0.9 cm. Lytic bone lesion of L1 2 x 1.8 x 1.8 cm. Subtle fracture of the L1 inferior endplate.   - Biopsy of L2 lesion w/ pathology \"malignant epithelioid neoplasm, favor poorly differentiated carcinoma from adrenal primary\"   - WBC 16.53, chronically elevated, currently below baseline   - Migraine cocktails not helping, gave dexamethasone, which seemed to help--> resumed w/ extended taper provided at discharge.  - consulted oncology, Has F/U appointment w/ Jaden in Oct and also sent Information to Dr. Rika Garcia at [facility].    Intractable vomiting/ HypoK/Weight loss  - CT chest, abdomen, and pelvis without acute issue.  - UA negative for infection   - K 2.5 at admission, slowing improving w/ aggressive " replacement, started on daily replacement at discharge and discussed no HCTZ going forward.  - s/p IVF.  - Antiemetics provided at discharge.    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   - She has had excessive weight loss, Hypokalemia, and Hypertension.  Disposition:  Home or Self Care     Patient Instructions:   Discharge Medication List as of 9/23/2024 4:47 PM       START taking these medications   Details   dexAMETHasone (DECADRON) 1 MG tablet Multiple Dosages:Starting Mon 9/23/2024, Until Thu 9/26/2024 at 2359, THEN Starting Fri 9/27/2024, Until Thu 10/3/2024 at 2359, THEN Starting Fri 10/4/2024, Until Thu 10/10/2024 at 2359Take 6 tablets by mouth daily with breakfast for 4 days, THEN 4 t ablets daily with breakfast for 7 days, THEN 2 tablets daily with breakfast for 7 days., Normal     ondansetron (ZOFRAN) 4 MG tablet Take 1 tablet by mouth every 6 (six) hours as needed for Nausea., Starting Mon 9/23/2024, Normal     pantoprazole (PROTONIX) 40 MG tablet Take 1 tablet by mouth daily., Starting Mon 9/23/2024, Until Wed 10/23/2024, Normal     potassium chloride SA (KLOR-CON M) 20 MEQ CR tablet Take 2 tablets by mouth daily., Starting Tue 9/24/2024, Until Mon 12/23/2024, Normal     propranolol (INDERAL) 10 MG tablet Take 1 tablet by mouth 3 (three) times daily., Starting Mon 9/23/2024, Until Sun 12/22/2024, Normal         CONTINUE these medications which have CHANGED   Details   !! apixaban (ELIQUIS) 5 MG tablet Multiple Dosages:Starting Mon 9/23/2024, Until Sun 9/29/2024 at 2359, THEN Starting Mon 9/30/2024, Until Sat 12/21/2024 at 2359Take 2 tablets by mouth 2 (two) times daily for 7 days, THEN 1 tablet 2 (two) times daily.,  Normal     !! apixaban (ELIQUIS) 5 MG tablet Take 1 tablet by mouth 2 (two) times daily after completion of starter prescription from [facility]., Starting Wed 10/23/2024, Normal     butalbital-acetaminophen-caffeine (FIORICET) -40 MG Take 1 tablet by mouth every 4 (four) hours as needed for Headaches. Max Daily Amount: 6 tablets, Starting Mon 9/23/2024, Normal     losartan (COZAAR) 25 MG tablet Take 2 tablets by mouth daily., Starting Mon 9/23/2024, No Print     scopolamine (TRANSDERM-SCOP) 1 MG (1.5 MG BASE)/3 DAYS Place 1 patch onto the skin every third day as needed (if nausea unrelieved with zofran)., Starting Mon 9/23/2024, Normal     !! - Potential duplicate medications found. Please discuss with provider.       CONTINUE these medications which have NOT CHANGED   Details   Acalabrutinib (CALQUENCE PO) Take 100 mg by mouth 2 (two) times daily., Starting Wed 1/10/2024, Historical Med     Cholecalciferol 25 MCG (1000 UT) CHEW Chew by mouth., Historical Med     colestipol (COLESTID) 1 g tablet Take 1 g by mouth 2 (two) times daily., Historical Med     desvenlafaxine ER (KHEDEZLA) 50 MG tablet Take 50 mg by mouth 2 (two) times daily., Historical Med     rosuvastatin (CRESTOR) 20 MG tablet Take 20 mg by mouth daily., Historical Med         STOP taking these medications     hydroCHLOROthiazide (HYDRODIURIL) 25 MG tablet     metoprolol (LOPRESSOR) 25 MG tablet     topiramate (TOPAMAX) 25 MG tablet     Contact information for other follow-up providers     Bee Roth MD .   Specialties: Neurology, Neurology Oncology, Oncology  Contact information  [address] [address]  [phone]   Future Appointments Provider Department Dept Phone   10/15/2024 2:20 PM Titus Sargent MD [facility] [phone]   Arrive 5 minutes prior to appointment.      10/29/2024 11:15 AM Coby Dallas MD [facility] -  Endocrinology [phone]   The following portions of the patient's history were reviewed and updated as appropriate: allergies, current medications, past family history, past medical history, past social history, past surgical history, and problem list.    Review of Systems   Constitutional:  Positive for unexpected weight change. Negative for chills.   Respiratory:  Negative for cough, shortness of breath and wheezing.    Cardiovascular:  Negative for chest pain, palpitations and leg swelling.   Gastrointestinal:  Negative for constipation, diarrhea, nausea and vomiting.   Genitourinary:  Negative for difficulty urinating.   Neurological:  Negative for dizziness.   Psychiatric/Behavioral:  Negative for confusion and suicidal ideas. The patient is not nervous/anxious.        Current Outpatient Medications:     Acalabrutinib Maleate (CALQUENCE) 100 MG tablet, Take 1 tablet by mouth 2 (Two) Times a Day., Disp: 60 tablet, Rfl: 11    acetaminophen (TYLENOL) 500 MG tablet, Take 2 tablets by mouth Daily As Needed for Mild Pain., Disp: , Rfl:     apixaban (ELIQUIS) 5 MG tablet tablet, Take 1 tablet by mouth 2 (Two) Times a Day., Disp: , Rfl:     butalbital-acetaminophen-caffeine (Esgic) -40 MG per tablet, Take 1 tablet by mouth Every 4 (Four) Hours As Needed for Headache., Disp: 30 tablet, Rfl: 1    Cholecalciferol (Vitamin D) 50 MCG (2000 UT) capsule, Take 1 capsule by mouth Daily. Indications: Vitamin D Deficiency, Disp: 90 capsule, Rfl: 3    desvenlafaxine (Pristiq) 100 MG 24 hr tablet, Take 1 tablet by mouth Daily. Indications: Major Depressive Disorder, Disp: 90 tablet, Rfl: 1    hydrALAZINE (APRESOLINE) 10 MG tablet, Take 1 tablet by mouth 3 (Three) Times a Day., Disp: 90 tablet, Rfl: 1    losartan (Cozaar) 50 MG tablet, Take 1 tablet by mouth Daily., Disp: 30 tablet, Rfl: 3    ondansetron ODT (ZOFRAN-ODT) 4 MG disintegrating tablet, Place 1 tablet on the tongue 4 (Four) Times a Day As Needed for Nausea or  Vomiting., Disp: 20 tablet, Rfl: 0    potassium chloride (KLOR-CON M20) 20 MEQ CR tablet, Take 2 tablets by mouth Daily., Disp: , Rfl:     prochlorperazine (COMPAZINE) 10 MG tablet, Take 1 tablet by mouth Every 6 (Six) Hours As Needed for Nausea., Disp: 60 tablet, Rfl: 3    propranolol (INDERAL) 10 MG tablet, Take 1 tablet by mouth 3 (Three) Times a Day., Disp: , Rfl:     rosuvastatin (CRESTOR) 20 MG tablet, Take 1 tablet by mouth Every Night. Indications: Elevation of Both Cholesterol and Triglycerides in Blood, Disp: 90 tablet, Rfl: 1    Scopolamine 1 MG/3DAYS patch, Place 1 patch on the skin as directed by provider., Disp: , Rfl:     SUMAtriptan (IMITREX) 100 MG tablet, Take 1 tablet by mouth Take As Directed. Take one tablet at onset of headache. May repeat dose one time in 2 hours if headache not relieved. Do not take more than 2 pills in 24 hrs, Disp: 20 tablet, Rfl: 3  Objective   Physical Exam  Vitals reviewed.   Constitutional:       Appearance: Normal appearance.   Neck:      Thyroid: No thyroid mass, thyromegaly or thyroid tenderness.   Cardiovascular:      Rate and Rhythm: Normal rate and regular rhythm.      Heart sounds: No murmur heard.     No friction rub. No gallop.   Pulmonary:      Effort: Pulmonary effort is normal.      Breath sounds: Normal breath sounds. No wheezing or rhonchi.   Lymphadenopathy:      Cervical: No cervical adenopathy.   Skin:     General: Skin is warm and dry.   Neurological:      Mental Status: She is alert and oriented to person, place, and time.      Cranial Nerves: No cranial nerve deficit.   Psychiatric:         Mood and Affect: Mood and affect normal.         Behavior: Behavior normal.         Thought Content: Thought content normal. Thought content does not include homicidal or suicidal ideation.         Judgment: Judgment normal.       Component  Ref Range & Units 4 d ago   BUN  7 - 25 mg/dL 19   Sodium  135 - 145 mmol/L 139   Potassium  3.5 - 5.0 mmol/L 4.5    Chloride  98 - 108 mmol/L 105   CO2  21 - 31 mmol/L 27   Creatinine  0.50 - 1.20 mg/dL 0.59   BUN/Creatinine Ratio 32   Anion Gap  7 - 17 mmol/L 12   eGFR, CKD-EPI, Female  >=60 mL/min/1.73m2 >90     Common labs          9/19/2024    10:50 9/19/2024    14:36 9/20/2024    11:55 9/21/2024    03:23   Common Labs   Potassium 2.5      3.3        AST (SGOT) 19          WBC    8.70       Hemoglobin    11.6       Hematocrit    35.9       Platelets    119       Hemoglobin A1C  5.4            Details          This result is from an external source.             Assessment & Plan   Problems Addressed this Visit          Cardiac and Vasculature    Primary hypertension     Hypertension is stable and controlled.  Continue current treatment regimen.  Ambulatory blood pressure monitoring.  I did advise her to monitor her blood pressure and to notify me if she has low blood pressures and especially if she is symptomatic.  Blood pressure will be reassessed  at next routine follow-up .         Relevant Medications    propranolol (INDERAL) 10 MG tablet       Other    Chemotherapy-induced fatigue     Patient is to get the echocardiogram prior to chemotherapy and wants to get it done at [facility].  Since it has been ordered by Dr. Biju Christina at [facility], I put in an order for the echocardiogram for her to get done at [facility].  We will get this scheduled as soon as possible before she has to leave for [place].  I will get the results sent to the ordering physician when available.         Relevant Orders    Adult Transthoracic Echo Complete W/ Cont if Necessary Per Protocol     Other Visit Diagnoses       Hospital discharge follow-up    -  Primary    No TCM call completed at    Malignant neoplasm of right adrenal gland, unspecified part        Relevant Orders    Adult Transthoracic Echo Complete W/ Cont if Necessary Per Protocol          Diagnoses         Codes Comments    Hospital discharge follow-up    -  Primary ICD-10-CM:  Z09  ICD-9-CM: V67.59 No TCM call completed at    Malignant neoplasm of right adrenal gland, unspecified part     ICD-10-CM: C74.91  ICD-9-CM: 194.0     Chemotherapy-induced fatigue     ICD-10-CM: R53.83, T45.1X5A  ICD-9-CM: 780.79, E933.1     Primary hypertension     ICD-10-CM: I10  ICD-9-CM: 401.9             Return if symptoms worsen or fail to improve.    I spent 42 minutes caring for Dorothea on this date of service. This time includes time spent by me in the following activities:preparing for the visit, reviewing tests, performing a medically appropriate examination and/or evaluation , counseling and educating the patient/family/caregiver, ordering medications, tests, or procedures, and documenting information in the medical record    Parts of this note are electronic transcriptions/translations of spoken language to printed text using the Dragon Dictation system.    Monserrat Grover, APRN    10/01/2024

## 2024-10-01 NOTE — ASSESSMENT & PLAN NOTE
Patient is to get the echocardiogram prior to chemotherapy and wants to get it done at [facility].  Since it has been ordered by Dr. Biju Christina at [facility], I put in an order for the echocardiogram for her to get done at [facility].  We will get this scheduled as soon as possible before she has to leave for [place].  I will get the results sent to the ordering physician when available.

## 2024-10-02 ENCOUNTER — TELEPHONE (OUTPATIENT)
Dept: FAMILY MEDICINE CLINIC | Facility: CLINIC | Age: 74
End: 2024-10-02

## 2024-10-02 DIAGNOSIS — G43.819 OTHER MIGRAINE WITHOUT STATUS MIGRAINOSUS, INTRACTABLE: ICD-10-CM

## 2024-10-02 RX ORDER — BUTALBITAL, ACETAMINOPHEN AND CAFFEINE 50; 325; 40 MG/1; MG/1; MG/1
1 TABLET ORAL EVERY 4 HOURS PRN
Qty: 30 TABLET | Refills: 1 | Status: SHIPPED | OUTPATIENT
Start: 2024-10-02

## 2024-10-02 NOTE — TELEPHONE ENCOUNTER
"    Caller: Dorothea Cruz \"Taniya\"    Relationship: Self    Best call back number: [phone]    Requested Prescriptions:   butalbital-acetaminophen-caffeine (Esgic) -40 MG per tablet  1 tablet, Every 4 Hours PRN          Pharmacy where request should be sent: [address] [phone] [facility] [phone]      Last office visit with prescribing clinician: 10/1/2024   Last telemedicine visit with prescribing clinician: Visit date not found   Next office visit with prescribing clinician: 11/25/2024     Additional details provided by patient: PATIENT STATED WHEN SHE WAS SEEN IN OFFICE YESTERDAY THAT SHE WAS TOLD BY PCP THAT SHE WOULD REFILL THIS PRESCRIPTION. PATIENT HAS MORE THAN A 3 DAY SUPPLY    Does the patient have less than a 3 day supply:  [] Yes  [x] No    Would you like a call back once the refill request has been completed: [] Yes [] No    If the office needs to give you a call back, can they leave a voicemail: [] Yes [] No    Jackelyn Cuello Rep   10/02/24 11:31 EDT         "

## 2024-10-02 NOTE — TELEPHONE ENCOUNTER
We did not discuss this during the visit. This is a controlled med and Dr. Glez has been prescribing it. She will need to call oncology for this refill.

## 2024-10-07 ENCOUNTER — LAB (OUTPATIENT)
Dept: LAB | Facility: HOSPITAL | Age: 74
End: 2024-10-07
Payer: MEDICARE

## 2024-10-07 DIAGNOSIS — E27.8 ADRENAL MASS: ICD-10-CM

## 2024-10-07 LAB
COLLECT DURATION TIME UR: 24 HRS
CREAT UR-MCNC: 52.7 MG/DL
CREATINE 24H UR-MRATE: 0.61 G/24 HR (ref 0.7–1.6)
SPECIMEN VOL 24H UR: 1150 ML

## 2024-10-07 PROCEDURE — 82530 CORTISOL FREE: CPT

## 2024-10-07 PROCEDURE — 82570 ASSAY OF URINE CREATININE: CPT

## 2024-10-07 PROCEDURE — 81050 URINALYSIS VOLUME MEASURE: CPT

## 2024-10-08 ENCOUNTER — LAB (OUTPATIENT)
Dept: LAB | Facility: HOSPITAL | Age: 74
End: 2024-10-08
Payer: MEDICARE

## 2024-10-08 ENCOUNTER — OFFICE VISIT (OUTPATIENT)
Dept: ONCOLOGY | Facility: HOSPITAL | Age: 74
End: 2024-10-08
Payer: MEDICARE

## 2024-10-08 VITALS
BODY MASS INDEX: 21.62 KG/M2 | HEIGHT: 62 IN | OXYGEN SATURATION: 99 % | DIASTOLIC BLOOD PRESSURE: 53 MMHG | RESPIRATION RATE: 18 BRPM | TEMPERATURE: 98.6 F | SYSTOLIC BLOOD PRESSURE: 132 MMHG | WEIGHT: 117.5 LBS | HEART RATE: 65 BPM

## 2024-10-08 DIAGNOSIS — E27.8 ADRENAL MASS: ICD-10-CM

## 2024-10-08 DIAGNOSIS — C74.91 CARCINOMA OF RIGHT ADRENAL GLAND: Primary | ICD-10-CM

## 2024-10-08 DIAGNOSIS — C50.211 MALIGNANT NEOPLASM OF UPPER-INNER QUADRANT OF RIGHT FEMALE BREAST, UNSPECIFIED ESTROGEN RECEPTOR STATUS: ICD-10-CM

## 2024-10-08 DIAGNOSIS — C91.10 CLL (CHRONIC LYMPHOCYTIC LEUKEMIA): ICD-10-CM

## 2024-10-08 DIAGNOSIS — I10 PRIMARY HYPERTENSION: ICD-10-CM

## 2024-10-08 LAB — CORTIS AM PEAK SERPL-MCNC: 9.92 MCG/DL (ref 6.02–18.4)

## 2024-10-08 PROCEDURE — 36415 COLL VENOUS BLD VENIPUNCTURE: CPT

## 2024-10-08 PROCEDURE — 80299 QUANTITATIVE ASSAY DRUG: CPT

## 2024-10-08 PROCEDURE — G0463 HOSPITAL OUTPT CLINIC VISIT: HCPCS | Performed by: INTERNAL MEDICINE

## 2024-10-08 PROCEDURE — 82533 TOTAL CORTISOL: CPT

## 2024-10-08 RX ORDER — DEXAMETHASONE 1 MG
TABLET ORAL
COMMUNITY
Start: 2024-10-03

## 2024-10-08 RX ORDER — DEXAMETHASONE 4 MG/1
TABLET ORAL
COMMUNITY
Start: 2024-10-03

## 2024-10-08 NOTE — PROGRESS NOTES
Patient  Dorothea Cruz    [facility] HEMATOLOGY & ONCOLOGY    Chief Complaint  FOLLOW UP 2     Referring Provider: TYRELL Peck*  PCP: Monserrat Grover APRN    Subjective          Oncology/Hematology History Overview Note     Right Triple Negative Breast Cancer:  - screening mammogram 9/10/21: 4mm asymmetry in the right upper inner breast  -Diagnostic mammogram on 2021: Persistent 5 mm ill-defined nodule in the upper inner mid right breast at 2:00, 7 cm from the nipple.  - core biopsy on 10/8/21: Invasive ductal carcinoma, grade 3, ER negative (less than 1%), KS negative (less than 1%), HER-2 equivocal (2+ HC), HER-2 FISH not amplified  - Right lumpectomy on 21. Pathology showed a 5mm tumor with negative margins, 0/3 lymph nodes involved, pT1aN0, ER-, KS-, HER2-  - chemotherapy was not recommended due to the small size of the tumor.   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  Mildly increased CD4 positive T cell LGL (5.2% of analyzed cells).  - CLL FISH positive for TP53/17p13 deletion in 49.5% of cells.  - IgVH somatic hypermutation was detected (3.7%)  -24: Start acalabrutinib due to symptoms of weakness, night sweats, tremor. WBC 31K, plt 133, hgb 13.7  - 3/7/24: Resume acalabrutinib after hold due to palpiations and diarrhea, which was from C diff. WBC 33K. Plts normal.     Li Fraumeni Syndrome (inherited p53 mutation):   Family History:    - brother with CLL  - father  from some type of leukemia  - sister with breast cancer and adult onset retinoblastoma which was fatal  - niece (sister's daughter) also had retinoblastoma as a child  - pt is considering genetic testing     Breast cancer   10/20/2021 Initial Diagnosis    Breast cancer (HCC)      Malignant neoplasm of upper-inner quadrant of right female breast   11/12/2021 Cancer Staged    Staging form: Breast, AJCC 8th Edition  - Pathologic: pT1a, pN0, cM0, ER-, IA-, HER2- - Signed by Santa Haskins APRN on 5/20/2022 1/13/2022 Initial Diagnosis    Malignant neoplasm of upper-inner quadrant of right female breast (HCC)     1/20/2022 - 2/14/2022 Radiation    Radiation OncologyTreatment Course:  Dorothea Cruz received 4256 cGy in 16 fractions to right breast.      CLL (chronic lymphocytic leukemia)   3/10/2023 Initial Diagnosis    CLL (chronic lymphocytic leukemia)     1/12/2024 -  Chemotherapy    OP LYMPHOMA (CLL) Acalabrutinib         History of Present Illness  Patient comes in today for follow-up. She remains on acalabrutinib.  She has been seen by [facility] medical oncology as well as medical oncology at [facility].  Plans for chemotherapy combined with mitotane for her metastatic adrenocortical carcinoma.  Patient unfortunately recently admitted to [facility] last month with adrenal crisis and elevated blood pressure.  Found to have pulmonary embolism.  She was admitted for 4 days.  They started her on propranolol and controlled her blood pressure.  This improved her headaches.  Also started on dexamethasone which helped. She started on anticoagulation with lovenox and transitioned to Eliquis at discharge.  She remains on Eliquis and tolerating well.  She reports her headaches are much better.  She is feeling much better.  She is taking butalbital medicine as needed for headaches which helps.  Blood pressure has been better.  She is due to start for cycle of chemotherapy next week on Monday.  Chemotherapy will be with cisplatin, etoposide and doxorubicin, with mitotane.  She will get first cycle and [facility] and receive second cycle with [facility].    Review of Systems   Constitutional:  Positive for appetite change, fatigue and unexpected weight loss. Negative for diaphoresis,  fever and unexpected weight gain.   HENT:  Negative for hearing loss, mouth sores, sore throat, swollen glands, trouble swallowing and voice change.    Eyes:  Negative for blurred vision.   Respiratory:  Negative for cough, shortness of breath and wheezing.    Cardiovascular:  Negative for chest pain and palpitations.   Gastrointestinal:  Negative for abdominal pain, blood in stool, constipation, diarrhea, nausea and vomiting.   Endocrine: Negative for cold intolerance and heat intolerance.   Genitourinary:  Negative for difficulty urinating, dysuria, frequency, hematuria and urinary incontinence.   Musculoskeletal:  Positive for back pain and neck pain. Negative for arthralgias and myalgias.   Skin:  Negative for rash, skin lesions and wound.   Neurological:  Positive for tremors and weakness. Negative for dizziness, seizures, numbness and headache.   Hematological:  Does not bruise/bleed easily.   Psychiatric/Behavioral:  Negative for depressed mood. The patient is not nervous/anxious.    All other systems reviewed and are negative.      Past Medical History:   Diagnosis Date    Allergic     Anxiety     Arthritis     Breast cancer     Cancer     BREAST CANCER    Cataract     Cholelithiases 4/30@1    CLL (chronic lymphocytic leukemia) 2023    Diverticulitis     Diverticulitis of colon     Diverticulosis 95    Fibromyalgia     Fibromyalgia, primary     Hyperlipidemia     Hypertension     Hypertensive urgency 09/19/2024    Hypokalemia 09/19/2024    Intractable nausea and vomiting 09/19/2024    Irritable bowel syndrome     Liver disease     Low back pain 2015    Major depressive disorder 05/07/2020    Palpitations     ASYMPTOMATIC- DENIES CP/SOB, SEE PCP- NO CARDIOLOGIST     Post-menopause 09/17/2020    Sinus trouble     Sleep apnea     Tremor 2018    I was on medicine for tremors    Urinary tract infection     Vitamin D deficiency 09/17/2020     Past Surgical History:   Procedure Laterality Date    APPENDECTOMY       BILATERAL BREAST REDUCTION      BREAST BIOPSY Right 2021    Procedure: RIGHT BREAST NEEDLE LOCALIZED  LUMPECTOMY WITH SENTINEL NODE BIOPSY;  Surgeon: Mirtha Esteves MD;  Location: [facility] OR [facility];  Service: General;  Laterality: Right;    BREAST LUMPECTOMY      CHOLECYSTECTOMY N/A 2021    Procedure: CHOLECYSTECTOMY LAPAROSCOPIC INTRAOPERATIVE CHOLANGIOGRAM;  Surgeon: Louie Medina MD;  Location: [facility] MAIN OR;  Service: General;  Laterality: N/A;    COLONOSCOPY  2019    COLONOSCOPY N/A 2022    Procedure: COLONOSCOPY WITH BIOPSIES;  Surgeon: Butch Garcia MD;  Location: [facility] ENDOSCOPY;  Service: Gastroenterology;  Laterality: N/A;  DIVERTICULOSIS    HYSTERECTOMY      LYMPH NODE BIOPSY      SEPTOPLASTY  2018    SINUS SURGERY  2018    TONSILLECTOMY      US GUIDED CYST ASPIRATION BREAST N/A 10/03/2022    WRIST ARTHROPLASTY Bilateral      Social History     Socioeconomic History    Marital status:    Tobacco Use    Smoking status: Never     Passive exposure: Yes    Smokeless tobacco: Never   Vaping Use    Vaping status: Never Used   Substance and Sexual Activity    Alcohol use: Never    Drug use: Never    Sexual activity: Not Currently     Partners: Female     Birth control/protection: Post-menopausal, Hysterectomy     Family History   Problem Relation Age of Onset    No Known Problems Mother     Cancer Father         Leukemia      Breast cancer Sister     Cancer Sister         Had breast cancer    Cancer Brother         Leukemia/CLL    Cancer Sister         Had stomach cancer    Cancer Brother         Bladder cancer    Cancer Brother         Bladder cancer    Cancer Sister         Retinal blastoma.    Colon cancer Neg Hx        Objective   Physical Exam  General: Alert, cooperative, NAD  Eyes: Anicteric sclera, PERRLA  Respiratory: normal respiratory effort  Skin: Normal tone, no rash, no lesions  Psychiatric: Appropriate affect, intact judgment  Neurologic: No focal  "sensory or motor deficits, normal cognition  Musculoskeletal: Normal muscle strength and tone    Vitals:    10/08/24 1357   BP: 132/53   Pulse: 65   Resp: 18   Temp: 98.6 °F (37 °C)   TempSrc: Temporal   SpO2: 99%   Weight: 53.3 kg (117 lb 8.1 oz)   Height: 157.5 cm (62.01\")   PainSc: 0-No pain                     PHQ-9 Total Score:         Result Review :   The following data was reviewed by: Luciano Glez MD on 10/08/24:  Lab Results   Component Value Date    HGB 11.6 (L) 09/21/2024    HCT 35.9 (L) 09/21/2024    MCV 91.6 09/21/2024     (L) 09/21/2024    WBC 8.70 09/21/2024    NEUTROABS 5.22 09/21/2024    LYMPHSABS 2.95 09/21/2024    MONOSABS 0.44 09/21/2024    EOSABS 0.03 09/21/2024    BASOSABS 0.02 09/21/2024     Lab Results   Component Value Date    GLUCOSE 114 (H) 07/11/2024    BUN 13 07/11/2024    CREATININE 0.59 07/11/2024     07/11/2024    K 3.3 (L) 09/20/2024     07/11/2024    CO2 27.7 07/11/2024    CALCIUM 9.9 07/11/2024    PROTEINTOT 6.3 07/11/2024    ALBUMIN 3.9 07/11/2024    BILITOT 0.3 07/11/2024    ALKPHOS 91 07/11/2024    AST 19 09/19/2024    ALT 12 07/11/2024     No results found for: \"IRON\", \"LABIRON\", \"TRANSFERRIN\", \"TIBC\"  Lab Results   Component Value Date    EZWIUEBV33 317 03/08/2022    FOLATE 14.40 03/08/2022     No results found for: \"PSA\", \"CEA\", \"AFP\", \"\", \"\"    Labs personally reviewed and WBC now normal    Discharge summary personally reviewed    Outside hospital oncology notes personally reviewed        No radiology results for the last 30 days.         Assessment and Plan    Diagnoses and all orders for this visit:    1. Carcinoma of right adrenal gland (Primary)          Adrenal Carcinoma  Bony lesion of Lumbar spine  2 cm in L2 with SUV of 4.4 per recent PET. MRI 7/31/24 with 2.4 cm area of abnormal signal that may represent malignancy. Bx 8/23/24 with findings consistent with both SLL and adrenal carcinoma (after expert consultation at [facility]). Patient already following with endocrinology.  Discussed that this represents metastatic or stage IV adrenal carcinoma.  Typically this is not treated with surgery.  Fortunately she has low-volume disease.  Patient has seen medical oncology at [facility]  Plan is for EDP chemotherapy with cisplatin, doxorubicin, and etoposide.  This will be combined with mitotane.  She will get first cycle in [place] and then transfer to [place] to get remaining cycles.    Del(17p) CLL  This is a high risk subtype of CLL. In setting of Li-Fraumeni. Patient symptomatic at visit 1/8/24 with night sweats, tremor, fevers, weakness. Acalabrutinib started 1/8/24. WBC improved to 33K as of 3/7/24.  Plts are normal. Resumed acalabrutinib 3/7/24. Tolerating well. WBC trending down. Continue. WBC has normalized as of recent. RTC 2 months.     Right Triple Negative Breast Cancer  November 2021 right lumpectomy. Chemotherapy was not recommended due to the small size of the tumor.  She completed adjuvant radiation in March.  Repeat screening mammogram will be due next September, 9/2023 mammogram benign.  DEXA scan will be due in 2025. Due to high risk, recommend annual MRI to alternate every 6 months with mammogram. MRI breast 3/8/2024 benign. Mammogram due now but on hold due to above new diagnosis and need for chemotherapy.     Pulmonary Embolism  Recent diagnosis 9/2024. In setting of malignancy. Continue Eliquis 5 mg BID.     Headaches  Not related to acalabrutinib as did not improve with drug holiday.  Headaches occur daily. CT head negative.  7/8/24 MRI brain negative.  Likely related to adrenal crisis and high blood pressure. Dex inpatient helped, and patient placed on taper.  Patient is on losartan.  She reports that Esgic (butalbital) medicine helps.  She is also on propranolol and off metoprolol.    HTN  Improved on current regimen. Holding HCTZ due to hypokalemia.    Li-Fraumeni Syndrome  Pathologic germline  p53 mutation. Has been referred for genetic counseling and evaluated on 9/5/23.  Screening Brain MRI on 6/19/23 was negative, repeat 7/2024 also negative. Needs urine cytology screening yearly. Ovaries have been previously removed. Colonoscopy due every 2 to 5 years, last 5/2022; recommend repeat anytime.        Total time spent: 30 minutes  Follow up: 2 months  Patient was given instructions and counseling regarding her condition or for health maintenance advice. Please see specific information pulled into the AVS if appropriate.

## 2024-10-09 ENCOUNTER — SPECIALTY PHARMACY (OUTPATIENT)
Dept: PHARMACY | Facility: HOSPITAL | Age: 74
End: 2024-10-09
Payer: MEDICARE

## 2024-10-09 LAB
CORTIS F 24H UR-MCNC: 22 UG/L
CORTIS F 24H UR-MRATE: 25 UG/24 HR (ref 6–42)

## 2024-10-10 ENCOUNTER — HOSPITAL ENCOUNTER (OUTPATIENT)
Dept: CARDIOLOGY | Facility: HOSPITAL | Age: 74
Discharge: HOME | End: 2024-10-10
Admitting: NURSE PRACTITIONER
Payer: MEDICARE

## 2024-10-10 DIAGNOSIS — T45.1X5A CHEMOTHERAPY-INDUCED FATIGUE: ICD-10-CM

## 2024-10-10 DIAGNOSIS — R53.83 CHEMOTHERAPY-INDUCED FATIGUE: ICD-10-CM

## 2024-10-10 DIAGNOSIS — C74.91 MALIGNANT NEOPLASM OF RIGHT ADRENAL GLAND, UNSPECIFIED PART: ICD-10-CM

## 2024-10-10 LAB
ASCENDING AORTA: 2.8 CM
BH CV ECHO LEFT VENTRICLE GLOBAL LONGITUDINAL STRAIN: -27.6 %
BH CV ECHO MEAS - AO MAX PG: 7.4 MMHG
BH CV ECHO MEAS - AO MEAN PG: 3.5 MMHG
BH CV ECHO MEAS - AO V2 MAX: 136 CM/SEC
BH CV ECHO MEAS - AO V2 VTI: 26.8 CM
BH CV ECHO MEAS - EDV(MOD-SP2): 89.6 ML
BH CV ECHO MEAS - EDV(MOD-SP4): 88.9 ML
BH CV ECHO MEAS - EF(MOD-SP2): 64.6 %
BH CV ECHO MEAS - EF(MOD-SP4): 68.4 %
BH CV ECHO MEAS - ESV(MOD-SP2): 31.7 ML
BH CV ECHO MEAS - ESV(MOD-SP4): 28.1 ML
BH CV ECHO MEAS - IVS/LVPW: 1 CM
BH CV ECHO MEAS - IVSD: 1.1 CM
BH CV ECHO MEAS - LA DIMENSION: 4.8 CM
BH CV ECHO MEAS - LAT PEAK E' VEL: 12.2 CM/SEC
BH CV ECHO MEAS - LV MAX PG: 4.8 MMHG
BH CV ECHO MEAS - LV MEAN PG: 2.5 MMHG
BH CV ECHO MEAS - LV V1 MAX: 110 CM/SEC
BH CV ECHO MEAS - LV V1 VTI: 23.1 CM
BH CV ECHO MEAS - LVIDD: 3.8 CM
BH CV ECHO MEAS - LVIDS: 2.2 CM
BH CV ECHO MEAS - LVOT DIAM: 2 CM
BH CV ECHO MEAS - LVPWD: 1.1 CM
BH CV ECHO MEAS - MED PEAK E' VEL: 9 CM/SEC
BH CV ECHO MEAS - MV A MAX VEL: 81.6 CM/SEC
BH CV ECHO MEAS - MV E MAX VEL: 79.2 CM/SEC
BH CV ECHO MEAS - MV E/A: 0.97
BH CV ECHO MEAS - SV(MOD-SP2): 57.9 ML
BH CV ECHO MEAS - SV(MOD-SP4): 60.8 ML
BH CV ECHO MEAS - TAPSE (>1.6): 2.9 CM
BH CV ECHO MEAS - TR MAX PG: 24.8 MMHG
BH CV ECHO MEAS - TR MAX VEL: 247.5 CM/SEC
BH CV ECHO MEASUREMENTS AVERAGE E/E' RATIO: 7.47
BH CV XLRA - TDI S': 20.1 CM/SEC
IVRT: 63 MS
LEFT ATRIUM VOLUME INDEX: 39.7 ML/M2
SINUS: 2.7 CM

## 2024-10-10 PROCEDURE — 93306 TTE W/DOPPLER COMPLETE: CPT

## 2024-10-10 PROCEDURE — 93356 MYOCRD STRAIN IMG SPCKL TRCK: CPT

## 2024-10-15 LAB
ASCENDING AORTA: 2.8 CM
BH CV ECHO LEFT VENTRICLE GLOBAL LONGITUDINAL STRAIN: -21.3 %
BH CV ECHO MEAS - AO MAX PG: 7.4 MMHG
BH CV ECHO MEAS - AO MEAN PG: 3.5 MMHG
BH CV ECHO MEAS - AO V2 MAX: 136 CM/SEC
BH CV ECHO MEAS - AO V2 VTI: 26.8 CM
BH CV ECHO MEAS - EDV(MOD-SP2): 89.6 ML
BH CV ECHO MEAS - EDV(MOD-SP4): 88.9 ML
BH CV ECHO MEAS - EF(MOD-SP2): 64.6 %
BH CV ECHO MEAS - EF(MOD-SP4): 68.4 %
BH CV ECHO MEAS - ESV(MOD-SP2): 31.7 ML
BH CV ECHO MEAS - ESV(MOD-SP4): 28.1 ML
BH CV ECHO MEAS - IVS/LVPW: 1 CM
BH CV ECHO MEAS - IVSD: 1.1 CM
BH CV ECHO MEAS - LA DIMENSION: 4.8 CM
BH CV ECHO MEAS - LAT PEAK E' VEL: 12.2 CM/SEC
BH CV ECHO MEAS - LV MAX PG: 4.8 MMHG
BH CV ECHO MEAS - LV MEAN PG: 2.5 MMHG
BH CV ECHO MEAS - LV V1 MAX: 110 CM/SEC
BH CV ECHO MEAS - LV V1 VTI: 23.1 CM
BH CV ECHO MEAS - LVIDD: 3.8 CM
BH CV ECHO MEAS - LVIDS: 2.2 CM
BH CV ECHO MEAS - LVOT DIAM: 2 CM
BH CV ECHO MEAS - LVPWD: 1.1 CM
BH CV ECHO MEAS - MED PEAK E' VEL: 9 CM/SEC
BH CV ECHO MEAS - MV A MAX VEL: 81.6 CM/SEC
BH CV ECHO MEAS - MV E MAX VEL: 79.2 CM/SEC
BH CV ECHO MEAS - MV E/A: 0.97
BH CV ECHO MEAS - SV(MOD-SP2): 57.9 ML
BH CV ECHO MEAS - SV(MOD-SP4): 60.8 ML
BH CV ECHO MEAS - TAPSE (>1.6): 2.9 CM
BH CV ECHO MEAS - TR MAX PG: 24.8 MMHG
BH CV ECHO MEAS - TR MAX VEL: 247.5 CM/SEC
BH CV ECHO MEASUREMENTS AVERAGE E/E' RATIO: 7.47
BH CV XLRA - TDI S': 20.1 CM/SEC
IVRT: 63 MS
LEFT ATRIUM VOLUME INDEX: 39.7 ML/M2
SINUS: 2.7 CM

## 2024-10-18 NOTE — PROGRESS NOTES
Chief Complaint   History of diverticulitis, and diarrhea     History of Present Illness       Dorothea Cruz is a 71 y.o. female who presents to [facility] GASTROENTEROLOGY for follow-up with a history of diverticulitis, internal hemorrhoids, diarrhea, altered bowel habits, prior cholecystectomy.  At the last office visit patient was given colestipol 1 g daily and continued on Metamucil daily.  Patient reports that her bowel movements did improve with use of colestipol but feels that the dose needs to be increased as she continues to have loose stools with urgency 2-3 times a day.  Patient continues to eat a well-balanced diet with fiber.  Patient denies fever, nausea, vomiting, weight loss, night sweats, melena, hematochezia, hematemesis.    Colonoscopy: Review of the patient's most recent colonoscopy performed by Dr. Garcia on 03/28/2019.  Revealed diverticula and internal hemorrhoids.  She was educated to take Metamucil at that time.     Patient had a CT abdomen/pelvis with contrast on 08/17/2021 acute diverticulitis of the distal descending colon.  No obstruction perforation or abscess.  Right adrenal nodule measuring 2.4 cm metastasis is considered unlikely given the chronicity.  MRI could be considered for further evaluation.  Cholelithiasis.    Patient has never had an EGD.     Results       Result Review :       CMP    CMP 7/23/21 8/17/21 8/23/21   Glucose 108 (A) 117 (A) 138 (A)   BUN 17 12 15   Creatinine 0.83 0.83 0.82   eGFR Non African Am 68 68 69   Sodium 137 137 139   Potassium 3.9 4.3 3.7   Chloride 97 (A) 101 99   Calcium 10.0 9.7 10.2   Albumin 4.40 4.10 4.70   Total Bilirubin 0.2 0.3 0.2   Alkaline Phosphatase 54 53 52   AST (SGOT) 23 16 23   ALT (SGPT) 16 14 20   (A) Abnormal value            CBC    CBC 7/23/21 8/17/21 8/23/21   WBC 14.85 (A) 17.77 (A) 13.57 (A)   RBC 5.26 4.93 5.24   Hemoglobin 15.9 14.8 15.4   Hematocrit 47.1 (A) 44.3 47.5 (A)   MCV 89.5 89.9 90.6   MCH  30.2 30.0 29.4   [facility] 33.8 33.4 32.4   RDW 15.2 15.2 14.8   Platelets 205 211 235   (A) Abnormal value                      Past Medical History       Past Medical History:   Diagnosis Date   • Anxiety    • Arthritis    • Breast cancer (HCC)    • Cancer (HCC)     BREAST CANCER   • Cholelithiases 4/30@1   • Depression    • Disease of thyroid gland     currently not taking any meds   • Diverticulitis    • Diverticulitis of colon    • Fibromyalgia    • Hyperlipidemia    • Hypertension    • Major depressive disorder 05/07/2020   • Palpitations     ASYMPTOMATIC- DENIES CP/SOB, SEE PCP- NO CARDIOLOGIST    • Post-menopause 09/17/2020   • Sinus trouble    • Sleep apnea    • Vitamin D deficiency 09/17/2020       Past Surgical History:   Procedure Laterality Date   • ABDOMINAL SURGERY  6-   • APPENDECTOMY     • BILATERAL BREAST REDUCTION     • BREAST BIOPSY Right 11/12/2021    Procedure: RIGHT BREAST NEEDLE LOCALIZED  LUMPECTOMY WITH SENTINEL NODE BIOPSY;  Surgeon: Mirtha Esteves MD;  Location: [facility] OR [facility];  Service: General;  Laterality: Right;   • BREAST LUMPECTOMY     • CHOLECYSTECTOMY N/A 9/2/2021    Procedure: CHOLECYSTECTOMY LAPAROSCOPIC INTRAOPERATIVE CHOLANGIOGRAM;  Surgeon: Louie Medina MD;  Location: [facility] MAIN OR;  Service: General;  Laterality: N/A;   • COLONOSCOPY  2019   • HYSTERECTOMY     • TONSILLECTOMY     • WRIST ARTHROPLASTY Bilateral          Current Outpatient Medications:   •  buPROPion XL (Wellbutrin XL) 150 MG 24 hr tablet, Take 1 tablet by mouth Daily., Disp: 90 tablet, Rfl: 0  •  colestipol (Colestid) 1 g tablet, Take 2 tablets by mouth 2 (Two) Times a Day., Disp: 90 tablet, Rfl: 3  •  DULoxetine (CYMBALTA) 60 MG capsule, Take 1 capsule by mouth Daily., Disp: 90 capsule, Rfl: 0  •  ergocalciferol (ERGOCALCIFEROL) 1.25 MG (85863 UT) capsule, Take 1 capsule by mouth 1 (One) Time Per Week., Disp: 13 capsule, Rfl: 1  •  gabapentin (NEURONTIN) 300 MG capsule, Take 1 capsule by mouth 3  "(Three) Times a Day As Needed (neuropathy)., Disp: 90 capsule, Rfl: 2  •  hydroCHLOROthiazide (HYDRODIURIL) 25 MG tablet, Take 1 tablet by mouth Daily., Disp: 90 tablet, Rfl: 0  •  metoprolol tartrate (LOPRESSOR) 25 MG tablet, Take 1 tablet by mouth 2 (Two) Times a Day., Disp: 180 tablet, Rfl: 1  •  primidone (MYSOLINE) 50 MG tablet, Take 1 tablet by mouth Every Night., Disp: 90 tablet, Rfl: 1  •  rosuvastatin (CRESTOR) 20 MG tablet, Take 1 tablet by mouth Every Night., Disp: 90 tablet, Rfl: 1  •  TURMERIC PO, Take 1 tablet by mouth Daily., Disp: , Rfl:      Allergies   Allergen Reactions   • Ace Inhibitors Cough   • Sulfamethoxazole-Trimethoprim Hives       Family History   Problem Relation Age of Onset   • No Known Problems Mother    • No Known Problems Father    • Breast cancer Sister    • Colon cancer Neg Hx         Social History     Social History Narrative   • Not on file       Objective        Vital Signs:   /69 (BP Location: Left arm, Patient Position: Sitting, Cuff Size: Adult)   Pulse 62   Ht 157.5 cm (62\")   Wt 86 kg (189 lb 11.2 oz)   SpO2 97%   BMI 34.70 kg/m²       Physical Exam  Constitutional:       General: She is not in acute distress.     Appearance: Normal appearance.   HENT:      Head: Normocephalic.   Eyes:      Conjunctiva/sclera: Conjunctivae normal.      Pupils: Pupils are equal, round, and reactive to light.      Visual Fields: Right eye visual fields normal and left eye visual fields normal.   Neck:      Trachea: Trachea normal.   Cardiovascular:      Rate and Rhythm: Normal rate and regular rhythm.      Heart sounds: Normal heart sounds.   Pulmonary:      Effort: Pulmonary effort is normal.      Breath sounds: Normal breath sounds and air entry.      Comments: Inspection of chest: normal appearance  Abdominal:      General: Abdomen is flat. Bowel sounds are normal.      Palpations: Abdomen is soft. There is no mass.      Tenderness: There is no guarding.   Musculoskeletal:     " Quality 226: Preventive Care And Screening: Tobacco Use: Screening And Cessation Intervention: Patient screened for tobacco use and is an ex/non-smoker  Right lower leg: No edema.      Left lower leg: No edema.   Skin:     Findings: No lesion.      Comments: Turgor is normal   Neurological:      Mental Status: She is alert and oriented to person, place, and time.   Psychiatric:         Mood and Affect: Mood and affect normal.           Assessment & Plan          Assessment and Plan    Diagnoses and all orders for this visit:    1. History of diverticulitis (Primary)    2. History of cholecystectomy    3. Altered bowel habits    4. Diarrhea, unspecified type    Other orders  -     colestipol (Colestid) 1 g tablet; Take 2 tablets by mouth 2 (Two) Times a Day.  Dispense: 90 tablet; Refill: 3      71-year-old female presented to the office today for a follow-up with a history of diverticulitis, cholecystectomy, altered bowel habits and diarrhea.  I have recommended that the patient undergo further evaluation with a colonoscopy.  I have discussed this procedure in detail with the patient.  I have discussed the risks, benefits and alternatives.  I have discussed the risk of anesthesia, bleeding and perforation.  Patient understands these risks, benefits and alternatives and wishes to proceed.  I will schedule her at her earliest convenience.  I have increased the patient's colestipol and sent in a prescription for 2 g twice daily.  I have educated the patient on titrating the medication.  She will continue Metamucil.  We will follow-up in the office after endoscopy.  Patient agreeable to this plan will call with any questions or concerns.            Follow Up       Follow Up   Return for Follow up after endoscopy in office.  Patient was given instructions and counseling regarding her condition or for health maintenance advice. Please see specific information pulled into the AVS if appropriate.        Detail Level: Detailed Quality 47: Advance Care Plan: Advance care planning not documented, reason not otherwise specified.

## 2024-10-21 LAB — DEXAMETHASONE SERPL-MCNC: 41 NG/DL

## 2024-10-28 DIAGNOSIS — G43.819 OTHER MIGRAINE WITHOUT STATUS MIGRAINOSUS, INTRACTABLE: ICD-10-CM

## 2024-10-28 RX ORDER — BUTALBITAL, ACETAMINOPHEN AND CAFFEINE 50; 325; 40 MG/1; MG/1; MG/1
1 TABLET ORAL EVERY 4 HOURS PRN
Qty: 30 TABLET | Refills: 1 | Status: SHIPPED | OUTPATIENT
Start: 2024-10-28 | End: 2024-10-28

## 2024-10-28 RX ORDER — BUTALBITAL, ACETAMINOPHEN AND CAFFEINE 50; 325; 40 MG/1; MG/1; MG/1
1 TABLET ORAL EVERY 4 HOURS PRN
Qty: 30 TABLET | Refills: 1 | Status: SHIPPED | OUTPATIENT
Start: 2024-10-28

## 2024-10-28 NOTE — TELEPHONE ENCOUNTER
"Caller: Nataliia Cruzbeth \"OXANA\"    Relationship: Self    Best call back number: [phone]    Requested Prescriptions:   Requested Prescriptions     Pending Prescriptions Disp Refills    butalbital-acetaminophen-caffeine (Esgic) -40 MG per tablet 30 tablet 1     Sig: Take 1 tablet by mouth Every 4 (Four) Hours As Needed for Headache.        Pharmacy where request should be sent: [id number] - [place], [place] - [address]  AT [facility] [address] - [phone]  - [phone] FX     Last office visit with prescribing clinician: 10/8/2024   Last telemedicine visit with prescribing clinician: Visit date not found   Next office visit with prescribing clinician: 12/12/2024     Does the patient have less than a 3 day supply:  [x] Yes  [] No    Would you like a call back once the refill request has been completed: [] Yes [x] No    If the office needs to give you a call back, can they leave a voicemail: [] Yes [x] No      "

## 2024-11-21 DIAGNOSIS — M79.7 FIBROMYALGIA: ICD-10-CM

## 2024-11-21 DIAGNOSIS — F32.1 CURRENT MODERATE EPISODE OF MAJOR DEPRESSIVE DISORDER, UNSPECIFIED WHETHER RECURRENT: ICD-10-CM

## 2024-11-21 DIAGNOSIS — E78.2 MIXED HYPERLIPIDEMIA: ICD-10-CM

## 2024-11-21 RX ORDER — ROSUVASTATIN CALCIUM 20 MG/1
20 TABLET, COATED ORAL NIGHTLY
Qty: 90 TABLET | Refills: 0 | Status: SHIPPED | OUTPATIENT
Start: 2024-11-21

## 2024-11-21 RX ORDER — DESVENLAFAXINE 100 MG/1
100 TABLET, EXTENDED RELEASE ORAL DAILY
Qty: 90 TABLET | Refills: 0 | Status: SHIPPED | OUTPATIENT
Start: 2024-11-21

## 2024-11-21 NOTE — TELEPHONE ENCOUNTER
"     Caller: Dorothea Cruz \"OXANA\"    Relationship: Self    Best call back number: [phone]        Requested Prescriptions:   Requested Prescriptions     Pending Prescriptions Disp Refills    rosuvastatin (CRESTOR) 20 MG tablet 90 tablet 1     Sig: Take 1 tablet by mouth Every Night. Indications: Elevation of Both Cholesterol and Triglycerides in Blood    desvenlafaxine (Pristiq) 100 MG 24 hr tablet 90 tablet 1     Sig: Take 1 tablet by mouth Daily. Indications: Major Depressive Disorder        Pharmacy where request should be sent: [facility] [id number] [place], [place] [phone]      Last office visit with prescribing clinician: 10/1/2024   Last telemedicine visit with prescribing clinician: Visit date not found   Next office visit with prescribing clinician: 11/25/2024     Additional details provided by patient:      Does the patient have less than a 3 day supply:  [] Yes  [x] No    Would you like a call back once the refill request has been completed: [] Yes [x] No    If the office needs to give you a call back, can they leave a voicemail: [] Yes [x] No    Jackelyn Newman Rep   11/21/24 15:13 EST      "

## 2024-11-25 ENCOUNTER — OFFICE VISIT (OUTPATIENT)
Dept: FAMILY MEDICINE CLINIC | Facility: CLINIC | Age: 74
End: 2024-11-25
Payer: MEDICARE

## 2024-11-25 VITALS
HEART RATE: 82 BPM | BODY MASS INDEX: 21.55 KG/M2 | OXYGEN SATURATION: 100 % | WEIGHT: 117.1 LBS | TEMPERATURE: 97.1 F | HEIGHT: 62 IN | DIASTOLIC BLOOD PRESSURE: 66 MMHG | SYSTOLIC BLOOD PRESSURE: 142 MMHG

## 2024-11-25 DIAGNOSIS — F32.1 CURRENT MODERATE EPISODE OF MAJOR DEPRESSIVE DISORDER, UNSPECIFIED WHETHER RECURRENT: ICD-10-CM

## 2024-11-25 DIAGNOSIS — H93.8X1 SENSATION OF FULLNESS IN RIGHT EAR: ICD-10-CM

## 2024-11-25 DIAGNOSIS — M79.7 FIBROMYALGIA: Primary | ICD-10-CM

## 2024-11-25 DIAGNOSIS — I10 PRIMARY HYPERTENSION: ICD-10-CM

## 2024-11-25 DIAGNOSIS — E78.2 MIXED HYPERLIPIDEMIA: ICD-10-CM

## 2024-11-25 DIAGNOSIS — Z23 NEED FOR INFLUENZA VACCINATION: ICD-10-CM

## 2024-11-25 PROCEDURE — 1126F AMNT PAIN NOTED NONE PRSNT: CPT | Performed by: NURSE PRACTITIONER

## 2024-11-25 PROCEDURE — 1159F MED LIST DOCD IN RCRD: CPT | Performed by: NURSE PRACTITIONER

## 2024-11-25 PROCEDURE — 90662 IIV NO PRSV INCREASED AG IM: CPT | Performed by: NURSE PRACTITIONER

## 2024-11-25 PROCEDURE — G0008 ADMIN INFLUENZA VIRUS VAC: HCPCS | Performed by: NURSE PRACTITIONER

## 2024-11-25 PROCEDURE — 3078F DIAST BP <80 MM HG: CPT | Performed by: NURSE PRACTITIONER

## 2024-11-25 PROCEDURE — 99214 OFFICE O/P EST MOD 30 MIN: CPT | Performed by: NURSE PRACTITIONER

## 2024-11-25 PROCEDURE — 3077F SYST BP >= 140 MM HG: CPT | Performed by: NURSE PRACTITIONER

## 2024-11-25 PROCEDURE — 1160F RVW MEDS BY RX/DR IN RCRD: CPT | Performed by: NURSE PRACTITIONER

## 2024-11-25 RX ORDER — LIDOCAINE/PRILOCAINE 2.5 %-2.5%
1 CREAM (GRAM) TOPICAL ONCE
COMMUNITY
Start: 2024-11-11

## 2024-11-25 RX ORDER — OLANZAPINE 5 MG/1
5 TABLET ORAL DAILY
COMMUNITY
Start: 2024-10-24

## 2024-11-25 RX ORDER — DESVENLAFAXINE 100 MG/1
100 TABLET, EXTENDED RELEASE ORAL DAILY
Qty: 90 TABLET | Refills: 0 | Status: CANCELLED | OUTPATIENT
Start: 2024-11-25

## 2024-11-25 RX ORDER — ROSUVASTATIN CALCIUM 20 MG/1
20 TABLET, COATED ORAL NIGHTLY
Qty: 90 TABLET | Refills: 0 | Status: CANCELLED | OUTPATIENT
Start: 2024-11-25

## 2024-11-25 RX ORDER — MULTIVIT-MIN/IRON/FOLIC ACID/K 18-600-40
2000 CAPSULE ORAL DAILY
Qty: 90 CAPSULE | Refills: 3 | Status: CANCELLED | OUTPATIENT
Start: 2024-11-25

## 2024-11-25 RX ORDER — PROMETHAZINE HYDROCHLORIDE 25 MG/1
12.5-25 TABLET ORAL EVERY 6 HOURS PRN
COMMUNITY
Start: 2024-10-24

## 2024-11-25 RX ORDER — MITOTANE 500 MG/1
500 TABLET ORAL TAKE AS DIRECTED
COMMUNITY
Start: 2024-11-05 | End: 2024-12-17

## 2024-11-25 RX ORDER — DABIGATRAN ETEXILATE 150 MG/1
150 CAPSULE ORAL
COMMUNITY
Start: 2024-11-08 | End: 2026-02-08

## 2024-11-25 RX ORDER — METHIMAZOLE 10 MG/1
10 TABLET ORAL DAILY
COMMUNITY
Start: 2024-11-14 | End: 2025-02-12

## 2024-11-25 NOTE — ASSESSMENT & PLAN NOTE
Currently stable on Pristiq 100 mg daily, will continue current dose.  Will follow-up in 6 months or sooner if new or worsening symptoms.

## 2024-11-25 NOTE — ASSESSMENT & PLAN NOTE
Lipid panel is at goal on rosuvastatin 20 mg every evening, will continue current dose.  Will reassess lipids in 6 months.

## 2024-11-25 NOTE — ASSESSMENT & PLAN NOTE
I offered to irrigate her right ear today but she declines.  She just wants to be referred to ENT.  I put a referral in for ENT.  Orders:    Ambulatory Referral to ENT (Otolaryngology)

## 2024-11-25 NOTE — PROGRESS NOTES
Chief Complaint  Hypertension, Hyperlipidemia, Vitamin D Deficiency, and Ear Fullness (Right side, Ongoing issue )    Subjective            Dorothea Cruz is a 74 y.o. female who presents to [facility]   History of Present Illness  6-month follow-up.    She is complaining of fullness in her right ear.  She states that she has had it irrigated before and used eardrops, but states these treatments did not seem to help because she wakes up every day with it feeling full and like she is in a tunnel.  She is wanting a referral to ENT.    Depression and fibromyalgia: she is on Pristiq 100 mg daily which she states seems to be helping.      Hyperlipidemia: Her last lipid panel was 6 months ago and was stable, on Crestor 20 mg every evening.    Hypertension: Blood pressure is stable.  She is now on losartan 50 mg daily, started on this by her oncologist.  She is no longer on metoprolol or hydrochlorothiazide.    Prediabetes: Her last 2 A1c's have been normal.    She has chronic lymphocytic leukemia, follows with oncology Dr. Glez.      She has chronic diarrhea but oncology stopped the colestipol due to drug interaction. She takes imodium as needed.       Tobacco Use: Medium Risk (11/25/2024)    Patient History     Smoking Tobacco Use: Never     Smokeless Tobacco Use: Never     Passive Exposure: Yes      E-cigarette/Vaping    E-cigarette/Vaping Use Never User     Passive Exposure No     Counseling Given No      E-cigarette/Vaping Substances    Nicotine No     THC No     CBD No     Flavoring No      E-cigarette/Vaping Devices    Disposable No     Pre-filled or Refillable Cartridge No     Refillable Tank No     Pre-filled Pod No        Alcohol Use: Not At Risk (7/6/2021)    AUDIT-C     Frequency of Alcohol Consumption: Never     Average Number of Drinks: Not on file     Frequency of Binge Drinking: Not on file         Objective   Vital Signs:   Vitals:    11/25/24 1448 11/25/24 1457  "  BP: 152/63 142/66   BP Location: Left arm    Patient Position: Sitting    Cuff Size: Adult    Pulse: 82    Temp: 97.1 °F (36.2 °C)    SpO2: 100%    Weight: 53.1 kg (117 lb 1.6 oz)    Height: 157.5 cm (62.01\")    PainSc: 0-No pain      Body mass index is 21.41 kg/m².    Wt Readings from Last 3 Encounters:   11/25/24 53.1 kg (117 lb 1.6 oz)   10/08/24 53.3 kg (117 lb 8.1 oz)   10/01/24 51.3 kg (113 lb)     BP Readings from Last 3 Encounters:   11/25/24 142/66   10/08/24 132/53   10/01/24 120/64       Health Maintenance   Topic Date Due    ZOSTER VACCINE (1 of 2) 11/25/2024 (Originally 11/11/2016)    COVID-19 Vaccine (3 - Moderna risk series) 05/31/2025 (Originally 5/20/2021)    DXA SCAN  03/28/2025    ANNUAL WELLNESS VISIT  05/31/2025    MAMMOGRAM  09/18/2025    LIPID PANEL  11/12/2025    TDAP/TD VACCINES (2 - Td or Tdap) 09/16/2026    COLORECTAL CANCER SCREENING  05/09/2027    INFLUENZA VACCINE  Completed    Pneumococcal Vaccine 65+  Completed    HEPATITIS C SCREENING  Discontinued       /66   Pulse 82   Temp 97.1 °F (36.2 °C)   Ht 157.5 cm (62.01\")   Wt 53.1 kg (117 lb 1.6 oz)   SpO2 100%   BMI 21.41 kg/m²       Current Outpatient Medications:     acetaminophen (TYLENOL) 500 MG tablet, Take 2 tablets by mouth Daily As Needed for Mild Pain., Disp: , Rfl:     butalbital-acetaminophen-caffeine (Esgic) -40 MG per tablet, Take 1 tablet by mouth Every 4 (Four) Hours As Needed for Headache., Disp: 30 tablet, Rfl: 1    Cholecalciferol (Vitamin D) 50 MCG (2000 UT) capsule, Take 1 capsule by mouth Daily. Indications: Vitamin D Deficiency, Disp: 90 capsule, Rfl: 3    dabigatran etexilate (PRADAXA) 150 MG capsu, Take 1 capsule by mouth., Disp: , Rfl:     desvenlafaxine (Pristiq) 100 MG 24 hr tablet, Take 1 tablet by mouth Daily. Indications: Major Depressive Disorder, Disp: 90 tablet, Rfl: 0    hydrALAZINE (APRESOLINE) 10 MG tablet, Take 1 tablet by mouth 3 (Three) Times a Day., Disp: 90 tablet, Rfl: 1    " lidocaine-prilocaine (EMLA) 2.5-2.5 % cream, Apply 1 Application topically to the appropriate area as directed 1 (One) Time., Disp: , Rfl:     losartan (Cozaar) 50 MG tablet, Take 1 tablet by mouth Daily., Disp: 30 tablet, Rfl: 3    Lysodren 500 MG chemo tablet, Take 1 tablet by mouth Take As Directed.  Take 1 tablet by mouth 3 (three) times daily for 14 days, THEN 2 tablets 3 (three) times daily for 14 days, THEN 3 tablets 3 (three) times daily for 14 days. Take with food. Doses may be adjusted based on tolerability and monitoring., Disp: , Rfl:     methIMAzole (TAPAZOLE) 10 MG tablet, Take 1 tablet by mouth Daily., Disp: , Rfl:     OLANZapine (zyPREXA) 5 MG tablet, Take 1 tablet by mouth Daily., Disp: , Rfl:     ondansetron ODT (ZOFRAN-ODT) 4 MG disintegrating tablet, Place 1 tablet on the tongue 4 (Four) Times a Day As Needed for Nausea or Vomiting., Disp: 20 tablet, Rfl: 0    potassium chloride (KLOR-CON M20) 20 MEQ CR tablet, Take 2 tablets by mouth Daily., Disp: , Rfl:     prochlorperazine (COMPAZINE) 10 MG tablet, Take 1 tablet by mouth Every 6 (Six) Hours As Needed for Nausea., Disp: 60 tablet, Rfl: 3    promethazine (PHENERGAN) 25 MG tablet, Take 0.5-1 tablets by mouth Every 6 (Six) Hours As Needed for Nausea., Disp: , Rfl:     propranolol (INDERAL) 10 MG tablet, Take 1 tablet by mouth 3 (Three) Times a Day., Disp: , Rfl:     rosuvastatin (CRESTOR) 20 MG tablet, Take 1 tablet by mouth Every Night. Indications: Elevation of Both Cholesterol and Triglycerides in Blood, Disp: 90 tablet, Rfl: 0    SUMAtriptan (IMITREX) 100 MG tablet, Take 1 tablet by mouth Take As Directed. Take one tablet at onset of headache. May repeat dose one time in 2 hours if headache not relieved. Do not take more than 2 pills in 24 hrs, Disp: 20 tablet, Rfl: 3   Past Medical History:   Diagnosis Date    Allergic     Anxiety     Arthritis     Breast cancer     Cancer     BREAST CANCER    Cataract     Cholelithiases 4/30@1    CLL  (chronic lymphocytic leukemia) 2023    Diverticulitis     Diverticulitis of colon     Diverticulosis 95    Fibromyalgia     Fibromyalgia, primary     Headache 7-24    Hyperlipidemia     Hypertension     Hypertensive urgency 09/19/2024    Hypokalemia 09/19/2024    Intractable nausea and vomiting 09/19/2024    Irritable bowel syndrome     Liver disease     Low back pain 2015    Major depressive disorder 05/07/2020    Palpitations     ASYMPTOMATIC- DENIES CP/SOB, SEE PCP- NO CARDIOLOGIST     Post-menopause 09/17/2020    Sinus trouble     Sleep apnea     Tremor 2018    I was on medicine for tremors    Urinary tract infection     Vitamin D deficiency 09/17/2020        Physical Exam  Vitals reviewed.   Constitutional:       Appearance: Normal appearance. She is well-developed.   HENT:      Right Ear: External ear normal. Decreased hearing noted. There is impacted cerumen.      Left Ear: Tympanic membrane, ear canal and external ear normal. There is no impacted cerumen.   Neck:      Thyroid: No thyroid mass, thyromegaly or thyroid tenderness.   Cardiovascular:      Rate and Rhythm: Normal rate and regular rhythm.      Heart sounds: No murmur heard.     No friction rub. No gallop.   Pulmonary:      Effort: Pulmonary effort is normal.      Breath sounds: Normal breath sounds. No wheezing or rhonchi.   Lymphadenopathy:      Cervical: No cervical adenopathy.   Skin:     General: Skin is warm and dry.   Neurological:      Mental Status: She is alert and oriented to person, place, and time.      Cranial Nerves: No cranial nerve deficit.   Psychiatric:         Mood and Affect: Mood and affect normal.         Behavior: Behavior normal.         Thought Content: Thought content normal. Thought content does not include homicidal or suicidal ideation.         Judgment: Judgment normal.          Result Review :    The following data was reviewed by: ARA Peck on 11/25/2024:  Common Labs   Common labs          9/20/2024     11:55 9/21/2024    03:23 10/14/2024    13:39   Common Labs   Potassium 3.3         WBC  8.70     15.43       Hemoglobin  11.6     12.7       Hematocrit  35.9     38.9       Platelets  119     110          Details          This result is from an external source.                  CT Head Without Contrast    Result Date: 9/3/2024  Senescent changes without acute abnormality. Electronically Signed: Poncho Erazo MD  9/3/2024 11:43 PM EDT  Workstation ID: [id number]    CT Needle Biopsy Bone Deep    Result Date: 8/23/2024  Impression: Technically successful percutaneous biopsy of an L1 lytic lesion without evidence of complication Electronically Signed: Dillon Aiken MD  8/23/2024 11:26 AM EDT  Workstation ID: [id number]    CT Head Without Contrast    Result Date: 8/3/2024  1.No evidence for acute intracranial abnormality. 2.Nonspecific white matter changes are noted with associated diffuse volume loss. These findings are likely related to chronic small vessel ischemic changes and/or age-related changes. Electronically Signed: Jefferson Valera MD  8/3/2024 11:44 AM EDT  Workstation ID: [id number]    MRI Lumbar Spine With & Without Contrast    Result Date: 8/1/2024     1. There is abnormal signal and enhancement involving the left aspect of the L2 vertebral body, which may represent an intraosseous metastasis. A primary bone malignancy cannot be excluded but is thought to be less likely. No other such findings are appreciated. No pathologic fracture is associated with the finding.  2. No acute vertebral compression fractures are seen.  3. Degenerative changes are present at multiple levels, as detailed above.  4. Chronic retrolisthesis is seen at L5-S1, estimated at 6 mm.  5. No cord signal or enhancement abnormality is suggested.  6. No cauda equina masses are suspected.  7. Please see above comments for further detail.    Please note that portions of this note were completed with a voice recognition program.      Electronically Signed By-Hiram Garza MD On:8/1/2024 5:39 AM      Assessment & Plan  Fibromyalgia  She is currently stable on Pristiq 100 mg daily, will continue current dose.  Current moderate episode of major depressive disorder, unspecified whether recurrent  Currently stable on Pristiq 100 mg daily, will continue current dose.  Will follow-up in 6 months or sooner if new or worsening symptoms.     Mixed hyperlipidemia  Lipid panel is at goal on rosuvastatin 20 mg every evening, will continue current dose.  Will reassess lipids in 6 months.     Sensation of fullness in right ear  I offered to irrigate her right ear today but she declines.  She just wants to be referred to ENT.  I put a referral in for ENT.  Orders:    Ambulatory Referral to ENT (Otolaryngology)    Primary hypertension  Hypertension is stable and controlled  Continue current treatment regimen.  Oncology changed her blood pressure medications and she is now on losartan 50 mg daily.  Blood pressure will be reassessed in 6 months.    Need for influenza vaccination  High-dose flu shot given in office today.  Orders:    Fluzone High-Dose 65+yrs ([id number])      BMI is within normal parameters. No other follow-up for BMI required.        Diagnosis Plan   1. Fibromyalgia        2. Current moderate episode of major depressive disorder, unspecified whether recurrent        3. Mixed hyperlipidemia        4. Sensation of fullness in right ear  Ambulatory Referral to ENT (Otolaryngology)      5. Primary hypertension        6. Need for influenza vaccination  Fluzone High-Dose 65+yrs ([id number])            FOLLOW UP  Return in about 6 months (around 5/25/2025) for Medicare Wellness.  Patient was given instructions and counseling regarding her condition or for health maintenance advice. Please see specific information pulled into the AVS if appropriate.       CURRENT & DISCONTINUED MEDICATIONS  Current Outpatient Medications   Medication Instructions     acetaminophen (TYLENOL) 1,000 mg, Daily PRN    butalbital-acetaminophen-caffeine (Esgic) -40 MG per tablet 1 tablet, Oral, Every 4 Hours PRN    dabigatran etexilate (PRADAXA) 150 mg    desvenlafaxine (PRISTIQ) 100 mg, Oral, Daily    hydrALAZINE (APRESOLINE) 10 mg, Oral, 3 Times Daily    lidocaine-prilocaine (EMLA) 2.5-2.5 % cream 1 Application, Once    losartan (COZAAR) 50 mg, Oral, Daily    Lysodren 500 mg, Take As Directed    methIMAzole (TAPAZOLE) 10 mg, Daily    OLANZapine (ZYPREXA) 5 mg, Daily    ondansetron ODT (ZOFRAN-ODT) 4 mg, Translingual, 4 Times Daily PRN    potassium chloride (KLOR-CON M20) 20 MEQ CR tablet 40 mEq, Daily    prochlorperazine (COMPAZINE) 10 mg, Oral, Every 6 Hours PRN    promethazine (PHENERGAN) 12.5-25 mg, Every 6 Hours PRN    propranolol (INDERAL) 10 mg, 3 Times Daily    rosuvastatin (CRESTOR) 20 mg, Oral, Nightly    SUMAtriptan (IMITREX) 100 mg, Oral, Take As Directed, Take one tablet at onset of headache. May repeat dose one time in 2 hours if headache not relieved. Do not take more than 2 pills in 24 hrs    Vitamin D 2,000 Units, Oral, Daily       Medications Discontinued During This Encounter   Medication Reason    Acalabrutinib Maleate (CALQUENCE) 100 MG tablet Discontinued by another clinician    apixaban (ELIQUIS) 5 MG tablet tablet Discontinued by another clinician    dexAMETHasone (DECADRON) 1 MG tablet *Therapy completed    dexAMETHasone (DECADRON) 4 MG tablet *Therapy completed    Scopolamine 1 MG/3DAYS patch *Therapy completed        Parts of this note are electronic transcriptions/translations of spoken language to printed text using the Dragon Dictation system.    Monserrat Grover, ARA  11/25/24  16:28 EST

## 2024-11-25 NOTE — ASSESSMENT & PLAN NOTE
Hypertension is stable and controlled  Continue current treatment regimen.  Oncology changed her blood pressure medications and she is now on losartan 50 mg daily.  Blood pressure will be reassessed in 6 months.

## 2024-11-28 NOTE — TELEPHONE ENCOUNTER
Problem: Discharge Planning  Goal: Discharge to home or other facility with appropriate resources  Outcome: Progressing     Problem: Skin/Tissue Integrity  Goal: Absence of new skin breakdown  Description: 1.  Monitor for areas of redness and/or skin breakdown  2.  Assess vascular access sites hourly  3.  Every 4-6 hours minimum:  Change oxygen saturation probe site  4.  Every 4-6 hours:  If on nasal continuous positive airway pressure, respiratory therapy assess nares and determine need for appliance change or resting period.  Outcome: Progressing     Problem: Safety - Adult  Goal: Free from fall injury  Outcome: Progressing      SEE COMMENT

## 2024-12-05 RX ORDER — HYDRALAZINE HYDROCHLORIDE 10 MG/1
10 TABLET, FILM COATED ORAL 3 TIMES DAILY
Qty: 90 TABLET | Refills: 1 | Status: SHIPPED | OUTPATIENT
Start: 2024-12-05

## 2024-12-06 ENCOUNTER — PATIENT ROUNDING (BHMG ONLY) (OUTPATIENT)
Dept: FAMILY MEDICINE CLINIC | Facility: CLINIC | Age: 74
End: 2024-12-06
Payer: MEDICARE

## 2024-12-06 NOTE — PROGRESS NOTES
A My-Chart message has been sent to the patient for [place] [facility]

## 2024-12-16 RX ORDER — PROMETHAZINE HYDROCHLORIDE 25 MG/1
12.5-25 TABLET ORAL EVERY 6 HOURS PRN
OUTPATIENT
Start: 2024-12-16

## 2024-12-20 NOTE — TELEPHONE ENCOUNTER
"  Caller: Nancy Dorothea \"OXANA\"    Relationship: Self    Best call back number: [phone]     Requested Prescriptions:   Requested Prescriptions     Pending Prescriptions Disp Refills    propranolol (INDERAL) 10 MG tablet 90 tablet 2     Sig: Take 1 tablet by mouth 3 (Three) Times a Day for 90 days.    potassium chloride (KLOR-CON M20) 20 MEQ CR tablet 60 tablet 2     Sig: Take 2 tablets by mouth Daily for 90 days.        Pharmacy where request should be sent: [place] [phone] [place], [place] [phone]      Last office visit with prescribing clinician: 11/25/2024   Last telemedicine visit with prescribing clinician: Visit date not found   Next office visit with prescribing clinician: 5/20/2025     Additional details provided by patient: MORE THAN THREE DAY SUPPLY ON HAND.     Does the patient have less than a 3 day supply:  [] Yes  [x] No        Jackelyn Pierre Rep   12/20/24 10:10 EST       "

## 2024-12-20 NOTE — TELEPHONE ENCOUNTER
Who put her on the propranolol and the potassium chloride?  She may need to call them to get this refilled, but let her know if she has trouble getting the prescriptions filled that I will see if I can do that for her.  I do need to know why she is taking the propranolol?

## 2024-12-23 RX ORDER — PROPRANOLOL HYDROCHLORIDE 10 MG/1
10 TABLET ORAL 3 TIMES DAILY
Qty: 90 TABLET | Refills: 1 | Status: SHIPPED | OUTPATIENT
Start: 2024-12-23 | End: 2025-03-23

## 2024-12-23 RX ORDER — POTASSIUM CHLORIDE 1500 MG/1
40 TABLET, EXTENDED RELEASE ORAL DAILY
Qty: 60 TABLET | Refills: 1 | Status: SHIPPED | OUTPATIENT
Start: 2024-12-23 | End: 2025-03-23

## 2024-12-23 NOTE — TELEPHONE ENCOUNTER
Pt states these were prescribed while she was in hospital out of town and she will not be able to get them filled by original prescriber. She is hoping you can take these over. Propranolol was prescribed for trimmers and high BP.

## 2025-01-09 ENCOUNTER — SPECIALTY PHARMACY (OUTPATIENT)
Dept: PHARMACY | Facility: HOSPITAL | Age: 75
End: 2025-01-09
Payer: OTHER GOVERNMENT

## 2025-01-09 NOTE — PROGRESS NOTES
Specialty Pharmacy Patient Management Program  Program Disenrollment      Dorothea Cruz is seen by their provider for CLL. Patient was previously enrolled in the Oncology Patient Management program offered by [facility].      Disenrolling patient today 1/9/25. Patient has transferred care to [facility] for treatment of her Adrenal Cancer and is now seeing an oncologist at [facility] that is coordinating with [facility].    Miracle Braun PharmD  Oncology Clinical Specialty Pharmacist  1/9/2025  09:08 EST

## 2025-03-05 ENCOUNTER — TELEPHONE (OUTPATIENT)
Dept: ONCOLOGY | Facility: HOSPITAL | Age: 75
End: 2025-03-05
Payer: MEDICARE

## 2025-03-06 ENCOUNTER — TELEPHONE (OUTPATIENT)
Dept: ONCOLOGY | Facility: HOSPITAL | Age: 75
End: 2025-03-06
Payer: MEDICARE

## 2025-03-17 DIAGNOSIS — E78.2 MIXED HYPERLIPIDEMIA: ICD-10-CM

## 2025-03-17 DIAGNOSIS — M79.7 FIBROMYALGIA: ICD-10-CM

## 2025-03-17 DIAGNOSIS — F32.1 CURRENT MODERATE EPISODE OF MAJOR DEPRESSIVE DISORDER, UNSPECIFIED WHETHER RECURRENT: ICD-10-CM

## 2025-03-17 RX ORDER — DESVENLAFAXINE 100 MG/1
100 TABLET, EXTENDED RELEASE ORAL DAILY
Qty: 90 TABLET | Refills: 0 | Status: SHIPPED | OUTPATIENT
Start: 2025-03-17

## 2025-03-17 RX ORDER — POTASSIUM CHLORIDE 1500 MG/1
40 TABLET, EXTENDED RELEASE ORAL DAILY
Qty: 180 TABLET | Refills: 0 | Status: SHIPPED | OUTPATIENT
Start: 2025-03-17 | End: 2025-06-15

## 2025-03-17 RX ORDER — ROSUVASTATIN CALCIUM 20 MG/1
20 TABLET, COATED ORAL NIGHTLY
Qty: 90 TABLET | Refills: 0 | Status: SHIPPED | OUTPATIENT
Start: 2025-03-17

## 2025-03-17 RX ORDER — PROPRANOLOL HYDROCHLORIDE 10 MG/1
10 TABLET ORAL 3 TIMES DAILY
Qty: 270 TABLET | Refills: 0 | Status: SHIPPED | OUTPATIENT
Start: 2025-03-17 | End: 2025-06-15

## 2025-03-17 NOTE — TELEPHONE ENCOUNTER
"    Caller: Dorothea Cruz \"OXANA\"    Relationship: Self    Best call back number: [phone]    Requested Prescriptions:   Requested Prescriptions     Pending Prescriptions Disp Refills    desvenlafaxine (Pristiq) 100 MG 24 hr tablet 90 tablet 0     Sig: Take 1 tablet by mouth Daily. Indications: Major Depressive Disorder    potassium chloride (KLOR-CON M20) 20 MEQ CR tablet 60 tablet 1     Sig: Take 2 tablets by mouth Daily for 90 days.    propranolol (INDERAL) 10 MG tablet 90 tablet 1     Sig: Take 1 tablet by mouth 3 (Three) Times a Day for 90 days.    rosuvastatin (CRESTOR) 20 MG tablet 90 tablet 0     Sig: Take 1 tablet by mouth Every Night. Indications: Elevation of Both Cholesterol and Triglycerides in Blood        Pharmacy where request should be sent: [place] [phone] [place], [place] [phone]      Last office visit with prescribing clinician: 11/25/2024   Last telemedicine visit with prescribing clinician: Visit date not found   Next office visit with prescribing clinician: 5/20/2025     Additional details provided by patient: PATIENT WOULD LIKE 90 DAY SUPPLY     Does the patient have less than a 3 day supply:  [x] Yes  [] No        Jackelyn Busch Rep   03/17/25 09:15 EDT             "

## 2025-03-18 NOTE — ASSESSMENT & PLAN NOTE
Goal Outcome Evaluation:    Overall Patient Progress: no changeOverall Patient Progress: no change     [id number]: Afebrile, VSS, no s/s of discomfort. LS coarse, tolerating vent settings on 26%. Tidal volumes slightly increased overnight, cuff was not fully inflated so added more water to inflate to 1 ml. Tolerating Jtube feeds, no emesis. Gtube to gravity with yellow/green output. Good ostomy output. No contact with family this shift. Rounding complete.        Patient's depression is recurrent and is moderate without psychosis. Their depression is currently active and the condition is improving with treatment. This will be reassessed at the next regular appointment. F/U as described:patient will continue current medication therapy.

## 2025-03-20 ENCOUNTER — TRANSCRIBE ORDERS (OUTPATIENT)
Dept: ADMINISTRATIVE | Facility: HOSPITAL | Age: 75
End: 2025-03-20
Payer: OTHER GOVERNMENT

## 2025-03-20 ENCOUNTER — OFFICE VISIT (OUTPATIENT)
Dept: SLEEP MEDICINE | Facility: HOSPITAL | Age: 75
End: 2025-03-20
Payer: MEDICARE

## 2025-03-20 DIAGNOSIS — G47.33 OSA (OBSTRUCTIVE SLEEP APNEA): Primary | ICD-10-CM

## 2025-03-20 DIAGNOSIS — C74.01 MALIGNANT NEOPLASM OF CORTEX OF RIGHT ADRENAL GLAND: Primary | ICD-10-CM

## 2025-03-20 PROCEDURE — G0463 HOSPITAL OUTPT CLINIC VISIT: HCPCS

## 2025-03-20 RX ORDER — MITOTANE 500 MG/1
3 TABLET ORAL 3 TIMES DAILY
COMMUNITY
Start: 2024-12-24

## 2025-03-20 RX ORDER — CALCIUM CARBONATE/VITAMIN D3 500MG-5MCG
1 TABLET ORAL EVERY 12 HOURS SCHEDULED
COMMUNITY
Start: 2025-02-11

## 2025-03-20 RX ORDER — B-COMPLEX WITH VITAMIN C
1 TABLET ORAL
COMMUNITY
Start: 2025-02-07

## 2025-03-20 RX ORDER — PANTOPRAZOLE SODIUM 20 MG/1
20 TABLET, DELAYED RELEASE ORAL DAILY
COMMUNITY
Start: 2024-12-20

## 2025-03-20 RX ORDER — LEVOTHYROXINE SODIUM 25 UG/1
25 TABLET ORAL DAILY
COMMUNITY
Start: 2025-02-10

## 2025-03-20 NOTE — PROGRESS NOTES
[facility]         Dr Sheridan Fleming  [unfilled]  Patient Care Team:  Monserrat Grover APRN as PCP - General (Nurse Practitioner)  Louie Medina MD as Consulting Physician (General Surgery)  Bebe Nair MD PhD as Consulting Physician (Hematology and Oncology)  Mirtha Esteves MD as Consulting Physician (General Surgery)  Melissa Cespedes MD as Consulting Physician (Urology)    Chief Complaint: HESHAM on CPAP    Interval History: Patient here for annual compliance visit.  Since last visit she has been diagnosed with adrenal cancer stage IV with mets to her spine.  Currently undergoing radiation treatment.  She has had significant weight loss.  She is currently set up on CPAP 10 cm.  Her compliance is reduced to 26% with average daily use 3 hours 30 minutes.  AHI leak within normal limits.  She tells me that due to her issues with radiation and cancer her compliance has been reduced.  She plans to get back on her CPAP religiously.  Currently she goes to bed 10:30 PM and has frequent arousals.  No tobacco no alcohol no caffeine abuse.  Currently has a nasal pillow that fits well.  Supplies have been adequate.    REVIEW OF SYSTEMS:   CARDIOVASCULAR: No chest pain, chest pressure or chest discomfort. No palpitations or edema.   RESPIRATORY: No shortness of breath, cough or sputum.   GASTROINTESTINAL: No anorexia, nausea, vomiting or diarrhea. No abdominal pain or blood.   HEMATOLOGIC: No bleeding or bruising.     Ventilator/Non-Invasive Ventilation Settings (From admission, onward)      None              Vital Signs     [unfilled]    BMI is within normal parameters. No other follow-up for BMI required.       Physical Exam:  Patient is examined using the personal protective equipment as per guidelines from infection control for this particular patient as enacted.  Hand hygiene was performed before and after patient interaction.   General Appearance:    Alert, cooperative, in no acute  distress.  Following simple commands  ENT Mallampati between 3 and 4 no nasal congestion  Neck midline trachea, no thyromegaly   Lungs:     Clear to auscultation, respirations regular, even and                  unlabored    Heart:    Regular rhythm and normal rate, normal S1 and S2, no            murmur, no gallop, no rub, no click   Chest Wall:    No abnormalities observed   Abdomen:     Normal bowel sounds, no masses, no organomegaly, soft        nontender, nondistended, no guarding, no rebound                tenderness   Extremities:   Moves all extremities well, no edema, no cyanosis, no             redness  CNS no focal neurological deficits normal sensory exam  Skin no rashes no nodules  Musculoskeletal no cyanosis no clubbing normal range of motion     Results Review:                                          I reviewed the patient's new clinical results.  I personally viewed and interpreted the patient's chest x-ray.        Medication Review:       No current facility-administered medications for this visit.      ASSESSMENT:   HESHAM on CPAP  Hypertension  Obesity  Adrenal cancer metastatic  Hypothyroidism  GERD      PLAN:  Unfortunate events with recent diagnosis of metastatic adrenal cancer.  Stressed the importance of getting back on the CPAP  Continue current CPAP 10 cm  Sleep hygiene measures  Treatment of underlying comorbidities  Patient benefiting from CPAP  Supplies be renewed  We will follow-up in 1 year      Sheridan Fleming MD  03/20/25  11:27 EDT

## 2025-03-21 ENCOUNTER — LAB (OUTPATIENT)
Facility: HOSPITAL | Age: 75
End: 2025-03-21
Payer: MEDICARE

## 2025-03-21 DIAGNOSIS — C74.01 MALIGNANT NEOPLASM OF CORTEX OF RIGHT ADRENAL GLAND: ICD-10-CM

## 2025-03-21 LAB
ALBUMIN SERPL-MCNC: 3.3 G/DL (ref 3.5–5.2)
ALBUMIN/GLOB SERPL: 1.1 G/DL
ALP SERPL-CCNC: 91 U/L (ref 39–117)
ALT SERPL W P-5'-P-CCNC: 9 U/L (ref 1–33)
ANION GAP SERPL CALCULATED.3IONS-SCNC: 4.5 MMOL/L (ref 5–15)
AST SERPL-CCNC: 18 U/L (ref 1–32)
BASOPHILS # BLD MANUAL: 0 10*3/MM3 (ref 0–0.2)
BASOPHILS NFR BLD MANUAL: 0 % (ref 0–1.5)
BILIRUB SERPL-MCNC: 0.2 MG/DL (ref 0–1.2)
BUN SERPL-MCNC: 11 MG/DL (ref 8–23)
BUN/CREAT SERPL: 18.3 (ref 7–25)
CALCIUM SPEC-SCNC: 8.6 MG/DL (ref 8.6–10.5)
CHLORIDE SERPL-SCNC: 109 MMOL/L (ref 98–107)
CHOLEST SERPL-MCNC: 153 MG/DL (ref 0–200)
CO2 SERPL-SCNC: 23.5 MMOL/L (ref 22–29)
CREAT SERPL-MCNC: 0.6 MG/DL (ref 0.57–1)
DEPRECATED RDW RBC AUTO: 52.3 FL (ref 37–54)
EGFRCR SERPLBLD CKD-EPI 2021: 94.3 ML/MIN/1.73
EOSINOPHIL # BLD MANUAL: 1.19 10*3/MM3 (ref 0–0.4)
EOSINOPHIL NFR BLD MANUAL: 7.2 % (ref 0.3–6.2)
ERYTHROCYTE [DISTWIDTH] IN BLOOD BY AUTOMATED COUNT: 12.7 % (ref 12.3–15.4)
GLOBULIN UR ELPH-MCNC: 2.9 GM/DL
GLUCOSE SERPL-MCNC: 96 MG/DL (ref 65–99)
HCT VFR BLD AUTO: 33.1 % (ref 34–46.6)
HDLC SERPL-MCNC: 48 MG/DL (ref 40–60)
HGB BLD-MCNC: 11.4 G/DL (ref 12–15.9)
LDLC SERPL CALC-MCNC: 84 MG/DL (ref 0–100)
LDLC/HDLC SERPL: 1.71 {RATIO}
LYMPHOCYTES # BLD MANUAL: 10.39 10*3/MM3 (ref 0.7–3.1)
LYMPHOCYTES NFR BLD MANUAL: 2.1 % (ref 5–12)
MACROCYTES BLD QL SMEAR: ABNORMAL
MCH RBC QN AUTO: 39.2 PG (ref 26.6–33)
MCHC RBC AUTO-ENTMCNC: 34.4 G/DL (ref 31.5–35.7)
MCV RBC AUTO: 113.7 FL (ref 79–97)
MONOCYTES # BLD: 0.35 10*3/MM3 (ref 0.1–0.9)
NEUTROPHILS # BLD AUTO: 2.05 10*3/MM3 (ref 1.7–7)
NEUTROPHILS NFR BLD MANUAL: 12.4 % (ref 42.7–76)
OTHER CELLS %: 15.5 % (ref 0–0)
PATHOLOGY REVIEW: YES
PLAT MORPH BLD: NORMAL
PLATELET # BLD AUTO: 105 10*3/MM3 (ref 140–450)
PMV BLD AUTO: 11.8 FL (ref 6–12)
POLYCHROMASIA BLD QL SMEAR: ABNORMAL
POTASSIUM SERPL-SCNC: 4.6 MMOL/L (ref 3.5–5.2)
PROT SERPL-MCNC: 6.2 G/DL (ref 6–8.5)
RBC # BLD AUTO: 2.91 10*6/MM3 (ref 3.77–5.28)
SMUDGE CELLS BLD QL SMEAR: ABNORMAL
SODIUM SERPL-SCNC: 137 MMOL/L (ref 136–145)
T4 FREE SERPL-MCNC: 0.75 NG/DL (ref 0.92–1.68)
TRIGL SERPL-MCNC: 114 MG/DL (ref 0–150)
TSH SERPL DL<=0.05 MIU/L-ACNC: 0.03 UIU/ML (ref 0.27–4.2)
VARIANT LYMPHS NFR BLD MANUAL: 62.9 % (ref 19.6–45.3)
VLDLC SERPL-MCNC: 21 MG/DL (ref 5–40)
WBC NRBC COR # BLD AUTO: 16.52 10*3/MM3 (ref 3.4–10.8)

## 2025-03-21 PROCEDURE — 80061 LIPID PANEL: CPT

## 2025-03-21 PROCEDURE — 80053 COMPREHEN METABOLIC PANEL: CPT

## 2025-03-21 PROCEDURE — 36415 COLL VENOUS BLD VENIPUNCTURE: CPT

## 2025-03-21 PROCEDURE — 84244 ASSAY OF RENIN: CPT

## 2025-03-21 PROCEDURE — 85025 COMPLETE CBC W/AUTO DIFF WBC: CPT

## 2025-03-21 PROCEDURE — 85007 BL SMEAR W/DIFF WBC COUNT: CPT

## 2025-03-21 PROCEDURE — 80299 QUANTITATIVE ASSAY DRUG: CPT

## 2025-03-21 PROCEDURE — 82024 ASSAY OF ACTH: CPT

## 2025-03-21 PROCEDURE — 84439 ASSAY OF FREE THYROXINE: CPT

## 2025-03-21 PROCEDURE — 82088 ASSAY OF ALDOSTERONE: CPT

## 2025-03-21 PROCEDURE — 84443 ASSAY THYROID STIM HORMONE: CPT

## 2025-03-22 LAB
LAB AP CASE REPORT: NORMAL
PATH REPORT.FINAL DX SPEC: NORMAL

## 2025-03-23 LAB — ACTH PLAS-MCNC: 10.7 PG/ML (ref 7.2–63.3)

## 2025-03-25 LAB — MITOTANE SERPL-MCNC: 4.1 UG/ML

## 2025-03-26 LAB — ALDOST SERPL-MCNC: 3.8 NG/DL (ref 0–30)

## 2025-03-26 RX ORDER — CALCIUM CARBONATE/VITAMIN D3 500MG-5MCG
1 TABLET ORAL EVERY 12 HOURS SCHEDULED
Qty: 180 TABLET | Refills: 0 | Status: SHIPPED | OUTPATIENT
Start: 2025-03-26

## 2025-03-26 NOTE — TELEPHONE ENCOUNTER
"     Caller: Dorothea Cruz \"OXANA\"    Relationship: Self    Best call back number:   Telephone Information:   Mobile [phone]         Requested Prescriptions:   Requested Prescriptions     Pending Prescriptions Disp Refills    [employer]SC[employer] 500 + D 500-5 MG-MCG tablet per tablet       Sig: Take 1 tablet by mouth Every 12 (Twelve) Hours.        Pharmacy where request should be sent: [place] [phone] [place] [phone]      Last office visit with prescribing clinician: 11/25/2024   Last telemedicine visit with prescribing clinician: Visit date not found   Next office visit with prescribing clinician: 5/20/2025     Additional details provided by patient:        THE  PATIENT IS OUT OF THIS MEDICATION     Does the patient have less than a 3 day supply:  [x] Yes  [] No    Would you like a call back once the refill request has been completed: [] Yes [x] No    If the office needs to give you a call back, can they leave a voicemail: [] Yes [x] No    Jackelyn Newman Rep   03/26/25 10:32 EDT        "

## 2025-03-28 LAB — RENIN PLAS-CCNC: 0.24 NG/ML/HR (ref 0.17–5.38)

## 2025-05-07 ENCOUNTER — TRANSCRIBE ORDERS (OUTPATIENT)
Dept: CARDIOLOGY | Facility: HOSPITAL | Age: 75
End: 2025-05-07
Payer: OTHER GOVERNMENT

## 2025-05-07 ENCOUNTER — TRANSCRIBE ORDERS (OUTPATIENT)
Dept: ADMINISTRATIVE | Facility: HOSPITAL | Age: 75
End: 2025-05-07
Payer: OTHER GOVERNMENT

## 2025-05-07 ENCOUNTER — LAB (OUTPATIENT)
Facility: HOSPITAL | Age: 75
End: 2025-05-07
Payer: MEDICARE

## 2025-05-07 DIAGNOSIS — I31.39 EFFUSION, PERICARDIUM: Primary | ICD-10-CM

## 2025-05-07 DIAGNOSIS — E87.8 ELECTROLYTE AND FLUID DISORDER: ICD-10-CM

## 2025-05-07 DIAGNOSIS — N91.2 AMENIA: Primary | ICD-10-CM

## 2025-05-07 DIAGNOSIS — D35.01: ICD-10-CM

## 2025-05-07 DIAGNOSIS — N91.2 AMENIA: ICD-10-CM

## 2025-05-07 LAB
ANISOCYTOSIS BLD QL: ABNORMAL
BASOPHILS # BLD MANUAL: 0 10*3/MM3 (ref 0–0.2)
BASOPHILS NFR BLD MANUAL: 0 % (ref 0–1.5)
DEPRECATED RDW RBC AUTO: 65.4 FL (ref 37–54)
EOSINOPHIL # BLD MANUAL: 0.26 10*3/MM3 (ref 0–0.4)
EOSINOPHIL NFR BLD MANUAL: 1.1 % (ref 0.3–6.2)
ERYTHROCYTE [DISTWIDTH] IN BLOOD BY AUTOMATED COUNT: 16.7 % (ref 12.3–15.4)
HCT VFR BLD AUTO: 29.9 % (ref 34–46.6)
HGB BLD-MCNC: 9.6 G/DL (ref 12–15.9)
LYMPHOCYTES # BLD MANUAL: 18.77 10*3/MM3 (ref 0.7–3.1)
LYMPHOCYTES NFR BLD MANUAL: 5.3 % (ref 5–12)
MACROCYTES BLD QL SMEAR: ABNORMAL
MCH RBC QN AUTO: 34.2 PG (ref 26.6–33)
MCHC RBC AUTO-ENTMCNC: 32.1 G/DL (ref 31.5–35.7)
MCV RBC AUTO: 106.4 FL (ref 79–97)
MONOCYTES # BLD: 1.28 10*3/MM3 (ref 0.1–0.9)
NEUTROPHILS # BLD AUTO: 3.81 10*3/MM3 (ref 1.7–7)
NEUTROPHILS NFR BLD MANUAL: 15.8 % (ref 42.7–76)
PLAT MORPH BLD: NORMAL
PLATELET # BLD AUTO: 117 10*3/MM3 (ref 140–450)
PMV BLD AUTO: 12.4 FL (ref 6–12)
POLYCHROMASIA BLD QL SMEAR: ABNORMAL
RBC # BLD AUTO: 2.81 10*6/MM3 (ref 3.77–5.28)
SMUDGE CELLS BLD QL SMEAR: ABNORMAL
VARIANT LYMPHS NFR BLD MANUAL: 1.1 % (ref 0–5)
VARIANT LYMPHS NFR BLD MANUAL: 76.8 % (ref 19.6–45.3)
WBC NRBC COR # BLD AUTO: 24.09 10*3/MM3 (ref 3.4–10.8)

## 2025-05-07 PROCEDURE — 85025 COMPLETE CBC W/AUTO DIFF WBC: CPT

## 2025-05-07 PROCEDURE — 36415 COLL VENOUS BLD VENIPUNCTURE: CPT

## 2025-05-07 PROCEDURE — 85007 BL SMEAR W/DIFF WBC COUNT: CPT

## 2025-05-27 NOTE — PROGRESS NOTES
Chief Complaint  Depression, Hyperlipidemia, and Vitamin D Deficiency    Patient or patient representative verbalized consent for the use of Ambient Listening during the visit with  ARA Peck for chart documentation. 5/29/2025  15:26 EDT    Subjective            Dorothea Cruz is a 75 y.o. female who presents to [facility]   Back Pain  Chronicity:  New  Onset:  In the past 7 days  Frequency:  Intermittently  Progression since onset:  Coming and going  Pain location:  Sacro-iliac  Pain quality:  Cramping  Pain-numeric:  8/10  Pain is:  Worse during the day  Aggravated by:  Standing, stress and twisting  Stiffness is present:  In the morning  Associated symptoms: abdominal pain, leg pain, weakness and weight loss    Associated symptoms: no bladder incontinence, no bowel incontinence, no chest pain, no dysuria and no fever    Risk factors:  History of cancer and lack of exercise      History of Present Illness  The patient presents for a 6-month follow-up.    She underwent surgery on 04/03/2025 and was discharged on 04/05/2025. However, she was readmitted due to difficulty swallowing, which prevented her from taking her medication or consuming food. During her 11-day hospital stay, she experienced elevated blood pressure and temperature, and her kidneys began to fail, possibly due to ibuprofen administration.    She is currently on Os-Srinivasan twice daily and vitamin D 2000 units.  Her last DEXA scan was 2 years ago and was normal.    She is on Pristiq 100 mg for depression and fibromyalgia, which she reports as effective.    She is on rosuvastatin 20 mg for cholesterol management.    She is on propranolol 10 mg three times a day for tremors.    She is on Eliquis 5 mg twice a day.    She is on Calquence for leukemia.    She is on levothyroxine 25 mcg, which she has been taking for an extended period.    She is on losartan 50 mg daily, prescribed by Dr. Glez.    She is  "on methimazole 10 mg tablet.    She is on pantoprazole.    She is is not sure if she is on diltiazem 240 mg which was found in the outside medication reconciliation.    She is on multivitamin and takes Tylenol as needed. She also takes betamethasone as needed, prescribed by another provider. She takes colchicine every 48 hours and hydrocortisone 10 mg as needed. She takes ondansetron, oxycodone, Compazine, promethazine, and Lomotil as needed. She has discontinued Pradaxa and potassium supplements. She denies being on Zyprexa.      PHQ-2 Depression Screening  Little interest or pleasure in doing things? Not at all   Feeling down, depressed, or hopeless? Not at all   PHQ-2 Total Score 0           Tobacco Use: Medium Risk (5/29/2025)    Patient History     Smoking Tobacco Use: Never     Smokeless Tobacco Use: Never     Passive Exposure: Yes      E-cigarette/Vaping    E-cigarette/Vaping Use Never User     Passive Exposure No     Counseling Given No      E-cigarette/Vaping Substances    Nicotine No     THC No     CBD No     Flavoring No      E-cigarette/Vaping Devices    Disposable No     Pre-filled or Refillable Cartridge No     Refillable Tank No     Pre-filled Pod No        Alcohol Use: Not At Risk (4/15/2025)    Received from [facility]    AUDIT-C     Frequency of Alcohol Consumption: Never     Average Number of Drinks: Patient does not drink     Frequency of Binge Drinking: Never         Objective   Vital Signs:   Vitals:    05/29/25 1507 05/29/25 1520   BP: 148/73 138/72   BP Location: Left arm    Patient Position: Sitting    Cuff Size: Adult    Pulse: 81    Temp: 97.3 °F (36.3 °C)    SpO2: 100%    Weight: 53 kg (116 lb 14.4 oz)    Height: 157.5 cm (62.01\")    PainSc: 0-No pain      Body mass index is 21.37 kg/m².    Wt Readings from Last 3 Encounters:   05/29/25 53 kg (116 lb 14.4 oz)   11/25/24 53.1 kg (117 lb 1.6 oz)   10/08/24 53.3 kg (117 lb 8.1 oz)     BP Readings from Last " "3 Encounters:   05/29/25 138/72   11/25/24 142/66   10/08/24 132/53       Health Maintenance   Topic Date Due    ZOSTER VACCINE (1 of 2) 11/11/2016    DXA SCAN  03/28/2025    ANNUAL WELLNESS VISIT  05/31/2025    COVID-19 Vaccine (3 - Moderna risk series) 05/31/2025 (Originally 5/20/2021)    INFLUENZA VACCINE  07/01/2025    MAMMOGRAM  09/18/2025    LIPID PANEL  03/21/2026    TDAP/TD VACCINES (2 - Td or Tdap) 09/16/2026    COLORECTAL CANCER SCREENING  05/09/2027    Pneumococcal Vaccine 50+  Completed    HEPATITIS C SCREENING  Discontinued       /72   Pulse 81   Temp 97.3 °F (36.3 °C)   Ht 157.5 cm (62.01\")   Wt 53 kg (116 lb 14.4 oz)   SpO2 100%   BMI 21.37 kg/m²       Current Outpatient Medications:     acetaminophen (TYLENOL) 500 MG tablet, Take 2 tablets by mouth Daily As Needed for Mild Pain., Disp: , Rfl:     apixaban (ELIQUIS) 5 MG tablet tablet, Take 1 tablet by mouth Every 12 (Twelve) Hours., Disp: , Rfl:     butalbital-acetaminophen-caffeine (Esgic) -40 MG per tablet, Take 1 tablet by mouth Every 4 (Four) Hours As Needed for Headache., Disp: 30 tablet, Rfl: 1    Calcium Carb-Cholecalciferol (OYSCO 500 + D) 500-5 MG-MCG tablet per tablet, Take 1 tablet by mouth Every 12 (Twelve) Hours., Disp: 180 tablet, Rfl: 3    Calquence 100 MG tablet, Take 1 tablet by mouth Daily., Disp: , Rfl:     Cholecalciferol (Vitamin D) 50 MCG (2000 UT) capsule, Take 1 capsule by mouth Daily. Indications: Vitamin D Deficiency, Disp: 90 capsule, Rfl: 3    colchicine 0.6 MG tablet, Take 0.5 tablets by mouth Every Other Day., Disp: , Rfl:     desvenlafaxine (Pristiq) 100 MG 24 hr tablet, Take 1 tablet by mouth Daily. Indications: Major Depressive Disorder, Disp: 90 tablet, Rfl: 1    diphenoxylate-atropine (LOMOTIL) 2.5-0.025 MG per tablet, Take 1 tablet by mouth 4 (Four) Times a Day As Needed for Diarrhea., Disp: , Rfl:     hydrALAZINE (APRESOLINE) 10 MG tablet, TAKE 1 TABLET BY MOUTH THREE TIMES DAILY, Disp: 90 " tablet, Rfl: 1    hydrocortisone (CORTEF) 10 MG tablet, Take 1 tablet by mouth Take As Directed.  Take 1.5 tabs in the am and 1/2 tab at 2 pm. Double dose in times of illness, Disp: , Rfl:     levothyroxine (SYNTHROID, LEVOTHROID) 25 MCG tablet, Take 1 tablet by mouth Daily., Disp: , Rfl:     lidocaine-prilocaine (EMLA) 2.5-2.5 % cream, Apply 1 Application topically to the appropriate area as directed 1 (One) Time., Disp: , Rfl:     losartan (Cozaar) 50 MG tablet, Take 1 tablet by mouth Daily., Disp: 30 tablet, Rfl: 3    multivitamin with minerals tablet tablet, Take 1 tablet by mouth Daily., Disp: 90 tablet, Rfl: 3    ondansetron ODT (ZOFRAN-ODT) 4 MG disintegrating tablet, Place 1 tablet on the tongue 4 (Four) Times a Day As Needed for Nausea or Vomiting., Disp: 20 tablet, Rfl: 0    oxyCODONE (ROXICODONE) 5 MG immediate release tablet, Take 1 tablet by mouth Every 6 (Six) Hours As Needed., Disp: , Rfl:     pantoprazole (PROTONIX) 20 MG EC tablet, Take 1 tablet by mouth Daily., Disp: , Rfl:     prochlorperazine (COMPAZINE) 10 MG tablet, Take 1 tablet by mouth Every 6 (Six) Hours As Needed for Nausea., Disp: 60 tablet, Rfl: 3    promethazine (PHENERGAN) 25 MG tablet, Take 0.5-1 tablets by mouth Every 6 (Six) Hours As Needed for Nausea., Disp: , Rfl:     propranolol (INDERAL) 10 MG tablet, Take 1 tablet by mouth 3 (Three) Times a Day for 90 days. Indications: Fine to Coarse Slow Tremor Affecting Head, Hands & Voice, Disp: 270 tablet, Rfl: 1    rosuvastatin (CRESTOR) 20 MG tablet, Take 1 tablet by mouth Every Night. Indications: Elevation of Both Cholesterol and Triglycerides in Blood, Disp: 90 tablet, Rfl: 1    methIMAzole (TAPAZOLE) 10 MG tablet, Take 1 tablet by mouth Daily., Disp: , Rfl:    Past Medical History:   Diagnosis Date    Adrenal cancer     Allergic     Anxiety     Arthritis     Breast cancer     Cancer     BREAST CANCER    Cataract     Cholelithiases 4/30@1    CLL (chronic lymphocytic leukemia) 2023     Colon polyp     Diverticulitis     Diverticulitis of colon     Diverticulosis 95    Fibromyalgia     Fibromyalgia, primary     Headache 7-24    History of medical problems 12/24    Hyperlipidemia     Hypertension     Hypertensive urgency 09/19/2024    Hypokalemia 09/19/2024    Intractable nausea and vomiting 09/19/2024    Irritable bowel syndrome     Liver disease     Low back pain 2015    Major depressive disorder 05/07/2020    Palpitations     ASYMPTOMATIC- DENIES CP/SOB, SEE PCP- NO CARDIOLOGIST     Peptic ulceration     Post-menopause 09/17/2020    Pulmonary embolism 9/24    Sinus trouble     Sleep apnea     Tremor 2018    I was on medicine for tremors    Urinary tract infection     Vitamin D deficiency 09/17/2020        Physical Exam  Vitals reviewed.   Constitutional:       Appearance: Normal appearance. She is cachectic.   Neck:      Thyroid: No thyroid mass, thyromegaly or thyroid tenderness.   Cardiovascular:      Rate and Rhythm: Normal rate and regular rhythm.      Heart sounds: No murmur heard.     No friction rub. No gallop.   Pulmonary:      Effort: Pulmonary effort is normal.      Breath sounds: Normal breath sounds. No wheezing or rhonchi.   Lymphadenopathy:      Cervical: No cervical adenopathy.   Skin:     General: Skin is warm and dry.   Neurological:      Mental Status: She is alert and oriented to person, place, and time.      Cranial Nerves: No cranial nerve deficit.   Psychiatric:         Mood and Affect: Mood and affect normal.         Behavior: Behavior normal.         Thought Content: Thought content normal. Thought content does not include homicidal or suicidal ideation.         Judgment: Judgment normal.         Physical Exam        Result Review :    The following data was reviewed by: ARA Peck on 05/20/2025:  CMP   CMP          4/14/2025    03:01 4/14/2025    17:28 4/14/2025    18:50 4/15/2025    00:18 4/15/2025    05:36 4/15/2025    18:17 4/18/2025    08:56   CMP    Potassium  5.9         4.1       Calcium 7.4       7.2          Total Protein   4.8       4.4        Albumin   2.5      2.3         Total Bilirubin   0.4           Alkaline Phosphatase   95           AST (SGOT)   22           ALT (SGPT)   4              Details          This result is from an external source.             CBC   CBC          4/22/2025    01:57 5/7/2025    11:27 5/22/2025    14:49   CBC   WBC 19.54     24.09  24.38       RBC 2.49     2.81  2.95       Hemoglobin 8.3     9.6  9.9       Hematocrit 26.8     29.9  30.9       .6     106.4  104.7       MCH 33.3     34.2  33.6       MCHC 31     32.1  32       RDW 19     16.7  15.5       Platelets 124     117  129          Details          This result is from an external source.             LIPID   Lipid Panel          3/21/2025    10:00 4/15/2025    00:18   Lipid Panel   Total Cholesterol 153     Triglycerides 114  154       HDL Cholesterol 48     VLDL Cholesterol 21     LDL Cholesterol  84     LDL/HDL Ratio 1.71        Details          This result is from an external source.             TSH   TSH          4/11/2025    05:15 4/13/2025    10:38 5/22/2025    14:49   TSH   TSH <0.008     0.008     <0.010          Details          This result is from an external source.             [id number]   A1C Last 3 Results          8/15/2024    12:25 9/19/2024    14:36   HGBA1C Last 3 Results   Hemoglobin A1C 5.20  5.4          Details          This result is from an external source.                    XR Chest 1 View  Result Date: 4/21/2025  IMPRESSION: Bilateral pleural effusions, increasing in size on the right. I personally viewed and interpreted these images and I have reviewed and approved this report. Electronically Signed By: Luciano Long MD on 4/21/2025 9:20 AM    XR Chest 1 View  Result Date: 4/19/2025  IMPRESSION: See above Electronically Signed By: Joss Mccollum MD on 4/19/2025 10:09 PM    XR Chest 1 View  Result Date: 4/15/2025  IMPRESSION:  No pneumothorax after thoracentesis. Significantly improved right effusion and aeration in the right lung with trace residual pleural fluid. Electronically Signed By: Melvi Rousseau MD on 4/15/2025 1:13 PM    XR Chest 1 View  Result Date: 4/15/2025  IMPRESSION: Mild improved aeration of the right lower lobe after intubation, ET tube is 3 cm above the ke. Right CVC tip overlies the lower SVC. No pneumothorax. Electronically Signed By: Melvi Rousseau MD on 4/15/2025 8:20 AM    XR Abdomen 1 View  Result Date: 4/15/2025  IMPRESSION: Gastric drainage tube tip and sidehole in the stomach. Electronically Signed By: Tita Gonzalez MD on 4/15/2025 8:02 AM    XR Chest 1 View  Result Date: 4/14/2025  IMPRESSION: See above Electronically Signed By: Joss Mccollum MD on 4/14/2025 9:32 PM    XR Abdomen 1 View  Result Date: 4/14/2025  IMPRESSION: 1.  Nonobstructive bowel gas pattern. Moderate gaseous distention of the stomach and transverse colon without dilation. No gross free air, within the limitations of this study. 2.  Colonic diverticulosis. 3.  Persistent bilateral nephrograms, could be related to renal insufficiency. 4.  Right pleural effusion with associated atelectatic changes. Electronically Signed By: Alejnadra Amor MD on 4/14/2025 8:20 PM    CT Angiogram Chest Pulmonary Embolism  Result Date: 4/13/2025  IMPRESSION: 1.  No acute pulmonary emboli. 2.  Bilateral pleural effusions with compressive atelectasis. 3.  Moderate pericardial effusion. 4.  Stable bilateral pulmonary nodules. I personally viewed and interpreted these images and I have reviewed and approved this report. Electronically Signed By: Joss Mccollum MD on 4/13/2025 4:34 PM    CT Abdomen Pelvis With Contrast  Result Date: 4/13/2025  IMPRESSION: 1.  Status post right adrenalectomy. No measurable fluid collection at the adrenalectomy bed. There is however intermediate attenuation fluid at the surgical bed and extending towards the  periportal region and retroperitoneum. This may represent serous sanguinous postoperative fluid. 2.  Grossly stable retroperitoneal and abdominal lymphadenopathy, consistent with the history of CLL. 3.  No other acute findings in the abdomen and pelvis. 4.   Ancillary findings as described above.   Electronically Signed By: Feliz Riley MD, MBBS on 4/13/2025 4:11 PM    XR Chest 1 View  Result Date: 4/13/2025  IMPRESSION: 1.  Similar size of moderate right pleural effusion with slight increase in size of small left pleural effusion. 2.  Similar bibasilar atelectasis with no new airspace disease I personally viewed and interpreted these images and I have reviewed and approved this report. Electronically Signed By: Luciano Long MD on 4/13/2025 11:28 AM    XR FLUORO UPPER GI, WATER SOLUBLE AND/OR BARIUM CONTRAST  Result Date: 4/11/2025  IMPRESSION: 1.  Moderate esophageal dysmotility. 2.  Gastroesophageal reflux. Electronically Signed By: Piedad Baker MD on 4/11/2025 4:21 PM I personally viewed and interpreted these images and I have reviewed and approved this report. Electronically Signed By: Tita Gonzalez MD on 4/11/2025 4:37 PM    XR Chest 1 View  Result Date: 4/9/2025  IMPRESSION: Multifocal bilateral airspace disease, not substantially changed. Heart size stable. Electronically Signed By: Camron Nuñez M.D. on 4/9/2025 11:25 AM    XR Chest 1 View  Result Date: 4/8/2025  IMPRESSION: No appreciable change since the prior study. Electronically Signed By: Julian Wise M.D on 4/8/2025 11:39 AM    XR Chest 1 View  Result Date: 4/7/2025  IMPRESSION: See above Electronically Signed By: Joss Mccollum MD on 4/7/2025 9:27 PM    XR Chest 1 View  Result Date: 4/6/2025  IMPRESSION: New moderate right and small left effusions with adjacent atelectasis and/or pneumonia. Electronically Signed By: Melvi Rousseau MD on 4/6/2025 9:03 AM    XR Chest PA & Lateral  Result Date: 3/25/2025  IMPRESSION: No acute  cardiopulmonary disease Electronically Signed By: Luciano Long MD on 3/25/2025 4:23 PM    MRI Abdomen wo Contrast Iron Quantification  Result Date: 3/25/2025  IMPRESSION: 1.  Stable right adrenal mass. 2.  Enlarging measured and unmeasured retroperitoneal or mesenteric adenopathy. 3.  Similar L1 vertebral body lesion. Electronically Signed By: Filippo Lawrence MD on 3/25/2025 9:25 AM    CT Abdomen Pelvis With Contrast  Result Date: 2/9/2025  IMPRESSION: 1.  Interval decrease in size of the right adrenal soft tissue mass when compared to the CT of September 2024. 2.  Increase in size of retroperitoneal and mesenteric lymph nodes since the prior CT scan. 3.  No evidence of hepatic metastasis. 4.  Grossly stable lesion at the L1 vertebra with inferior endplate pathologic fracture, stable since the recent PET/CT. 5.  Colonic diverticulosis. Thickening of the sigmoid: Is likely related to chronic inflammation. Correlation with colonoscopy is recommended. 6.  Ancillary findings as described above. Electronically Signed By: Feliz Riley MD, MBBS on 2/9/2025 11:46 AM    CT Chest With Contrast Diagnostic  Result Date: 2/6/2025  IMPRESSION: 1.  Stable bilateral pulmonary nodules. 2.  No intrathoracic lymphadenopathy. Electronically Signed By: Toma Canales MD on 2/6/2025 3:49 PM      Results  Labs   - A1c: Normal    Assessment & Plan  Current moderate episode of major depressive disorder, unspecified whether recurrent      Orders:    desvenlafaxine (Pristiq) 100 MG 24 hr tablet; Take 1 tablet by mouth Daily. Indications: Major Depressive Disorder    Fibromyalgia    Orders:    desvenlafaxine (Pristiq) 100 MG 24 hr tablet; Take 1 tablet by mouth Daily. Indications: Major Depressive Disorder    Mixed hyperlipidemia       Orders:    rosuvastatin (CRESTOR) 20 MG tablet; Take 1 tablet by mouth Every Night. Indications: Elevation of Both Cholesterol and Triglycerides in Blood    Vitamin D deficiency    Orders:     Cholecalciferol (Vitamin D) 50 MCG (2000 UT) capsule; Take 1 capsule by mouth Daily. Indications: Vitamin D Deficiency    Essential tremor    Orders:    propranolol (INDERAL) 10 MG tablet; Take 1 tablet by mouth 3 (Three) Times a Day for 90 days. Indications: Fine to Coarse Slow Tremor Affecting Head, Hands & Voice    Postmenopause         Assessment & Plan  1. Post-surgical complications.  - Experienced significant complications post-surgery on 04/03/2025, including difficulty swallowing, leading to an 11-day hospital stay.  - Reported issues with blood pressure, temperature, and kidney function, potentially due to ibuprofen administration.  - No immediate changes to her current treatment plan are necessary.    2. Medication management.  - Currently taking multiple medications including Os-Srinivasan, Pristiq 100 mg for depression, rosuvastatin 20 mg for cholesterol, vitamin D 2000 units, propranolol 10 mg three times a day for tremors, Eliquis 5 mg twice a day, Calquence for leukemia, levothyroxine 25 mcg, losartan 50 mg daily, methimazole 10 mg, pantoprazole, and others as needed.  - Blood pressure is 138/72, heart rate is stable.  - Refills for multivitamin, calcium, vitamin D, rosuvastatin, propranolol, and desvenlafaxine will be sent to pharmacy. Medications no longer taken will be removed from her list.    3. Prediabetes.  - A1C levels have normalized recently, likely due to weight loss.  - No additional labs are required at this time.    4. Bone health.  - Has not had a bone density scan in over two years.  - A repeat scan will be ordered if her oncologist deems it necessary.    Follow-up  The patient will follow up in 6 months or sooner if necessary.        BMI is within normal parameters. No other follow-up for BMI required.        Diagnosis Plan   1. Current moderate episode of major depressive disorder, unspecified whether recurrent  desvenlafaxine (Pristiq) 100 MG 24 hr tablet      2. Fibromyalgia   desvenlafaxine (Pristiq) 100 MG 24 hr tablet      3. Mixed hyperlipidemia  rosuvastatin (CRESTOR) 20 MG tablet      4. Vitamin D deficiency  Cholecalciferol (Vitamin D) 50 MCG (2000 UT) capsule      5. Essential tremor  propranolol (INDERAL) 10 MG tablet      6. Postmenopause              FOLLOW UP  Return in about 6 months (around 11/29/2025) for Annual Wellness Visit (Medicare).  Patient was given instructions and counseling regarding her condition or for health maintenance advice. Please see specific information pulled into the AVS if appropriate.       CURRENT & DISCONTINUED MEDICATIONS  Current Outpatient Medications   Medication Instructions    acetaminophen (TYLENOL) 1,000 mg, Daily PRN    apixaban (ELIQUIS) 5 mg, Every 12 Hours Scheduled    butalbital-acetaminophen-caffeine (Esgic) -40 MG per tablet 1 tablet, Oral, Every 4 Hours PRN    Calcium Carb-Cholecalciferol (OYSCO 500 + D) 500-5 MG-MCG tablet per tablet 1 tablet, Oral, Every 12 Hours Scheduled    Calquence 100 mg, Daily    colchicine 0.3 mg, Every 48 Hours    desvenlafaxine (PRISTIQ) 100 mg, Oral, Daily    diphenoxylate-atropine (LOMOTIL) 2.5-0.025 MG per tablet 1 tablet, 4 Times Daily PRN    hydrALAZINE (APRESOLINE) 10 mg, Oral, 3 Times Daily    hydrocortisone (CORTEF) 10 mg, Take As Directed    levothyroxine (SYNTHROID, LEVOTHROID) 25 mcg, Daily    lidocaine-prilocaine (EMLA) 2.5-2.5 % cream 1 Application, Once    losartan (COZAAR) 50 mg, Oral, Daily    methIMAzole (TAPAZOLE) 10 mg, Daily    multivitamin with minerals tablet tablet 1 tablet, Oral, Daily    ondansetron ODT (ZOFRAN-ODT) 4 mg, Translingual, 4 Times Daily PRN    oxyCODONE (ROXICODONE) 5 mg, Every 6 Hours PRN    pantoprazole (PROTONIX) 20 mg, Daily    prochlorperazine (COMPAZINE) 10 mg, Oral, Every 6 Hours PRN    promethazine (PHENERGAN) 12.5-25 mg, Every 6 Hours PRN    propranolol (INDERAL) 10 mg, Oral, 3 Times Daily    rosuvastatin (CRESTOR) 20 mg, Oral, Nightly    Vitamin D  2,000 Units, Oral, Daily       Medications Discontinued During This Encounter   Medication Reason    Lysodren 500 MG chemo tablet *Therapy completed    Calcium Carbonate-Vitamin D (Oyster Shell Calcium/D) 500-5 MG-MCG tablet Duplicate order    dabigatran etexilate (PRADAXA) 150 MG capsu Discontinued by another clinician    Desvenlafaxine  MG tablet sustained-release 24 hour Duplicate order    OLANZapine (zyPREXA) 5 MG tablet Discontinued by another clinician    potassium chloride (KLOR-CON M20) 20 MEQ CR tablet Discontinued by another clinician    Cholecalciferol (Vitamin D) 50 MCG (2000 UT) capsule Reorder    desvenlafaxine (Pristiq) 100 MG 24 hr tablet Reorder    propranolol (INDERAL) 10 MG tablet Reorder    rosuvastatin (CRESTOR) 20 MG tablet Reorder    OYSCO 500 + D 500-5 MG-MCG tablet per tablet Reorder    multivitamin with minerals tablet tablet Reorder        Parts of this note are electronic transcriptions/translations of spoken language to printed text using the Dragon Dictation system.    Monserrat Grover, ARA  05/29/25  16:09 EDT

## 2025-05-29 ENCOUNTER — OFFICE VISIT (OUTPATIENT)
Dept: FAMILY MEDICINE CLINIC | Facility: CLINIC | Age: 75
End: 2025-05-29
Payer: MEDICARE

## 2025-05-29 VITALS
DIASTOLIC BLOOD PRESSURE: 72 MMHG | SYSTOLIC BLOOD PRESSURE: 138 MMHG | HEIGHT: 62 IN | BODY MASS INDEX: 21.51 KG/M2 | TEMPERATURE: 97.3 F | OXYGEN SATURATION: 100 % | HEART RATE: 81 BPM | WEIGHT: 116.9 LBS

## 2025-05-29 DIAGNOSIS — M79.7 FIBROMYALGIA: ICD-10-CM

## 2025-05-29 DIAGNOSIS — G25.0 ESSENTIAL TREMOR: ICD-10-CM

## 2025-05-29 DIAGNOSIS — E55.9 VITAMIN D DEFICIENCY: ICD-10-CM

## 2025-05-29 DIAGNOSIS — Z78.0 POSTMENOPAUSE: ICD-10-CM

## 2025-05-29 DIAGNOSIS — F32.1 CURRENT MODERATE EPISODE OF MAJOR DEPRESSIVE DISORDER, UNSPECIFIED WHETHER RECURRENT: Primary | ICD-10-CM

## 2025-05-29 DIAGNOSIS — E78.2 MIXED HYPERLIPIDEMIA: ICD-10-CM

## 2025-05-29 PROCEDURE — 1159F MED LIST DOCD IN RCRD: CPT | Performed by: NURSE PRACTITIONER

## 2025-05-29 PROCEDURE — 3078F DIAST BP <80 MM HG: CPT | Performed by: NURSE PRACTITIONER

## 2025-05-29 PROCEDURE — 3075F SYST BP GE 130 - 139MM HG: CPT | Performed by: NURSE PRACTITIONER

## 2025-05-29 PROCEDURE — 1126F AMNT PAIN NOTED NONE PRSNT: CPT | Performed by: NURSE PRACTITIONER

## 2025-05-29 PROCEDURE — 99214 OFFICE O/P EST MOD 30 MIN: CPT | Performed by: NURSE PRACTITIONER

## 2025-05-29 PROCEDURE — 1160F RVW MEDS BY RX/DR IN RCRD: CPT | Performed by: NURSE PRACTITIONER

## 2025-05-29 RX ORDER — MULTIPLE VITAMINS W/ MINERALS TAB 9MG-400MCG
1 TAB ORAL DAILY
COMMUNITY
End: 2025-05-29 | Stop reason: SDUPTHER

## 2025-05-29 RX ORDER — ROSUVASTATIN CALCIUM 20 MG/1
20 TABLET, COATED ORAL NIGHTLY
Qty: 90 TABLET | Refills: 1 | Status: SHIPPED | OUTPATIENT
Start: 2025-05-29

## 2025-05-29 RX ORDER — DESVENLAFAXINE 100 MG/1
100 TABLET, EXTENDED RELEASE ORAL DAILY
COMMUNITY
End: 2025-05-29 | Stop reason: SDUPTHER

## 2025-05-29 RX ORDER — MULTIPLE VITAMINS W/ MINERALS TAB 9MG-400MCG
1 TAB ORAL DAILY
Qty: 90 TABLET | Refills: 3 | Status: SHIPPED | OUTPATIENT
Start: 2025-05-29

## 2025-05-29 RX ORDER — CALCIUM CARBONATE/VITAMIN D3 500MG-5MCG
1 TABLET ORAL EVERY 12 HOURS SCHEDULED
Qty: 180 TABLET | Refills: 0 | Status: CANCELLED | OUTPATIENT
Start: 2025-05-29

## 2025-05-29 RX ORDER — DESVENLAFAXINE 100 MG/1
100 TABLET, EXTENDED RELEASE ORAL DAILY
Qty: 90 TABLET | Refills: 1 | Status: SHIPPED | OUTPATIENT
Start: 2025-05-29

## 2025-05-29 RX ORDER — B-COMPLEX WITH VITAMIN C
1 TABLET ORAL DAILY
Qty: 90 TABLET | Refills: 1 | Status: CANCELLED | OUTPATIENT
Start: 2025-05-29

## 2025-05-29 RX ORDER — PROPRANOLOL HYDROCHLORIDE 10 MG/1
10 TABLET ORAL 3 TIMES DAILY
Qty: 270 TABLET | Refills: 1 | Status: SHIPPED | OUTPATIENT
Start: 2025-05-29 | End: 2025-08-27

## 2025-05-29 RX ORDER — COLCHICINE 0.6 MG/1
0.3 TABLET ORAL
COMMUNITY
Start: 2025-04-22 | End: 2025-08-20

## 2025-05-29 RX ORDER — CALCIUM CARBONATE/VITAMIN D3 500MG-5MCG
1 TABLET ORAL EVERY 12 HOURS SCHEDULED
Qty: 180 TABLET | Refills: 3 | Status: SHIPPED | OUTPATIENT
Start: 2025-05-29

## 2025-05-29 RX ORDER — OXYCODONE HYDROCHLORIDE 5 MG/1
5 TABLET ORAL EVERY 6 HOURS PRN
COMMUNITY
Start: 2025-04-12 | End: 2025-06-22

## 2025-05-29 RX ORDER — DIPHENOXYLATE HYDROCHLORIDE AND ATROPINE SULFATE 2.5; .025 MG/1; MG/1
1 TABLET ORAL 4 TIMES DAILY PRN
COMMUNITY
Start: 2025-01-15

## 2025-05-29 RX ORDER — MULTIVIT-MIN/IRON/FOLIC ACID/K 18-600-40
2000 CAPSULE ORAL DAILY
Qty: 90 CAPSULE | Refills: 3 | Status: SHIPPED | OUTPATIENT
Start: 2025-05-29

## 2025-05-29 RX ORDER — HYDROCORTISONE 10 MG/1
10 TABLET ORAL TAKE AS DIRECTED
COMMUNITY
Start: 2025-05-22

## 2025-05-29 RX ORDER — ACALABRUTINIB 100 MG/1
100 TABLET, FILM COATED ORAL DAILY
COMMUNITY

## 2025-05-29 NOTE — ASSESSMENT & PLAN NOTE
Orders:    rosuvastatin (CRESTOR) 20 MG tablet; Take 1 tablet by mouth Every Night. Indications: Elevation of Both Cholesterol and Triglycerides in Blood

## 2025-05-29 NOTE — ASSESSMENT & PLAN NOTE
Orders:    desvenlafaxine (Pristiq) 100 MG 24 hr tablet; Take 1 tablet by mouth Daily. Indications: Major Depressive Disorder

## 2025-05-29 NOTE — ASSESSMENT & PLAN NOTE
Orders:    Cholecalciferol (Vitamin D) 50 MCG (2000 UT) capsule; Take 1 capsule by mouth Daily. Indications: Vitamin D Deficiency

## 2025-06-03 RX ORDER — LEVOTHYROXINE SODIUM 25 UG/1
25 TABLET ORAL DAILY
Qty: 90 TABLET | Refills: 0 | Status: SHIPPED | OUTPATIENT
Start: 2025-06-03

## 2025-06-03 RX ORDER — LOSARTAN POTASSIUM 50 MG/1
50 TABLET ORAL DAILY
Qty: 90 TABLET | Refills: 0 | Status: SHIPPED | OUTPATIENT
Start: 2025-06-03

## 2025-06-03 RX ORDER — PANTOPRAZOLE SODIUM 20 MG/1
20 TABLET, DELAYED RELEASE ORAL DAILY
Qty: 90 TABLET | Refills: 0 | Status: SHIPPED | OUTPATIENT
Start: 2025-06-03

## 2025-06-03 NOTE — TELEPHONE ENCOUNTER
"    Caller: TashiatiffDortohea \"OXANA\"    Relationship: Self    Best call back number: [phone]     Requested Prescriptions:   Requested Prescriptions     Pending Prescriptions Disp Refills    levothyroxine (SYNTHROID, LEVOTHROID) 25 MCG tablet       Sig: Take 1 tablet by mouth Daily.    losartan (Cozaar) 50 MG tablet 30 tablet 3     Sig: Take 1 tablet by mouth Daily.    pantoprazole (PROTONIX) 20 MG EC tablet       Sig: Take 1 tablet by mouth Daily.        Pharmacy where request should be sent: [facility] [phone] [place], [place] [phone]      Last office visit with prescribing clinician: 5/29/2025   Last telemedicine visit with prescribing clinician: Visit date not found   Next office visit with prescribing clinician: 11/17/2025     Additional details provided by patient: PATIENT STATES SHE HAS ABOUT A 3 DAY SUPPLY LEFT OF THESE MEDICATIONS, PLEASE ADVISE     Does the patient have less than a 3 day supply:  [] Yes  [x] No    Jackelyn Dyole Rep   06/03/25 12:09 EDT       "

## 2025-06-11 RX ORDER — COLCHICINE 0.6 MG/1
0.3 TABLET ORAL
Qty: 8 TABLET | Refills: 3 | OUTPATIENT
Start: 2025-06-11 | End: 2025-10-09

## 2025-06-11 NOTE — TELEPHONE ENCOUNTER
"    Caller: TashiarositaDroothea suresh \"OXANA\"    Relationship: Self    Best call back number: [phone]     Requested Prescriptions:   Requested Prescriptions     Pending Prescriptions Disp Refills    colchicine 0.6 MG tablet 8 tablet 3     Sig: Take 0.5 tablets by mouth Every Other Day for 120 days.        Pharmacy where request should be sent: [place] [phone] [place] [phone]      Last office visit with prescribing clinician: 5/29/2025   Last telemedicine visit with prescribing clinician: Visit date not found   Next office visit with prescribing clinician: 11/17/2025     Additional details provided by patient: THREE DAYS LEFT ON HAND.     Does the patient have less than a 3 day supply:  [x] Yes  [] No      Jackelyn Pierre Rep   06/11/25 10:37 EDT         "

## 2025-06-30 NOTE — TELEPHONE ENCOUNTER
"Caller: Dorothea Cruz \"OXANA\"    Relationship: Self    Best call back number: [phone]     Requested Prescriptions:   Requested Prescriptions     Pending Prescriptions Disp Refills    colchicine 0.6 MG tablet 8 tablet 3     Sig: Take 0.5 tablets by mouth Every Other Day for 120 days.        Pharmacy where request should be sent: [place] [phone] [facility] [phone]      Last office visit with prescribing clinician: 5/29/2025   Last telemedicine visit with prescribing clinician: Visit date not found   Next office visit with prescribing clinician: 11/17/2025     Does the patient have less than a 3 day supply:  [x] Yes  [] No    Jackelyn Le   06/30/25 12:27 EDT     "

## 2025-07-01 RX ORDER — COLCHICINE 0.6 MG/1
0.3 TABLET ORAL
Qty: 45 TABLET | Refills: 1 | Status: SHIPPED | OUTPATIENT
Start: 2025-07-01

## 2025-07-22 ENCOUNTER — TELEPHONE (OUTPATIENT)
Dept: FAMILY MEDICINE CLINIC | Facility: CLINIC | Age: 75
End: 2025-07-22

## 2025-07-22 DIAGNOSIS — Z86.711 HISTORY OF PULMONARY EMBOLUS (PE): Primary | ICD-10-CM

## 2025-07-26 NOTE — PROGRESS NOTES
Subjective   The ABCs of the Annual Wellness Visit  Medicare Wellness Visit      Dorothea Cruz is a 75 y.o. patient who presents for a Medicare Wellness Visit.    The following portions of the patient's history were reviewed and   updated as appropriate: allergies, current medications, past family history, past medical history, past social history, past surgical history, and problem list.    Compared to one year ago, the patient's physical   health is better.  Compared to one year ago, the patient's mental   health is the same.    Recent Hospitalizations:  She was admitted within the past 365 days at [facility].     Current Medical Providers:  Patient Care Team:  Monserrat Grover APRN as PCP - General (Nurse Practitioner)  Louie Medina MD as Consulting Physician (General Surgery)  Bebe Nair MD PhD as Consulting Physician (Hematology and Oncology)  Mirtha Esteves MD as Consulting Physician (General Surgery)  Melissa Cespedes MD as Consulting Physician (Urology)    Outpatient Medications Prior to Visit   Medication Sig Dispense Refill    acetaminophen (TYLENOL) 500 MG tablet Take 2 tablets by mouth Daily As Needed for Mild Pain.      apixaban (ELIQUIS) 5 MG tablet tablet Take 1 tablet by mouth 2 (Two) Times a Day. Indications: history of DVT/ tablet 0    butalbital-acetaminophen-caffeine (Esgic) -40 MG per tablet Take 1 tablet by mouth Every 4 (Four) Hours As Needed for Headache. 30 tablet 1    Calcium Carb-Cholecalciferol (OYSCO 500 + D) 500-5 MG-MCG tablet per tablet Take 1 tablet by mouth Every 12 (Twelve) Hours. 180 tablet 3    Calquence 100 MG tablet Take 1 tablet by mouth Daily.      Cholecalciferol (Vitamin D) 50 MCG (2000 UT) capsule Take 1 capsule by mouth Daily. Indications: Vitamin D Deficiency 90 capsule 3    colchicine 0.6 MG tablet Take 0.5 tablets by mouth Every Other Day. 45 tablet 1    desvenlafaxine (Pristiq) 100 MG 24 hr tablet Take 1 tablet by mouth  Daily. Indications: Major Depressive Disorder 90 tablet 1    diphenoxylate-atropine (LOMOTIL) 2.5-0.025 MG per tablet Take 1 tablet by mouth 4 (Four) Times a Day As Needed for Diarrhea.      hydrocortisone (CORTEF) 10 MG tablet Take 1 tablet by mouth Take As Directed.  Take 1.5 tabs in the am and 1/2 tab at 2 pm. Double dose in times of illness      levothyroxine (SYNTHROID, LEVOTHROID) 25 MCG tablet Take 1 tablet by mouth Daily. 90 tablet 0    lidocaine-prilocaine (EMLA) 2.5-2.5 % cream Apply 1 Application topically to the appropriate area as directed 1 (One) Time.      losartan (Cozaar) 50 MG tablet Take 1 tablet by mouth Daily. 90 tablet 0    multivitamin with minerals tablet tablet Take 1 tablet by mouth Daily. 90 tablet 3    ondansetron ODT (ZOFRAN-ODT) 4 MG disintegrating tablet Place 1 tablet on the tongue 4 (Four) Times a Day As Needed for Nausea or Vomiting. 20 tablet 0    pantoprazole (PROTONIX) 20 MG EC tablet Take 1 tablet by mouth Daily. 90 tablet 0    prochlorperazine (COMPAZINE) 10 MG tablet Take 1 tablet by mouth Every 6 (Six) Hours As Needed for Nausea. 60 tablet 3    promethazine (PHENERGAN) 25 MG tablet Take 0.5-1 tablets by mouth Every 6 (Six) Hours As Needed for Nausea.      propranolol (INDERAL) 10 MG tablet Take 1 tablet by mouth 3 (Three) Times a Day for 90 days. Indications: Fine to Coarse Slow Tremor Affecting Head, Hands & Voice 270 tablet 1    rosuvastatin (CRESTOR) 20 MG tablet Take 1 tablet by mouth Every Night. Indications: Elevation of Both Cholesterol and Triglycerides in Blood 90 tablet 1    methIMAzole (TAPAZOLE) 10 MG tablet Take 1 tablet by mouth Daily.      hydrALAZINE (APRESOLINE) 10 MG tablet TAKE 1 TABLET BY MOUTH THREE TIMES DAILY (Patient not taking: Reported on 7/28/2025) 90 tablet 1     No facility-administered medications prior to visit.     No opioid medication identified on active medication list. I have reviewed chart for other potential  high risk medication/s and  harmful drug interactions in the elderly.      Aspirin is not on active medication list.  Aspirin use is contraindicated for this patient due to: current use of Eliquis.  .    Patient Active Problem List   Diagnosis    Vitamin D deficiency    Sleep apnea    Postmenopause    Major depressive disorder    Primary hypertension    Mixed hyperlipidemia    Fibromyalgia    Disease of thyroid gland    Depression    Anxiety    Arthritis    ACE-inhibitor cough    Diverticulitis    Breast cancer    History of diverticulitis    History of cholecystectomy    Altered bowel habits    Diarrhea    Malignant neoplasm of upper-inner quadrant of right female breast    Chronic low back pain    Trochanteric bursitis of both hips    Family history of CLL (chronic lymphoid leukemia)    Degeneration of lumbar intervertebral disc    Lumbar spondylosis    Chronic osteoarthritis    Leukocytosis    Prediabetes    Class 1 obesity with serious comorbidity and body mass index (BMI) of 32.0 to 32.9 in adult    Adenoma of right adrenal gland    CLL (chronic lymphocytic leukemia)    Lumbosacral spondylosis without myelopathy    Li-Fraumeni syndrome    Absence of both cervix and uterus, acquired    Sinusitis    Urinary urgency    Dehydration    Chronic neck pain    Inflammation of sacroiliac joint    Migraine    Malignant neoplasm of adrenal gland    Acute pulmonary embolism    Adrenal carcinoma    Chemotherapy-induced fatigue    Sensation of fullness in right ear    History of pulmonary embolus (PE)    Cancer associated pain    Other long term (current) drug therapy    Secondary malignant neoplasm of bone     Advance Care Planning Advance Directive is not on file.  ACP discussion was held with the patient during this visit. Patient does not have an advance directive, declines further assistance.            Objective   Vitals:    07/28/25 1348 07/28/25 1356   BP: 148/70 142/70   BP Location: Left arm    Patient Position: Sitting    Cuff Size: Adult   "  Pulse: 78    Temp: 97.3 °F (36.3 °C)    SpO2: 99%    Weight: 50.7 kg (111 lb 11.2 oz)    Height: 157.5 cm (62.01\")    PainSc: 0-No pain        Estimated body mass index is 20.42 kg/m² as calculated from the following:    Height as of this encounter: 157.5 cm (62.01\").    Weight as of this encounter: 50.7 kg (111 lb 11.2 oz).    BMI is within normal parameters. No other follow-up for BMI required.         Does the patient have evidence of cognitive impairment? No                                                                                                Health  Risk Assessment    Smoking Status:  Social History     Tobacco Use   Smoking Status Never    Passive exposure: Yes   Smokeless Tobacco Never     Alcohol Consumption:  Social History     Substance and Sexual Activity   Alcohol Use Never       Fall Risk Screen  STEADI Fall Risk Assessment was completed, and patient is at LOW risk for falls.Assessment completed on:2025    Depression Screening   Little interest or pleasure in doing things? Not at all   Feeling down, depressed, or hopeless? Not at all   PHQ-2 Total Score 0      Health Habits and Functional and Cognitive Screenin/28/2025     1:51 PM   Functional & Cognitive Status   Do you have difficulty preparing food and eating? No   Do you have difficulty bathing yourself, getting dressed or grooming yourself? No   Do you have difficulty using the toilet? No   Do you have difficulty moving around from place to place? No   Do you have trouble with steps or getting out of a bed or a chair? No   Current Diet Well Balanced Diet   Dental Exam Up to date   Eye Exam Up to date   Exercise (times per week) 4 times per week   Current Exercises Include Walking;House Cleaning   Do you need help using the phone?  No   Are you deaf or do you have serious difficulty hearing?  No   Do you need help to go to places out of walking distance? No   Do you need help shopping? No   Do you need help preparing meals?  " No   Do you need help with housework?  No   Do you need help with laundry? No   Do you need help taking your medications? No   Do you need help managing money? No   Do you ever drive or ride in a car without wearing a seat belt? No   Have you felt unusual fatigue (could be tiredness), stress, anger or loneliness in the last month? Yes   Who do you live with? Spouse   If you need help, do you have trouble finding someone available to you? No   Have you been bothered in the last four weeks by sexual problems? No   Do you have difficulty concentrating, remembering or making decisions? No           Age-appropriate Screening Schedule:  Refer to the list below for future screening recommendations based on patient's age, sex and/or medical conditions. Orders for these recommended tests are listed in the plan section. The patient has been provided with a written plan.    Health Maintenance List  Health Maintenance   Topic Date Due    MAMMOGRAM  09/18/2024    DXA SCAN  03/28/2025    ANNUAL WELLNESS VISIT  05/31/2025    ZOSTER VACCINE (1 of 2) 08/11/2025 (Originally 11/11/2016)    COVID-19 Vaccine (3 - Moderna risk series) 01/28/2026 (Originally 5/20/2021)    INFLUENZA VACCINE  10/01/2025    LIPID PANEL  03/21/2026    TDAP/TD VACCINES (2 - Td or Tdap) 09/16/2026    COLORECTAL CANCER SCREENING  05/09/2027    Pneumococcal Vaccine 50+  Completed    HEPATITIS C SCREENING  Discontinued                                                                                                                                                CMS Preventative Services Quick Reference  Risk Factors Identified During Encounter  Dental Screening Recommended  Vision Screening Recommended    The above risks/problems have been discussed with the patient.  Pertinent information has been shared with the patient in the After Visit Summary.  An After Visit Summary and PPPS were made available to the patient.    Follow Up:   Next Medicare Wellness visit to  be scheduled in 1 year.          Additional E&M Note during same encounter follows:  Patient has multiple medical problems which are significant and separately identifiable that require additional work above and beyond the Medicare Wellness Visit.      Chief Complaint  Medicare Wellness-subsequent, Rash (On back, Started about 3 weeks ), and New Med Request (PCP taken over Rx for Eliquis )    Dorothea Cruz is a 75 y.o. female who presents to [facility]     History of Present Illness  The patient is here today for a Medicare wellness visit and follow-up on medication management.    She reports an improvement in her physical health compared to the previous year, while her mental health remains stable. She has been hospitalized at [facility] within the last year. She does not have an advanced directive or living will. She regularly undergoes vision exams and has an appointment scheduled for next week. She also maintains regular dental check-ups. She is due for a mammogram and has not undergone a mastectomy. She has no history of osteoporosis. She has declined further COVID-19 vaccinations but is interested in receiving the shingles vaccine. She is up to date on her colonoscopy. She reports experiencing stress and fatigue but notes that her condition is improving. Her sleep pattern is normal, and she reports no bladder issues.    She experiences migraines approximately twice a year, a significant decrease from the previous year when they were frequent. She is currently not experiencing any headaches. She is requesting a refill of Fioricet as it helps with her severe headaches.    She has been dealing with a rash on her back for about 3 to 4 weeks, which has improved since its onset. She suspects it may be due to a change in laundry detergent. The rash is itchy and covers her entire back. She has tried over-the-counter treatments such as Benadryl, calamine lotion, and  "hydrocortisone cream, but the rash persists.    She is currently on losartan 50 mg daily for blood pressure management and has taken her dose today. She was previously prescribed hydralazine 10 mg 3 times a day by Dr. Glez but has not seen him in a long time now.  She is not sure if this was prescribed for blood pressure or something else.  She is no longer taking hydralazine.    She is on propranolol 10 mg 3 times daily for tremors but tries to remember to take it in the evening.    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 Additionally, she has chronic lymphocytic leukemia (CLL) and a spot on her L1 vertebra.    Social History:  Sleep: Reports normal sleep pattern    Objective   Vital Signs:   Vitals:    07/28/25 1348 07/28/25 1356   BP: 148/70 142/70   BP Location: Left arm    Patient Position: Sitting    Cuff Size: Adult    Pulse: 78    Temp: 97.3 °F (36.3 °C)    SpO2: 99%    Weight: 50.7 kg (111 lb 11.2 oz)    Height: 157.5 cm (62.01\")    PainSc: 0-No pain        Wt Readings from Last 3 Encounters:   07/28/25 50.7 kg (111 lb 11.2 oz)   05/29/25 53 kg (116 lb 14.4 oz)   11/25/24 53.1 kg (117 lb 1.6 oz)     BP Readings from Last 3 Encounters:   07/28/25 142/70   05/29/25 138/72   11/25/24 142/66       Physical Exam  Vitals reviewed.   Constitutional:       Appearance: Normal appearance. She is well-developed.   HENT:      Right Ear: Tympanic membrane and external ear normal. There is impacted cerumen.      Left Ear: Tympanic membrane, ear canal and external ear normal.      Mouth/Throat:      Mouth: Mucous membranes are moist.      Pharynx: No pharyngeal swelling, oropharyngeal exudate or posterior oropharyngeal erythema.   Neck:      Thyroid: No thyroid mass, thyromegaly or thyroid tenderness.   Cardiovascular:      Rate and Rhythm: Normal rate and regular rhythm.      Heart sounds: No murmur heard.     No friction rub. No gallop. "   Pulmonary:      Effort: Pulmonary effort is normal.      Breath sounds: Normal breath sounds. No wheezing or rhonchi.   Lymphadenopathy:      Cervical: No cervical adenopathy.   Skin:     General: Skin is warm and dry.      Findings: No rash.   Neurological:      Mental Status: She is alert and oriented to person, place, and time.      Cranial Nerves: No cranial nerve deficit.   Psychiatric:         Mood and Affect: Mood and affect normal.         Behavior: Behavior normal.         Thought Content: Thought content normal. Thought content does not include homicidal or suicidal ideation.         Judgment: Judgment normal.         Physical Exam  Ears: Cerumen present in the right ear  Skin: No visible rash on the back, patient reports itchiness      The following data was reviewed by ARA Peck on 07/28/2025      Results          Assessment & Plan   Diagnoses and all orders for this visit:    1. Medicare annual wellness visit, subsequent (Primary)    2. History of pulmonary embolus (PE)    3. Other migraine without status migrainosus, intractable  -     ubrogepant (Ubrelvy) 100 MG tablet; Take one tab at onset of migraine, may repeat one tab once in 2 hours if needed.  Indications: Migraine Headache  Dispense: 16 tablet; Refill: 5  -     ubrogepant (Ubrelvy) 100 MG tablet; Take one tab at onset of migraine, may repeat one tab once in 2 hours if needed.  Dispense: 2 tablet; Refill: 0    4. Adenoma of right adrenal gland    5. Encounter for screening mammogram for malignant neoplasm of breast  -     Mammo Screening Digital Tomosynthesis Bilateral With CAD; Future    6. Postmenopause  -     DEXA Bone Density Axial; Future    7. Secondary malignant neoplasm of bone    8. Itchy skin  -     Skin Protectants, Misc. (Eucerin) cream; Apply 1 Application topically to the appropriate area as directed 2 (Two) Times a Day As Needed (itching, dry skin).  Dispense: 57 g; Refill: 5        Assessment & Plan  1.  Medicare wellness visit.  - Blood pressure readings have shown improvement since 05/2025, although they remain slightly elevated.  - Mammogram and DEXA scan have been ordered.  - Encouraged to receive the shingles vaccine at a pharmacy.  - Influenza vaccine will be administered in the fall.    2. Migraine headaches.  - Experiences migraines infrequently, approximately twice a year.  - Fioricet can cause rebound migraines and is not recommended anymore.  - Prescription for Ubrelvy provided, with instructions to take one pill at the onset of a migraine and a second dose after 2 hours if symptoms persist, not exceeding two pills within a 24-hour period.  - Samples of Ubrelvy given.    3. Rash on back.  - No visible rash observed during the examination, but reports itchiness.  - Eucerin cream recommended for application twice daily as needed.  - Over-the-counter options such as Aveeno or Benadryl cream can also be considered.    4. Blood pressure management.  - Currently on losartan 50 mg daily and propranolol 10 mg three times daily (propranolol is for her tremors).  - Advised to continue these medications and monitor blood pressure at home.  - Advised to notify me if her blood pressure remains elevated.    5.  History of pulmonary embolism (PE).  - She is currently stable on Eliquis 5 mg twice daily and will continue current dose.  No refills needed today.    Follow-up: A follow-up appointment is scheduled for 11/2025.       BMI is within normal parameters. No other follow-up for BMI required.       FOLLOW UP  Return for Keep Scheduled Follow-up.  Patient was given instructions and counseling regarding her condition or for health maintenance advice. Please see specific information pulled into the AVS if appropriate.     Patient or patient representative verbalized consent for the use of Ambient Listening during the visit with  ARA Peck for chart documentation. 7/28/2025  14:10 YOANA RAYMOND  ARA Grover  07/28/25  15:29 EDT

## 2025-07-28 ENCOUNTER — OFFICE VISIT (OUTPATIENT)
Dept: FAMILY MEDICINE CLINIC | Facility: CLINIC | Age: 75
End: 2025-07-28
Payer: MEDICARE

## 2025-07-28 VITALS
HEART RATE: 78 BPM | HEIGHT: 62 IN | BODY MASS INDEX: 20.56 KG/M2 | DIASTOLIC BLOOD PRESSURE: 70 MMHG | SYSTOLIC BLOOD PRESSURE: 142 MMHG | TEMPERATURE: 97.3 F | WEIGHT: 111.7 LBS | OXYGEN SATURATION: 99 %

## 2025-07-28 DIAGNOSIS — D35.01 ADENOMA OF RIGHT ADRENAL GLAND: ICD-10-CM

## 2025-07-28 DIAGNOSIS — Z12.31 ENCOUNTER FOR SCREENING MAMMOGRAM FOR MALIGNANT NEOPLASM OF BREAST: ICD-10-CM

## 2025-07-28 DIAGNOSIS — L29.9 ITCHY SKIN: ICD-10-CM

## 2025-07-28 DIAGNOSIS — Z00.00 MEDICARE ANNUAL WELLNESS VISIT, SUBSEQUENT: Primary | ICD-10-CM

## 2025-07-28 DIAGNOSIS — C79.51 SECONDARY MALIGNANT NEOPLASM OF BONE: ICD-10-CM

## 2025-07-28 DIAGNOSIS — Z78.0 POSTMENOPAUSE: ICD-10-CM

## 2025-07-28 DIAGNOSIS — Z86.711 HISTORY OF PULMONARY EMBOLUS (PE): ICD-10-CM

## 2025-07-28 DIAGNOSIS — G43.819 OTHER MIGRAINE WITHOUT STATUS MIGRAINOSUS, INTRACTABLE: ICD-10-CM

## 2025-07-28 PROBLEM — C74.90: Status: ACTIVE | Noted: 2024-09-13

## 2025-07-28 PROBLEM — G89.3 CANCER ASSOCIATED PAIN: Status: ACTIVE | Noted: 2025-04-29

## 2025-07-28 PROBLEM — I31.39 PERICARDIAL EFFUSION: Status: RESOLVED | Noted: 2025-04-14 | Resolved: 2025-07-28

## 2025-07-28 PROBLEM — Z79.899 OTHER LONG TERM (CURRENT) DRUG THERAPY: Status: ACTIVE | Noted: 2022-05-27

## 2025-07-28 PROCEDURE — G0439 PPPS, SUBSEQ VISIT: HCPCS | Performed by: NURSE PRACTITIONER

## 2025-07-28 PROCEDURE — 1126F AMNT PAIN NOTED NONE PRSNT: CPT | Performed by: NURSE PRACTITIONER

## 2025-07-28 PROCEDURE — 1159F MED LIST DOCD IN RCRD: CPT | Performed by: NURSE PRACTITIONER

## 2025-07-28 PROCEDURE — 3077F SYST BP >= 140 MM HG: CPT | Performed by: NURSE PRACTITIONER

## 2025-07-28 PROCEDURE — 1170F FXNL STATUS ASSESSED: CPT | Performed by: NURSE PRACTITIONER

## 2025-07-28 PROCEDURE — 3078F DIAST BP <80 MM HG: CPT | Performed by: NURSE PRACTITIONER

## 2025-07-28 PROCEDURE — G2211 COMPLEX E/M VISIT ADD ON: HCPCS | Performed by: NURSE PRACTITIONER

## 2025-07-28 PROCEDURE — 96160 PT-FOCUSED HLTH RISK ASSMT: CPT | Performed by: NURSE PRACTITIONER

## 2025-07-28 PROCEDURE — 1160F RVW MEDS BY RX/DR IN RCRD: CPT | Performed by: NURSE PRACTITIONER

## 2025-07-28 PROCEDURE — 99214 OFFICE O/P EST MOD 30 MIN: CPT | Performed by: NURSE PRACTITIONER

## 2025-07-28 RX ORDER — LANOLIN ALCOHOL/MO/W.PET/CERES
1 CREAM (GRAM) TOPICAL 2 TIMES DAILY PRN
Qty: 57 G | Refills: 5 | Status: SHIPPED | OUTPATIENT
Start: 2025-07-28

## 2025-08-05 ENCOUNTER — HOSPITAL ENCOUNTER (OUTPATIENT)
Dept: MAMMOGRAPHY | Facility: HOSPITAL | Age: 75
Discharge: HOME | End: 2025-08-05
Admitting: NURSE PRACTITIONER
Payer: MEDICARE

## 2025-08-05 ENCOUNTER — RESULTS FOLLOW-UP (OUTPATIENT)
Dept: FAMILY MEDICINE CLINIC | Facility: CLINIC | Age: 75
End: 2025-08-05
Payer: OTHER GOVERNMENT

## 2025-08-05 DIAGNOSIS — Z12.31 ENCOUNTER FOR SCREENING MAMMOGRAM FOR MALIGNANT NEOPLASM OF BREAST: ICD-10-CM

## 2025-08-05 PROCEDURE — 77063 BREAST TOMOSYNTHESIS BI: CPT

## 2025-08-05 PROCEDURE — 77067 SCR MAMMO BI INCL CAD: CPT

## 2025-08-11 ENCOUNTER — HOSPITAL ENCOUNTER (OUTPATIENT)
Dept: BONE DENSITY | Facility: HOSPITAL | Age: 75
Discharge: HOME | End: 2025-08-11
Payer: MEDICARE

## 2025-08-11 ENCOUNTER — LAB (OUTPATIENT)
Dept: LAB | Facility: HOSPITAL | Age: 75
End: 2025-08-11
Payer: MEDICARE

## 2025-08-11 ENCOUNTER — TRANSCRIBE ORDERS (OUTPATIENT)
Dept: ADMINISTRATIVE | Facility: HOSPITAL | Age: 75
End: 2025-08-11
Payer: OTHER GOVERNMENT

## 2025-08-11 DIAGNOSIS — C74.01 MALIGNANT NEOPLASM OF CORTEX OF RIGHT ADRENAL GLAND: ICD-10-CM

## 2025-08-11 DIAGNOSIS — E03.2 HYPOTHYROIDISM DUE TO MEDICATION: ICD-10-CM

## 2025-08-11 DIAGNOSIS — Z78.0 POSTMENOPAUSE: ICD-10-CM

## 2025-08-11 DIAGNOSIS — E03.2 HYPOTHYROIDISM DUE TO MEDICATION: Primary | ICD-10-CM

## 2025-08-11 DIAGNOSIS — E87.8 ELECTROLYTE AND FLUID DISORDER: ICD-10-CM

## 2025-08-11 DIAGNOSIS — D35.01: ICD-10-CM

## 2025-08-11 DIAGNOSIS — I31.39 EFFUSION, PERICARDIUM: ICD-10-CM

## 2025-08-11 LAB
ALBUMIN SERPL-MCNC: 4.3 G/DL (ref 3.5–5.2)
ALBUMIN/GLOB SERPL: 1.8 G/DL
ALP SERPL-CCNC: 69 U/L (ref 39–117)
ALT SERPL W P-5'-P-CCNC: 9 U/L (ref 1–33)
ANION GAP SERPL CALCULATED.3IONS-SCNC: 9.6 MMOL/L (ref 5–15)
AST SERPL-CCNC: 15 U/L (ref 1–32)
BILIRUB SERPL-MCNC: <0.2 MG/DL (ref 0–1.2)
BUN SERPL-MCNC: 18 MG/DL (ref 8–23)
BUN/CREAT SERPL: 22.5 (ref 7–25)
CALCIUM SPEC-SCNC: 9.8 MG/DL (ref 8.6–10.5)
CHLORIDE SERPL-SCNC: 107 MMOL/L (ref 98–107)
CO2 SERPL-SCNC: 24.4 MMOL/L (ref 22–29)
CREAT SERPL-MCNC: 0.8 MG/DL (ref 0.57–1)
EGFRCR SERPLBLD CKD-EPI 2021: 77.4 ML/MIN/1.73
GLOBULIN UR ELPH-MCNC: 2.4 GM/DL
GLUCOSE SERPL-MCNC: 112 MG/DL (ref 65–99)
POTASSIUM SERPL-SCNC: 4.3 MMOL/L (ref 3.5–5.2)
PROT SERPL-MCNC: 6.7 G/DL (ref 6–8.5)
SODIUM SERPL-SCNC: 141 MMOL/L (ref 136–145)
T4 FREE SERPL-MCNC: 1.65 NG/DL (ref 0.92–1.68)
TSH SERPL DL<=0.05 MIU/L-ACNC: <0.005 UIU/ML (ref 0.27–4.2)

## 2025-08-11 PROCEDURE — 84439 ASSAY OF FREE THYROXINE: CPT

## 2025-08-11 PROCEDURE — 84443 ASSAY THYROID STIM HORMONE: CPT

## 2025-08-11 PROCEDURE — 84244 ASSAY OF RENIN: CPT

## 2025-08-11 PROCEDURE — 36415 COLL VENOUS BLD VENIPUNCTURE: CPT

## 2025-08-11 PROCEDURE — 77080 DXA BONE DENSITY AXIAL: CPT

## 2025-08-11 PROCEDURE — 80053 COMPREHEN METABOLIC PANEL: CPT

## 2025-08-16 LAB — RENIN PLAS-CCNC: <0.167 NG/ML/HR (ref 0.17–5.38)

## (undated) DEVICE — ENDOPATH XCEL WITH OPTIVIEW TECHNOLOGY UNIVERSAL TROCAR STABILITY SLEEVES: Brand: ENDOPATH XCEL OPTIVIEW

## (undated) DEVICE — THE KUMAR CATHETER®, USED IN CONJUNCTION WITH KUMAR CHOLANGIOGRAPHY® CLAMP, IS MEANT TO PROVIDE A MEANS OF LAPAROSCOPIC CHOLANGIOGRAPHY. IT COMPRISES A TRANSLUCENT TUBING ( 76 CM. LENGTH AND 16 GA. ) THAT CARRIES A 19 GA., 1.25 CM LONG NEEDLE AT THE END. THE KUMAR CATHETER® IS USED TO PUNCTURE THE HARTMANN'S POUCH OF THE GALLBLADDER FOR BILIARY ACCESS AND / OR ASPIRATION. PRODUCT IS LATEX FREE.: Brand: KUMAR CATHETER®

## (undated) DEVICE — SLV SCD LEG COMFORT KENDALLSCD MD REPROC

## (undated) DEVICE — NON-WOVEN ADHESIVE WOUND DRESSING: Brand: PRIMAPORE ADHESIVE WOUND DRSG 7.2*5CM

## (undated) DEVICE — GOWN,REINFORCE,POLY,SIRUS,BREATH SLV,XLG: Brand: MEDLINE

## (undated) DEVICE — DEV OPN LIGASURE DISSCT EXACT 40DEG 21.6MM BX/1

## (undated) DEVICE — LAP PORT CLOSURE GUIDES 5MM AND 10/12MM: Brand: LAP PORT CLOSURE GUIDES 5MM AND 10/12MM

## (undated) DEVICE — SUT VIC 3/0 SH 27IN J416H

## (undated) DEVICE — CVR PROB 96IN LF STRL

## (undated) DEVICE — ELECTRD BLD EDGE/INSUL1P 2.4X5.1MM STRL

## (undated) DEVICE — TISSUE RETRIEVAL SYSTEM: Brand: INZII RETRIEVAL SYSTEM

## (undated) DEVICE — MAJOR-LF: Brand: MEDLINE INDUSTRIES, INC.

## (undated) DEVICE — ADHS LIQ MASTISOL 2/3ML

## (undated) DEVICE — CONTAINER,SPECIMEN,O.R.STRL,4.5OZ: Brand: MEDLINE

## (undated) DEVICE — SUT PERMAHAND SILK 0 FSL 30IN BLK

## (undated) DEVICE — DRSNG SURG AQUACEL AG 9X15CM

## (undated) DEVICE — INTENDED FOR TISSUE SEPARATION, AND OTHER PROCEDURES THAT REQUIRE A SHARP SURGICAL BLADE TO PUNCTURE OR CUT.: Brand: BARD-PARKER ® CARBON RIB-BACK BLADES

## (undated) DEVICE — APPL CHLORAPREP HI/LITE 26ML ORNG

## (undated) DEVICE — 2, DISPOSABLE SUCTION/IRRIGATOR WITHOUT DISPOSABLE TIP: Brand: STRYKEFLOW

## (undated) DEVICE — ENDOPATH XCEL WITH OPTIVIEW TECHNOLOGY BLADELESS TROCARS WITH STABILITY SLEEVES: Brand: ENDOPATH XCEL OPTIVIEW

## (undated) DEVICE — Device: Brand: DEFENDO AIR/WATER/SUCTION AND BIOPSY VALVE

## (undated) DEVICE — GLV SURG BIOGEL LTX PF 7 1/2

## (undated) DEVICE — APPL HEMO SURG ARISTA/AH/FLEXITIP XL 38CM

## (undated) DEVICE — STERILE POLYISOPRENE POWDER-FREE SURGICAL GLOVES WITH EMOLLIENT COATING: Brand: PROTEXIS

## (undated) DEVICE — GENERAL LAPAROSCOPY-LF: Brand: MEDLINE INDUSTRIES, INC.

## (undated) DEVICE — LAPAROSCOPIC ELECTRODE WITH A 3/32" PIN CONNECTOR, L-HOOK 5 MM X 27 CM: Brand: CONMED

## (undated) DEVICE — SINGLE-USE BIOPSY FORCEPS: Brand: RADIAL JAW 4

## (undated) DEVICE — SUT MNCRYL 4/0 PS2 18 IN

## (undated) DEVICE — SOL IRRG H2O PL/BG 1000ML STRL

## (undated) DEVICE — STRIP CLS WND SUTURESTRIP/PLS 0.5X4IN TP1103

## (undated) DEVICE — LAPAROSCOPIC DISSECTOR: Brand: DEROYAL

## (undated) DEVICE — PENCL E/S SMOKEEVAC W/TELESCP CANN

## (undated) DEVICE — GAMMEX® NON-LATEX SIZE 7.5, STERILE NEOPRENE POWDER-FREE SURGICAL GLOVE: Brand: GAMMEX

## (undated) DEVICE — SOL NACL 0.9PCT 1000ML

## (undated) DEVICE — 3M™ STERI-STRIP™ REINFORCED ADHESIVE SKIN CLOSURES, R1547, 1/2 IN X 4 IN (12 MM X 100 MM), 6 STRIPS/ENVELOPE: Brand: 3M™ STERI-STRIP™

## (undated) DEVICE — VISUALIZATION SYSTEM: Brand: CLEARIFY

## (undated) DEVICE — BRA COMPR SURG STL119 V/CLOSE/FRNT SZQ WHT

## (undated) DEVICE — ENDOPATH PNEUMONEEDLE INSUFFLATION NEEDLES WITH LUER LOCK CONNECTORS 120MM: Brand: ENDOPATH

## (undated) DEVICE — STERILE POLYISOPRENE POWDER-FREE SURGICAL GLOVES: Brand: PROTEXIS

## (undated) DEVICE — 3 RING SUTURE PASSER - 16 CM: Brand: 3 RING SUTURE PASSER - 16 CM

## (undated) DEVICE — COLON KIT: Brand: MEDLINE INDUSTRIES, INC.